# Patient Record
Sex: FEMALE | Race: WHITE | Employment: UNEMPLOYED | URBAN - NONMETROPOLITAN AREA
[De-identification: names, ages, dates, MRNs, and addresses within clinical notes are randomized per-mention and may not be internally consistent; named-entity substitution may affect disease eponyms.]

---

## 2017-07-18 ENCOUNTER — HOSPITAL ENCOUNTER (OUTPATIENT)
Dept: PREADMISSION TESTING | Age: 47
Discharge: HOME | End: 2017-07-18
Payer: MEDICARE

## 2017-12-18 ENCOUNTER — NURSE TRIAGE (OUTPATIENT)
Dept: ADMINISTRATIVE | Age: 47
End: 2017-12-18

## 2017-12-18 NOTE — TELEPHONE ENCOUNTER
Summary: Upper right abdominal pain that wraps around to her back     Upper right abdominal pain that wraps around to her back

## 2017-12-18 NOTE — TELEPHONE ENCOUNTER
Reason for Disposition   [1] MILD-MODERATE pain AND [2] constant AND [3] present > 2 hours    Answer Assessment - Initial Assessment Questions  1. LOCATION: \"Where does it hurt? \"       RUQ pain   2. RADIATION: \"Does the pain shoot anywhere else? \" (e.g., chest, back)     To back   3. ONSET: \"When did the pain begin? \" (e.g., minutes, hours or days ago)       Three days ago. 4. SUDDEN: \"Gradual or sudden onset? \"      Gradual   5. PATTERN \"Does the pain come and go, or is it constant? \"     - If constant: \"Is it getting better, staying the same, or worsening? \"       (Note: Constant means the pain never goes away completely; most serious pain is constant and it progresses)      - If intermittent: \"How long does it last?\" \"Do you have pain now? \"      (Note: Intermittent means the pain goes away completely between bouts)     intermittent   6. SEVERITY: \"How bad is the pain? \"  (e.g., Scale 1-10; mild, moderate, or severe)    - MILD (1-3): doesn't interfere with normal activities, abdomen soft and not tender to touch     - MODERATE (4-7): interferes with normal activities or awakens from sleep, tender to touch     - SEVERE (8-10): excruciating pain, doubled over, unable to do any normal activities       Pain 7 our of 10   7. RECURRENT SYMPTOM: \"Have you ever had this type of abdominal pain before? \" If so, ask: \"When was the last time? \" and \"What happened that time? \"       No   8. CAUSE: What do you think is causing the abdominal pain?      unknown   9. RELIEVING/AGGRAVATING FACTORS: \"What makes it better or worse? \" (e.g., movement, antacids, bowel movement)    Nothing   10. OTHER SYMPTOMS: \"Has there been any vomiting, diarrhea, constipation, or urine problems? \"      Nausea   11. PREGNANCY: \"Is there any chance you are pregnant? \" \"When was your last menstrual period? \"      No    Protocols used: ABDOMINAL PAIN - [facility]

## 2019-01-15 ENCOUNTER — TELEPHONE (OUTPATIENT)
Dept: CARDIOLOGY | Age: 49
End: 2019-01-15

## 2019-01-16 ENCOUNTER — OFFICE VISIT (OUTPATIENT)
Dept: CARDIOLOGY CLINIC | Age: 49
End: 2019-01-16
Payer: MEDICARE

## 2019-01-16 VITALS
HEART RATE: 66 BPM | BODY MASS INDEX: 43.15 KG/M2 | SYSTOLIC BLOOD PRESSURE: 132 MMHG | DIASTOLIC BLOOD PRESSURE: 80 MMHG | WEIGHT: 219.8 LBS | HEIGHT: 60 IN

## 2019-01-16 DIAGNOSIS — R07.9 CHEST PAIN, UNSPECIFIED TYPE: Primary | ICD-10-CM

## 2019-01-16 DIAGNOSIS — R42 DIZZINESS: ICD-10-CM

## 2019-01-16 DIAGNOSIS — I10 ESSENTIAL HYPERTENSION: ICD-10-CM

## 2019-01-16 PROCEDURE — G8417 CALC BMI ABV UP PARAM F/U: HCPCS | Performed by: NUCLEAR MEDICINE

## 2019-01-16 PROCEDURE — 93000 ELECTROCARDIOGRAM COMPLETE: CPT | Performed by: NUCLEAR MEDICINE

## 2019-01-16 PROCEDURE — G8427 DOCREV CUR MEDS BY ELIG CLIN: HCPCS | Performed by: NUCLEAR MEDICINE

## 2019-01-16 PROCEDURE — G8484 FLU IMMUNIZE NO ADMIN: HCPCS | Performed by: NUCLEAR MEDICINE

## 2019-01-16 PROCEDURE — 99204 OFFICE O/P NEW MOD 45 MIN: CPT | Performed by: NUCLEAR MEDICINE

## 2019-01-16 PROCEDURE — 4004F PT TOBACCO SCREEN RCVD TLK: CPT | Performed by: NUCLEAR MEDICINE

## 2019-01-16 RX ORDER — HYDROCHLOROTHIAZIDE 12.5 MG/1
12.5 TABLET ORAL DAILY
Status: ON HOLD | COMMUNITY
End: 2019-10-30 | Stop reason: HOSPADM

## 2019-01-16 ASSESSMENT — ENCOUNTER SYMPTOMS
RECTAL PAIN: 0
VOMITING: 0
ABDOMINAL PAIN: 0
PHOTOPHOBIA: 0
BLOOD IN STOOL: 0
NAUSEA: 0
BACK PAIN: 0
DIARRHEA: 0
SHORTNESS OF BREATH: 1
CHEST TIGHTNESS: 1
ABDOMINAL DISTENTION: 0
CONSTIPATION: 0
ANAL BLEEDING: 0

## 2019-02-04 ENCOUNTER — TELEPHONE (OUTPATIENT)
Dept: CARDIOLOGY CLINIC | Age: 49
End: 2019-02-04

## 2019-02-19 ENCOUNTER — TELEPHONE (OUTPATIENT)
Dept: CARDIOLOGY CLINIC | Age: 49
End: 2019-02-19

## 2019-02-21 ENCOUNTER — HOSPITAL ENCOUNTER (OUTPATIENT)
Age: 49
Discharge: HOME | End: 2019-02-21
Payer: MEDICARE

## 2019-02-21 LAB
AVERAGE GLUCOSE: 108 MG/DL (ref 70–126)
BASOPHILS # BLD: 0.6 %
BASOPHILS ABSOLUTE: 0 THOU/MM3 (ref 0–0.1)
CHOLESTEROL, TOTAL: 186 MG/DL (ref 100–199)
EOSINOPHIL # BLD: 2.2 %
EOSINOPHILS ABSOLUTE: 0.1 THOU/MM3 (ref 0–0.4)
ERYTHROCYTE [DISTWIDTH] IN BLOOD BY AUTOMATED COUNT: 13.2 % (ref 11.5–14.5)
ERYTHROCYTE [DISTWIDTH] IN BLOOD BY AUTOMATED COUNT: 41.1 FL (ref 35–45)
GLUCOSE BLD-MCNC: 96 MG/DL (ref 70–108)
HBA1C MFR BLD: 5.6 % (ref 4.4–6.4)
HCT VFR BLD CALC: 44.2 % (ref 37–47)
HDLC SERPL-MCNC: 54 MG/DL
HEMOGLOBIN: 14.3 GM/DL (ref 12–16)
IMMATURE GRANS (ABS): 0.06 THOU/MM3 (ref 0–0.07)
IMMATURE GRANULOCYTES: 1 %
LDL CHOLESTEROL CALCULATED: 112 MG/DL
LYMPHOCYTES # BLD: 33.1 %
LYMPHOCYTES ABSOLUTE: 2.1 THOU/MM3 (ref 1–4.8)
MCH RBC QN AUTO: 28.3 PG (ref 26–33)
MCHC RBC AUTO-ENTMCNC: 32.4 GM/DL (ref 32.2–35.5)
MCV RBC AUTO: 87.4 FL (ref 81–99)
MONOCYTES # BLD: 6.3 %
MONOCYTES ABSOLUTE: 0.4 THOU/MM3 (ref 0.4–1.3)
NUCLEATED RED BLOOD CELLS: 0 /100 WBC
PLATELET # BLD: 232 THOU/MM3 (ref 130–400)
PMV BLD AUTO: 10.4 FL (ref 9.4–12.4)
RBC # BLD: 5.06 MILL/MM3 (ref 4.2–5.4)
SEG NEUTROPHILS: 56.8 %
SEGMENTED NEUTROPHILS ABSOLUTE COUNT: 3.5 THOU/MM3 (ref 1.8–7.7)
TRIGL SERPL-MCNC: 100 MG/DL (ref 0–199)
TSH SERPL DL<=0.05 MIU/L-ACNC: 2.68 UIU/ML (ref 0.4–4.2)
WBC # BLD: 6.2 THOU/MM3 (ref 4.8–10.8)

## 2019-02-21 PROCEDURE — 82947 ASSAY GLUCOSE BLOOD QUANT: CPT

## 2019-02-21 PROCEDURE — 36415 COLL VENOUS BLD VENIPUNCTURE: CPT

## 2019-02-21 PROCEDURE — 84443 ASSAY THYROID STIM HORMONE: CPT

## 2019-02-21 PROCEDURE — 83036 HEMOGLOBIN GLYCOSYLATED A1C: CPT

## 2019-02-21 PROCEDURE — 85025 COMPLETE CBC W/AUTO DIFF WBC: CPT

## 2019-02-21 PROCEDURE — 80061 LIPID PANEL: CPT

## 2019-03-11 ENCOUNTER — TELEPHONE (OUTPATIENT)
Dept: CARDIOLOGY CLINIC | Age: 49
End: 2019-03-11

## 2019-03-11 ENCOUNTER — HOSPITAL ENCOUNTER (OUTPATIENT)
Dept: NON INVASIVE DIAGNOSTICS | Age: 49
Discharge: HOME | End: 2019-03-11
Payer: MEDICARE

## 2019-03-11 DIAGNOSIS — R07.9 CHEST PAIN, UNSPECIFIED TYPE: ICD-10-CM

## 2019-03-11 DIAGNOSIS — R42 DIZZINESS: ICD-10-CM

## 2019-03-11 DIAGNOSIS — A37.90 PERTUSSIS-LIKE SYNDROME: Primary | ICD-10-CM

## 2019-03-11 LAB
LV EF: 60 %
LVEF MODALITY: NORMAL

## 2019-03-11 PROCEDURE — 2709999900 HC NON-CHARGEABLE SUPPLY

## 2019-03-11 PROCEDURE — 93306 TTE W/DOPPLER COMPLETE: CPT

## 2019-03-11 PROCEDURE — 93660 TILT TABLE EVALUATION: CPT | Performed by: NUCLEAR MEDICINE

## 2019-03-11 PROCEDURE — 93270 REMOTE 30 DAY ECG REV/REPORT: CPT

## 2019-05-23 ENCOUNTER — OFFICE VISIT (OUTPATIENT)
Dept: ENT CLINIC | Age: 49
End: 2019-05-23
Payer: MEDICARE

## 2019-05-23 VITALS
RESPIRATION RATE: 14 BRPM | HEIGHT: 60 IN | DIASTOLIC BLOOD PRESSURE: 80 MMHG | BODY MASS INDEX: 39.95 KG/M2 | SYSTOLIC BLOOD PRESSURE: 130 MMHG | WEIGHT: 203.5 LBS | HEART RATE: 80 BPM | TEMPERATURE: 98 F

## 2019-05-23 DIAGNOSIS — R42 DIZZINESS: Primary | ICD-10-CM

## 2019-05-23 DIAGNOSIS — M26.609 TMJ (TEMPOROMANDIBULAR JOINT SYNDROME): ICD-10-CM

## 2019-05-23 PROCEDURE — 1036F TOBACCO NON-USER: CPT | Performed by: OTOLARYNGOLOGY

## 2019-05-23 PROCEDURE — G8417 CALC BMI ABV UP PARAM F/U: HCPCS | Performed by: OTOLARYNGOLOGY

## 2019-05-23 PROCEDURE — G8427 DOCREV CUR MEDS BY ELIG CLIN: HCPCS | Performed by: OTOLARYNGOLOGY

## 2019-05-23 PROCEDURE — 99204 OFFICE O/P NEW MOD 45 MIN: CPT | Performed by: OTOLARYNGOLOGY

## 2019-05-23 RX ORDER — IBUPROFEN 800 MG/1
800 TABLET ORAL EVERY 6 HOURS PRN
Status: ON HOLD | COMMUNITY
End: 2019-07-19 | Stop reason: HOSPADM

## 2019-05-23 NOTE — PROGRESS NOTES
[address] [place], NOSE [facility]  [address] [address]  Dept: [phone]  Dept Fax: [phone]  Loc: [phone]    Akilah Patel is a 51 y.o. female who was referred Shirin Powers MD for:  Chief Complaint   Patient presents with    Dizziness     New patient is here for dizziness, synocope and choking. Radha Campa HPI:     Akilah Patel is a 51 y.o. female who presents today for evaluation of dizziness which has been going on for about 1 year. She doesn't report true vertigo but does reports what sounds like presyncopal episodes where she feels like she is going to pass out. These last seconds and are worse when she gets up from lying down. Otherwise she can not think of any other associations with it. She denies ever truly losing consciousness but she had at one time fallen. No history of trauma to her head. She describes her dizziness as feeling like she is [place] over. \" She has no history or family history of migraines but on further questioning she is not clear whether she gets headaches. She doesn't report that bright lights or loud noises make her dizzy, however she does feel that bright lights makes her feel \"queasy. \"  She was worked up by her cardiologist in a tilt table test was negative. She also reports that she had a Holter monitor for arrhythmias but she self-reports that it malfunctioned and she did not wear all the time. She has no prior history of ear disease or ear surgeries. No hearing loss, tinnitus, otorrhea or otalgia. She does report left TMJ pain with chewing. She is a history of reflux with globus sensation and throat clearing. History:      Allergies   Allergen Reactions    Codeine Rash    Dilaudid [Hydromorphone Hcl] Rash     Current Outpatient Medications   Medication Sig Dispense Refill    ibuprofen (ADVIL;MOTRIN) 800 MG tablet Take 800 mg by mouth every 6 hours as needed for Pain  Cyclobenzaprine HCl (FLEXERIL PO) Take by mouth      PHENTERMINE HCL PO Take by mouth      hydrochlorothiazide (HYDRODIURIL) 12.5 MG tablet Take 12.5 mg by mouth daily       No current facility-administered medications for this visit. Past Medical History:   Diagnosis Date    Arthritis     Back disorder     spine     GERD (gastroesophageal reflux disease)       Past Surgical History:   Procedure Laterality Date    BLADDER SUSPENSION      BREAST REDUCTION SURGERY      CHOLECYSTECTOMY      COLONOSCOPY      DILATION AND CURETTAGE OF UTERUS      ENDOSCOPY, COLON, DIAGNOSTIC      HYSTERECTOMY      SPINE SURGERY      ACDF     Family History   Problem Relation Age of Onset    Ulcerative Colitis Mother     Cancer Father         skin    High Blood Pressure Father     Diabetes Father      Social History     Tobacco Use    Smoking status: Former Smoker     Packs/day: 2.00     Years: 20.00     Pack years: 40.00     Types: Cigarettes     Last attempt to quit: 2017     Years since quittin.3    Smokeless tobacco: Never Used   Substance Use Topics    Alcohol use: No       Subjective:      Review of Systems   Constitutional: Negative. HENT: Positive for trouble swallowing. Negative for ear discharge, ear pain, facial swelling, hearing loss, nosebleeds, postnasal drip, rhinorrhea, sinus pressure, sinus pain, sore throat, tinnitus and voice change. Eyes: Negative for itching and visual disturbance. Respiratory: Negative for cough, shortness of breath and stridor. Gastrointestinal: Negative for nausea and vomiting. Musculoskeletal: Negative for neck pain and neck stiffness. Skin: Negative for color change and wound. Allergic/Immunologic: Negative for environmental allergies and immunocompromised state. Neurological: Positive for dizziness and light-headedness. Negative for facial asymmetry. Psychiatric/Behavioral: Negative for behavioral problems and hallucinations. All other systems reviewed and are negative. Rest of review of systems are negative, except as noted in HPI. Objective:   /80 (Site: Left Upper Arm, Position: Sitting)   Pulse 80   Temp 98 °F (36.7 °C) (Oral)   Resp 14   Ht 5' (1.524 m)   Wt 203 lb 8 oz (92.3 kg)   BMI 39.74 kg/m²     PHYSICAL EXAM  Constitutional: He is oriented to person, place, and time. He appears well-developed and well-nourished. No distress. HENT:   Head: Normocephalic and atraumatic. Right Ear: External ear normal. Ear canal clear. Tympanic membrane intact. Middle ear aerated. Left Ear: External ear normal. Ear canal clear. Tympanic membrane intact. Middle ear aerated. Nose: External nose normal.  Nasal mucosa normal.  No lesions noted. Mouth/Throat: Fair dentition. Oral cavity mucosa normal, no masses or lesions noted. Oropharynx is clear and moist.   Eyes: Pupils are equal, round, and reactive to light. Conjunctivae and EOM are normal.   Neck: Normal range of motion. Neck supple. No JVD present. No tracheal deviation present. No thyromegaly present. No cervical lymphadenopathy noted. Well healed neck incision from ACDF surgery. Cardiovascular: Normal rate. Pulmonary/Chest: Effort normal. No stridor or stertor. No respiratory distress. Musculoskeletal: Normal range of motion. He exhibits no edema or Lymphadenopathy. Neurological: He is alert and oriented to person, place, and time. Cranial nerve II-XII grossly intact. Skin: Skin is warm. No erythema. Psychiatric: Normal mood and affect. Behavior is normal.   Vitals reviewed. No spontaneous or gaze evoked nystagmus. Normal gait. Left sided tenderness to palpation, and clicking. Data:  All of the past medical history, past surgical history, family history,social history, allergies and current medications were reviewed with the patient. Assessment & Plan   Diagnoses and all orders for this visit:     Diagnosis Orders   1.  Dizziness Audiometry with tympanometry    Videonystagmography   2. TMJ (temporomandibular joint syndrome)       52-year-old female with subjective symptoms of dizziness which is worse from standing up from lying down. She does not report true vertigo but more of episodes of presyncope, like she feels like she is going to pass out. She has already been worked up by her cardiologist and had a negative tilt table test and no signs of arrhythmia. From a systematic standpoint it would be reasonable to perform vestibular testing to see if there are any objective data showing any pathologies of her inner ear. We will also obtain an audiogram and tympanometry to complete the evaluation. I discussed that finding a diagnosis can be difficult and would not be done on our first visit. I gave her education on inner ear causes of dizziness and discussed keeping a dizzy log. If vestibular testing is negative, the next approach may be sending her to neurology to rule out a central cause. She does report that bright lights does seem to make her feel \"queasy\" and she'll pay more attention to associations with her dizziness. If we do not find a cause of her symptoms she still may benefit from physical therapy and in fact she is currently undergoing this for her chronic musculoskeletal issues. I gave her handouts on home exercises she can do to strengthen her inner ear. Follow-up in 6 weeks with our NP or PA after she obtains her hearing and balance testing. A total of 45 minutes were spent face-to-face with the patient during this encounter and over half of that time was spent on counseling and coordination of care. This includes education on causes of dizziness, and our systematic method to help find a diagnosis. We also discussed ways to cope, and the multifactorial nature of dizziness. The findings were explained and her questions were answered.         Return in about 6 weeks (around 7/4/2019) for Evaluation of dizziness following audio/tymp, and VNG. Gregory Bangura MD    **This report has been created using voice recognition software. It may contain minor errors which are inherent in voice recognition technology. **

## 2019-05-24 ASSESSMENT — ENCOUNTER SYMPTOMS
SORE THROAT: 0
COLOR CHANGE: 0
COUGH: 0
TROUBLE SWALLOWING: 1
VOICE CHANGE: 0
RHINORRHEA: 0
NAUSEA: 0
VOMITING: 0
EYE ITCHING: 0
SINUS PAIN: 0
STRIDOR: 0
SHORTNESS OF BREATH: 0
SINUS PRESSURE: 0
FACIAL SWELLING: 0

## 2019-06-05 ENCOUNTER — HOSPITAL ENCOUNTER (OUTPATIENT)
Dept: AUDIOLOGY | Age: 49
Discharge: HOME | End: 2019-06-05
Payer: MEDICARE

## 2019-06-05 PROCEDURE — 92540 BASIC VESTIBULAR EVALUATION: CPT | Performed by: AUDIOLOGIST

## 2019-06-05 PROCEDURE — 92537 CALORIC VSTBLR TEST W/REC: CPT | Performed by: AUDIOLOGIST

## 2019-06-05 PROCEDURE — 92557 COMPREHENSIVE HEARING TEST: CPT | Performed by: AUDIOLOGIST

## 2019-06-05 PROCEDURE — 92567 TYMPANOMETRY: CPT | Performed by: AUDIOLOGIST

## 2019-06-05 NOTE — PROGRESS NOTES
ACCOUNT #: [de-identified]                                    AUDIOLOGICAL EVALUATION WITH VNG      MEDICATIONS REVIEWED:  Patient has held appropriate medications for VNG testing. CHIEF COMPLAINT:  Patient reported dizziness with near syncope when laying down, getting up, and with quick movements. She has experienced symptoms for several months. She feels like she is going to faint when she has an episode. She also has left otalgia. OTOSCOPY: clear EAC's. AUDIOGRAM        Reliability: Good  Audiometer Used:  GSI-61    PURE TONES     RE    LE     [x]   [x] WNL        []   [] Mild    []   [] Moderate       []   [] Mod-Severe   []   [] Severe    []   [] Profound      SPEECH AUDIOMETRY   Right Left Sound Field Aided   PTA 7 7     SRT 0 0     SAT       MASKING       % WRS   QUIET 100 100      30 SL 30 SL     %WRS   NOISE              MCL       UCL            Live Voice  [x]     Recorded  []     List   []     WORD RECOGNITION   RE    LE  [x]   [x]  Excellent    []   []  Good  []   [] Fair  []   [] Poor  []   [] Very Poor    TYMPANOGRAMS  RE    LE  [x]   [x]  Normal compliance    []   []  Reduced mobility  []   [] Hyper mobility  [x]   [x] Normal middle ear pressure  []   [] Negative middle ear pressure  []   [] Positive middle ear pressure  []   [] Flat w/normal ECV  []   [] Flat w/large ECV  []   [] Patent PE tube  []   [] Non-Patent PE tube  []   [] Could Not Test       VNG RESULTS:    SACCADES: WNL  TRACKING: WNL  OPTOKINETICS:  WNL  GAZE: WNL  HALLPIKE MANEUVER:  BPPV BILATERALLY. RESPONSE WAS STRONGER WITH THE LEFT EAR UNDERNEATH. NYSTAGMUS WAS ROTATORY WITH AND OCCURRED AFTER 16 SEC LASTING APPROXIMATELY 10 SECONDS. RESPONSE DID FATIGUE. POSITIONAL: WNL  HEADSHAKE TEST: WNL  CALORIC TESTING: WNL  PARVEEN' SOT:WNL    IMPRESSIONS/RECOMMENDATION(S):    1. VNG REVEALED BPPV (STRONGER WITH THE LEFT EAR DOWN). ALL OTHER RESULTS WERE NEGATIVE. VESTIBULAR REHABILITATION WITH A REPOSITIONING MANEUVER IS RECOMMENDED.

## 2019-07-10 ENCOUNTER — HOSPITAL ENCOUNTER (OUTPATIENT)
Dept: CT IMAGING | Age: 49
Discharge: HOME | End: 2019-07-10
Payer: MEDICARE

## 2019-07-10 DIAGNOSIS — R19.7 DIARRHEA, UNSPECIFIED TYPE: ICD-10-CM

## 2019-07-10 DIAGNOSIS — R10.30 LOWER ABDOMINAL PAIN: ICD-10-CM

## 2019-07-10 PROCEDURE — 6360000004 HC RX CONTRAST MEDICATION: Performed by: INTERNAL MEDICINE

## 2019-07-10 PROCEDURE — 74177 CT ABD & PELVIS W/CONTRAST: CPT

## 2019-07-10 RX ADMIN — IOPAMIDOL 85 ML: 755 INJECTION, SOLUTION INTRAVENOUS at 09:07

## 2019-07-10 RX ADMIN — IOHEXOL 50 ML: 240 INJECTION, SOLUTION INTRATHECAL; INTRAVASCULAR; INTRAVENOUS; ORAL at 09:09

## 2019-07-17 ENCOUNTER — HOSPITAL ENCOUNTER (OUTPATIENT)
Age: 49
Discharge: HOME | End: 2019-07-17
Payer: MEDICARE

## 2019-07-17 LAB
ANION GAP SERPL CALCULATED.3IONS-SCNC: 11 MEQ/L (ref 8–16)
BUN BLDV-MCNC: 22 MG/DL (ref 7–22)
CALCIUM SERPL-MCNC: 9.4 MG/DL (ref 8.5–10.5)
CHLORIDE BLD-SCNC: 104 MEQ/L (ref 98–111)
CO2: 27 MEQ/L (ref 23–33)
CREAT SERPL-MCNC: 0.9 MG/DL (ref 0.4–1.2)
ERYTHROCYTE [DISTWIDTH] IN BLOOD BY AUTOMATED COUNT: 13.6 % (ref 11.5–14.5)
ERYTHROCYTE [DISTWIDTH] IN BLOOD BY AUTOMATED COUNT: 43.4 FL (ref 35–45)
GFR SERPL CREATININE-BSD FRML MDRD: 67 ML/MIN/1.73M2
GLUCOSE BLD-MCNC: 94 MG/DL (ref 70–108)
HCT VFR BLD CALC: 44.3 % (ref 37–47)
HEMOGLOBIN: 14.6 GM/DL (ref 12–16)
MCH RBC QN AUTO: 29 PG (ref 26–33)
MCHC RBC AUTO-ENTMCNC: 33 GM/DL (ref 32.2–35.5)
MCV RBC AUTO: 87.9 FL (ref 81–99)
PLATELET # BLD: 229 THOU/MM3 (ref 130–400)
PMV BLD AUTO: 10.6 FL (ref 9.4–12.4)
POTASSIUM SERPL-SCNC: 4.7 MEQ/L (ref 3.5–5.2)
RBC # BLD: 5.04 MILL/MM3 (ref 4.2–5.4)
SODIUM BLD-SCNC: 142 MEQ/L (ref 135–145)
WBC # BLD: 6 THOU/MM3 (ref 4.8–10.8)

## 2019-07-17 PROCEDURE — 80048 BASIC METABOLIC PNL TOTAL CA: CPT

## 2019-07-17 PROCEDURE — 85027 COMPLETE CBC AUTOMATED: CPT

## 2019-07-17 PROCEDURE — 36415 COLL VENOUS BLD VENIPUNCTURE: CPT

## 2019-07-18 ENCOUNTER — OFFICE VISIT (OUTPATIENT)
Dept: ENT CLINIC | Age: 49
End: 2019-07-18
Payer: MEDICARE

## 2019-07-18 VITALS
SYSTOLIC BLOOD PRESSURE: 130 MMHG | HEIGHT: 60 IN | DIASTOLIC BLOOD PRESSURE: 76 MMHG | TEMPERATURE: 98.9 F | WEIGHT: 206 LBS | RESPIRATION RATE: 14 BRPM | BODY MASS INDEX: 40.44 KG/M2 | HEART RATE: 74 BPM

## 2019-07-18 DIAGNOSIS — R42 EPISODIC LIGHTHEADEDNESS: ICD-10-CM

## 2019-07-18 DIAGNOSIS — M26.609 TMJ (TEMPOROMANDIBULAR JOINT SYNDROME): ICD-10-CM

## 2019-07-18 DIAGNOSIS — H81.12 BENIGN PAROXYSMAL POSITIONAL VERTIGO OF LEFT EAR: Primary | ICD-10-CM

## 2019-07-18 PROCEDURE — 95992 CANALITH REPOSITIONING PROC: CPT | Performed by: OTOLARYNGOLOGY

## 2019-07-18 PROCEDURE — G8417 CALC BMI ABV UP PARAM F/U: HCPCS | Performed by: OTOLARYNGOLOGY

## 2019-07-18 PROCEDURE — 99213 OFFICE O/P EST LOW 20 MIN: CPT | Performed by: OTOLARYNGOLOGY

## 2019-07-18 PROCEDURE — G8427 DOCREV CUR MEDS BY ELIG CLIN: HCPCS | Performed by: OTOLARYNGOLOGY

## 2019-07-18 PROCEDURE — 1036F TOBACCO NON-USER: CPT | Performed by: OTOLARYNGOLOGY

## 2019-07-18 ASSESSMENT — ENCOUNTER SYMPTOMS
VOICE CHANGE: 0
CHOKING: 0
SORE THROAT: 0
APNEA: 0
FACIAL SWELLING: 0
NAUSEA: 0
ABDOMINAL PAIN: 0
COLOR CHANGE: 0
STRIDOR: 0
WHEEZING: 0
SINUS PRESSURE: 0
RHINORRHEA: 0
VOMITING: 0
SHORTNESS OF BREATH: 0
DIARRHEA: 0
CHEST TIGHTNESS: 0
TROUBLE SWALLOWING: 0
COUGH: 0

## 2019-07-18 NOTE — PROGRESS NOTES
Problem Relation Age of Onset    Ulcerative Colitis Mother     Cancer Father         skin    High Blood Pressure Father     Diabetes Father      Social History     Tobacco Use    Smoking status: Former Smoker     Packs/day: 2.00     Years: 20.00     Pack years: 40.00     Types: Cigarettes     Last attempt to quit: 2017     Years since quittin.5    Smokeless tobacco: Never Used   Substance Use Topics    Alcohol use: No       Subjective:      Review of Systems   Constitutional: Negative for activity change, appetite change, chills, diaphoresis, fatigue, fever and unexpected weight change. HENT: Negative for congestion, dental problem, ear discharge, ear pain, facial swelling, hearing loss, mouth sores, nosebleeds, postnasal drip, rhinorrhea, sinus pressure, sneezing, sore throat, tinnitus, trouble swallowing and voice change. Eyes: Negative for visual disturbance. Respiratory: Negative for apnea, cough, choking, chest tightness, shortness of breath, wheezing and stridor. Cardiovascular: Negative for chest pain, palpitations and leg swelling. Gastrointestinal: Negative for abdominal pain, diarrhea, nausea and vomiting. Endocrine: Negative for cold intolerance, heat intolerance, polydipsia and polyuria. Genitourinary: Negative for dysuria, enuresis and hematuria. Musculoskeletal: Negative for arthralgias, gait problem, neck pain and neck stiffness. Skin: Negative for color change and rash. Allergic/Immunologic: Negative for environmental allergies, food allergies and immunocompromised state. Neurological: Positive for light-headedness. Negative for dizziness, syncope, facial asymmetry, speech difficulty and headaches. Hematological: Negative for adenopathy. Does not bruise/bleed easily. Psychiatric/Behavioral: Negative for confusion and sleep disturbance. The patient is not nervous/anxious.         Objective:   /76 (Site: Left Upper Arm, Position: Sitting)   Pulse 74

## 2019-07-18 NOTE — LETTER
[address] and Throat  [address] [facility]  Phone: [phone]  Fax: [address], [place]        August 18, 2019    Whit Ramirez [address] [facility]  Ludmila Degree    Patient: Richard Jarquin   MR Number: [id number]   YOB: 1970   Date of Visit: 7/18/2019     Dear Long Castillo,    I recently saw your patient, Richard Jarquin, regarding her dizziness. She has 2 types of dizziness. The VNG testing demonstrated that she did have BPPV and I performed an Epley maneuver for her left posterior semicircular canal..     Below are the relevant portions of my assessment and plan of care. Assessment & Plan   Diagnoses and all orders for this visit:     Diagnosis Orders   1. Benign paroxysmal positional vertigo of left ear     2. Episodic lightheadedness     3. TMJ (temporomandibular joint syndrome)         The findings were explained and her questions were answered. I explained this had about an 80% chance of controlling the vertigo and would not necessarily help the lightheadedness. She was told not to lie flat for approximately 48 hours    Return in about 6 weeks (around 8/29/2019) for Follow-Up BPPV after Epley. If you have questions, please do not hesitate to call me. I look forward to following Hieu Graham along with you.     Sincerely,          Raf Solis MD

## 2019-07-19 ENCOUNTER — HOSPITAL ENCOUNTER (OUTPATIENT)
Age: 49
Setting detail: OUTPATIENT SURGERY
Discharge: HOME | End: 2019-07-19
Attending: INTERNAL MEDICINE | Admitting: INTERNAL MEDICINE
Payer: MEDICARE

## 2019-07-19 ENCOUNTER — ANESTHESIA EVENT (OUTPATIENT)
Dept: ENDOSCOPY | Age: 49
End: 2019-07-19
Payer: MEDICARE

## 2019-07-19 ENCOUNTER — ANESTHESIA (OUTPATIENT)
Dept: ENDOSCOPY | Age: 49
End: 2019-07-19
Payer: MEDICARE

## 2019-07-19 VITALS
HEART RATE: 63 BPM | OXYGEN SATURATION: 98 % | HEIGHT: 60 IN | SYSTOLIC BLOOD PRESSURE: 153 MMHG | WEIGHT: 206.6 LBS | DIASTOLIC BLOOD PRESSURE: 97 MMHG | BODY MASS INDEX: 40.56 KG/M2 | TEMPERATURE: 97.8 F | RESPIRATION RATE: 16 BRPM

## 2019-07-19 VITALS
SYSTOLIC BLOOD PRESSURE: 183 MMHG | OXYGEN SATURATION: 99 % | RESPIRATION RATE: 9 BRPM | DIASTOLIC BLOOD PRESSURE: 103 MMHG

## 2019-07-19 PROCEDURE — 2580000003 HC RX 258: Performed by: INTERNAL MEDICINE

## 2019-07-19 PROCEDURE — 3700000000 HC ANESTHESIA ATTENDED CARE: Performed by: INTERNAL MEDICINE

## 2019-07-19 PROCEDURE — 3609012400 HC EGD TRANSORAL BIOPSY SINGLE/MULTIPLE: Performed by: INTERNAL MEDICINE

## 2019-07-19 PROCEDURE — 3609012700 HC EGD DILATION SAVORY: Performed by: INTERNAL MEDICINE

## 2019-07-19 PROCEDURE — 2709999900 HC NON-CHARGEABLE SUPPLY: Performed by: INTERNAL MEDICINE

## 2019-07-19 PROCEDURE — 3700000001 HC ADD 15 MINUTES (ANESTHESIA): Performed by: INTERNAL MEDICINE

## 2019-07-19 PROCEDURE — 7100000000 HC PACU RECOVERY - FIRST 15 MIN: Performed by: INTERNAL MEDICINE

## 2019-07-19 PROCEDURE — 7100000001 HC PACU RECOVERY - ADDTL 15 MIN: Performed by: INTERNAL MEDICINE

## 2019-07-19 PROCEDURE — 88305 TISSUE EXAM BY PATHOLOGIST: CPT

## 2019-07-19 PROCEDURE — 2500000003 HC RX 250 WO HCPCS: Performed by: SPECIALIST

## 2019-07-19 PROCEDURE — 6360000002 HC RX W HCPCS: Performed by: SPECIALIST

## 2019-07-19 PROCEDURE — 86677 HELICOBACTER PYLORI ANTIBODY: CPT

## 2019-07-19 RX ORDER — SODIUM CHLORIDE 450 MG/100ML
INJECTION, SOLUTION INTRAVENOUS CONTINUOUS
Status: DISCONTINUED | OUTPATIENT
Start: 2019-07-19 | End: 2019-07-19 | Stop reason: HOSPADM

## 2019-07-19 RX ORDER — GLYCOPYRROLATE 1 MG/5 ML
SYRINGE (ML) INTRAVENOUS PRN
Status: DISCONTINUED | OUTPATIENT
Start: 2019-07-19 | End: 2019-07-19 | Stop reason: SDUPTHER

## 2019-07-19 RX ORDER — LIDOCAINE HYDROCHLORIDE 10 MG/ML
INJECTION, SOLUTION INFILTRATION; PERINEURAL PRN
Status: DISCONTINUED | OUTPATIENT
Start: 2019-07-19 | End: 2019-07-19 | Stop reason: SDUPTHER

## 2019-07-19 RX ORDER — PROPOFOL 10 MG/ML
INJECTION, EMULSION INTRAVENOUS PRN
Status: DISCONTINUED | OUTPATIENT
Start: 2019-07-19 | End: 2019-07-19 | Stop reason: SDUPTHER

## 2019-07-19 RX ADMIN — SODIUM CHLORIDE: 4.5 INJECTION, SOLUTION INTRAVENOUS at 10:22

## 2019-07-19 RX ADMIN — PROPOFOL 230 MG: 10 INJECTION, EMULSION INTRAVENOUS at 10:37

## 2019-07-19 RX ADMIN — Medication 0.1 MG: at 10:37

## 2019-07-19 RX ADMIN — LIDOCAINE HYDROCHLORIDE 100 MG: 10 INJECTION, SOLUTION INFILTRATION; PERINEURAL at 10:37

## 2019-07-19 ASSESSMENT — PAIN - FUNCTIONAL ASSESSMENT: PAIN_FUNCTIONAL_ASSESSMENT: 0-10

## 2019-07-19 ASSESSMENT — PAIN DESCRIPTION - DESCRIPTORS: DESCRIPTORS: ACHING

## 2019-07-19 NOTE — OP NOTE
#5.  The patient also had grade 2 esophagitis noted as  shown in picture #2. Multiple biopsies obtained to rule out Baptiste's  epithelium. Scope was advanced down through the distal esophagus  through the stomach, into the second portion of duodenum. On withdrawal  of the scope, the duodenum looks normal as shown in picture #10. In the  antrum, patchy focal erythema consistent with antral gastritis noted as  shown in pictures #8 and #9. Biopsies obtained for CLOtest.  Lesser and  greater curvature and body of the stomach looks normal as shown in  picture #7. On retroflex, fundus looks normal as shown in picture #6. In the distal esophagus, again grade 2 esophagitis with benign distal  esophageal stricture noted. The scope was advanced into the second portion of duodenum. Guidewire  was passed through the scope. Tip of the guidewire was placed in the  antrum of the stomach. Scope was removed while advancing the guidewire. #5 markings noted at the bite block after complete removal of the scope. #54-Wallisian and #60-Wallisian American dilators were advanced over the  guidewire and the esophageal stricture was dilated serially. After the  60-Wallisian American dilator was used to dilate the stricture, the dilator  and guidewire were removed out as a unit. No immediate complications  noted. The patient tolerated the procedure well without any immediate  complications. Vitals including blood pressure, heart rate, and pulse  ox were stable during the procedure and after the procedure. IMPRESSION:  1. Esophagogastroduodenoscopy to the second portion of duodenum:   Antral gastritis. Biopsies obtained. Grade 2 esophagitis. Biopsies  obtained to rule out Baptiste's epithelium. 2.  Distal esophageal stricture, status post dilation with #54 and  #60-Wallisian American dilators. RECOMMENDATIONS:  Antireflux instructions. Soft diet today. Pantoprazole 40 mg p.o. q.a.m.   Call my office in less than two

## 2019-07-19 NOTE — PROGRESS NOTES
EGD with dilation complete. 54Fr and 60Fr American dilators used. Patient tolerated well. Photos taken. Biopsies taken. Two specimen jars labeled and sent to lab.

## 2019-07-20 LAB — UREASE-CLO-C. PYLORI: NEGATIVE

## 2019-07-22 ENCOUNTER — OFFICE VISIT (OUTPATIENT)
Dept: CARDIOLOGY CLINIC | Age: 49
End: 2019-07-22
Payer: MEDICARE

## 2019-07-22 VITALS
SYSTOLIC BLOOD PRESSURE: 110 MMHG | DIASTOLIC BLOOD PRESSURE: 80 MMHG | HEIGHT: 60 IN | BODY MASS INDEX: 41.03 KG/M2 | WEIGHT: 209 LBS | HEART RATE: 60 BPM

## 2019-07-22 DIAGNOSIS — R00.2 PALPITATION: ICD-10-CM

## 2019-07-22 DIAGNOSIS — I10 ESSENTIAL HYPERTENSION: Primary | ICD-10-CM

## 2019-07-22 PROBLEM — M54.2 NECK PAIN: Status: ACTIVE | Noted: 2018-09-12

## 2019-07-22 PROCEDURE — G8417 CALC BMI ABV UP PARAM F/U: HCPCS | Performed by: NUCLEAR MEDICINE

## 2019-07-22 PROCEDURE — 1036F TOBACCO NON-USER: CPT | Performed by: NUCLEAR MEDICINE

## 2019-07-22 PROCEDURE — G8427 DOCREV CUR MEDS BY ELIG CLIN: HCPCS | Performed by: NUCLEAR MEDICINE

## 2019-07-22 PROCEDURE — 99213 OFFICE O/P EST LOW 20 MIN: CPT | Performed by: NUCLEAR MEDICINE

## 2019-07-22 RX ORDER — PANTOPRAZOLE SODIUM 40 MG/1
40 TABLET, DELAYED RELEASE ORAL
Status: ON HOLD | COMMUNITY
Start: 2019-07-19 | End: 2019-10-30 | Stop reason: HOSPADM

## 2019-07-22 NOTE — PROGRESS NOTES
tablet 40 mg       No current facility-administered medications for this visit. Allergies   Allergen Reactions    Codeine Rash    Dilaudid [Hydromorphone Hcl] Rash     Health Maintenance   Topic Date Due    HIV screen  01/14/1986    DTaP/Tdap/Td vaccine (1 - Tdap) 01/14/1990    Cervical cancer screen  01/14/1992    Flu vaccine (1) 09/01/2019    Potassium monitoring  07/17/2020    Creatinine monitoring  07/17/2020    Lipid screen  02/21/2024    Pneumococcal 0-64 years Vaccine  Aged Out       Subjective:  Review of Systems  General:   No fever, no chills, No fatigue or weight loss  Pulmonary:    No dyspnea, no wheezing  Cardiac:    Denies recent chest pain,   GI:     No nausea or vomiting, no abdominal pain  Neuro:    some dizziness or light headedness,   Musculoskeletal:  No recent active issues  Extremities:   No edema, good peripheral pulses      Objective:  Physical Exam  /80   Pulse 60   Ht 5' (1.524 m)   Wt 209 lb (94.8 kg)   BMI 40.82 kg/m²   General:   Well developed, well nourished  Lungs:   Clear to auscultation  Heart:    Normal S1 S2, Slight murmur. no rubs, no gallops  Abdomen:   Soft, non tender, no organomegalies, positive bowel sounds  Extremities:   No edema, no cyanosis, good peripheral pulses  Neurological:   Awake, alert, oriented. No obvious focal deficits  Musculoskelatal:  No obvious deformities    Assessment:      Diagnosis Orders   1. Essential hypertension     2. Palpitation     cardiac fair for now     Plan:  No follow-ups on file. As above  Continue risk factor modification and medical management  Thank you for allowing me to participate in the care of your patient. Please don't hesitate to contact me regarding any further issues related to the patient care    Orders Placed:  No orders of the defined types were placed in this encounter. Medications Prescribed:  No orders of the defined types were placed in this encounter.          Discussed use, benefit, and side effects of prescribed medications. All patient questions answered. Pt voicedunderstanding. Instructed to continue current medications, diet and exercise. Continue risk factor modification and medical management. Patient agreed with treatment plan. Follow up as directed.     Electronically signedby Humberto Carbajal MD on 7/22/2019 at 12:52 PM

## 2019-08-30 ENCOUNTER — HOSPITAL ENCOUNTER (OUTPATIENT)
Dept: GENERAL RADIOLOGY | Age: 49
Discharge: HOME | End: 2019-08-30
Payer: MEDICARE

## 2019-08-30 DIAGNOSIS — R19.7 DIARRHEA, UNSPECIFIED TYPE: ICD-10-CM

## 2019-08-30 DIAGNOSIS — R11.0 NAUSEA: ICD-10-CM

## 2019-08-30 PROCEDURE — 2500000003 HC RX 250 WO HCPCS: Performed by: INTERNAL MEDICINE

## 2019-08-30 PROCEDURE — 74250 X-RAY XM SM INT 1CNTRST STD: CPT

## 2019-08-30 RX ADMIN — BARIUM SULFATE 600 ML: 240 SUSPENSION ORAL at 09:34

## 2019-10-23 ENCOUNTER — HOSPITAL ENCOUNTER (INPATIENT)
Age: 49
LOS: 7 days | Discharge: NURSING, REHAB OR LONG-TERM CARE | DRG: 862 | End: 2019-10-30
Attending: PHYSICAL MEDICINE & REHABILITATION | Admitting: PHYSICAL MEDICINE & REHABILITATION
Payer: MEDICARE

## 2019-10-23 DIAGNOSIS — Z98.1 HISTORY OF LUMBAR FUSION: Primary | ICD-10-CM

## 2019-10-23 LAB
CREAT SERPL-MCNC: 0.6 MG/DL (ref 0.4–1.2)
GFR SERPL CREATININE-BSD FRML MDRD: > 90 ML/MIN/1.73M2
MRSA SCREEN RT-PCR: NEGATIVE
VANCOMYCIN RESISTANT ENTEROCOCCUS: NEGATIVE

## 2019-10-23 PROCEDURE — 99223 1ST HOSP IP/OBS HIGH 75: CPT | Performed by: PHYSICAL MEDICINE & REHABILITATION

## 2019-10-23 PROCEDURE — 36415 COLL VENOUS BLD VENIPUNCTURE: CPT

## 2019-10-23 PROCEDURE — 87081 CULTURE SCREEN ONLY: CPT

## 2019-10-23 PROCEDURE — 1180000000 HC REHAB R&B

## 2019-10-23 PROCEDURE — 82565 ASSAY OF CREATININE: CPT

## 2019-10-23 PROCEDURE — 2709999900 HC NON-CHARGEABLE SUPPLY

## 2019-10-23 PROCEDURE — 6370000000 HC RX 637 (ALT 250 FOR IP): Performed by: PHYSICAL MEDICINE & REHABILITATION

## 2019-10-23 PROCEDURE — 87641 MR-STAPH DNA AMP PROBE: CPT

## 2019-10-23 PROCEDURE — 87500 VANOMYCIN DNA AMP PROBE: CPT

## 2019-10-23 PROCEDURE — 6360000002 HC RX W HCPCS: Performed by: PHYSICAL MEDICINE & REHABILITATION

## 2019-10-23 RX ORDER — SENNA PLUS 8.6 MG/1
2 TABLET ORAL NIGHTLY
Status: DISCONTINUED | OUTPATIENT
Start: 2019-10-23 | End: 2019-10-23

## 2019-10-23 RX ORDER — METHYLPREDNISOLONE 4 MG/1
8 TABLET ORAL DAILY
Status: COMPLETED | OUTPATIENT
Start: 2019-10-23 | End: 2019-10-25

## 2019-10-23 RX ORDER — BISACODYL 10 MG
10 SUPPOSITORY, RECTAL RECTAL DAILY PRN
Status: DISCONTINUED | OUTPATIENT
Start: 2019-10-23 | End: 2019-10-30 | Stop reason: HOSPADM

## 2019-10-23 RX ORDER — METHYLPREDNISOLONE 4 MG/1
4 TABLET ORAL DAILY
Status: DISCONTINUED | OUTPATIENT
Start: 2019-10-26 | End: 2019-10-30 | Stop reason: HOSPADM

## 2019-10-23 RX ORDER — HYDROCHLOROTHIAZIDE 25 MG/1
12.5 TABLET ORAL DAILY
Status: DISCONTINUED | OUTPATIENT
Start: 2019-10-23 | End: 2019-10-23

## 2019-10-23 RX ORDER — ACETAMINOPHEN 325 MG/1
650 TABLET ORAL EVERY 4 HOURS PRN
Status: DISCONTINUED | OUTPATIENT
Start: 2019-10-23 | End: 2019-10-30 | Stop reason: HOSPADM

## 2019-10-23 RX ORDER — M-VIT,TX,IRON,MINS/CALC/FOLIC 27MG-0.4MG
1 TABLET ORAL DAILY
Status: DISCONTINUED | OUTPATIENT
Start: 2019-10-23 | End: 2019-10-30 | Stop reason: HOSPADM

## 2019-10-23 RX ORDER — METHYLPREDNISOLONE 4 MG/1
4 TABLET ORAL 4 TIMES DAILY
Status: DISCONTINUED | OUTPATIENT
Start: 2019-10-23 | End: 2019-10-23

## 2019-10-23 RX ORDER — DOCUSATE SODIUM 100 MG/1
100 CAPSULE, LIQUID FILLED ORAL DAILY
Status: DISCONTINUED | OUTPATIENT
Start: 2019-10-23 | End: 2019-10-30 | Stop reason: HOSPADM

## 2019-10-23 RX ORDER — PANTOPRAZOLE SODIUM 40 MG/1
40 TABLET, DELAYED RELEASE ORAL
Status: DISCONTINUED | OUTPATIENT
Start: 2019-10-24 | End: 2019-10-30 | Stop reason: HOSPADM

## 2019-10-23 RX ORDER — ONDANSETRON 4 MG/1
4 TABLET, FILM COATED ORAL EVERY 8 HOURS PRN
Status: DISCONTINUED | OUTPATIENT
Start: 2019-10-23 | End: 2019-10-30 | Stop reason: HOSPADM

## 2019-10-23 RX ORDER — CYCLOBENZAPRINE HCL 10 MG
10 TABLET ORAL 3 TIMES DAILY PRN
Status: DISCONTINUED | OUTPATIENT
Start: 2019-10-23 | End: 2019-10-30 | Stop reason: HOSPADM

## 2019-10-23 RX ORDER — VITAMIN B COMPLEX
2000 TABLET ORAL DAILY
Status: DISCONTINUED | OUTPATIENT
Start: 2019-10-23 | End: 2019-10-30 | Stop reason: HOSPADM

## 2019-10-23 RX ORDER — OXYCODONE HYDROCHLORIDE AND ACETAMINOPHEN 5; 325 MG/1; MG/1
1 TABLET ORAL EVERY 6 HOURS PRN
Status: DISCONTINUED | OUTPATIENT
Start: 2019-10-23 | End: 2019-10-23

## 2019-10-23 RX ORDER — GABAPENTIN 300 MG/1
300 CAPSULE ORAL 3 TIMES DAILY
Status: DISCONTINUED | OUTPATIENT
Start: 2019-10-23 | End: 2019-10-23

## 2019-10-23 RX ORDER — DOCUSATE SODIUM 100 MG/1
100 CAPSULE, LIQUID FILLED ORAL 2 TIMES DAILY
Status: DISCONTINUED | OUTPATIENT
Start: 2019-10-23 | End: 2019-10-23 | Stop reason: SDUPTHER

## 2019-10-23 RX ORDER — POLYETHYLENE GLYCOL 3350 17 G/17G
17 POWDER, FOR SOLUTION ORAL DAILY
Status: DISCONTINUED | OUTPATIENT
Start: 2019-10-24 | End: 2019-10-30 | Stop reason: HOSPADM

## 2019-10-23 RX ORDER — GABAPENTIN 300 MG/1
600 CAPSULE ORAL 3 TIMES DAILY
Status: DISCONTINUED | OUTPATIENT
Start: 2019-10-23 | End: 2019-10-28

## 2019-10-23 RX ORDER — OXYCODONE HYDROCHLORIDE AND ACETAMINOPHEN 5; 325 MG/1; MG/1
1 TABLET ORAL EVERY 4 HOURS PRN
Status: DISCONTINUED | OUTPATIENT
Start: 2019-10-23 | End: 2019-10-30 | Stop reason: HOSPADM

## 2019-10-23 RX ORDER — OXYCODONE HYDROCHLORIDE AND ACETAMINOPHEN 5; 325 MG/1; MG/1
2 TABLET ORAL EVERY 4 HOURS PRN
Status: DISCONTINUED | OUTPATIENT
Start: 2019-10-23 | End: 2019-10-30 | Stop reason: HOSPADM

## 2019-10-23 RX ORDER — LIDOCAINE 4 G/G
2 PATCH TOPICAL DAILY
Status: DISCONTINUED | OUTPATIENT
Start: 2019-10-24 | End: 2019-10-30 | Stop reason: HOSPADM

## 2019-10-23 RX ORDER — FUROSEMIDE 20 MG/1
20 TABLET ORAL DAILY
Status: DISCONTINUED | OUTPATIENT
Start: 2019-10-24 | End: 2019-10-30 | Stop reason: HOSPADM

## 2019-10-23 RX ORDER — POLYETHYLENE GLYCOL 3350 17 G/17G
17 POWDER, FOR SOLUTION ORAL DAILY
Status: DISCONTINUED | OUTPATIENT
Start: 2019-10-23 | End: 2019-10-23

## 2019-10-23 RX ADMIN — CYCLOBENZAPRINE 10 MG: 10 TABLET, FILM COATED ORAL at 17:59

## 2019-10-23 RX ADMIN — METHYLPREDNISOLONE 8 MG: 4 TABLET ORAL at 22:21

## 2019-10-23 RX ADMIN — GABAPENTIN 600 MG: 300 CAPSULE ORAL at 22:21

## 2019-10-23 RX ADMIN — OXYCODONE HYDROCHLORIDE AND ACETAMINOPHEN 1 TABLET: 5; 325 TABLET ORAL at 16:42

## 2019-10-23 RX ADMIN — HYDROCHLOROTHIAZIDE 12.5 MG: 25 TABLET ORAL at 16:42

## 2019-10-23 RX ADMIN — GABAPENTIN 300 MG: 300 CAPSULE ORAL at 16:43

## 2019-10-23 RX ADMIN — OXYCODONE HYDROCHLORIDE AND ACETAMINOPHEN 2 TABLET: 5; 325 TABLET ORAL at 22:20

## 2019-10-23 RX ADMIN — NALOXEGOL OXALATE 25 MG: 25 TABLET, FILM COATED ORAL at 16:42

## 2019-10-23 RX ADMIN — CALCIUM ACETATE 1334 MG: 667 CAPSULE ORAL at 16:41

## 2019-10-23 RX ADMIN — DOCUSATE SODIUM 100 MG: 100 CAPSULE, LIQUID FILLED ORAL at 16:42

## 2019-10-23 RX ADMIN — OXYCODONE HYDROCHLORIDE AND ACETAMINOPHEN 1 TABLET: 5; 325 TABLET ORAL at 18:00

## 2019-10-23 RX ADMIN — MULTIPLE VITAMINS W/ MINERALS TAB 1 TABLET: TAB at 22:22

## 2019-10-23 RX ADMIN — VITAMIN D, TAB 1000IU (100/BT) 2000 UNITS: 25 TAB at 16:42

## 2019-10-23 ASSESSMENT — PAIN SCALES - GENERAL
PAINLEVEL_OUTOF10: 10
PAINLEVEL_OUTOF10: 3
PAINLEVEL_OUTOF10: 8

## 2019-10-23 ASSESSMENT — PAIN DESCRIPTION - PROGRESSION
CLINICAL_PROGRESSION: NOT CHANGED

## 2019-10-23 ASSESSMENT — PAIN DESCRIPTION - DESCRIPTORS: DESCRIPTORS: ACHING;CONSTANT

## 2019-10-23 ASSESSMENT — PAIN DESCRIPTION - ONSET
ONSET: ON-GOING
ONSET: ON-GOING

## 2019-10-23 ASSESSMENT — PAIN DESCRIPTION - ORIENTATION
ORIENTATION: LOWER;MID
ORIENTATION: LOWER;MID

## 2019-10-23 ASSESSMENT — PAIN DESCRIPTION - LOCATION
LOCATION: BACK
LOCATION: LEG;BACK

## 2019-10-23 ASSESSMENT — PAIN - FUNCTIONAL ASSESSMENT: PAIN_FUNCTIONAL_ASSESSMENT: ACTIVITIES ARE NOT PREVENTED

## 2019-10-23 ASSESSMENT — PAIN DESCRIPTION - PAIN TYPE
TYPE: SURGICAL PAIN
TYPE: SURGICAL PAIN

## 2019-10-23 ASSESSMENT — PAIN DESCRIPTION - FREQUENCY
FREQUENCY: CONTINUOUS
FREQUENCY: CONTINUOUS

## 2019-10-24 LAB
ANION GAP SERPL CALCULATED.3IONS-SCNC: 12 MEQ/L (ref 8–16)
BASOPHILS # BLD: 0.9 %
BASOPHILS ABSOLUTE: 0.1 THOU/MM3 (ref 0–0.1)
BUN BLDV-MCNC: 17 MG/DL (ref 7–22)
CALCIUM SERPL-MCNC: 9.7 MG/DL (ref 8.5–10.5)
CHLORIDE BLD-SCNC: 97 MEQ/L (ref 98–111)
CO2: 30 MEQ/L (ref 23–33)
CREAT SERPL-MCNC: 0.7 MG/DL (ref 0.4–1.2)
DIFFERENTIAL TYPE: ABNORMAL
EOSINOPHIL # BLD: 3.2 %
EOSINOPHILS ABSOLUTE: 0.3 THOU/MM3 (ref 0–0.4)
ERYTHROCYTE [DISTWIDTH] IN BLOOD BY AUTOMATED COUNT: 14.6 % (ref 11.5–14.5)
ERYTHROCYTE [DISTWIDTH] IN BLOOD BY AUTOMATED COUNT: 48.5 FL (ref 35–45)
GFR SERPL CREATININE-BSD FRML MDRD: 89 ML/MIN/1.73M2
GLUCOSE BLD-MCNC: 125 MG/DL (ref 70–108)
HCT VFR BLD CALC: 35.4 % (ref 37–47)
HEMOGLOBIN: 10.8 GM/DL (ref 12–16)
IMMATURE GRANS (ABS): 0.6 THOU/MM3 (ref 0–0.07)
IMMATURE GRANULOCYTES: 6.5 %
LYMPHOCYTES # BLD: 18.3 %
LYMPHOCYTES ABSOLUTE: 1.7 THOU/MM3 (ref 1–4.8)
MAGNESIUM: 2 MG/DL (ref 1.6–2.4)
MCH RBC QN AUTO: 28.2 PG (ref 26–33)
MCHC RBC AUTO-ENTMCNC: 30.5 GM/DL (ref 32.2–35.5)
MCV RBC AUTO: 92.4 FL (ref 81–99)
MONOCYTES # BLD: 5.3 %
MONOCYTES ABSOLUTE: 0.5 THOU/MM3 (ref 0.4–1.3)
NUCLEATED RED BLOOD CELLS: 0 /100 WBC
PATHOLOGIST REVIEW: ABNORMAL
PLATELET # BLD: 290 THOU/MM3 (ref 130–400)
PLATELET ESTIMATE: ADEQUATE
PMV BLD AUTO: 9.3 FL (ref 9.4–12.4)
POTASSIUM SERPL-SCNC: 4.5 MEQ/L (ref 3.5–5.2)
POTASSIUM SERPL-SCNC: 5.4 MEQ/L (ref 3.5–5.2)
RBC # BLD: 3.83 MILL/MM3 (ref 4.2–5.4)
SCAN OF BLOOD SMEAR: NORMAL
SEG NEUTROPHILS: 65.8 %
SEGMENTED NEUTROPHILS ABSOLUTE COUNT: 6.1 THOU/MM3 (ref 1.8–7.7)
SODIUM BLD-SCNC: 139 MEQ/L (ref 135–145)
WBC # BLD: 9.3 THOU/MM3 (ref 4.8–10.8)

## 2019-10-24 PROCEDURE — 6370000000 HC RX 637 (ALT 250 FOR IP): Performed by: PHYSICAL MEDICINE & REHABILITATION

## 2019-10-24 PROCEDURE — 1180000000 HC REHAB R&B

## 2019-10-24 PROCEDURE — 6360000002 HC RX W HCPCS: Performed by: PHYSICAL MEDICINE & REHABILITATION

## 2019-10-24 PROCEDURE — 97116 GAIT TRAINING THERAPY: CPT

## 2019-10-24 PROCEDURE — 97162 PT EVAL MOD COMPLEX 30 MIN: CPT

## 2019-10-24 PROCEDURE — 97166 OT EVAL MOD COMPLEX 45 MIN: CPT

## 2019-10-24 PROCEDURE — 97110 THERAPEUTIC EXERCISES: CPT

## 2019-10-24 PROCEDURE — 85025 COMPLETE CBC W/AUTO DIFF WBC: CPT

## 2019-10-24 PROCEDURE — 97535 SELF CARE MNGMENT TRAINING: CPT

## 2019-10-24 PROCEDURE — 84132 ASSAY OF SERUM POTASSIUM: CPT

## 2019-10-24 PROCEDURE — 2709999900 HC NON-CHARGEABLE SUPPLY

## 2019-10-24 PROCEDURE — 97530 THERAPEUTIC ACTIVITIES: CPT

## 2019-10-24 PROCEDURE — 99232 SBSQ HOSP IP/OBS MODERATE 35: CPT | Performed by: PHYSICAL MEDICINE & REHABILITATION

## 2019-10-24 PROCEDURE — 83735 ASSAY OF MAGNESIUM: CPT

## 2019-10-24 PROCEDURE — 80048 BASIC METABOLIC PNL TOTAL CA: CPT

## 2019-10-24 PROCEDURE — 36415 COLL VENOUS BLD VENIPUNCTURE: CPT

## 2019-10-24 RX ORDER — AMLODIPINE BESYLATE 5 MG/1
5 TABLET ORAL DAILY
Status: DISCONTINUED | OUTPATIENT
Start: 2019-10-24 | End: 2019-10-30 | Stop reason: HOSPADM

## 2019-10-24 RX ADMIN — METHYLPREDNISOLONE 8 MG: 4 TABLET ORAL at 08:56

## 2019-10-24 RX ADMIN — MULTIPLE VITAMINS W/ MINERALS TAB 1 TABLET: TAB at 08:56

## 2019-10-24 RX ADMIN — DOCUSATE SODIUM 100 MG: 100 CAPSULE, LIQUID FILLED ORAL at 08:55

## 2019-10-24 RX ADMIN — POLYETHYLENE GLYCOL 3350 17 G: 17 POWDER, FOR SOLUTION ORAL at 09:08

## 2019-10-24 RX ADMIN — GABAPENTIN 600 MG: 300 CAPSULE ORAL at 14:28

## 2019-10-24 RX ADMIN — NALOXEGOL OXALATE 25 MG: 25 TABLET, FILM COATED ORAL at 08:56

## 2019-10-24 RX ADMIN — CALCIUM ACETATE 1334 MG: 667 CAPSULE ORAL at 11:32

## 2019-10-24 RX ADMIN — OXYCODONE HYDROCHLORIDE AND ACETAMINOPHEN 2 TABLET: 5; 325 TABLET ORAL at 16:23

## 2019-10-24 RX ADMIN — CALCIUM ACETATE 1334 MG: 667 CAPSULE ORAL at 08:55

## 2019-10-24 RX ADMIN — VITAMIN D, TAB 1000IU (100/BT) 2000 UNITS: 25 TAB at 08:56

## 2019-10-24 RX ADMIN — AMLODIPINE BESYLATE 5 MG: 5 TABLET ORAL at 11:32

## 2019-10-24 RX ADMIN — GABAPENTIN 600 MG: 300 CAPSULE ORAL at 20:42

## 2019-10-24 RX ADMIN — OXYCODONE HYDROCHLORIDE AND ACETAMINOPHEN 2 TABLET: 5; 325 TABLET ORAL at 06:13

## 2019-10-24 RX ADMIN — CALCIUM ACETATE 1334 MG: 667 CAPSULE ORAL at 16:23

## 2019-10-24 RX ADMIN — OXYCODONE HYDROCHLORIDE AND ACETAMINOPHEN 2 TABLET: 5; 325 TABLET ORAL at 11:32

## 2019-10-24 RX ADMIN — OXYCODONE HYDROCHLORIDE AND ACETAMINOPHEN 2 TABLET: 5; 325 TABLET ORAL at 02:13

## 2019-10-24 RX ADMIN — PANTOPRAZOLE SODIUM 40 MG: 40 TABLET, DELAYED RELEASE ORAL at 06:12

## 2019-10-24 RX ADMIN — ENOXAPARIN SODIUM 40 MG: 40 INJECTION SUBCUTANEOUS at 08:56

## 2019-10-24 RX ADMIN — FUROSEMIDE 20 MG: 20 TABLET ORAL at 08:55

## 2019-10-24 RX ADMIN — OXYCODONE HYDROCHLORIDE AND ACETAMINOPHEN 2 TABLET: 5; 325 TABLET ORAL at 20:42

## 2019-10-24 RX ADMIN — GABAPENTIN 600 MG: 300 CAPSULE ORAL at 08:55

## 2019-10-24 ASSESSMENT — PAIN DESCRIPTION - ORIENTATION
ORIENTATION: LEFT
ORIENTATION: LEFT

## 2019-10-24 ASSESSMENT — PAIN DESCRIPTION - LOCATION
LOCATION: LEG
LOCATION: LEG

## 2019-10-24 ASSESSMENT — PAIN SCALES - GENERAL
PAINLEVEL_OUTOF10: 10
PAINLEVEL_OUTOF10: 7
PAINLEVEL_OUTOF10: 7
PAINLEVEL_OUTOF10: 8
PAINLEVEL_OUTOF10: 5
PAINLEVEL_OUTOF10: 8
PAINLEVEL_OUTOF10: 8
PAINLEVEL_OUTOF10: 7
PAINLEVEL_OUTOF10: 8

## 2019-10-24 ASSESSMENT — PAIN DESCRIPTION - PAIN TYPE
TYPE: ACUTE PAIN
TYPE: ACUTE PAIN

## 2019-10-24 ASSESSMENT — PAIN DESCRIPTION - PROGRESSION
CLINICAL_PROGRESSION: NOT CHANGED

## 2019-10-24 ASSESSMENT — PAIN DESCRIPTION - ONSET
ONSET: ON-GOING
ONSET: ON-GOING

## 2019-10-24 ASSESSMENT — PAIN DESCRIPTION - DESCRIPTORS
DESCRIPTORS: CONSTANT;SHOOTING
DESCRIPTORS: CONSTANT;SHARP;SHOOTING

## 2019-10-24 ASSESSMENT — PAIN - FUNCTIONAL ASSESSMENT
PAIN_FUNCTIONAL_ASSESSMENT: PREVENTS OR INTERFERES WITH MANY ACTIVE NOT PASSIVE ACTIVITIES
PAIN_FUNCTIONAL_ASSESSMENT: PREVENTS OR INTERFERES WITH ALL ACTIVE AND SOME PASSIVE ACTIVITIES

## 2019-10-24 ASSESSMENT — PAIN DESCRIPTION - FREQUENCY
FREQUENCY: CONTINUOUS
FREQUENCY: CONTINUOUS

## 2019-10-25 LAB — MRSA SCREEN: NORMAL

## 2019-10-25 PROCEDURE — 6360000002 HC RX W HCPCS: Performed by: PHYSICAL MEDICINE & REHABILITATION

## 2019-10-25 PROCEDURE — 6370000000 HC RX 637 (ALT 250 FOR IP): Performed by: PHYSICAL MEDICINE & REHABILITATION

## 2019-10-25 PROCEDURE — 2709999900 HC NON-CHARGEABLE SUPPLY

## 2019-10-25 PROCEDURE — 97530 THERAPEUTIC ACTIVITIES: CPT

## 2019-10-25 PROCEDURE — 97116 GAIT TRAINING THERAPY: CPT

## 2019-10-25 PROCEDURE — 97110 THERAPEUTIC EXERCISES: CPT

## 2019-10-25 PROCEDURE — 97535 SELF CARE MNGMENT TRAINING: CPT

## 2019-10-25 PROCEDURE — 94761 N-INVAS EAR/PLS OXIMETRY MLT: CPT

## 2019-10-25 PROCEDURE — 1180000000 HC REHAB R&B

## 2019-10-25 PROCEDURE — 99232 SBSQ HOSP IP/OBS MODERATE 35: CPT | Performed by: PHYSICAL MEDICINE & REHABILITATION

## 2019-10-25 RX ADMIN — METHYLPREDNISOLONE 8 MG: 4 TABLET ORAL at 08:48

## 2019-10-25 RX ADMIN — CALCIUM ACETATE 1334 MG: 667 CAPSULE ORAL at 17:35

## 2019-10-25 RX ADMIN — OXYCODONE HYDROCHLORIDE AND ACETAMINOPHEN 2 TABLET: 5; 325 TABLET ORAL at 10:37

## 2019-10-25 RX ADMIN — ENOXAPARIN SODIUM 40 MG: 40 INJECTION SUBCUTANEOUS at 09:08

## 2019-10-25 RX ADMIN — CYCLOBENZAPRINE 10 MG: 10 TABLET, FILM COATED ORAL at 08:48

## 2019-10-25 RX ADMIN — VITAMIN D, TAB 1000IU (100/BT) 2000 UNITS: 25 TAB at 08:48

## 2019-10-25 RX ADMIN — GABAPENTIN 600 MG: 300 CAPSULE ORAL at 15:11

## 2019-10-25 RX ADMIN — GABAPENTIN 600 MG: 300 CAPSULE ORAL at 19:56

## 2019-10-25 RX ADMIN — MULTIPLE VITAMINS W/ MINERALS TAB 1 TABLET: TAB at 08:48

## 2019-10-25 RX ADMIN — OXYCODONE HYDROCHLORIDE AND ACETAMINOPHEN 2 TABLET: 5; 325 TABLET ORAL at 02:12

## 2019-10-25 RX ADMIN — NALOXEGOL OXALATE 25 MG: 25 TABLET, FILM COATED ORAL at 08:48

## 2019-10-25 RX ADMIN — CYCLOBENZAPRINE 10 MG: 10 TABLET, FILM COATED ORAL at 17:35

## 2019-10-25 RX ADMIN — OXYCODONE HYDROCHLORIDE AND ACETAMINOPHEN 2 TABLET: 5; 325 TABLET ORAL at 19:56

## 2019-10-25 RX ADMIN — FUROSEMIDE 20 MG: 20 TABLET ORAL at 08:48

## 2019-10-25 RX ADMIN — POLYETHYLENE GLYCOL 3350 17 G: 17 POWDER, FOR SOLUTION ORAL at 08:50

## 2019-10-25 RX ADMIN — CALCIUM ACETATE 1334 MG: 667 CAPSULE ORAL at 15:11

## 2019-10-25 RX ADMIN — DOCUSATE SODIUM 100 MG: 100 CAPSULE, LIQUID FILLED ORAL at 08:48

## 2019-10-25 RX ADMIN — PANTOPRAZOLE SODIUM 40 MG: 40 TABLET, DELAYED RELEASE ORAL at 06:25

## 2019-10-25 RX ADMIN — AMLODIPINE BESYLATE 5 MG: 5 TABLET ORAL at 08:48

## 2019-10-25 RX ADMIN — CALCIUM ACETATE 1334 MG: 667 CAPSULE ORAL at 08:48

## 2019-10-25 RX ADMIN — OXYCODONE HYDROCHLORIDE AND ACETAMINOPHEN 2 TABLET: 5; 325 TABLET ORAL at 06:25

## 2019-10-25 RX ADMIN — GABAPENTIN 600 MG: 300 CAPSULE ORAL at 08:48

## 2019-10-25 RX ADMIN — OXYCODONE HYDROCHLORIDE AND ACETAMINOPHEN 2 TABLET: 5; 325 TABLET ORAL at 15:10

## 2019-10-25 ASSESSMENT — PAIN DESCRIPTION - PROGRESSION
CLINICAL_PROGRESSION: NOT CHANGED

## 2019-10-25 ASSESSMENT — PAIN DESCRIPTION - PAIN TYPE: TYPE: NEUROPATHIC PAIN

## 2019-10-25 ASSESSMENT — PAIN SCALES - GENERAL
PAINLEVEL_OUTOF10: 5
PAINLEVEL_OUTOF10: 7
PAINLEVEL_OUTOF10: 8
PAINLEVEL_OUTOF10: 7
PAINLEVEL_OUTOF10: 7
PAINLEVEL_OUTOF10: 6
PAINLEVEL_OUTOF10: 8

## 2019-10-25 ASSESSMENT — PAIN DESCRIPTION - ONSET: ONSET: ON-GOING

## 2019-10-25 ASSESSMENT — PAIN DESCRIPTION - ORIENTATION: ORIENTATION: LEFT

## 2019-10-25 ASSESSMENT — PAIN DESCRIPTION - DESCRIPTORS: DESCRIPTORS: TINGLING;SHOOTING;SHARP

## 2019-10-25 ASSESSMENT — PAIN DESCRIPTION - LOCATION: LOCATION: LEG

## 2019-10-25 ASSESSMENT — PAIN DESCRIPTION - FREQUENCY: FREQUENCY: CONTINUOUS

## 2019-10-25 ASSESSMENT — PAIN - FUNCTIONAL ASSESSMENT: PAIN_FUNCTIONAL_ASSESSMENT: PREVENTS OR INTERFERES SOME ACTIVE ACTIVITIES AND ADLS

## 2019-10-26 ENCOUNTER — APPOINTMENT (OUTPATIENT)
Dept: INTERVENTIONAL RADIOLOGY/VASCULAR | Age: 49
DRG: 862 | End: 2019-10-26
Attending: PHYSICAL MEDICINE & REHABILITATION
Payer: MEDICARE

## 2019-10-26 PROCEDURE — 97116 GAIT TRAINING THERAPY: CPT

## 2019-10-26 PROCEDURE — 1180000000 HC REHAB R&B

## 2019-10-26 PROCEDURE — 93971 EXTREMITY STUDY: CPT

## 2019-10-26 PROCEDURE — 97530 THERAPEUTIC ACTIVITIES: CPT

## 2019-10-26 PROCEDURE — 2709999900 HC NON-CHARGEABLE SUPPLY

## 2019-10-26 PROCEDURE — 6370000000 HC RX 637 (ALT 250 FOR IP): Performed by: PHYSICAL MEDICINE & REHABILITATION

## 2019-10-26 PROCEDURE — 97535 SELF CARE MNGMENT TRAINING: CPT

## 2019-10-26 PROCEDURE — 6360000002 HC RX W HCPCS: Performed by: PHYSICAL MEDICINE & REHABILITATION

## 2019-10-26 PROCEDURE — 97110 THERAPEUTIC EXERCISES: CPT

## 2019-10-26 RX ADMIN — OXYCODONE HYDROCHLORIDE AND ACETAMINOPHEN 2 TABLET: 5; 325 TABLET ORAL at 06:33

## 2019-10-26 RX ADMIN — PANTOPRAZOLE SODIUM 40 MG: 40 TABLET, DELAYED RELEASE ORAL at 06:33

## 2019-10-26 RX ADMIN — OXYCODONE HYDROCHLORIDE AND ACETAMINOPHEN 2 TABLET: 5; 325 TABLET ORAL at 10:24

## 2019-10-26 RX ADMIN — CYCLOBENZAPRINE 10 MG: 10 TABLET, FILM COATED ORAL at 11:26

## 2019-10-26 RX ADMIN — VITAMIN D, TAB 1000IU (100/BT) 2000 UNITS: 25 TAB at 09:38

## 2019-10-26 RX ADMIN — OXYCODONE HYDROCHLORIDE AND ACETAMINOPHEN 2 TABLET: 5; 325 TABLET ORAL at 01:09

## 2019-10-26 RX ADMIN — AMLODIPINE BESYLATE 5 MG: 5 TABLET ORAL at 09:39

## 2019-10-26 RX ADMIN — ENOXAPARIN SODIUM 40 MG: 40 INJECTION SUBCUTANEOUS at 09:43

## 2019-10-26 RX ADMIN — GABAPENTIN 600 MG: 300 CAPSULE ORAL at 08:00

## 2019-10-26 RX ADMIN — OXYCODONE HYDROCHLORIDE AND ACETAMINOPHEN 2 TABLET: 5; 325 TABLET ORAL at 21:53

## 2019-10-26 RX ADMIN — ACETAMINOPHEN 650 MG: 325 TABLET ORAL at 11:29

## 2019-10-26 RX ADMIN — CALCIUM ACETATE 1334 MG: 667 CAPSULE ORAL at 13:33

## 2019-10-26 RX ADMIN — GABAPENTIN 600 MG: 300 CAPSULE ORAL at 13:33

## 2019-10-26 RX ADMIN — DOCUSATE SODIUM 100 MG: 100 CAPSULE, LIQUID FILLED ORAL at 09:53

## 2019-10-26 RX ADMIN — MULTIPLE VITAMINS W/ MINERALS TAB 1 TABLET: TAB at 09:38

## 2019-10-26 RX ADMIN — FUROSEMIDE 20 MG: 20 TABLET ORAL at 09:38

## 2019-10-26 RX ADMIN — CYCLOBENZAPRINE 10 MG: 10 TABLET, FILM COATED ORAL at 21:10

## 2019-10-26 RX ADMIN — NALOXEGOL OXALATE 25 MG: 25 TABLET, FILM COATED ORAL at 09:37

## 2019-10-26 RX ADMIN — CYCLOBENZAPRINE 10 MG: 10 TABLET, FILM COATED ORAL at 02:28

## 2019-10-26 RX ADMIN — METHYLPREDNISOLONE 4 MG: 4 TABLET ORAL at 09:39

## 2019-10-26 RX ADMIN — OXYCODONE HYDROCHLORIDE AND ACETAMINOPHEN 2 TABLET: 5; 325 TABLET ORAL at 17:48

## 2019-10-26 RX ADMIN — CALCIUM ACETATE 1334 MG: 667 CAPSULE ORAL at 17:48

## 2019-10-26 RX ADMIN — CALCIUM ACETATE 1334 MG: 667 CAPSULE ORAL at 09:36

## 2019-10-26 RX ADMIN — GABAPENTIN 600 MG: 300 CAPSULE ORAL at 21:10

## 2019-10-26 ASSESSMENT — PAIN DESCRIPTION - PROGRESSION
CLINICAL_PROGRESSION: NOT CHANGED

## 2019-10-26 ASSESSMENT — PAIN SCALES - GENERAL
PAINLEVEL_OUTOF10: 6
PAINLEVEL_OUTOF10: 6
PAINLEVEL_OUTOF10: 8
PAINLEVEL_OUTOF10: 10
PAINLEVEL_OUTOF10: 9
PAINLEVEL_OUTOF10: 8
PAINLEVEL_OUTOF10: 7
PAINLEVEL_OUTOF10: 7

## 2019-10-26 ASSESSMENT — PAIN DESCRIPTION - FREQUENCY
FREQUENCY: INTERMITTENT

## 2019-10-26 ASSESSMENT — PAIN DESCRIPTION - DESCRIPTORS
DESCRIPTORS: ACHING
DESCRIPTORS: ACHING
DESCRIPTORS: ACHING;NUMBNESS

## 2019-10-26 ASSESSMENT — PAIN DESCRIPTION - PAIN TYPE
TYPE: NEUROPATHIC PAIN
TYPE: ACUTE PAIN;SURGICAL PAIN

## 2019-10-26 ASSESSMENT — PAIN DESCRIPTION - ORIENTATION
ORIENTATION: LEFT
ORIENTATION: RIGHT

## 2019-10-26 ASSESSMENT — PAIN DESCRIPTION - LOCATION
LOCATION: LEG;FOOT
LOCATION: BACK;HIP

## 2019-10-26 ASSESSMENT — PAIN DESCRIPTION - ONSET
ONSET: ON-GOING
ONSET: ON-GOING

## 2019-10-26 ASSESSMENT — PAIN - FUNCTIONAL ASSESSMENT: PAIN_FUNCTIONAL_ASSESSMENT: PREVENTS OR INTERFERES SOME ACTIVE ACTIVITIES AND ADLS

## 2019-10-27 LAB
ANION GAP SERPL CALCULATED.3IONS-SCNC: 10 MEQ/L (ref 8–16)
BUN BLDV-MCNC: 22 MG/DL (ref 7–22)
CALCIUM SERPL-MCNC: 9.2 MG/DL (ref 8.5–10.5)
CHLORIDE BLD-SCNC: 97 MEQ/L (ref 98–111)
CO2: 31 MEQ/L (ref 23–33)
CREAT SERPL-MCNC: 0.8 MG/DL (ref 0.4–1.2)
GFR SERPL CREATININE-BSD FRML MDRD: 76 ML/MIN/1.73M2
GLUCOSE BLD-MCNC: 100 MG/DL (ref 70–108)
POTASSIUM SERPL-SCNC: 4.7 MEQ/L (ref 3.5–5.2)
SODIUM BLD-SCNC: 138 MEQ/L (ref 135–145)

## 2019-10-27 PROCEDURE — 6370000000 HC RX 637 (ALT 250 FOR IP): Performed by: PHYSICAL MEDICINE & REHABILITATION

## 2019-10-27 PROCEDURE — 80048 BASIC METABOLIC PNL TOTAL CA: CPT

## 2019-10-27 PROCEDURE — 36415 COLL VENOUS BLD VENIPUNCTURE: CPT

## 2019-10-27 PROCEDURE — 2709999900 HC NON-CHARGEABLE SUPPLY

## 2019-10-27 PROCEDURE — 1180000000 HC REHAB R&B

## 2019-10-27 PROCEDURE — 94760 N-INVAS EAR/PLS OXIMETRY 1: CPT

## 2019-10-27 PROCEDURE — 6360000002 HC RX W HCPCS: Performed by: PHYSICAL MEDICINE & REHABILITATION

## 2019-10-27 PROCEDURE — 99232 SBSQ HOSP IP/OBS MODERATE 35: CPT | Performed by: PHYSICAL MEDICINE & REHABILITATION

## 2019-10-27 RX ADMIN — GABAPENTIN 600 MG: 300 CAPSULE ORAL at 12:44

## 2019-10-27 RX ADMIN — OXYCODONE HYDROCHLORIDE AND ACETAMINOPHEN 2 TABLET: 5; 325 TABLET ORAL at 13:42

## 2019-10-27 RX ADMIN — GABAPENTIN 600 MG: 300 CAPSULE ORAL at 09:29

## 2019-10-27 RX ADMIN — FUROSEMIDE 20 MG: 20 TABLET ORAL at 09:29

## 2019-10-27 RX ADMIN — OXYCODONE HYDROCHLORIDE AND ACETAMINOPHEN 2 TABLET: 5; 325 TABLET ORAL at 09:30

## 2019-10-27 RX ADMIN — CYCLOBENZAPRINE 10 MG: 10 TABLET, FILM COATED ORAL at 23:50

## 2019-10-27 RX ADMIN — GABAPENTIN 600 MG: 300 CAPSULE ORAL at 21:44

## 2019-10-27 RX ADMIN — NALOXEGOL OXALATE 25 MG: 25 TABLET, FILM COATED ORAL at 09:24

## 2019-10-27 RX ADMIN — AMLODIPINE BESYLATE 5 MG: 5 TABLET ORAL at 09:24

## 2019-10-27 RX ADMIN — DOCUSATE SODIUM 100 MG: 100 CAPSULE, LIQUID FILLED ORAL at 09:30

## 2019-10-27 RX ADMIN — MULTIPLE VITAMINS W/ MINERALS TAB 1 TABLET: TAB at 09:24

## 2019-10-27 RX ADMIN — METHYLPREDNISOLONE 4 MG: 4 TABLET ORAL at 09:24

## 2019-10-27 RX ADMIN — CALCIUM ACETATE 1334 MG: 667 CAPSULE ORAL at 09:24

## 2019-10-27 RX ADMIN — CALCIUM ACETATE 1334 MG: 667 CAPSULE ORAL at 17:39

## 2019-10-27 RX ADMIN — CALCIUM ACETATE 1334 MG: 667 CAPSULE ORAL at 12:44

## 2019-10-27 RX ADMIN — CYCLOBENZAPRINE 10 MG: 10 TABLET, FILM COATED ORAL at 06:02

## 2019-10-27 RX ADMIN — CYCLOBENZAPRINE 10 MG: 10 TABLET, FILM COATED ORAL at 15:39

## 2019-10-27 RX ADMIN — ENOXAPARIN SODIUM 40 MG: 40 INJECTION SUBCUTANEOUS at 09:31

## 2019-10-27 RX ADMIN — OXYCODONE HYDROCHLORIDE AND ACETAMINOPHEN 2 TABLET: 5; 325 TABLET ORAL at 17:40

## 2019-10-27 RX ADMIN — OXYCODONE HYDROCHLORIDE AND ACETAMINOPHEN 2 TABLET: 5; 325 TABLET ORAL at 03:53

## 2019-10-27 RX ADMIN — OXYCODONE HYDROCHLORIDE AND ACETAMINOPHEN 2 TABLET: 5; 325 TABLET ORAL at 21:44

## 2019-10-27 RX ADMIN — VITAMIN D, TAB 1000IU (100/BT) 2000 UNITS: 25 TAB at 09:24

## 2019-10-27 RX ADMIN — PANTOPRAZOLE SODIUM 40 MG: 40 TABLET, DELAYED RELEASE ORAL at 06:01

## 2019-10-27 ASSESSMENT — PAIN DESCRIPTION - FREQUENCY
FREQUENCY: INTERMITTENT
FREQUENCY: CONTINUOUS
FREQUENCY: INTERMITTENT

## 2019-10-27 ASSESSMENT — PAIN DESCRIPTION - PAIN TYPE
TYPE: ACUTE PAIN;SURGICAL PAIN
TYPE: ACUTE PAIN;SURGICAL PAIN
TYPE: ACUTE PAIN

## 2019-10-27 ASSESSMENT — PAIN SCALES - GENERAL
PAINLEVEL_OUTOF10: 6
PAINLEVEL_OUTOF10: 9
PAINLEVEL_OUTOF10: 6
PAINLEVEL_OUTOF10: 7
PAINLEVEL_OUTOF10: 2
PAINLEVEL_OUTOF10: 2
PAINLEVEL_OUTOF10: 5
PAINLEVEL_OUTOF10: 0
PAINLEVEL_OUTOF10: 7
PAINLEVEL_OUTOF10: 5
PAINLEVEL_OUTOF10: 2
PAINLEVEL_OUTOF10: 7

## 2019-10-27 ASSESSMENT — PAIN DESCRIPTION - DESCRIPTORS
DESCRIPTORS: ACHING;NUMBNESS
DESCRIPTORS: SHARP;SHOOTING;OTHER (COMMENT)
DESCRIPTORS: CRAMPING
DESCRIPTORS: ACHING;NUMBNESS

## 2019-10-27 ASSESSMENT — PAIN DESCRIPTION - ORIENTATION
ORIENTATION: RIGHT
ORIENTATION: LEFT;LOWER
ORIENTATION: LOWER;RIGHT
ORIENTATION: RIGHT

## 2019-10-27 ASSESSMENT — PAIN DESCRIPTION - LOCATION
LOCATION: BACK;HIP

## 2019-10-27 ASSESSMENT — PAIN DESCRIPTION - DIRECTION: RADIATING_TOWARDS: DOWN LEG

## 2019-10-27 ASSESSMENT — PAIN DESCRIPTION - PROGRESSION
CLINICAL_PROGRESSION: NOT CHANGED
CLINICAL_PROGRESSION: NOT CHANGED

## 2019-10-27 ASSESSMENT — PAIN DESCRIPTION - ONSET
ONSET: ON-GOING
ONSET: ON-GOING

## 2019-10-27 ASSESSMENT — PAIN - FUNCTIONAL ASSESSMENT: PAIN_FUNCTIONAL_ASSESSMENT: PREVENTS OR INTERFERES SOME ACTIVE ACTIVITIES AND ADLS

## 2019-10-28 ENCOUNTER — APPOINTMENT (OUTPATIENT)
Dept: CT IMAGING | Age: 49
DRG: 862 | End: 2019-10-28
Attending: PHYSICAL MEDICINE & REHABILITATION
Payer: MEDICARE

## 2019-10-28 PROCEDURE — 97110 THERAPEUTIC EXERCISES: CPT

## 2019-10-28 PROCEDURE — 97032 APPL MODALITY 1+ESTIM EA 15: CPT

## 2019-10-28 PROCEDURE — 6370000000 HC RX 637 (ALT 250 FOR IP): Performed by: PHYSICAL MEDICINE & REHABILITATION

## 2019-10-28 PROCEDURE — 97535 SELF CARE MNGMENT TRAINING: CPT

## 2019-10-28 PROCEDURE — 1180000000 HC REHAB R&B

## 2019-10-28 PROCEDURE — 97530 THERAPEUTIC ACTIVITIES: CPT

## 2019-10-28 PROCEDURE — 99232 SBSQ HOSP IP/OBS MODERATE 35: CPT | Performed by: PHYSICAL MEDICINE & REHABILITATION

## 2019-10-28 PROCEDURE — 97116 GAIT TRAINING THERAPY: CPT

## 2019-10-28 PROCEDURE — 6360000002 HC RX W HCPCS: Performed by: PHYSICAL MEDICINE & REHABILITATION

## 2019-10-28 PROCEDURE — 72131 CT LUMBAR SPINE W/O DYE: CPT

## 2019-10-28 PROCEDURE — 94760 N-INVAS EAR/PLS OXIMETRY 1: CPT

## 2019-10-28 RX ORDER — GABAPENTIN 300 MG/1
900 CAPSULE ORAL 3 TIMES DAILY
Status: DISCONTINUED | OUTPATIENT
Start: 2019-10-28 | End: 2019-10-30 | Stop reason: HOSPADM

## 2019-10-28 RX ADMIN — OXYCODONE HYDROCHLORIDE AND ACETAMINOPHEN 2 TABLET: 5; 325 TABLET ORAL at 06:35

## 2019-10-28 RX ADMIN — VITAMIN D, TAB 1000IU (100/BT) 2000 UNITS: 25 TAB at 09:05

## 2019-10-28 RX ADMIN — DOCUSATE SODIUM 100 MG: 100 CAPSULE, LIQUID FILLED ORAL at 09:05

## 2019-10-28 RX ADMIN — ENOXAPARIN SODIUM 40 MG: 40 INJECTION SUBCUTANEOUS at 09:06

## 2019-10-28 RX ADMIN — CYCLOBENZAPRINE 10 MG: 10 TABLET, FILM COATED ORAL at 09:05

## 2019-10-28 RX ADMIN — OXYCODONE HYDROCHLORIDE AND ACETAMINOPHEN 2 TABLET: 5; 325 TABLET ORAL at 21:38

## 2019-10-28 RX ADMIN — CYCLOBENZAPRINE 10 MG: 10 TABLET, FILM COATED ORAL at 17:28

## 2019-10-28 RX ADMIN — CALCIUM ACETATE 1334 MG: 667 CAPSULE ORAL at 17:27

## 2019-10-28 RX ADMIN — POLYETHYLENE GLYCOL 3350 17 G: 17 POWDER, FOR SOLUTION ORAL at 09:05

## 2019-10-28 RX ADMIN — GABAPENTIN 900 MG: 300 CAPSULE ORAL at 21:38

## 2019-10-28 RX ADMIN — NALOXEGOL OXALATE 25 MG: 25 TABLET, FILM COATED ORAL at 09:06

## 2019-10-28 RX ADMIN — MULTIPLE VITAMINS W/ MINERALS TAB 1 TABLET: TAB at 09:06

## 2019-10-28 RX ADMIN — OXYCODONE HYDROCHLORIDE AND ACETAMINOPHEN 2 TABLET: 5; 325 TABLET ORAL at 16:13

## 2019-10-28 RX ADMIN — OXYCODONE HYDROCHLORIDE AND ACETAMINOPHEN 2 TABLET: 5; 325 TABLET ORAL at 01:57

## 2019-10-28 RX ADMIN — PANTOPRAZOLE SODIUM 40 MG: 40 TABLET, DELAYED RELEASE ORAL at 06:31

## 2019-10-28 RX ADMIN — OXYCODONE HYDROCHLORIDE AND ACETAMINOPHEN 2 TABLET: 5; 325 TABLET ORAL at 10:49

## 2019-10-28 RX ADMIN — FUROSEMIDE 20 MG: 20 TABLET ORAL at 09:06

## 2019-10-28 RX ADMIN — AMLODIPINE BESYLATE 5 MG: 5 TABLET ORAL at 09:06

## 2019-10-28 RX ADMIN — GABAPENTIN 600 MG: 300 CAPSULE ORAL at 09:06

## 2019-10-28 RX ADMIN — METHYLPREDNISOLONE 4 MG: 4 TABLET ORAL at 09:06

## 2019-10-28 RX ADMIN — CALCIUM ACETATE 1334 MG: 667 CAPSULE ORAL at 09:06

## 2019-10-28 RX ADMIN — CALCIUM ACETATE 1334 MG: 667 CAPSULE ORAL at 13:15

## 2019-10-28 RX ADMIN — GABAPENTIN 600 MG: 300 CAPSULE ORAL at 13:15

## 2019-10-28 ASSESSMENT — PAIN SCALES - GENERAL
PAINLEVEL_OUTOF10: 8
PAINLEVEL_OUTOF10: 7
PAINLEVEL_OUTOF10: 8
PAINLEVEL_OUTOF10: 7
PAINLEVEL_OUTOF10: 0

## 2019-10-28 ASSESSMENT — PAIN DESCRIPTION - ORIENTATION: ORIENTATION: LEFT;RIGHT

## 2019-10-28 ASSESSMENT — PAIN DESCRIPTION - LOCATION: LOCATION: HIP

## 2019-10-28 ASSESSMENT — PAIN DESCRIPTION - DESCRIPTORS: DESCRIPTORS: SHARP;SHOOTING;OTHER (COMMENT)

## 2019-10-28 ASSESSMENT — PAIN DESCRIPTION - PAIN TYPE: TYPE: ACUTE PAIN

## 2019-10-29 ENCOUNTER — APPOINTMENT (OUTPATIENT)
Dept: MRI IMAGING | Age: 49
DRG: 862 | End: 2019-10-29
Attending: PHYSICAL MEDICINE & REHABILITATION
Payer: MEDICARE

## 2019-10-29 PROCEDURE — 1180000000 HC REHAB R&B

## 2019-10-29 PROCEDURE — 97530 THERAPEUTIC ACTIVITIES: CPT

## 2019-10-29 PROCEDURE — 97110 THERAPEUTIC EXERCISES: CPT

## 2019-10-29 PROCEDURE — 97535 SELF CARE MNGMENT TRAINING: CPT

## 2019-10-29 PROCEDURE — 99222 1ST HOSP IP/OBS MODERATE 55: CPT | Performed by: NEUROLOGICAL SURGERY

## 2019-10-29 PROCEDURE — 72158 MRI LUMBAR SPINE W/O & W/DYE: CPT

## 2019-10-29 PROCEDURE — 99233 SBSQ HOSP IP/OBS HIGH 50: CPT | Performed by: PHYSICAL MEDICINE & REHABILITATION

## 2019-10-29 PROCEDURE — 6360000004 HC RX CONTRAST MEDICATION: Performed by: PHYSICAL MEDICINE & REHABILITATION

## 2019-10-29 PROCEDURE — A9579 GAD-BASE MR CONTRAST NOS,1ML: HCPCS | Performed by: PHYSICAL MEDICINE & REHABILITATION

## 2019-10-29 PROCEDURE — 6360000002 HC RX W HCPCS: Performed by: PHYSICAL MEDICINE & REHABILITATION

## 2019-10-29 PROCEDURE — 97116 GAIT TRAINING THERAPY: CPT

## 2019-10-29 PROCEDURE — 6370000000 HC RX 637 (ALT 250 FOR IP): Performed by: PHYSICAL MEDICINE & REHABILITATION

## 2019-10-29 PROCEDURE — 94761 N-INVAS EAR/PLS OXIMETRY MLT: CPT

## 2019-10-29 RX ORDER — DIAZEPAM 5 MG/1
5 TABLET ORAL ONCE
Status: COMPLETED | OUTPATIENT
Start: 2019-10-29 | End: 2019-10-29

## 2019-10-29 RX ADMIN — GABAPENTIN 900 MG: 300 CAPSULE ORAL at 09:26

## 2019-10-29 RX ADMIN — DOCUSATE SODIUM 100 MG: 100 CAPSULE, LIQUID FILLED ORAL at 09:26

## 2019-10-29 RX ADMIN — CALCIUM ACETATE 1334 MG: 667 CAPSULE ORAL at 18:40

## 2019-10-29 RX ADMIN — OXYCODONE HYDROCHLORIDE AND ACETAMINOPHEN 2 TABLET: 5; 325 TABLET ORAL at 20:16

## 2019-10-29 RX ADMIN — OXYCODONE HYDROCHLORIDE AND ACETAMINOPHEN 2 TABLET: 5; 325 TABLET ORAL at 12:44

## 2019-10-29 RX ADMIN — CYCLOBENZAPRINE 10 MG: 10 TABLET, FILM COATED ORAL at 09:26

## 2019-10-29 RX ADMIN — DIAZEPAM 5 MG: 5 TABLET ORAL at 15:45

## 2019-10-29 RX ADMIN — PANTOPRAZOLE SODIUM 40 MG: 40 TABLET, DELAYED RELEASE ORAL at 06:23

## 2019-10-29 RX ADMIN — GADOTERIDOL 20 ML: 279.3 INJECTION, SOLUTION INTRAVENOUS at 17:36

## 2019-10-29 RX ADMIN — GABAPENTIN 900 MG: 300 CAPSULE ORAL at 14:40

## 2019-10-29 RX ADMIN — METHYLPREDNISOLONE 4 MG: 4 TABLET ORAL at 09:26

## 2019-10-29 RX ADMIN — AMLODIPINE BESYLATE 5 MG: 5 TABLET ORAL at 09:26

## 2019-10-29 RX ADMIN — CALCIUM ACETATE 1334 MG: 667 CAPSULE ORAL at 12:44

## 2019-10-29 RX ADMIN — VITAMIN D, TAB 1000IU (100/BT) 2000 UNITS: 25 TAB at 09:26

## 2019-10-29 RX ADMIN — CALCIUM ACETATE 1334 MG: 667 CAPSULE ORAL at 09:26

## 2019-10-29 RX ADMIN — OXYCODONE HYDROCHLORIDE AND ACETAMINOPHEN 2 TABLET: 5; 325 TABLET ORAL at 06:24

## 2019-10-29 RX ADMIN — ENOXAPARIN SODIUM 40 MG: 40 INJECTION SUBCUTANEOUS at 09:26

## 2019-10-29 RX ADMIN — GABAPENTIN 900 MG: 300 CAPSULE ORAL at 20:16

## 2019-10-29 RX ADMIN — FUROSEMIDE 20 MG: 20 TABLET ORAL at 09:26

## 2019-10-29 RX ADMIN — CYCLOBENZAPRINE 10 MG: 10 TABLET, FILM COATED ORAL at 22:54

## 2019-10-29 RX ADMIN — NALOXEGOL OXALATE 25 MG: 25 TABLET, FILM COATED ORAL at 09:26

## 2019-10-29 RX ADMIN — MULTIPLE VITAMINS W/ MINERALS TAB 1 TABLET: TAB at 09:26

## 2019-10-29 ASSESSMENT — PAIN SCALES - GENERAL
PAINLEVEL_OUTOF10: 7
PAINLEVEL_OUTOF10: 8

## 2019-10-29 ASSESSMENT — PAIN DESCRIPTION - ORIENTATION: ORIENTATION: LEFT

## 2019-10-29 ASSESSMENT — PAIN DESCRIPTION - LOCATION: LOCATION: HIP

## 2019-10-29 ASSESSMENT — ENCOUNTER SYMPTOMS
ABDOMINAL PAIN: 0
ABDOMINAL DISTENTION: 0
BACK PAIN: 1
CHEST TIGHTNESS: 0
SHORTNESS OF BREATH: 0
APNEA: 0

## 2019-10-29 ASSESSMENT — PAIN DESCRIPTION - ONSET: ONSET: ON-GOING

## 2019-10-29 ASSESSMENT — PAIN DESCRIPTION - FREQUENCY: FREQUENCY: CONTINUOUS

## 2019-10-29 ASSESSMENT — PAIN DESCRIPTION - PROGRESSION: CLINICAL_PROGRESSION: NOT CHANGED

## 2019-10-29 ASSESSMENT — PAIN - FUNCTIONAL ASSESSMENT: PAIN_FUNCTIONAL_ASSESSMENT: PREVENTS OR INTERFERES SOME ACTIVE ACTIVITIES AND ADLS

## 2019-10-30 ENCOUNTER — HOSPITAL ENCOUNTER (OUTPATIENT)
Dept: CT IMAGING | Age: 49
Discharge: HOME | End: 2019-10-30
Payer: MEDICARE

## 2019-10-30 VITALS
RESPIRATION RATE: 18 BRPM | BODY MASS INDEX: 40.84 KG/M2 | SYSTOLIC BLOOD PRESSURE: 151 MMHG | DIASTOLIC BLOOD PRESSURE: 92 MMHG | WEIGHT: 208 LBS | HEART RATE: 83 BPM | TEMPERATURE: 98.9 F | OXYGEN SATURATION: 99 % | HEIGHT: 60 IN

## 2019-10-30 DIAGNOSIS — Z00.6 EXAMINATION FOR NORMAL COMPARISON FOR CLINICAL RESEARCH: ICD-10-CM

## 2019-10-30 PROCEDURE — 6360000002 HC RX W HCPCS: Performed by: PHYSICAL MEDICINE & REHABILITATION

## 2019-10-30 PROCEDURE — 2709999900 HC NON-CHARGEABLE SUPPLY

## 2019-10-30 PROCEDURE — 3209999900 CT COMPARISON OF OUTSIDE FILMS

## 2019-10-30 PROCEDURE — 97535 SELF CARE MNGMENT TRAINING: CPT

## 2019-10-30 PROCEDURE — 99239 HOSP IP/OBS DSCHRG MGMT >30: CPT | Performed by: PHYSICAL MEDICINE & REHABILITATION

## 2019-10-30 PROCEDURE — 6370000000 HC RX 637 (ALT 250 FOR IP): Performed by: PHYSICAL MEDICINE & REHABILITATION

## 2019-10-30 RX ORDER — OXYCODONE HYDROCHLORIDE AND ACETAMINOPHEN 5; 325 MG/1; MG/1
1 TABLET ORAL EVERY 6 HOURS PRN
Qty: 28 TABLET | Refills: 0 | Status: SHIPPED | OUTPATIENT
Start: 2019-10-30 | End: 2019-11-06

## 2019-10-30 RX ORDER — M-VIT,TX,IRON,MINS/CALC/FOLIC 27MG-0.4MG
1 TABLET ORAL DAILY
Qty: 30 TABLET | Refills: 0 | Status: SHIPPED | OUTPATIENT
Start: 2019-10-31 | End: 2019-12-12

## 2019-10-30 RX ORDER — PANTOPRAZOLE SODIUM 40 MG/1
40 TABLET, DELAYED RELEASE ORAL
Qty: 30 TABLET | Refills: 0 | Status: ON HOLD | OUTPATIENT
Start: 2019-10-31 | End: 2020-02-07 | Stop reason: SDUPTHER

## 2019-10-30 RX ORDER — AMLODIPINE BESYLATE 5 MG/1
5 TABLET ORAL DAILY
Qty: 30 TABLET | Refills: 0 | Status: SHIPPED | OUTPATIENT
Start: 2019-10-31 | End: 2019-12-12 | Stop reason: ALTCHOICE

## 2019-10-30 RX ORDER — METHYLPREDNISOLONE 4 MG/1
4 TABLET ORAL DAILY
Qty: 15 TABLET | Refills: 0 | Status: SHIPPED | OUTPATIENT
Start: 2019-10-31 | End: 2019-11-15

## 2019-10-30 RX ORDER — FUROSEMIDE 20 MG/1
20 TABLET ORAL DAILY
Qty: 30 TABLET | Refills: 0 | Status: SHIPPED | OUTPATIENT
Start: 2019-10-31 | End: 2019-12-12 | Stop reason: ALTCHOICE

## 2019-10-30 RX ORDER — CYCLOBENZAPRINE HCL 10 MG
10 TABLET ORAL 3 TIMES DAILY PRN
Qty: 45 TABLET | Refills: 0 | Status: SHIPPED | OUTPATIENT
Start: 2019-10-30 | End: 2019-11-14

## 2019-10-30 RX ORDER — GABAPENTIN 300 MG/1
900 CAPSULE ORAL 3 TIMES DAILY
Qty: 135 CAPSULE | Refills: 0 | Status: SHIPPED | OUTPATIENT
Start: 2019-10-30 | End: 2019-12-12

## 2019-10-30 RX ADMIN — CYCLOBENZAPRINE 10 MG: 10 TABLET, FILM COATED ORAL at 08:04

## 2019-10-30 RX ADMIN — POLYETHYLENE GLYCOL 3350 17 G: 17 POWDER, FOR SOLUTION ORAL at 08:04

## 2019-10-30 RX ADMIN — CALCIUM ACETATE 1334 MG: 667 CAPSULE ORAL at 08:04

## 2019-10-30 RX ADMIN — ENOXAPARIN SODIUM 40 MG: 40 INJECTION SUBCUTANEOUS at 08:03

## 2019-10-30 RX ADMIN — GABAPENTIN 900 MG: 300 CAPSULE ORAL at 08:04

## 2019-10-30 RX ADMIN — MULTIPLE VITAMINS W/ MINERALS TAB 1 TABLET: TAB at 08:04

## 2019-10-30 RX ADMIN — AMLODIPINE BESYLATE 5 MG: 5 TABLET ORAL at 08:04

## 2019-10-30 RX ADMIN — METHYLPREDNISOLONE 4 MG: 4 TABLET ORAL at 08:04

## 2019-10-30 RX ADMIN — PANTOPRAZOLE SODIUM 40 MG: 40 TABLET, DELAYED RELEASE ORAL at 06:20

## 2019-10-30 RX ADMIN — FUROSEMIDE 20 MG: 20 TABLET ORAL at 08:04

## 2019-10-30 RX ADMIN — OXYCODONE HYDROCHLORIDE AND ACETAMINOPHEN 2 TABLET: 5; 325 TABLET ORAL at 04:35

## 2019-10-30 RX ADMIN — OXYCODONE HYDROCHLORIDE AND ACETAMINOPHEN 2 TABLET: 5; 325 TABLET ORAL at 10:34

## 2019-10-30 RX ADMIN — OXYCODONE HYDROCHLORIDE AND ACETAMINOPHEN 2 TABLET: 5; 325 TABLET ORAL at 00:18

## 2019-10-30 RX ADMIN — DOCUSATE SODIUM 100 MG: 100 CAPSULE, LIQUID FILLED ORAL at 08:04

## 2019-10-30 RX ADMIN — VITAMIN D, TAB 1000IU (100/BT) 2000 UNITS: 25 TAB at 08:04

## 2019-10-30 RX ADMIN — NALOXEGOL OXALATE 25 MG: 25 TABLET, FILM COATED ORAL at 08:04

## 2019-10-30 ASSESSMENT — PAIN SCALES - GENERAL
PAINLEVEL_OUTOF10: 9
PAINLEVEL_OUTOF10: 9
PAINLEVEL_OUTOF10: 8
PAINLEVEL_OUTOF10: 7

## 2019-11-21 RX ORDER — CALCIUM ACETATE 667 MG/1
CAPSULE ORAL
Qty: 180 CAPSULE | Refills: 0 | OUTPATIENT
Start: 2019-11-21

## 2019-11-21 RX ORDER — OMEGA-3S/DHA/EPA/FISH OIL/D3 300MG-1000
CAPSULE ORAL
Qty: 150 TABLET | Refills: 0 | OUTPATIENT
Start: 2019-11-21

## 2019-11-21 RX ORDER — AMLODIPINE BESYLATE 5 MG/1
TABLET ORAL
Qty: 30 TABLET | Refills: 0 | OUTPATIENT
Start: 2019-11-21

## 2019-11-21 RX ORDER — FUROSEMIDE 20 MG/1
TABLET ORAL
Qty: 30 TABLET | Refills: 0 | OUTPATIENT
Start: 2019-11-21

## 2019-12-05 ENCOUNTER — HOSPITAL ENCOUNTER (EMERGENCY)
Age: 49
Discharge: HOME | End: 2019-12-05
Attending: EMERGENCY MEDICINE
Payer: MEDICARE

## 2019-12-05 VITALS
BODY MASS INDEX: 39.85 KG/M2 | RESPIRATION RATE: 17 BRPM | OXYGEN SATURATION: 96 % | TEMPERATURE: 98.7 F | HEART RATE: 81 BPM | WEIGHT: 203 LBS | DIASTOLIC BLOOD PRESSURE: 92 MMHG | SYSTOLIC BLOOD PRESSURE: 143 MMHG | HEIGHT: 60 IN

## 2019-12-05 DIAGNOSIS — R19.7 DIARRHEA, UNSPECIFIED TYPE: Primary | ICD-10-CM

## 2019-12-05 LAB
ANION GAP SERPL CALCULATED.3IONS-SCNC: 13 MEQ/L (ref 8–16)
BASOPHILS # BLD: 0.6 %
BASOPHILS ABSOLUTE: 0 THOU/MM3 (ref 0–0.1)
BUN BLDV-MCNC: 20 MG/DL (ref 7–22)
CALCIUM SERPL-MCNC: 10 MG/DL (ref 8.5–10.5)
CHLORIDE BLD-SCNC: 101 MEQ/L (ref 98–111)
CO2: 28 MEQ/L (ref 23–33)
CREAT SERPL-MCNC: 0.9 MG/DL (ref 0.4–1.2)
EOSINOPHIL # BLD: 1.1 %
EOSINOPHILS ABSOLUTE: 0.1 THOU/MM3 (ref 0–0.4)
ERYTHROCYTE [DISTWIDTH] IN BLOOD BY AUTOMATED COUNT: 14.2 % (ref 11.5–14.5)
ERYTHROCYTE [DISTWIDTH] IN BLOOD BY AUTOMATED COUNT: 47.5 FL (ref 35–45)
GFR SERPL CREATININE-BSD FRML MDRD: 67 ML/MIN/1.73M2
GLUCOSE BLD-MCNC: 123 MG/DL (ref 70–108)
HCT VFR BLD CALC: 42.2 % (ref 37–47)
HEMOGLOBIN: 12.8 GM/DL (ref 12–16)
IMMATURE GRANS (ABS): 0.01 THOU/MM3 (ref 0–0.07)
IMMATURE GRANULOCYTES: 0.2 %
LYMPHOCYTES # BLD: 19 %
LYMPHOCYTES ABSOLUTE: 1.2 THOU/MM3 (ref 1–4.8)
MCH RBC QN AUTO: 27.7 PG (ref 26–33)
MCHC RBC AUTO-ENTMCNC: 30.3 GM/DL (ref 32.2–35.5)
MCV RBC AUTO: 91.3 FL (ref 81–99)
MONOCYTES # BLD: 5.9 %
MONOCYTES ABSOLUTE: 0.4 THOU/MM3 (ref 0.4–1.3)
NUCLEATED RED BLOOD CELLS: 0 /100 WBC
OSMOLALITY CALCULATION: 287.1 MOSMOL/KG (ref 275–300)
PLATELET # BLD: 357 THOU/MM3 (ref 130–400)
PMV BLD AUTO: 9.8 FL (ref 9.4–12.4)
POTASSIUM SERPL-SCNC: 3.7 MEQ/L (ref 3.5–5.2)
RBC # BLD: 4.62 MILL/MM3 (ref 4.2–5.4)
SEG NEUTROPHILS: 73.2 %
SEGMENTED NEUTROPHILS ABSOLUTE COUNT: 4.7 THOU/MM3 (ref 1.8–7.7)
SODIUM BLD-SCNC: 142 MEQ/L (ref 135–145)
WBC # BLD: 6.4 THOU/MM3 (ref 4.8–10.8)

## 2019-12-05 PROCEDURE — 36415 COLL VENOUS BLD VENIPUNCTURE: CPT

## 2019-12-05 PROCEDURE — 80048 BASIC METABOLIC PNL TOTAL CA: CPT

## 2019-12-05 PROCEDURE — 85025 COMPLETE CBC W/AUTO DIFF WBC: CPT

## 2019-12-05 PROCEDURE — 99284 EMERGENCY DEPT VISIT MOD MDM: CPT

## 2019-12-05 PROCEDURE — 6370000000 HC RX 637 (ALT 250 FOR IP)

## 2019-12-05 RX ORDER — ONDANSETRON 4 MG/1
TABLET, ORALLY DISINTEGRATING ORAL
Status: COMPLETED
Start: 2019-12-05 | End: 2019-12-05

## 2019-12-05 RX ADMIN — ONDANSETRON 4 MG: 4 TABLET, ORALLY DISINTEGRATING ORAL at 18:03

## 2019-12-05 ASSESSMENT — ENCOUNTER SYMPTOMS
ABDOMINAL PAIN: 0
SHORTNESS OF BREATH: 0
WHEEZING: 0
EYE PAIN: 0
SORE THROAT: 0
COUGH: 0
VOMITING: 0
EYE DISCHARGE: 0
NAUSEA: 0
DIARRHEA: 1
RHINORRHEA: 0
BACK PAIN: 0

## 2019-12-12 ENCOUNTER — OFFICE VISIT (OUTPATIENT)
Dept: PHYSICAL MEDICINE AND REHAB | Age: 49
End: 2019-12-12
Payer: MEDICARE

## 2019-12-12 VITALS
HEART RATE: 84 BPM | DIASTOLIC BLOOD PRESSURE: 80 MMHG | SYSTOLIC BLOOD PRESSURE: 128 MMHG | BODY MASS INDEX: 38.48 KG/M2 | WEIGHT: 196 LBS | HEIGHT: 60 IN

## 2019-12-12 DIAGNOSIS — G89.29 CHRONIC LEFT-SIDED LOW BACK PAIN WITH LEFT-SIDED SCIATICA: Primary | ICD-10-CM

## 2019-12-12 DIAGNOSIS — R26.9 NEUROLOGIC GAIT DYSFUNCTION: ICD-10-CM

## 2019-12-12 DIAGNOSIS — M54.42 CHRONIC LEFT-SIDED LOW BACK PAIN WITH LEFT-SIDED SCIATICA: Primary | ICD-10-CM

## 2019-12-12 DIAGNOSIS — R20.2 PARESTHESIA OF LEFT LOWER EXTREMITY: ICD-10-CM

## 2019-12-12 DIAGNOSIS — R20.2 PARESTHESIA OF BUTTOCK: ICD-10-CM

## 2019-12-12 PROCEDURE — 1036F TOBACCO NON-USER: CPT | Performed by: PHYSICAL MEDICINE & REHABILITATION

## 2019-12-12 PROCEDURE — G8484 FLU IMMUNIZE NO ADMIN: HCPCS | Performed by: PHYSICAL MEDICINE & REHABILITATION

## 2019-12-12 PROCEDURE — 99214 OFFICE O/P EST MOD 30 MIN: CPT | Performed by: PHYSICAL MEDICINE & REHABILITATION

## 2019-12-12 PROCEDURE — G8417 CALC BMI ABV UP PARAM F/U: HCPCS | Performed by: PHYSICAL MEDICINE & REHABILITATION

## 2019-12-12 PROCEDURE — G8427 DOCREV CUR MEDS BY ELIG CLIN: HCPCS | Performed by: PHYSICAL MEDICINE & REHABILITATION

## 2019-12-12 RX ORDER — FUROSEMIDE 20 MG/1
20 TABLET ORAL DAILY
Qty: 30 TABLET | Refills: 0 | Status: ON HOLD | OUTPATIENT
Start: 2019-12-12 | End: 2020-02-07 | Stop reason: HOSPADM

## 2019-12-12 RX ORDER — IBUPROFEN 800 MG/1
TABLET ORAL
Refills: 3 | Status: ON HOLD | COMMUNITY
Start: 2019-11-21 | End: 2020-02-07 | Stop reason: HOSPADM

## 2019-12-12 RX ORDER — DOXYCYCLINE HYCLATE 100 MG/1
CAPSULE ORAL
Status: ON HOLD | COMMUNITY
End: 2020-02-07 | Stop reason: HOSPADM

## 2019-12-12 RX ORDER — OXYCODONE HYDROCHLORIDE AND ACETAMINOPHEN 5; 325 MG/1; MG/1
TABLET ORAL
Status: ON HOLD | COMMUNITY
End: 2020-02-07 | Stop reason: HOSPADM

## 2019-12-12 RX ORDER — CYCLOBENZAPRINE HCL 10 MG
TABLET ORAL
Refills: 1 | Status: ON HOLD | COMMUNITY
Start: 2019-11-15 | End: 2020-02-07 | Stop reason: HOSPADM

## 2019-12-12 RX ORDER — MELATONIN
Refills: 1 | COMMUNITY
Start: 2019-11-22

## 2019-12-12 RX ORDER — DOCUSATE SODIUM AND SENNOSIDES 8.6; 5 MG/1; MG/1
TABLET, FILM COATED ORAL
Refills: 0 | COMMUNITY
Start: 2019-11-22 | End: 2020-12-11

## 2019-12-12 RX ORDER — AMLODIPINE BESYLATE 5 MG/1
5 TABLET ORAL DAILY
Qty: 30 TABLET | Refills: 0 | Status: ON HOLD | OUTPATIENT
Start: 2019-12-12 | End: 2020-02-07 | Stop reason: HOSPADM

## 2019-12-22 PROBLEM — R20.2 PARESTHESIA OF LEFT LOWER EXTREMITY: Status: ACTIVE | Noted: 2019-12-22

## 2019-12-22 PROBLEM — M54.42 CHRONIC LEFT-SIDED LOW BACK PAIN WITH LEFT-SIDED SCIATICA: Status: ACTIVE | Noted: 2019-12-22

## 2019-12-22 PROBLEM — R26.9 NEUROLOGIC GAIT DYSFUNCTION: Status: ACTIVE | Noted: 2019-12-22

## 2019-12-22 PROBLEM — R20.2 PARESTHESIA OF BUTTOCK: Status: ACTIVE | Noted: 2019-12-22

## 2019-12-22 PROBLEM — G89.29 CHRONIC LEFT-SIDED LOW BACK PAIN WITH LEFT-SIDED SCIATICA: Status: ACTIVE | Noted: 2019-12-22

## 2019-12-22 ASSESSMENT — ENCOUNTER SYMPTOMS
GASTROINTESTINAL NEGATIVE: 1
EYES NEGATIVE: 1
RESPIRATORY NEGATIVE: 1
BACK PAIN: 1

## 2020-01-02 ENCOUNTER — HOSPITAL ENCOUNTER (OUTPATIENT)
Dept: PHYSICAL THERAPY | Age: 50
Setting detail: THERAPIES SERIES
Discharge: HOME | End: 2020-01-02
Payer: MEDICARE

## 2020-01-02 PROCEDURE — 97110 THERAPEUTIC EXERCISES: CPT

## 2020-01-02 PROCEDURE — 97162 PT EVAL MOD COMPLEX 30 MIN: CPT

## 2020-01-02 ASSESSMENT — PAIN DESCRIPTION - ORIENTATION: ORIENTATION: LEFT;LOWER

## 2020-01-02 ASSESSMENT — PAIN DESCRIPTION - LOCATION: LOCATION: BACK

## 2020-01-02 ASSESSMENT — PAIN DESCRIPTION - PAIN TYPE: TYPE: ACUTE PAIN;SURGICAL PAIN

## 2020-01-02 ASSESSMENT — PAIN SCALES - GENERAL: PAINLEVEL_OUTOF10: 6

## 2020-01-02 NOTE — PROGRESS NOTES
Reviewed: Yes  Patient assessed for rehabilitation services?: Yes  Family / Caregiver Present: No  Comments: Follow up with family doctor today and with surgeon on 1-8-20. [facility] follow up with Dr Jon Baum in one month. Subjective: Patient trace had surgery in October 2019 and had a fusion done in low back with rods and screws.  was in horrific pain and found out that the surgeon had pinched and entrapped her L5 nerve.  had surgery again in Nov 2019 and felt better but developed MRSA and the top part of her incision was open and not healing.  was having no pain and started walking 1-2 miles a day. Reports then right before Christmas 2019 started having increased pain in her back again.  went to the ER and testing showed all her surgical parts were healing fine and that she may have just pulled a muscle.  is continuing to have pain and tightness in low back and incision is not fully healed at the top yet.  thought had C-diff but was told by a doctor that she did not when she went to the hospital. Trace is being given two sets of instructions from doctors so not sure what to follow.  2 days ago started having increased dizziness and trouble catching her breath.        Pain:  Patient Currently in Pain: Yes  Pain Assessment: 0-10  Pain Level: 6(5-6/10)  Pain Type: Acute pain, Surgical pain  Pain Location: Back  Pain Orientation: Left, Lower  Pain Radiating Towards:  left leg pain is almost gone     Social/Functional History:    Type of Home: House  Home Layout: Multi-level, Bed/Bath upstairs  Home Access: Stairs to enter without rails  Entrance Stairs - Number of Steps: one big step at [facility]  Home Equipment: [employer]             ADL Assistance: Independent  Homemaking Assistance: Independent  Homemaking Responsibilities: Yes  Ambulation Assistance: Independent  Transfer Assistance: Independent    Active : Yes  Occupation: Unemployed  Type of occupation: Patient has been through multiple surgeries, her incision is not healing and her pain continues to be a problem so unsure how will handle therapy. .  Decision making was based on patient assessment and decision making process in determining plan of care and establishing reasonable expectations for measurable functional outcomes. Evaluation Complexity: Based on the findings of patient history, examination, clinical presentation, and decision making during this evaluation, the evaluation of Luann Robledo  is of medium complexity. Goals:  Patient goals : To have better use of left leg and improve her balance. Short term goals  Time Frame for Short term goals: 4 weeks  Short term goal 1: Patient to report 25-50% decrease in low back pain to be able to tolerate laying in positions longer for better sleep at night  Short term goal 2: Patient to ambulate with less use of AD. improved balance and more upright posture and less pain for all shopping  Short term goal 3: Patient to be able to independently lift left leg without pain through all ranges for ease with getting in and out of the car and bed  Short term goal 4: Patient to demonstrate abdominal bracing with no more than 1-2 cues for increased stability in back with all lifting at home  Short term goal 5: Patient to demonstrate 25% increase in lumbar range with less pain for ease with dressing    Long term goals  Time Frame for Long term goals : 8 weeks  Long term goal 1: Patient to be independent with home and or pool program for ease and less pain with all daily activity to be able to return to a job.     [address], [address]

## 2020-01-09 ENCOUNTER — HOSPITAL ENCOUNTER (OUTPATIENT)
Dept: PHYSICAL THERAPY | Age: 50
Setting detail: THERAPIES SERIES
Discharge: HOME | End: 2020-01-09
Payer: MEDICARE

## 2020-01-09 PROCEDURE — 97140 MANUAL THERAPY 1/> REGIONS: CPT

## 2020-01-09 PROCEDURE — 97110 THERAPEUTIC EXERCISES: CPT

## 2020-01-09 PROCEDURE — G0283 ELEC STIM OTHER THAN WOUND: HCPCS

## 2020-01-09 ASSESSMENT — PAIN DESCRIPTION - LOCATION: LOCATION: BACK

## 2020-01-09 ASSESSMENT — PAIN DESCRIPTION - PAIN TYPE: TYPE: ACUTE PAIN;SURGICAL PAIN

## 2020-01-09 ASSESSMENT — PAIN SCALES - GENERAL: PAINLEVEL_OUTOF10: 10

## 2020-01-09 ASSESSMENT — PAIN DESCRIPTION - ORIENTATION: ORIENTATION: LOWER;RIGHT;LEFT

## 2020-01-09 NOTE — PROGRESS NOTES
Left      Objective    Exercises  Exercise 2: Spent beginning of session discussing what was said at doctor's appts and educating then on what will be doing with therapy  Exercise 3: E-stim to bilateral sides low back (channel 1 to one side and channel 2 to the other) wtih one HP to each side and not over incision in right sidelying. 15 minutes 100% scan at intenisty of 12  Exercise 4: Used wipes to clean up tape residue on low back so could perform myofascial work to bilateral sides low back in right sidelying. patient with tightness noted bilaterally. Exercise 5: Ended sesion with exercises for abdominal bracing, glut sets and quad sets. Activity Tolerance:  Activity Tolerance: Patient Tolerated treatment well, Patient limited by pain  Activity Tolerance: Patient reported pain decreased from 10/10 to 5-6/10 after treatment. Assessment: Body structures, Functions, Activity limitations: Decreased functional mobility , Decreased strength, Decreased endurance, Decreased ADL status, Decreased sensation, Decreased ROM, Decreased balance, Increased pain  Assessment: Patient responded well to therapy with E-stim and manual treatments. Need to continue to work on decreasing pain and tightness and progressing strengthening. Prognosis: Fair  Discharge Recommendations: Continue to assess pending progress    Patient Education:  Patient Education: Continue with HEP and see how feels after treatment today. Plan:  Times per week: 1x/week, may increase to 2x/week after has seen doctors  Plan weeks: 8 weeks  Specific instructions for Next Treatment: core and leg strengthening, posture education, manual therapy/stretches, possibly pool therapy if wound heals, modalities - be careful of wound.   Current Treatment Recommendations: Strengthening, ROM, Balance Training, Functional Mobility Training, Endurance Training, Stair training, Gait Training, Neuromuscular Re-education, Manual Therapy - Soft Tissue Mobilization, Home Exercise Program, Modalities, Aquatics, Pain Management  Plan Comment: Continue with POC    Goals:  Patient goals : To have better use of left leg and improve her balance. Short term goals  Time Frame for Short term goals: 4 weeks  Short term goal 1: Patient to report 25-50% decrease in low back pain to be able to tolerate laying in positions longer for better sleep at night  Short term goal 2: Patient to ambulate with less use of AD. improved balance and more upright posture and less pain for all shopping  Short term goal 3: Patient to be able to independently lift left leg without pain through all ranges for ease with getting in and out of the car and bed  Short term goal 4: Patient to demonstrate abdominal bracing with no more than 1-2 cues for increased stability in back with all lifting at home  Short term goal 5: Patient to demonstrate 25% increase in lumbar range with less pain for ease with dressing    Long term goals  Time Frame for Long term goals : 8 weeks  Long term goal 1: Patient to be independent with home and or pool program for ease and less pain with all daily activity to be able to return to a job.     [address], [address]

## 2020-01-13 ENCOUNTER — HOSPITAL ENCOUNTER (OUTPATIENT)
Dept: PHYSICAL THERAPY | Age: 50
Setting detail: THERAPIES SERIES
Discharge: HOME | End: 2020-01-13
Payer: MEDICARE

## 2020-01-13 PROCEDURE — 97110 THERAPEUTIC EXERCISES: CPT

## 2020-01-13 PROCEDURE — 97140 MANUAL THERAPY 1/> REGIONS: CPT

## 2020-01-13 PROCEDURE — G0283 ELEC STIM OTHER THAN WOUND: HCPCS

## 2020-01-13 ASSESSMENT — PAIN DESCRIPTION - ORIENTATION: ORIENTATION: RIGHT;LEFT;LOWER

## 2020-01-13 ASSESSMENT — PAIN DESCRIPTION - LOCATION: LOCATION: BACK;HIP

## 2020-01-13 ASSESSMENT — PAIN DESCRIPTION - PAIN TYPE: TYPE: SURGICAL PAIN;ACUTE PAIN

## 2020-01-13 ASSESSMENT — PAIN SCALES - GENERAL: PAINLEVEL_OUTOF10: 10

## 2020-01-13 NOTE — PROGRESS NOTES
weeks  Short term goal 1: Patient to report 25-50% decrease in low back pain to be able to tolerate laying in positions longer for better sleep at night  Short term goal 2: Patient to ambulate with less use of AD. improved balance and more upright posture and less pain for all shopping  Short term goal 3: Patient to be able to independently lift left leg without pain through all ranges for ease with getting in and out of the car and bed  Short term goal 4: Patient to demonstrate abdominal bracing with no more than 1-2 cues for increased stability in back with all lifting at home  Short term goal 5: Patient to demonstrate 25% increase in lumbar range with less pain for ease with dressing    Long term goals  Time Frame for Long term goals : 8 weeks  Long term goal 1: Patient to be independent with home and or pool program for ease and less pain with all daily activity to be able to return to a job.     [address], [address]

## 2020-01-16 ENCOUNTER — APPOINTMENT (OUTPATIENT)
Dept: PHYSICAL THERAPY | Age: 50
End: 2020-01-16
Payer: MEDICARE

## 2020-01-20 ENCOUNTER — TELEPHONE (OUTPATIENT)
Dept: PHYSICAL MEDICINE AND REHAB | Age: 50
End: 2020-01-20

## 2020-01-20 ENCOUNTER — HOSPITAL ENCOUNTER (OUTPATIENT)
Dept: PHYSICAL THERAPY | Age: 50
Setting detail: THERAPIES SERIES
Discharge: HOME | End: 2020-01-20
Payer: MEDICARE

## 2020-01-20 NOTE — PROGRESS NOTES
[facility]  PHYSICAL THERAPY MISSED TREATMENT NOTE  OUTPATIENT REHABILITATION    Date: 2020  Patient Name: Emiliano Romo        MRN: [id number]   : 1970  (53 y.o.)  Gender: female           PT Visit Information  Canceled Appointment: 2    REASON FOR MISSED TREATMENT:  PT spoke with patient over the phone. Patient reports has actually been able to have a bowel movement but her back pain is still horrible. States has to take a pain pill just to be able to get out of bed to go to the bathroom. Patient states she is miserable and does not know what to do. States she cannot move or do any activity due to her back pain. Patient crying on the phone and stating that she does not want to live the rest of her life in this type of pain. PT stated would call doctor's office to find out what to do next. PT cancelled therapy appt for [place] [id number]: 2020

## 2020-01-21 ENCOUNTER — TELEPHONE (OUTPATIENT)
Dept: PHYSICAL MEDICINE AND REHAB | Age: 50
End: 2020-01-21

## 2020-01-21 NOTE — TELEPHONE ENCOUNTER
[facility] physical therapy department called and stated patient having severe back pain and can not tolerate therapy. Want to know if therapy should be continued and what to advise patient.  Please advise

## 2020-01-21 NOTE — TELEPHONE ENCOUNTER
Spoke with the patient's outpatient P.T., Twan Officer, on 01/21/20. Twan Officer stated that the patient's low back pain has recently been severe and she is not able to tolerate the P.T. Recommended that her P.T be placed on hold. The patient is to be reevaluated in the outpatient rehab. clinic on 01/22/20. Will update Kassidy after the patient's follow-up evaluation.

## 2020-01-21 NOTE — DISCHARGE SUMMARY
[facility]    Patient Name: Chanell Gonzales        CSN: [id number]   YOB: 1970  Gender: female  Referring Practitioner: Dr Randy Eldridge  Diagnosis: M54.42, G89.29 (ICD-10-CM) - Chronic left-sided low back pain with left-sided sciatica    Patient is discharged from Physical Therapy services at this time. See last note for details related to results of therapy and goal achievement. Reason for discharge:  Patient has had to cancel her last 2 sessions of therapy due to cannot get out of bed because of her intense pain. Patient reports she is wetting the bed because she cannot tolerate getting up to go to the bathroom. States her pain is all on her right side now. Patient crying for the entirety of the phone call and kept stating that \"I cannot live like this\" and \"I cannot go through another night of pain. \" Patient mentioned going to the ER in [place] because cannot get in to see the doctor up there for 3 more weeks. Patient agreeable to being done with therapy right now due to cannot tolerate it. Unable to update goals at this time.   Patient discharged as of 1-      [facility] [id number]: 1/21/2020

## 2020-01-22 ENCOUNTER — TELEPHONE (OUTPATIENT)
Dept: PHYSICAL MEDICINE AND REHAB | Age: 50
End: 2020-01-22

## 2020-01-22 NOTE — TELEPHONE ENCOUNTER
Pt. Called office to cancel today's appt. States she is admitted to [facility] and is scheduled to have MRI for possible loose  hardware from prior back surgery.

## 2020-01-22 NOTE — TELEPHONE ENCOUNTER
Message noted. Will follow-up with this patient once she returns home from her treatment at the [facility].

## 2020-01-23 ENCOUNTER — APPOINTMENT (OUTPATIENT)
Dept: PHYSICAL THERAPY | Age: 50
End: 2020-01-23
Payer: MEDICARE

## 2020-01-28 ENCOUNTER — APPOINTMENT (OUTPATIENT)
Dept: PHYSICAL THERAPY | Age: 50
End: 2020-01-28
Payer: MEDICARE

## 2020-01-29 ENCOUNTER — APPOINTMENT (OUTPATIENT)
Dept: PHYSICAL THERAPY | Age: 50
End: 2020-01-29
Payer: MEDICARE

## 2020-01-30 ENCOUNTER — HOSPITAL ENCOUNTER (INPATIENT)
Age: 50
LOS: 8 days | Discharge: HOME HEALTH | DRG: 058 | End: 2020-02-07
Attending: PHYSICAL MEDICINE & REHABILITATION | Admitting: PHYSICAL MEDICINE & REHABILITATION
Payer: MEDICARE

## 2020-01-30 PROBLEM — M43.27 FUSION OF LUMBOSACRAL SPINE: Status: ACTIVE | Noted: 2020-01-30

## 2020-01-30 PROBLEM — M54.50 BACK PAIN, LUMBOSACRAL: Status: ACTIVE | Noted: 2020-01-30

## 2020-01-30 PROCEDURE — 87641 MR-STAPH DNA AMP PROBE: CPT

## 2020-01-30 PROCEDURE — 1180000000 HC REHAB R&B

## 2020-01-30 PROCEDURE — 87081 CULTURE SCREEN ONLY: CPT

## 2020-01-30 PROCEDURE — 87500 VANOMYCIN DNA AMP PROBE: CPT

## 2020-01-30 PROCEDURE — 6370000000 HC RX 637 (ALT 250 FOR IP): Performed by: PHYSICAL MEDICINE & REHABILITATION

## 2020-01-30 RX ORDER — OXYCODONE HYDROCHLORIDE AND ACETAMINOPHEN 5; 325 MG/1; MG/1
1 TABLET ORAL EVERY 4 HOURS PRN
Status: DISCONTINUED | OUTPATIENT
Start: 2020-01-30 | End: 2020-02-07 | Stop reason: HOSPADM

## 2020-01-30 RX ADMIN — OXYCODONE HYDROCHLORIDE AND ACETAMINOPHEN 1 TABLET: 5; 325 TABLET ORAL at 23:58

## 2020-01-30 ASSESSMENT — PAIN DESCRIPTION - FREQUENCY: FREQUENCY: INTERMITTENT

## 2020-01-30 ASSESSMENT — PAIN DESCRIPTION - PAIN TYPE: TYPE: ACUTE PAIN

## 2020-01-30 ASSESSMENT — PAIN SCALES - GENERAL
PAINLEVEL_OUTOF10: 10
PAINLEVEL_OUTOF10: 5

## 2020-01-30 ASSESSMENT — PAIN DESCRIPTION - LOCATION: LOCATION: BACK

## 2020-01-30 ASSESSMENT — PAIN DESCRIPTION - ORIENTATION: ORIENTATION: RIGHT;LEFT;LOWER

## 2020-01-30 ASSESSMENT — PAIN DESCRIPTION - PROGRESSION: CLINICAL_PROGRESSION: NOT CHANGED

## 2020-01-30 NOTE — PROGRESS NOTES
[facility]  Acute Inpatient Rehab Preadmission Assessment    Patient Name: Guera Easley        MRN: [id number]    : 1970  (53 y.o.)  Gender: female     Admitted from:[facility]  Initial Assessment  Pre-admission assessment completed via review of faxed medical records and review of patient information with staff. Pre-Admission assessment discussed with physiatrist and approved for admission     Date of admission to the hospital:20  Date patient eligible for admission:2020    Primary Diagnosis: s1-L1 fusion       Did patient have surgery? yes - S1-Fusion    Physicians: Tr Romo MD    Risk for clinical complications/co-morbidities:   Past Medical History:   Diagnosis Date    Arthritis     Back disorder     spine     Esophagitis     GERD (gastroesophageal reflux disease)     Hypertension     Morbid obesity with BMI of 40.0-44.9, adult [facility])        Financial Information  Primary insurance: medicaid advantage    Secondary Insurance:  . Has the patient had two or more falls in the past year or any fall with injury in the past year? no    Did the patient have major surgery during the 100 days prior to admission?   yes    Precautions:   falls and infections       Isolation Precautions: Contact       Physiatrist: Dr. Zuniga President    Patients Occupation: Unemployed, not seeking work  Reviewed Lab and Diagnostic reports from Current Admission: Yes    Patients Prior Functional  Level:      Current functional status for upper extremity ADLs:     Current functional status for lower extremity ADLs:     Current functional status for bed, chair, wheelchair transfers:     Current functional status for toilet transfers:     Current functional status for locomotion:     Current functional status for bladder management: Complete independence    Current functional status for bowel management:Complete independence    Current functional status for comprehension: Modified independence    Current functional status for expression: Modified independence    Current functional status for social interaction: Modified independence    Current functional status for problem solving: Complete independence    Current functional status for memory: Complete independence    Expected level of Improvement in Self-Care:  Complete independence    Expected level of Improvement in Sphincter Control:  Complete independence    Expected level of Improvement in Transfers: Complete independence    Expected level of Improvement in Locomotion:  Complete independence    Expected level of Improvement in Communication and Social Cognition: Complete independence    Expected length of time to achieve that level of improvement: 2 weeks    Current rehab issues: ADL dysfunction,bladder management,bowel management,carry over of therapy techniques, discharge planning, disease and co-morbidity management, gait/mobility dysfunction, medication management, nutrition and hydration management,Ongoing assessment of safety, Pain management, Patient and family education, Prevention of secondary complications, Skin Integrity  Required therapy: Physical Therapy, Occupational Therapy 3 hours per day, 5-6 days per week. Recreational Therapy 1 hour per week. Expected Discharge Destination: Home    Expected Post Discharge Treatments: Home Care    Other information relevant to the care needs:     Acute Inpatient Rehabilitation Disclosure Statement provided to patient. Patient verbalized understanding. I have reviewed and concur with the findings and results of the pre-admission screening assessment completed by the Inpatient Rehabilitation Admissions Coordinator.     Tala Ugarte MD

## 2020-01-31 LAB
ABSOLUTE RETIC #: 81 THOU/MM3 (ref 20–115)
ALBUMIN SERPL-MCNC: 3.4 G/DL (ref 3.5–5.1)
ALP BLD-CCNC: 85 U/L (ref 38–126)
ALT SERPL-CCNC: 9 U/L (ref 11–66)
ANION GAP SERPL CALCULATED.3IONS-SCNC: 11 MEQ/L (ref 8–16)
AST SERPL-CCNC: 9 U/L (ref 5–40)
BILIRUB SERPL-MCNC: 0.5 MG/DL (ref 0.3–1.2)
BUN BLDV-MCNC: 11 MG/DL (ref 7–22)
C-REACTIVE PROTEIN: 2.52 MG/DL (ref 0–1)
CALCIUM SERPL-MCNC: 9.6 MG/DL (ref 8.5–10.5)
CHLORIDE BLD-SCNC: 101 MEQ/L (ref 98–111)
CO2: 28 MEQ/L (ref 23–33)
CREAT SERPL-MCNC: 0.5 MG/DL (ref 0.4–1.2)
ERYTHROCYTE [DISTWIDTH] IN BLOOD BY AUTOMATED COUNT: 14.8 % (ref 11.5–14.5)
ERYTHROCYTE [DISTWIDTH] IN BLOOD BY AUTOMATED COUNT: 44.8 FL (ref 35–45)
FERRITIN: 129 NG/ML (ref 10–291)
GFR SERPL CREATININE-BSD FRML MDRD: > 90 ML/MIN/1.73M2
GLUCOSE BLD-MCNC: 84 MG/DL (ref 70–108)
HCT VFR BLD CALC: 32.1 % (ref 37–47)
HEMOGLOBIN: 9.6 GM/DL (ref 12–16)
IMMATURE RETIC FRACT: 27.3 % (ref 3–15.9)
IRON: 30 UG/DL (ref 50–170)
MCH RBC QN AUTO: 24.9 PG (ref 26–33)
MCHC RBC AUTO-ENTMCNC: 29.9 GM/DL (ref 32.2–35.5)
MCV RBC AUTO: 83.4 FL (ref 81–99)
MRSA SCREEN RT-PCR: NEGATIVE
PLATELET # BLD: 330 THOU/MM3 (ref 130–400)
PMV BLD AUTO: 9.1 FL (ref 9.4–12.4)
POTASSIUM SERPL-SCNC: 4.2 MEQ/L (ref 3.5–5.2)
RBC # BLD: 3.85 MILL/MM3 (ref 4.2–5.4)
RETIC HEMOGLOBIN: 30.3 PG (ref 28.2–35.7)
RETICULOCYTE ABSOLUTE COUNT: 2.1 % (ref 0.5–2)
SEDIMENTATION RATE, ERYTHROCYTE: 81 MM/HR (ref 0–20)
SODIUM BLD-SCNC: 140 MEQ/L (ref 135–145)
TOTAL PROTEIN: 6.8 G/DL (ref 6.1–8)
VANCOMYCIN RESISTANT ENTEROCOCCUS: NEGATIVE
VITAMIN D 25-HYDROXY: 26 NG/ML (ref 30–100)
WBC # BLD: 7.1 THOU/MM3 (ref 4.8–10.8)

## 2020-01-31 PROCEDURE — 6370000000 HC RX 637 (ALT 250 FOR IP): Performed by: PHYSICAL MEDICINE & REHABILITATION

## 2020-01-31 PROCEDURE — 1180000000 HC REHAB R&B

## 2020-01-31 PROCEDURE — 82306 VITAMIN D 25 HYDROXY: CPT

## 2020-01-31 PROCEDURE — 2580000003 HC RX 258: Performed by: PHYSICAL MEDICINE & REHABILITATION

## 2020-01-31 PROCEDURE — 6360000002 HC RX W HCPCS: Performed by: PHYSICAL MEDICINE & REHABILITATION

## 2020-01-31 PROCEDURE — 97161 PT EVAL LOW COMPLEX 20 MIN: CPT

## 2020-01-31 PROCEDURE — 97116 GAIT TRAINING THERAPY: CPT

## 2020-01-31 PROCEDURE — 2580000003 HC RX 258: Performed by: INTERNAL MEDICINE

## 2020-01-31 PROCEDURE — 97110 THERAPEUTIC EXERCISES: CPT

## 2020-01-31 PROCEDURE — 82728 ASSAY OF FERRITIN: CPT

## 2020-01-31 PROCEDURE — 97530 THERAPEUTIC ACTIVITIES: CPT

## 2020-01-31 PROCEDURE — 85046 RETICYTE/HGB CONCENTRATE: CPT

## 2020-01-31 PROCEDURE — 36415 COLL VENOUS BLD VENIPUNCTURE: CPT

## 2020-01-31 PROCEDURE — 97535 SELF CARE MNGMENT TRAINING: CPT

## 2020-01-31 PROCEDURE — 86140 C-REACTIVE PROTEIN: CPT

## 2020-01-31 PROCEDURE — 97166 OT EVAL MOD COMPLEX 45 MIN: CPT

## 2020-01-31 PROCEDURE — 80053 COMPREHEN METABOLIC PANEL: CPT

## 2020-01-31 PROCEDURE — 85027 COMPLETE CBC AUTOMATED: CPT

## 2020-01-31 PROCEDURE — 85651 RBC SED RATE NONAUTOMATED: CPT

## 2020-01-31 PROCEDURE — 6360000002 HC RX W HCPCS: Performed by: INTERNAL MEDICINE

## 2020-01-31 PROCEDURE — 83540 ASSAY OF IRON: CPT

## 2020-01-31 RX ORDER — METOPROLOL SUCCINATE 50 MG/1
50 TABLET, EXTENDED RELEASE ORAL DAILY
Status: DISCONTINUED | OUTPATIENT
Start: 2020-01-31 | End: 2020-02-07 | Stop reason: HOSPADM

## 2020-01-31 RX ORDER — PANTOPRAZOLE SODIUM 40 MG/1
40 TABLET, DELAYED RELEASE ORAL
Status: DISCONTINUED | OUTPATIENT
Start: 2020-01-31 | End: 2020-02-07 | Stop reason: HOSPADM

## 2020-01-31 RX ORDER — DOCUSATE SODIUM 100 MG/1
100 CAPSULE, LIQUID FILLED ORAL 2 TIMES DAILY
Status: DISCONTINUED | OUTPATIENT
Start: 2020-01-31 | End: 2020-02-07 | Stop reason: HOSPADM

## 2020-01-31 RX ORDER — SODIUM CHLORIDE 0.9 % (FLUSH) 0.9 %
10 SYRINGE (ML) INJECTION EVERY 12 HOURS
Status: DISCONTINUED | OUTPATIENT
Start: 2020-01-31 | End: 2020-02-07 | Stop reason: HOSPADM

## 2020-01-31 RX ORDER — LIDOCAINE 4 G/G
3 PATCH TOPICAL DAILY
Status: DISCONTINUED | OUTPATIENT
Start: 2020-01-31 | End: 2020-02-02

## 2020-01-31 RX ORDER — POLYETHYLENE GLYCOL 3350 17 G/17G
17 POWDER, FOR SOLUTION ORAL DAILY
Status: DISCONTINUED | OUTPATIENT
Start: 2020-01-31 | End: 2020-02-07 | Stop reason: HOSPADM

## 2020-01-31 RX ORDER — MORPHINE SULFATE 2 MG/ML
2 INJECTION, SOLUTION INTRAMUSCULAR; INTRAVENOUS
Status: DISCONTINUED | OUTPATIENT
Start: 2020-01-31 | End: 2020-02-03

## 2020-01-31 RX ORDER — SENNA PLUS 8.6 MG/1
2 TABLET ORAL 2 TIMES DAILY
Status: DISCONTINUED | OUTPATIENT
Start: 2020-01-31 | End: 2020-02-07 | Stop reason: HOSPADM

## 2020-01-31 RX ORDER — VITAMIN B COMPLEX
2000 TABLET ORAL DAILY
Status: DISCONTINUED | OUTPATIENT
Start: 2020-01-31 | End: 2020-02-01

## 2020-01-31 RX ORDER — CYCLOBENZAPRINE HCL 10 MG
10 TABLET ORAL 3 TIMES DAILY PRN
Status: DISCONTINUED | OUTPATIENT
Start: 2020-01-31 | End: 2020-02-07 | Stop reason: HOSPADM

## 2020-01-31 RX ORDER — HEPARIN SODIUM 5000 [USP'U]/ML
5000 INJECTION, SOLUTION INTRAVENOUS; SUBCUTANEOUS EVERY 12 HOURS
Status: DISCONTINUED | OUTPATIENT
Start: 2020-01-31 | End: 2020-02-07 | Stop reason: HOSPADM

## 2020-01-31 RX ADMIN — HEPARIN SODIUM 5000 UNITS: 5000 INJECTION INTRAVENOUS; SUBCUTANEOUS at 14:00

## 2020-01-31 RX ADMIN — OXYCODONE HYDROCHLORIDE AND ACETAMINOPHEN 1 TABLET: 5; 325 TABLET ORAL at 19:28

## 2020-01-31 RX ADMIN — SODIUM CHLORIDE, PRESERVATIVE FREE 10 ML: 5 INJECTION INTRAVENOUS at 00:58

## 2020-01-31 RX ADMIN — SODIUM CHLORIDE, PRESERVATIVE FREE 10 ML: 5 INJECTION INTRAVENOUS at 09:00

## 2020-01-31 RX ADMIN — OXYCODONE HYDROCHLORIDE AND ACETAMINOPHEN 1 TABLET: 5; 325 TABLET ORAL at 14:15

## 2020-01-31 RX ADMIN — VITAMIN D, TAB 1000IU (100/BT) 2000 UNITS: 25 TAB at 10:12

## 2020-01-31 RX ADMIN — VANCOMYCIN HYDROCHLORIDE 1.5 G: 5 INJECTION, POWDER, LYOPHILIZED, FOR SOLUTION INTRAVENOUS at 14:26

## 2020-01-31 RX ADMIN — DOCUSATE SODIUM 100 MG: 100 CAPSULE, LIQUID FILLED ORAL at 10:13

## 2020-01-31 RX ADMIN — HEPARIN SODIUM 5000 UNITS: 5000 INJECTION INTRAVENOUS; SUBCUTANEOUS at 01:50

## 2020-01-31 RX ADMIN — OXYCODONE HYDROCHLORIDE AND ACETAMINOPHEN 1 TABLET: 5; 325 TABLET ORAL at 10:18

## 2020-01-31 RX ADMIN — SENNOSIDES 17.2 MG: 8.6 TABLET, FILM COATED ORAL at 17:33

## 2020-01-31 RX ADMIN — MEROPENEM 1 G: 1 INJECTION, POWDER, FOR SOLUTION INTRAVENOUS at 17:33

## 2020-01-31 RX ADMIN — PANTOPRAZOLE SODIUM 40 MG: 40 TABLET, DELAYED RELEASE ORAL at 06:08

## 2020-01-31 RX ADMIN — MEROPENEM 1 G: 1 INJECTION, POWDER, FOR SOLUTION INTRAVENOUS at 00:57

## 2020-01-31 RX ADMIN — MEROPENEM 1 G: 1 INJECTION, POWDER, FOR SOLUTION INTRAVENOUS at 10:15

## 2020-01-31 RX ADMIN — OXYCODONE HYDROCHLORIDE AND ACETAMINOPHEN 1 TABLET: 5; 325 TABLET ORAL at 06:08

## 2020-01-31 RX ADMIN — DOCUSATE SODIUM 100 MG: 100 CAPSULE, LIQUID FILLED ORAL at 22:00

## 2020-01-31 RX ADMIN — SENNOSIDES 17.2 MG: 8.6 TABLET, FILM COATED ORAL at 10:12

## 2020-01-31 RX ADMIN — METOPROLOL SUCCINATE 50 MG: 50 TABLET, EXTENDED RELEASE ORAL at 10:13

## 2020-01-31 RX ADMIN — VANCOMYCIN HYDROCHLORIDE 1.5 G: 5 INJECTION, POWDER, LYOPHILIZED, FOR SOLUTION INTRAVENOUS at 02:47

## 2020-01-31 ASSESSMENT — PAIN SCALES - GENERAL
PAINLEVEL_OUTOF10: 8
PAINLEVEL_OUTOF10: 6
PAINLEVEL_OUTOF10: 0
PAINLEVEL_OUTOF10: 8
PAINLEVEL_OUTOF10: 5
PAINLEVEL_OUTOF10: 8
PAINLEVEL_OUTOF10: 0

## 2020-01-31 ASSESSMENT — PAIN DESCRIPTION - LOCATION
LOCATION: BACK;LEG
LOCATION: BACK;LEG
LOCATION: BACK
LOCATION: BACK;LEG

## 2020-01-31 ASSESSMENT — PAIN DESCRIPTION - FREQUENCY
FREQUENCY: CONTINUOUS

## 2020-01-31 ASSESSMENT — PAIN DESCRIPTION - PAIN TYPE
TYPE: CHRONIC PAIN
TYPE: SURGICAL PAIN;CHRONIC PAIN

## 2020-01-31 ASSESSMENT — PAIN DESCRIPTION - ONSET
ONSET: ON-GOING

## 2020-01-31 ASSESSMENT — PAIN DESCRIPTION - DESCRIPTORS
DESCRIPTORS: ACHING;CONSTANT;DISCOMFORT
DESCRIPTORS: ACHING;CONSTANT;SHARP
DESCRIPTORS: ACHING;CONSTANT;DISCOMFORT
DESCRIPTORS: ACHING;CONSTANT;DISCOMFORT

## 2020-01-31 ASSESSMENT — PAIN DESCRIPTION - ORIENTATION: ORIENTATION: LOWER;LEFT;RIGHT

## 2020-01-31 NOTE — PROGRESS NOTES
on the Rt side   Entrance Stairs - Rails: Right  Home Equipment: Cane, Rolling walker     Bathroom Shower/Tub: Tub/Shower unit  Bathroom Toilet: Standard  Bathroom Equipment: (shower stool. )    Receives Help From: Family        Ambulation Assistance: Independent  Transfer Assistance: Independent               OBJECTIVE:  Range of Motion:  Right Lower Extremity: WFL  Left Lower Extremity: WFL    Strength:  Right Lower Extremity: WFL,  MMT assessed in supine. Hip add/abd, ankle dorsiflexion/plantar flexion, hip flexion, and knee extension were 2/5. Left Lower Extremity: WFL, MMT assessed in supine. Hip add/abd, ankle dorsiflexion/plantar flexion, hip flexion, knee extension were all 2-/5. Balance:  Static Sitting Balance:  Modified Independent  Static Standing Balance: Contact Guard Assistance, X 1    Bed Mobility:  Rolling to Right: Mod I   Supine to Sit: Mod I  Sit to Supine: Mod I  Bed was flat w/ no rail, log roll precautions performed    Transfers:  Sit to Stand: Contact Guard Assistance, X 1  Stand to [place] [id number], X 1  Stand Pivot:Contact [employer], X 1  Car:Contact Guard Assistance, X 1, verbal cues for hand placement    Ambulation:  Contact Guard Assistance, X 1, verbal cues for heel strike improved gait pattern  Distance: 70 ft X 1, 10 ft X 1  Surface: Level Tile  Device:Rolling Walker  Gait Deviations:  Slow Es, Decreased Gait Speed, Decreased Heel Strike Bilaterally, bilateral hip drop on single leg stance, and delayed bilateral toe-off. Exercise:  Patient was guided in 1 set(s) 10 reps of exercise to both lower extremities. Ankle pumps, Glut sets, Heelslides, isometric abdominals, scapula squeezes, and quad sets. Exercises were completed for increased independence with functional mobility. Functional Outcome Measures: Not completed       ASSESSMENT:  Activity Tolerance:  Patient tolerance of  treatment: fair.        Treatment Initiated: Treatment and education for Short term goals: 1 week  Short term goal 1: Pt to perform sit <>stand transfers at [facility] to prepare for transfers. Short term goal 2: Pt to amb >=100' at [facility] w/ RW on indoor/outdoor surfaces for home mobility. Short term goal 3: Pt to peform car transfer at [facility] for community transportation. Short term goal 4: Pt will complete curb step w/ RW at [facility] X 1 to enter/leave home. Long term goals  Time Frame for Long term goals : 3 weeks  Long term goal 1: Pt to perform sit <>stand transfers at [facility] to prepare for transfers. Long term goal 2: Pt to amb >=150' at [facility] w/ RW on indoor/outdoor surfaces for home mobility. Long term goal 3: Pt to peform car transfer at [facility] for community transportation. Long term goal 4: Pt will complete 6 steps w/ railing on Rt, Mod I X 1 to go upstairs in home. Long term goal 5: Pt will complete TUG at <=20 seconds to show decreased risk of falls. Following session, patient left in safe position with all fall risk precautions in place.

## 2020-01-31 NOTE — PROGRESS NOTES
[facility]  Individualized Disclosure Statement      Patient: Emiliano Romo      [facility] [address] provides 24 hour individualized service to patients with functional limitations due to, but not limited to: stroke, brain injury, spinal cord injury, major multiple trauma, fractures, amputation, and neurological disorders. The [address] [place] [address] provides rehabilitative nursing and medical services as well as physical, occupational, speech, and recreation therapies. [address] [facility] is fully accredited by the Commission on Accreditation of Rehabilitation Facilities (CARF) as a comprehensive provider of rehabilitation services. Patients admitted to the [address] [place] [address] receive a minimum of three hours of therapy per day, at least six days per week, with a revised therapy schedule on weekends and holidays. Physical therapy, occupational therapy, and speech therapy are provided seven days per week including holidays. Other therapeutic services are available on weekends and evenings as needed or scheduled. Intensity of Treatment  Your treatment program will consist of Nursing Care and:  1.5 hours of Physical Therapy, per day  1.5    hours of Occupational Therapy, per day  1 hours of Recreational Therapy, per week    [facility] maintains contracts with most insurance plans. Depending on the type of coverage, the insurance may impose limits on the coverage for rehabilitation care. Coverage is based on the premise that you are able to fully participate in the rehabilitation program and show continued progress. Please verify your own insurance information A copy of this was given to the patient/ family on this date.   Insurance Coverage  Your insurance company has made the following determination relative to the length of your stay:   Your estimated length of stay is 14 days   Your insurance Coverage has been verified as follows:    Primary Insurance: [employer]   Deductible:0 Coverage:active  Secondary Insurance: ;secondary insurance policies often cover co-pay amounts, but to ensure payment please contact your insurance company.     Alternative Resources: Please ask the  for more information [phone]

## 2020-01-31 NOTE — PLAN OF CARE
Problem: Pain:  Goal: Pain level will decrease  Description  Pain level will decrease  Note:   Pain meds given and repositioning and ice therapy offered      Problem: SKIN INTEGRITY  Goal: LTG - Patient will be free from infection  Outcome: Ongoing  Note:   Incision lower mid back clean dry and intact LEIGHA

## 2020-01-31 NOTE — PROGRESS NOTES
Pharmacy Note  Vancomycin Consult    Becky Conroy is a 53 y.o. female started on Vancomycin for possible osteomyelitis of the back ; consult received from Dr. Daniel Pac to manage therapy. Also receiving the following antibiotics: merrem. Patient Active Problem List   Diagnosis    GERD (gastroesophageal reflux disease)    Pertussis-like syndrome    Anxiety    Clinical depression    Neck pain    History of lumbar fusion    Chronic left-sided low back pain with left-sided sciatica    Paresthesia of buttock    Paresthesia of left lower extremity    Neurologic gait dysfunction    Fusion of lumbosacral spine    Back pain, lumbosacral       Allergies:  Codeine and Dilaudid [hydromorphone hcl]     Temp max: 98.4    No results for input(s): BUN in the last 72 hours. No results for input(s): CREATININE in the last 72 hours. No results for input(s): WBC in the last 72 hours. No intake or output data in the 24 hours ending 01/31/20 0047    Culture Date      Source                       Results       Ht Readings from Last 1 Encounters:   12/12/19 5' (1.524 m)        Wt Readings from Last 1 Encounters:   12/12/19 196 lb (88.9 kg)         There is no height or weight on file to calculate BMI. CrCl cannot be calculated (Patient's most recent lab result is older than the maximum 10 days allowed. ). Goal Trough Level: 15-20 mcg/mL    Assessment/Plan:  Will continue vancomycin 1500 mg IV every 12 hours. Timing of trough level will be determined based on culture results, renal function, and clinical response. Thank you for the consult. Will continue to follow.

## 2020-01-31 NOTE — PROGRESS NOTES
RECREATIONAL THERAPY  MISSED TREATMENT    []Transitional Care Unit  [x]Inpatient Rehabilitation    Date:  1/31/2020            Patient Name: Joanne Keller           MRN: [id number]  Acct: [de-identified]          YOB: 1970 (49 y.o.)       Gender: female   Diagnosis: lumbar spinal fusion  Physician: Referring Practitioner: Dr. Noam Osei:    []Hold treatment per nursing request  []Patient refusal  []Family declined treatment  []Patient at testing and/or off the floor  [x]Patient unavailable, with PT/OT/nursing, etc.  [x]Other pt with P.T. this afternoon and unavailable -will evaluate for RT on Monday   Lashell Newsome, [address] 1/31/2020

## 2020-01-31 NOTE — PLAN OF CARE
Problem: Nutrition  Goal: Optimal nutrition therapy  Outcome: Ongoing   Nutrition Problem: Increased nutrient needs  Intervention: Food and/or Nutrient Delivery: Continue current diet, Start ONS, Vitamin Supplement(Started Ensure High Protein TID.  Recommend a Multivitamin w/minerals daily.)  Nutritional Goals: Pt will consume 75% or more of meals during LOS

## 2020-01-31 NOTE — H&P
Procedure Laterality Date    BLADDER SUSPENSION      BREAST REDUCTION SURGERY  10/2007    CERVICAL FUSION  2017    ACDF C5-C7    CHOLECYSTECTOMY      COLONOSCOPY      DILATION AND CURETTAGE OF UTERUS      ENDOSCOPY, COLON, DIAGNOSTIC      HYSTERECTOMY      LUMBAR SPINE SURGERY  10/17/2019    L5-S1    UPPER GASTROINTESTINAL ENDOSCOPY N/A 7/19/2019    EGD BIOPSY performed by Yusra Verdugo MD at [facility] ENDOSCOPY  7/19/2019    EGD DILATION SAVORY performed by Yusra Verdugo MD at [facility]     Allergies:    Codeine and Dilaudid [hydromorphone hcl]    Current Medications:    Current Facility-Administered Medications: meropenem (MERREM) 1 g in sodium chloride 0.9 % 100 mL IVPB (mini-bag), 1 g, Intravenous, Q8H  polyethylene glycol (GLYCOLAX) packet 17 g, 17 g, Oral, Daily  senna (SENOKOT) tablet 17.2 mg, 2 tablet, Oral, BID  vancomycin (VANCOCIN) 1.5 g in dextrose 5 % 500 mL IVPB, 1.5 g, Intravenous, Q12H  metoprolol succinate (TOPROL XL) extended release tablet 50 mg, 50 mg, Oral, Daily  morphine (PF) injection 2 mg, 2 mg, Intravenous, Q3H PRN  sodium chloride flush 0.9 % injection 10 mL, 10 mL, Intravenous, Q12H  pantoprazole (PROTONIX) tablet 40 mg, 40 mg, Oral, QAM AC  bisacodyl (DULCOLAX) EC tablet 10 mg, 10 mg, Oral, Daily PRN  docusate sodium (COLACE) capsule 100 mg, 100 mg, Oral, BID  heparin (porcine) injection 5,000 Units, 5,000 Units, Subcutaneous, Q12H  Vitamin D (CHOLECALCIFEROL) tablet 2,000 Units, 2,000 Units, Oral, Daily  cyclobenzaprine (FLEXERIL) tablet 10 mg, 10 mg, Oral, TID PRN  vancomycin (VANCOCIN) intermittent dosing (placeholder), , Other, RX Placeholder  oxyCODONE-acetaminophen (PERCOCET) 5-325 MG per tablet 1 tablet, 1 tablet, Oral, Q4H PRN     Social History/Functional History:   reports that she quit smoking about 3 years ago. Her smoking use included cigarettes. She has a 40.00 pack-year smoking history.  She has never used smokeless situation  Mood: within normal limits  Affect: calm  General appearance: Patient is well nourished, well developed, well groomed and in no acute distress, overweight, appearing stated age, up in [facility]    Memory:   normal,   Attention/Concentration: normal  Language:  normal    Cranial Nerves:  cranial nerves II-XII are grossly intact  ROM:  abnormal - Lumbar precautions  Motor Exam:  Motor exam is symmetrical 5 out of 5 upper extremities bilaterally    Manual Muscle Testing:    Hip Flexion  Hip Extension  Hip Abduction  Hip Adduction    Left  3-      Right 3-         Knee Flex  Knee Ext  Ankle DF  Ankle PF  Ankle Inv  Ankle Ev  EHL    Left   3- 3- 3+      Right  3- 3- 3+        Tone:  normal  Muscle bulk: within normal limits  Sensory:  Sensory intact with exception of decreased sensation over the left distal lateral thigh, left distal lateral calf and top of left foot  Coordination:   abnormal - foot taps decreased  Deep Tendon Reflexes:  Right Knee:  3+  Left Knee:  3+    Skin: warm and dry, no rash or erythema  Peripheral vascular: Pulses: Normal upper and lower extremity pulses; Edema: 1+    Diagnostics:  Recent Results (from the past 24 hour(s))   MRSA by PCR    Collection Time: 01/30/20 11:20 PM   Result Value Ref Range    MRSA SCREEN RT-PCR NEGATIVE    VRE Screen by PCR    Collection Time: 01/30/20 11:20 PM   Result Value Ref Range    Vancomycin Resistant Enterococcus NEGATIVE    CBC    Collection Time: 01/31/20  6:00 AM   Result Value Ref Range    WBC 7.1 4.8 - 10.8 thou/mm3    RBC 3.85 (L) 4.20 - 5.40 mill/mm3    Hemoglobin 9.6 (L) 12.0 - 16.0 gm/dl    Hematocrit 32.1 (L) 37.0 - 47.0 %    MCV 83.4 81.0 - 99.0 fL    MCH 24.9 (L) 26.0 - 33.0 pg    MCHC 29.9 (L) 32.2 - 35.5 gm/dl    RDW-CV 14.8 (H) 11.5 - 14.5 %    RDW-SD 44.8 35.0 - 45.0 fL    Platelets 864 281 - 879 thou/mm3    MPV 9.1 (L) 9.4 - 12.4 fL   Comprehensive Metabolic Panel    Collection Time: 01/31/20  6:00 AM   Result Value Ref Range    Glucose 84 70 - 108 mg/dL    CREATININE 0.5 0.4 - 1.2 mg/dL    BUN 11 7 - 22 mg/dL    Sodium 140 135 - 145 meq/L    Potassium 4.2 3.5 - 5.2 meq/L    Chloride 101 98 - 111 meq/L    CO2 28 23 - 33 meq/L    Calcium 9.6 8.5 - 10.5 mg/dL    AST 9 5 - 40 U/L    Alkaline Phosphatase 85 38 - 126 U/L    Total Protein 6.8 6.1 - 8.0 g/dL    Alb 3.4 (L) 3.5 - 5.1 g/dL    Total Bilirubin 0.5 0.3 - 1.2 mg/dL    ALT 9 (L) 11 - 66 U/L   Vitamin D 25 Hydroxy    Collection Time: 01/31/20  6:00 AM   Result Value Ref Range    Vit D, 25-Hydroxy 26 (L) 30 - 100 ng/ml   Sedimentation Rate    Collection Time: 01/31/20  6:00 AM   Result Value Ref Range    Sed Rate 81 (H) 0 - 20 mm/hr   C-Reactive Protein    Collection Time: 01/31/20  6:00 AM   Result Value Ref Range    CRP 2.52 (H) 0.00 - 1.00 mg/dl   Iron    Collection Time: 01/31/20  6:00 AM   Result Value Ref Range    Iron 30 (L) 50 - 170 ug/dL   Ferritin    Collection Time: 01/31/20  6:00 AM   Result Value Ref Range    Ferritin 129 10 - 291 ng/mL   Reticulocytes    Collection Time: 01/31/20  6:00 AM   Result Value Ref Range    Retic Ct Abs 2.1 (H) 0.5 - 2.0 %    Absolute Retic # 81.0 20.0 - 115.0 thou/mm3    Immature Retic Fract 27.3 (H) 3.0 - 15.9 %    Retic Hemoglobin 30.3 28.2 - 35.7 pg   Anion Gap    Collection Time: 01/31/20  6:00 AM   Result Value Ref Range    Anion Gap 11.0 8.0 - 16.0 meq/L   Glomerular Filtration Rate, Estimated    Collection Time: 01/31/20  6:00 AM   Result Value Ref Range    Est, Glom Filt Rate >90 ml/min/1.73m2      Lab Results   Component Value Date    LABA1C 5.6 02/21/2019     No results found for: EAG  Recent Labs     01/31/20  0600   WBC 7.1   HGB 9.6*   HCT 32.1*   MCV 83.4        Impression:  1. Postsurgical wound bed infection at L4-S1 with L4-L5 osteomyelitis noted on CT imaging of the lumbar spine with contrast on 1/22/2019.  2. Long-term antibiotic treatment  3.  History of prior L5-S1 lumbar fusion on 10/17/2019 at [facility] with complication of retropulsion L5-S1 compressing the left L5 nerve root. 4. History of removal of machine prosthetic allograft spacer L5-S1 on 11/19/2019 at [facility]. 5. Persistent paresthesias over the bilateral legs following surgery on 11/19/2019.  1. Patient had been using Lidoderm patches and these have been ordered. 6. Physical deconditioning/Gait instability/Decreased ability for ADLs. 1. PT/OT. 7. Nutrition:  Consultation to dietician for nutritional counseling and recommendations. 8. TotP 6.8, alb 3.4, Vitamin 25OH level of 26 on 1/31/2020.  1. Cholecalciferol 5000 IU daily. 9. Electrolytes. 1. Normal on 1/31. 10. Bladder: No issues. 11. Bowel: Senna, colace, MOM. 1. Glycolax. 12. Rehabilitation nursing will be involved for bowel, bladder, skin, and pain management. Nursing will also provide education and training to patient and family. 13. Prophylaxis:  DVT: Heparin. GI: Protonix. 14.  and case management consultations for coordination of care and discharge planning. Chronic Problems:  1. Hypertension. 1. Toprol-XL. 2. GERD. 1. Protonix. 3. History of IBS. 1. No current medications. 4. History of diverticulitis. 5. History of ACDF C5-C7 in 2017. 6. COPD. 1. No current medications. Labs reviewed on:  1. 1/31.  2. 2/1.  3. 2/2. Infectious Disease:  1. Vancomycin. 2. Meropenem. The main medical problem(s) and comorbidities being actively managed by the physicians and requiring 24 hour rehabilitation nursing care during this stay include wound care, pain control, antibiotic management, electrolyte management. The domains of functional impairment present in this patient which will require an intensive and interdisciplinary rehabilitation environment include self care, mobility, bowel/bladder management, pain management and safety. Estimated length of stay for this admission 14 days. Anticipated disposition: Home. The potential to achieve that is very good.     The post admission physician evaluation (MARLA) is consistent with the pre-admission assessment. See above findings to reflect the elements required in the MARLA. Patient's admitting condition is consistent with the findings of the preadmission assessment by the rehabilitation admissions coordinator.     Yon Yang MD

## 2020-01-31 NOTE — PROGRESS NOTES
Independent  Transfer Assistance: Independent               Cognition/Orientation:       WFL. ADL's:  Feeding: Setup  Grooming: Contact guard assistance(standing sinkside for ADLs)  UE Bathing: Setup  LE Bathing: Minimal assistance  UE Dressing: Contact guard assistance  LE Dressing: Minimal assistance(assist for socks over feet. CGA for LE dressing tasks without AE.)  Toileting: Contact guard assistance       Functional Mobility:  Bed mobility  Supine to Sit: Contact guard assistance  Sit to Supine: Contact guard assistance  Scooting: Contact guard assistance    Functional Mobility  Functional - Mobility Device: Rolling Walker  Activity: To/from bathroom  Assist Level: Contact guard assistance  Functional Mobility Comments: min cues for RW safety to keep it close to her. Pt ambulated x 10 feet x 2 trials to and from the shower room. Balance:  Balance  Sitting Balance: Modified independent   Standing Balance: Contact guard assistance  Standing Balance  Time: x 2 minute intervalx x 4 trials during ADL routine 1 UE support required    Transfers:  Sit to stand: Contact guard assistance  Stand to sit: Contact guard assistance  Transfer Comments: mod cues for safe hand placement  Toilet Transfers  Toilet - Technique: Ambulating  Equipment Used: Standard bedside commode  Toilet Transfer: Contact guard assistance  Toilet Transfers Comments: min cues for safe tech. Upper Extremity Assessment:   LUE AROM : WFL  RUE AROM : WFL    LUE Strength  LUE Strength Comment: min deconditioned throughout B UEs. MMt Formally NT due to spinal surgery. Sensation  Overall Sensation Status: WFL       Activity Tolerance: Patient limited by pain, Patient Tolerated treatment well       Assessment:  Assessment: Pt presents with a decrease in function for ADLs due to recent spinal surgery. She currently requires up to min A for LE ADLs and CGA for dynamic mobility.  She requires min cues for back safety as well as min cues for RW dressing tasks with SBA and no cues for adapted tech to increase independence in self care tasks  Short term goal 2: PT will tolerate standing and dynamic reaching tasks x 5 min with SBA and no cues for back safety to increase endurance for sinkside ADL routien  Short term goal 3: PT will ambulate to and from the BR as well as around obstacles with SBA and AE to increase independence in home mobiliyt to and from the BR  Short term goal 4: Pt will complete 2 step homemaking tasks with SBA and RW with no > min cues for proper body mechanics to increase ability to retrieve a snack  Long term goals  Time Frame for Long term goals : 2-3 weeks  Long term goal 1: Pt will complete ADL routine with MI and no cues for proper body mechanics, back safety and LHAE PRN to increase independence in self care tasks  Long term goal 2: PT will complete 2 step homemaking tasks with no cues for safe tech and proper body mechanics/ work simplification tech          Following session, patient left in safe position with all fall risk precautions in place.

## 2020-01-31 NOTE — PLAN OF CARE
rails  Entrance Stairs - Number of Steps: 6 stairs in house w/ railing on the Rt side   Entrance Stairs - Rails: Right  Bathroom Shower/Tub: Tub/Shower unit  Bathroom Toilet: Standard  Bathroom Equipment: (shower stool. )  Home Equipment: [place], Rolling walker  Receives Help From: Family  Ambulation Assistance: Independent  Transfer Assistance: Independent    Contact/Guardian Information: MotherBairon, [phone]. Daughter, Chante Munoz, [phone]. Community Resources Utilized: Patient receives medical card through [employer]. Sexuality/Intimacy: No issues or concerns noted at time of SW assessment. Complementary Health Approaches: Patient with no current interest in complementary health approaches at time of SW assessment. Anticipated Needs/Discharge Plans: Anticipate patient would benefit from continued therapy and IV antibiotics at discharge. SW met with patient to introduce self and explain role, complete SW assessment, and initiate discharge planning. Patient familiar to SW from previous inpatient rehab stay in October 2019 after initial back surgery. Since discharge, patient has had another back surgery with complications that required wound vac, home health care, etc. Patient indicated that after having second surgery in November began feeling some relief and was recovering well. Patient indicated being able to walk for almost two miles at the [facility] back in December. Patient reports that significant pain increased approximately a week and a half ago. Patient did not feel she was managed well by surgeon and went to [facility] a second opinion. Patient admitted to inpatient rehab from [facility]. Since discharge from previous stay in October, patient has continued to live with her 66year old mother. Patient indicates that mother assists with meal preparation, laundry, and housekeeping. Patient was independent with personal care.  Patient has not worked since 2014, and has been denied for disability for the second time. Patient reports ex- provides financial assistance for bills, spending money, etc. Patient was driving, managing finances, managing medications. Patient indicates that mother, daughter, and girlfriend have been and will continue to be her support system. Barriers to discharge include patient's two IV antibiotics (one Q8 and one Q12) that are needed until March 2, 2020. Anticipate patient would benefit from continued therapy and IV antibiotics at discharge. SW reviewed with patient team conference on Tuesday, 02/04/2020, to further discuss progress and discharge recommendations. SW to follow and maintain involvement in discharge planning.       Electronically signed by BARRY Ochoa on 1/31/2020 at 4:43 PM

## 2020-01-31 NOTE — CONSULTS
Body Wt: 100 lb (45.4 kg)   · BMI Classification: BMI 35.0 - 39.9 Obese Class II(38.8)    Nutrition Interventions:   Continue current diet, Start ONS, Vitamin Supplement(Started Ensure High Protein TID.  Recommend a Multivitamin w/minerals daily.)  Continued Inpatient Monitoring, Education Initiated, Coordination of Care(Encouraged lean protein sources with meals at best effort and reviewed what foods are a good source of protein.)    Nutrition Evaluation:   · Evaluation: Goals set   · Goals: Pt will consume 75% or more of meals during LOS    · Monitoring: Nutrition Progression, Meal Intake, Supplement Intake, Diet Tolerance, Skin Integrity, Wound Healing, Weight, Pertinent Labs, Monitor Bowel Function    Electronically signed by Rekha Gudino RD, LD on 1/31/20 at 1:43 PM    Contact Number: [phone]

## 2020-01-31 NOTE — PROGRESS NOTES
[address] [address]  INPATIENT PHYSICAL THERAPY  DAILY NOTE  [facility] - [id number]    Time In: 1430  Time Out: 1500  Timed Code Treatment Minutes: 30 Minutes  Minutes: 30          Date: 2020  Patient Name: Guera Easley,  Gender:  female        MRN: [id number]  : 1970  (53 y.o.)     Referring Practitioner: Dr. Zuniga President     Additional Pertinent Hx: Pt had past spinal fusion of L1-S1 in 10/19 secondary to lumbar spondylolisthesis. Pt recently addmitted to [facility] for increased low back pain and infection. History of low back pain, HTN, arthritis and Lt sciatica. Dx: Lumbar Sacral spinal fusion    Prior Level of Function:  Lives With: Family(Currently lives w/ mother)  Type of Home: House  Home Layout: Multi-level  Home Access: Stairs to enter with rails  Entrance Stairs - Number of Steps: 6 stairs in house w/ railing on the Rt side   Entrance Stairs - Rails: Right  Home Equipment: Cane, Rolling walker   Bathroom Shower/Tub: Tub/Shower unit  Bathroom Toilet: Standard  Bathroom Equipment: (shower stool. )    Receives Help From: Family  Ambulation Assistance: Independent  Transfer Assistance: Independent    Restrictions/Precautions:  Restrictions/Precautions: General Precautions, Surgical Protocols, Up as Tolerated  Required Braces or Orthoses  Spinal: Lumbar Corset  Spinal Other: LSO brace on PRN   Position Activity Restriction  Spinal Precautions: No Bending, No Lifting, No Twisting  Spinal Precautions: no lifting > 5 pounds   Other position/activity restrictions: Pt reported cardiologist stated no rolling to Lt    SUBJECTIVE: Pt was sitting in w/c with IV lines in for whole session. Pt was pleasant and cooperative. Pt used restroom at the end of the session. PAIN: No pain reported.     OBJECTIVE:  Bed Mobility:  Supine to Sit: Modified Independent  Sit to Supine: Modified Independent     Transfers:  Sit to Stand: [employer], X 1  Stand to [facility], X 1  Stand Pivot:Contact Guard Assistance, X 1     Balance:  Pt stood in bathroom and washed hands at sink at CGA X 1. No LOB noted during reaching. Ambulation:  Contact Guard Assistance, X 1  Distance: 50 ft X 1, 20 ft X 2  Surface: Level Tile  Device:Rolling Walker  Gait Deviations:  Slow Es, Decreased Gait Speed, bilateral hip drop during single leg stance, delayed toe-off bilaterally, and Decreased Heel Strike Bilaterally    Exercise:  Patient was guided in 1 set(s) 10 reps of exercise to both lower extremities. Standing heel/toe raises. Exercises were completed for increased independence with functional mobility. Functional Outcome Measures: Completed   TU seconds  >14 seconds is risk for falls    ASSESSMENT:  Assessment: Patient progressing toward established goals. Activity Tolerance:  Patient tolerance of  treatment: good. Equipment Recommendations:   Discharge Recommendations:       Plan: Times per week: 5x/week 90 minutes, 1x/week 30 minutes  Current Treatment Recommendations: Strengthening, Transfer Training, Endurance Training, ROM, Equipment Evaluation, Education, & procurement, Pain Management, Balance Training, Gait Training, Home Exercise Program, Functional Mobility Training, Stair training, Safety Education & Training    Patient Education  Patient Education: Plan of Care, [employer] Mobility, Transfers, Gait, Verbal Exercise Instruction,  - Patient Verbalized Understanding, - Patient Requires Continued Education    Goals:  Patient goals : increase strength in legs and decrease pain  Short term goals  Time Frame for Short term goals: 1 week  Short term goal 1: Pt to perform sit <>stand transfers at [facility] to prepare for transfers. Short term goal 2: Pt to amb >=100' at [facility] w/ RW on indoor/outdoor surfaces for home mobility. Short term goal 3: Pt to peform car transfer at [facility] for community transportation.   Short term goal 4: Pt will complete curb step w/ RW at [address] X 1 to enter/leave home. Long term goals  Time Frame for Long term goals : 3 weeks  Long term goal 1: Pt to perform sit <>stand transfers at [facility] to prepare for transfers. Long term goal 2: Pt to amb >=150' at [facility] w/ RW on indoor/outdoor surfaces for home mobility. Long term goal 3: Pt to peform car transfer at [facility] for community transportation. Long term goal 4: Pt will complete 6 steps w/ railing on Rt, [facility] X 1 to go upstairs in home. Long term goal 5: Pt will complete TUG at <=20 seconds to show decreased risk of falls. Following session, patient left in safe position with all fall risk precautions in place.

## 2020-01-31 NOTE — PROGRESS NOTES
Admitted to the Inpatient Rehabilitation Unit via wheelchair. Patient was then oriented to room and unit. Education provided on the rehabilitation routine: three hours of therapy five days per week and dining room for lunch. Explained patients right to have family, representative or physician notified of their admission. Patient has requested for physician to be notified. Patient has declined for family/representative to be notified. Admitting medication orders compared with acute stay medications; home medication list reviewed with patient/family. Medication issues identified None  Medication issue: N/A    Bladder and Bowel Function Assessment:  1. Prior history of bladder problems: none  2. Number of pads used per day: none  3. Frequency of night time voiding: none  4. Fluid intake volume and pattern:   5. Last BM: 1/30/2020  6. Prior history of bowel problems: Yes     Incontinence      Frequent diarrhea   X   Constipation      Hemorrhoids      Diverticulitis      Bowel Surgery       Care plan was created with patient's input and goals were agreed upon. Admission folder provided with education regarding patients diagnoses, fall prevention, skin care, and M in the box. \"Data Collection Information Summary for Patients in Inpatient Rehabilitation Facilities\" and \"Privacy Act Statement - Health Care Records\" provided. Please refer to the admission navigator for further information.

## 2020-01-31 NOTE — DISCHARGE SUMMARY
[facility]  Discharge Summary     Patient Identification:  Chanell Gonzales  : 1970  Admit date: 2020  Discharge date:  2020   Attending provider: Roya Lantigua MD        Primary care provider: Ella Michael MD     Discharge Diagnoses:   1. Postsurgical wound bed infection at L4-S1 with L4-L5 osteomyelitis noted on CT imaging of the lumbar spine with contrast on 2019.  2. Long-term antibiotic treatment  3. History of prior L5-S1 lumbar fusion on 10/17/2019 at [facility] with complication of retropulsion of machine prosthetic allograft spacer at L5-S1 compressing the left L5 nerve root. 4. History of removal of machine prosthetic allograft spacer L5-S1 on 2019 at [facility]. 5. Persistent paresthesias over the bilateral legs following surgery on 2019. 6. Physical deconditioning. 7. Gait instability. 8. Decreased ability for ADLs. 9. Hypertension. 10. GERD. 11. History of IBS. 12. History of diverticulitis. 13. History of ACDF C5-C7 in 2017. 14. COPD. 15. Dysesthesias at site of lumbar surgical scar. Discharge Functional Status:    Physical therapy:    OBJECTIVE:  Bed Mobility:  Rolling to Right: Modified Independent   Scooting: Modified Independent  Impulsively moving in bed     Transfers:  Not Tested  Pt refusing to get OOB     Ambulation:  Not Tested     Balance:  Not Tested     Exercise:  Gave HEP and discussed/demo'd with pt who did not want to try and complete any of the xercises due to nausea. Pt declined any questions or concerns about this packet. Pt expressed understanding of all exercises.     Functional Outcome Measures: Not completed     ASSESSMENT:  Assessment: Patient progressing toward established goals. Activity Tolerance:  Patient tolerance of  treatment: poor. Pt refusing to get up due to nausea. SAfety concerns noted de to poor foot clearance and pain with mobility. Pt demos malingering behaviors therefore self limiting. within her daily activities, completing a shower routine and a simple meal prep task this date with SBA and self initiating various squats / modified golfer's lift without cue'ing required. She continues to be limited by pain management at times, but overall this is cont to improve. She reports pain relief with kinesiotape applied to the incision site for scar management. Plans to  continue with education of family regarding scar management, IADL routines, and advanced IADLS to encourage increased awareness of body mechanics to utilize for prevention of additional back concerns. lShe requires skilled OT intervention to progress to a MI level with her ADLs, IADLs and to improve strength and endurance as well as overall safety to decrease fall risk and burden of care. Speech therapy:  N/A    Wt Readings from Last 3 Encounters:   02/05/20 197 lb 11.2 oz (89.7 kg)   12/12/19 196 lb (88.9 kg)   12/05/19 203 lb (92.1 kg)     Vitals:    02/05/20 0745 02/06/20 0844 02/07/20 0836 02/07/20 0859   BP: 134/89 119/77 134/85 134/85   Pulse: 79 79 72 72   Resp: 16 18 16    Temp: 97.7 °F (36.5 °C) 98.1 °F (36.7 °C) 98.4 °F (36.9 °C)    TempSrc: Oral Oral Oral    SpO2: 98% 98% 98%    Weight:       Height:            Inpatient Rehabilitation Course:   Jeovany Nixon is a 53 y.o. female admitted to inpatient rehabilitation on 1/30/2020 as a transfer from [facility]. Original admission to the [facility] was on 1/22/2020 where the patient had sought a second opinion for her persistent back pain after a previous lumbar surgery. The patient originally had an elective L5-S1 spinal fusion on 10/17/2019 at the [facility] and was discharged to the [facility] acute inpatient rehabilitation unit where she initially did well with her offered therapies.   She did have worsening pain and a CT of the lumbar spine on 10/28/2019 demonstrated that the L5 and S1 disc prostheses had displaced to the far left Medical course on the inpatient rehabilitation unit was notable for worsening pain in her left leg, continued antibiotic treatments as directed by the discharging hospital, and a mild vitamin D deficiency. During the acute inpatient rehabilitation stay and the extra therapy offered the patient did have worsening pain in her left leg that presented primarily as worsening paresthesias. Given the length of time out from the patient's last surgery and discussion with the patient she was agreeable to trial a Medrol Dosepak; this resulted in significant relief and resolution of the paresthesias over her thigh and calf leaving only the toes as having any residual pain at time of discharge. Antibiotics were continued as directed with one adjustment made by pharmacy for the vancomycin dose based on a vancomycin trough that was drawn. Patient was discharged with orders to continue both vancomycin and meropenem at the directed frequencies through at least March 2 based upon her labs to be faxed to her infectious disease specialist.  At discharge the patient provide orders for home health to draw CRP, ESR, CBC with auto differential, and a BMP and to fax these recommendations to Dr. Homero Upton. Lastly the patient did have a mild vitamin D deficiency of 23 as a vitamin D 25-hydroxy level which was addressed with cholecalciferol 5000 IU daily with a mild improvement to 29 at time of discharge. Dosing was recommended to continue at 1000 international units daily after discharge. Details are provided below. The patient progressed well with the offered therapies and was discharged to home with Home Health Physical Therapy, Occupational Therapy and Nursing. Issues addressed during rehabilitation stay and medication changes:   Narcotic usage:  Percocet Last 24 hour usage 25mg. Stable. Morphine Last 24 hour usage none. Discontinued 2/3.   Last BM:  Stool Amount: Large (02/04/20 7885)      Acute/Rehabilitation Problems:  1. Postsurgical wound bed infection at L4-S1 with L4-L5 osteomyelitis noted on CT imaging of the lumbar spine with contrast on 1/22/2019.  1. Addressed with long-term antibiotic treatment as below. 2. Wound care. 3. Pain control. 4. Oxycodone. 5. Morphine. Discontinued 2/3.  6. Flexeril. 7. Patient was started on a Medrol Dosepak on 2/5 for worsening pain in her left leg that was neuropathic in nature. Patient notes significant improvement in her pain complaint; specifically absence of pain over the left thigh and left lateral calf. The only residual pain at this time after the second dose is it in her toes. 8. Patient is endorsing dysesthesias at the site of the lumbar surgical scar for which Occupational Therapy has been addressing this with kinesiotaping which the patient endorses is helping significantly to reduce the sensitivity to having clothing over the surgical site. 2. Long-term antibiotic treatment. 1. Vancomycin every 12 hours through at least 3/2/2020.  1. Pharmacy to dose for vancomycin trough levels. 2. Meropenem every 8 hours through at least 3/2/2020.  3. Weekly CBC/differential, ESR, and CRP. 4. On 1/22 ESR was 73 and CRP was 6.2.  5. Labs on 1/31 were 81 and 2.52.  6. Labs on 2/3 were 77 and 2.93  7. [address] has requested CBC/diff, Creatinine, ESR, CRP, Vanc trough fax Dr. Hernan Mccullough [phone].  1. Information faxed on 2/4.  3. History of prior L5-S1 lumbar fusion on 10/17/2019 at [facility] with complication of retropulsion of machine prosthetic allograft spacer at L5-S1 compressing the left L5 nerve root. 4. History of removal of machine prosthetic allograft spacer L5-S1 on 11/19/2019 at [facility]. 5. Persistent paresthesias over the bilateral legs following surgery on 11/19/2019.  1. Patient had been using Lidoderm patches and these have been ordered.   Patches have not been staying in place and Voltaren gel was substituted which the patient states works very well.  6. Physical deconditioning/Gait instability/Decreased ability for ADLs. 1. PT/OT. 2. TUG 35 seconds on 2/3. Improved to 28 seconds on 2/5.  60-69 years:  8.1 seconds; 70-79 years: 9.2 seconds; 80-99 years: 11.3 seconds. 7. Nutrition:  Consultation to dietician for nutritional counseling and recommendations. 1. TotP 6.8, alb 3.4, Vitamin 25OH level of 26 on 1/31/2020.  2. Cholecalciferol 5000 IU daily. 8. Electrolytes. 1. Normal on 2/3.  9. Bladder: No issues. 10. Bowel: Senna, colace, MOM. 1. Glycolax. 11. Rehabilitation nursing will be involved for bowel, bladder, skin, and pain management.  Nursing will also provide education and training to patient and family.    12. Prophylaxis:  DVT: Heparin.  GI: Protonix. 13.  and case management consultations for coordination of care and discharge planning.     Chronic Problems:  1. Hypertension. 1. Toprol-XL. 2. GERD. 1. Protonix. 3. History of IBS. 1. No current medications. 4. History of diverticulitis. 5. History of ACDF C5-C7 in 2017. 6. COPD. 1. No current medications. The patient participated in an aggressive multidisciplinary inpatient rehabilitation program involving 3 hours per day, 5 days per week of rehabilitation. Estimated Discharge Date: 2/7   Destination: home health  Services at Discharge: [address], Occupational Therapy and Nursing 2x week  Is patient appropriate for an outpatient driving evaluation? no  Equipment at Discharge: None    Hypertension management was undertaken with medication management on any patient with systolic blood pressure greater than 997 or diastolic blood pressure greater than 90. Hyperlipidemia was considered and the patient was started or continued on a statin medication for any LDL>100. Appropriate DVT prophylaxis options were considered throughout rehabilitation stay.     DME:  None    Consults:   Internal Medicine, Physical Medicine    Significant Diagnostics:     Lab Results   Component Value Date     02/07/2020    K 4.7 02/07/2020     02/07/2020    CO2 29 02/07/2020    BUN 15 02/07/2020    CREATININE 0.5 02/07/2020    GLUCOSE 107 02/07/2020    CALCIUM 9.1 02/07/2020    PROT 6.7 02/07/2020    LABALBU 3.5 02/07/2020    BILITOT 0.3 02/07/2020    ALKPHOS 99 02/07/2020    AST 9 02/07/2020    ALT 13 02/07/2020    LABGLOM >90 02/07/2020     Recent Labs     02/07/20  0615 02/03/20  0915 02/02/20  1420   WBC 9.1 6.3 7.6   HGB 9.8* 10.2* 10.1*   HCT 33.3* 34.4* 33.3*   MCV 84.7 83.5 83.7    360 358     Results for Kecia Sellers (MRN [id number]) as of 2/9/2020 18:51   Ref.  Range 12/5/2019 16:19 1/31/2020 06:00 2/1/2020 06:30 2/2/2020 14:20 2/3/2020 09:15   WBC Latest Ref Range: 4.8 - 10.8 thou/mm3 6.4 7.1 7.0 7.6 6.3   RBC Latest Ref Range: 4.20 - 5.40 mill/mm3 4.62 3.85 (L) 3.80 (L) 3.98 (L) 4.12 (L)   Hemoglobin Quant Latest Ref Range: 12.0 - 16.0 gm/dl 12.8 9.6 (L) 9.6 (L) 10.1 (L) 10.2 (L)   Hematocrit Latest Ref Range: 37.0 - 47.0 % 42.2 32.1 (L) 31.7 (L) 33.3 (L) 34.4 (L)   MCV Latest Ref Range: 81.0 - 99.0 fL 91.3 83.4 83.4 83.7 83.5   MCH Latest Ref Range: 26.0 - 33.0 pg 27.7 24.9 (L) 25.3 (L) 25.4 (L) 24.8 (L)   MCHC Latest Ref Range: 32.2 - 35.5 gm/dl 30.3 (L) 29.9 (L) 30.3 (L) 30.3 (L) 29.7 (L)   MPV Latest Ref Range: 9.4 - 12.4 fL 9.8 9.1 (L) 9.3 (L) 9.5 9.3 (L)   RDW-CV Latest Ref Range: 11.5 - 14.5 % 14.2 14.8 (H) 15.0 (H) 15.1 (H) 15.3 (H)   RDW-SD Latest Ref Range: 35.0 - 45.0 fL 47.5 (H) 44.8 45.2 (H) 45.7 (H) 45.7 (H)   Platelet Count Latest Ref Range: 130 - 400 thou/mm3 357 330 314 358 360   Lymphocytes Absolute Latest Ref Range: 1.0 - 4.8 thou/mm3 1.2    1.7   Monocytes Absolute Latest Ref Range: 0.4 - 1.3 thou/mm3 0.4    0.4   Eosinophils Absolute Latest Ref Range: 0.0 - 0.4 thou/mm3 0.1    0.2   Basophils Absolute Latest Ref Range: 0.0 - 0.1 thou/mm3 0.0    0.1   Seg Neutrophils Latest Units: % 73.2    62.1   Segs Absolute Latest Ref Range: 1.8 - 7.7 thou/mm3 4.7    3.9   Lymphocytes Latest Units: % 19.0    26.9   Monocytes Latest Units: % 5.9    6.1   Eosinophils Latest Units: % 1.1    2.7   Basophils Latest Units: % 0.6    0.8   Immature Grans (Abs) Latest Ref Range: 0.00 - 0.07 thou/mm3 0.01    0.09 (H)   Immature Granulocytes Latest Units: % 0.2    1.4   Nucleated Red Blood Cells Latest Units: /100 wbc 0    0     Labs Renal Latest Ref Rng & Units 2/7/2020 2/3/2020 2/2/2020 2/1/2020 1/31/2020   BUN 7 - 22 mg/dL 15 12 13 15 11   Cr 0.4 - 1.2 mg/dL 0.5 0.5 0.5 0.5 0.5   K 3.5 - 5.2 meq/L 4.7 4.4 4.2 4.3 4.2   Na 135 - 145 meq/L 140 140 135 139 140     Results for Marcela Nova (MRN [id number]) as of 2/9/2020 18:51   Ref. Range 1/31/2020 06:00 2/1/2020 14:45 2/3/2020 09:15 2/7/2020 10:35   Vancomycin Tr Latest Ref Range: 5.0 - 15.0 ug/mL  23.4 (HH) 18.5 (H) 17.6 (H)   Sed Rate Latest Ref Range: 0 - 20 mm/hr 81 (H)  77 (H)    CRP Latest Ref Range: 0.00 - 1.00 mg/dl 2.52 (H)  2.93 (H)       Results for Marcela Nova (MRN [id number]) as of 2/9/2020 18:51   Ref. Range 1/31/2020 06:00 2/7/2020 06:15   Vit D, 25-Hydroxy Latest Ref Range: 30 - 100 ng/ml 26 (L) 29 (L)     Patient Instructions:    See AVS  Follow-up visits: See after visit summary from hospitalization    Discharge Medications:   Ayesha Martinidain   Home Medication Instructions IQB:[id number]    Printed on:02/09/20 4536   Medication Information                      Cholecalciferol (VITAMIN D3) 25 MCG (1000 UT) TABS  TAKE ONE TABLET BY MOUTH EVERY DAY             cyclobenzaprine (FLEXERIL) 10 MG tablet  Take 1 tablet by mouth 3 times daily as needed for Muscle spasms             dextrose 5 % SOLN 250 mL with vancomycin 1 g SOLR 1,250 mg  Infuse 1,250 mg intravenously every 12 hours for 24 days Vancomycin to be dosed and followed by Dr. Vianney Neville.              diclofenac sodium 1 % GEL  Apply 4 g topically 4 times daily as needed for Pain metoprolol succinate (TOPROL XL) 50 MG extended release tablet  Take 1 tablet by mouth daily             oxyCODONE-acetaminophen (PERCOCET) 5-325 MG per tablet  Take 1 tablet by mouth every 6 hours as needed for Pain for up to 14 days. pantoprazole (PROTONIX) 40 MG tablet  Take 1 tablet by mouth every morning (before breakfast)             STOOL SOFTENER/LAXATIVE 50-8.6 MG per tablet  TAKE ONE TABLET BY MOUTH EVERY TWELVE HOURS AS NEEDED               Controlled substances monitoring: possible medication side effects, risk of tolerance and/or dependence, and alternative treatments discussed, no signs of potential drug abuse or diversion identified and OARRS report reviewed today- activity consistent with treatment plan.      40 minutes spent preparing the patient for discharge    Livier Conde MD

## 2020-01-31 NOTE — PLAN OF CARE
nursing interventions may include bowel/bladder training, education for medical assistive devices, medication education, O2 saturation management, energy conservation, stress management techniques, fall prevention, alarms protocol, seating and positioning, skin/wound care, pressure relief instruction, dressing changes, infection protection, DVT prophylaxis, assistance with safe transfers , and/or assistance with bathroom activities and hygiene. PHYSICAL THERAPY:  Goals:        Short term goals  Time Frame for Short term goals: 1 week  Short term goal 1: Pt to perform sit <>stand transfers at [facility] to prepare for transfers. Short term goal 2: Pt to amb >=100' at [facility] w/ RW on indoor/outdoor surfaces for home mobility. Short term goal 3: Pt to peform car transfer at [facility] for community transportation. Short term goal 4: Pt will complete curb step w/ RW at [facility] to enter/leave home. Long term goals  Time Frame for Long term goals : 3 weeks  Long term goal 1: Pt to perform sit <>stand transfers at [facility] to prepare for transfers. Long term goal 2: Pt to amb >=150' at [facility] w/ RW on indoor/outdoor surfaces for home mobility. Long term goal 3: Pt to peform car transfer at [facility] for community transportation. Long term goal 4: Pt will complete 6 steps w/ railing on Rt, [facility] X 1 to go upstairs in home. Long term goal 5: Pt will complete TUG at <=20 seconds to show decreased risk of falls. PPlan of Care:  Patient to be seen by physical therapy services 90 minutes per day Monday through Friday and 30 minutes on Saturday or Sunday    Anticipated interventions may include therapeutic exercises, gait training, neuromuscular re-ed, transfer training, community reintegration, bed mobility, w/c mobility and training.     OCCUPATIONAL THERAPY:  Goals:             Short term goals  Time Frame for Short term goals: 1 week   Short term goal 1: pt will complete ADL routine including gathering items needed for dressing tasks prophylaxis, Fall precautions, Fluid/Electrolyte balance, Nutritional status and Anemia                                           Physician anticipated functional outcomes: Improved independence with functional measures   Estimated length of stay for this admission 21 days. Medical Prognosis: Good  Anticipated disposition: Home. The potential to achieve the above medical and rehabilitative goals is good. This plan of care has been developed with the assistance and input of the multidisciplinary rehabilitation team.  The plan was reviewed with the patient. The patient has had the opportunity to provide input to the therapy team.    I have reviewed this Individualized Plan of Care and agree with its contents. Above documentation has been expanded, modified, adjusted to reflect the findings of my evaluations and goals for the patient.     Physician:  Christine Nolasco MD

## 2020-01-31 NOTE — PROGRESS NOTES
Alert and oriented to person, place, and time. Calm and cheerfull interaction with staff. Sitting in wheelchair, visiting with mom. Lung sounds clear anterior, posterior, and lateral. Respiration rate easy and regular. Pedal push and pull weak bilaterally. Leg lift weak bilaterally. Wheelchair locked, call light and over bed table within reach. Denies any further needs at this time.

## 2020-01-31 NOTE — CONSULTS
Hospital Medicine Consult    Patient:  Celeste Davila  MRN: [id number]    Referring Physician: Dr Nino Kent  Reason for Consult: medical management  Primacy Care Physician: Waleska Villasenor MD    HISTORY OF PRESENT ILLNESS:   The patient is a 53 y.o. female  Admitted from [facility] to [facility] rehab bed for further management of lumbar spine infection. She had earlier lumbar spine decompression and fusion surgery in Sept 2019, had a revision done in Nov 2019. By Jan this year, she developed excruciating back pain with left leg weakness. She presented to [facility] where MRI lumbar spine showed inflammatory changes at L4/5 with epidural and paraspinal tissues involvement, thus started on antibiotics with PT/OT. She reported a h/o dysrhythmia, had stress test at [facility], unremarkable.     Past Medical History:        Diagnosis Date    Arthritis     Esophagitis     GERD (gastroesophageal reflux disease)     Hypertension     Morbid obesity with BMI of 40.0-44.9, adult ([address]     Vertigo        Past Surgical History:        Procedure Laterality Date    BLADDER SUSPENSION      BREAST REDUCTION SURGERY  10/2007    CERVICAL FUSION  2017    ACDF C5-C7    CHOLECYSTECTOMY      COLONOSCOPY      DILATION AND CURETTAGE OF UTERUS      ENDOSCOPY, COLON, DIAGNOSTIC      HYSTERECTOMY      LUMBAR SPINE SURGERY  10/17/2019    L5-S1    UPPER GASTROINTESTINAL ENDOSCOPY N/A 7/19/2019    EGD BIOPSY performed by Yolanda Galvan MD at [facility]  7/19/2019    EGD DILATION SAVORY performed by Yolanda Galvan MD at [facility]       Medications: Scheduled Meds:   meropenem  1 g Intravenous Q8H    polyethylene glycol  17 g Oral Daily    senna  2 tablet Oral BID    vancomycin  1.5 g Intravenous Q12H    metoprolol succinate  50 mg Oral Daily    sodium chloride flush  10 mL Intravenous Q12H    pantoprazole  40 mg Oral QAM AC    docusate sodium  100 mg Oral BID    heparin (porcine)  5,000 Units Subcutaneous Q12H    Vitamin D  2,000 Units Oral Daily    vancomycin (VANCOCIN) intermittent dosing (placeholder)   Other RX Placeholder     Continuous Infusions:  PRN Meds:.morphine, bisacodyl, cyclobenzaprine, oxyCODONE-acetaminophen    Allergies:  Codeine and Dilaudid [hydromorphone hcl]    Social History:   TOBACCO:   reports that she quit smoking about 3 years ago. Her smoking use included cigarettes. She has a 40.00 pack-year smoking history. She has never used smokeless tobacco.  ETOH:   reports no history of alcohol use. Family History:       Problem Relation Age of Onset    Ulcerative Colitis Mother     Cancer Father         skin    High Blood Pressure Father     Diabetes Father        Physical Exam:    Vitals: BP (!) 130/94   Pulse 78   Temp 98.1 °F (36.7 °C) (Oral)   Resp 16   Ht 5' (1.524 m)   Wt 198 lb 4.8 oz (89.9 kg)   LMP 10/27/2015 (Exact Date)   SpO2 99%   BMI 38.73 kg/m²   General appearance: alert, appears stated age and cooperative  Skin: Skin color, texture, turgor normal. No rashes or lesions  HEENT: Head: atraumatic  Neck: no adenopathy, no carotid bruit and no JVD  Lungs: clear to auscultation bilaterally  Heart: S1, S2 normal  Abdomen: soft, non-tender; bowel sounds normal; no masses,  no organomegaly  Extremities: no edema, redness or tenderness in the calves or thighs  Neurologic: Mental status: Alert, oriented, thought content appropriate, power 4/5 lower extremities tati    CBC:   Recent Labs     01/31/20  0600   WBC 7.1   HGB 9.6*        BMP:    Recent Labs     01/31/20  0600      K 4.2      CO2 28   BUN 11   CREATININE 0.5   GLUCOSE 84     Hepatic:   Recent Labs     01/31/20  0600   AST 9   ALT 9*   BILITOT 0.5   ALKPHOS 85         Assessment and Plan   1. L4/5 osteomyelitis  2. H/o L4/5 decompression and PSF  3. HTN  4. Anemia -post op  5. Non sust VT, negative stress test  6. Mild protein Eliazar malnutrition      On Merrem, vanc IV  Cont BB.   Check lytes and correct. Analgesics. Iron studies  Thank you for the consult Dr Alex Sun.       Patient Active Problem List   Diagnosis Code    GERD (gastroesophageal reflux disease) K21.9    Pertussis-like syndrome A37.90    Anxiety F41.9    Clinical depression F32.9    Neck pain M54.2    History of lumbar fusion Z98.1    Chronic left-sided low back pain with left-sided sciatica M54.42, G89.29    Paresthesia of buttock R20.2    Paresthesia of left lower extremity R20.2    Neurologic gait dysfunction R26.9    Fusion of lumbosacral spine M43.27    Back pain, lumbosacral M54.5       Jose Juarez MD  Consulting Internist.  1/31/2020

## 2020-02-01 LAB
ANION GAP SERPL CALCULATED.3IONS-SCNC: 12 MEQ/L (ref 8–16)
BUN BLDV-MCNC: 15 MG/DL (ref 7–22)
CALCIUM SERPL-MCNC: 9.6 MG/DL (ref 8.5–10.5)
CHLORIDE BLD-SCNC: 100 MEQ/L (ref 98–111)
CO2: 27 MEQ/L (ref 23–33)
CREAT SERPL-MCNC: 0.5 MG/DL (ref 0.4–1.2)
ERYTHROCYTE [DISTWIDTH] IN BLOOD BY AUTOMATED COUNT: 15 % (ref 11.5–14.5)
ERYTHROCYTE [DISTWIDTH] IN BLOOD BY AUTOMATED COUNT: 45.2 FL (ref 35–45)
FOLATE: 4.9 NG/ML (ref 4.8–24.2)
GFR SERPL CREATININE-BSD FRML MDRD: > 90 ML/MIN/1.73M2
GLUCOSE BLD-MCNC: 80 MG/DL (ref 70–108)
HCT VFR BLD CALC: 31.7 % (ref 37–47)
HEMOGLOBIN: 9.6 GM/DL (ref 12–16)
MCH RBC QN AUTO: 25.3 PG (ref 26–33)
MCHC RBC AUTO-ENTMCNC: 30.3 GM/DL (ref 32.2–35.5)
MCV RBC AUTO: 83.4 FL (ref 81–99)
MRSA SCREEN: NORMAL
PLATELET # BLD: 314 THOU/MM3 (ref 130–400)
PMV BLD AUTO: 9.3 FL (ref 9.4–12.4)
POTASSIUM REFLEX MAGNESIUM: 4.3 MEQ/L (ref 3.5–5.2)
RBC # BLD: 3.8 MILL/MM3 (ref 4.2–5.4)
SODIUM BLD-SCNC: 139 MEQ/L (ref 135–145)
TSH SERPL DL<=0.05 MIU/L-ACNC: 2.08 UIU/ML (ref 0.4–4.2)
VANCOMYCIN TROUGH: 23.4 UG/ML (ref 5–15)
VITAMIN B-12: 278 PG/ML (ref 211–911)
WBC # BLD: 7 THOU/MM3 (ref 4.8–10.8)

## 2020-02-01 PROCEDURE — 82746 ASSAY OF FOLIC ACID SERUM: CPT

## 2020-02-01 PROCEDURE — 36415 COLL VENOUS BLD VENIPUNCTURE: CPT

## 2020-02-01 PROCEDURE — 85027 COMPLETE CBC AUTOMATED: CPT

## 2020-02-01 PROCEDURE — 2580000003 HC RX 258: Performed by: PHYSICAL MEDICINE & REHABILITATION

## 2020-02-01 PROCEDURE — 6360000002 HC RX W HCPCS: Performed by: PHYSICAL MEDICINE & REHABILITATION

## 2020-02-01 PROCEDURE — 97535 SELF CARE MNGMENT TRAINING: CPT

## 2020-02-01 PROCEDURE — 1180000000 HC REHAB R&B

## 2020-02-01 PROCEDURE — 2580000003 HC RX 258: Performed by: PHARMACIST

## 2020-02-01 PROCEDURE — 6370000000 HC RX 637 (ALT 250 FOR IP): Performed by: PHYSICAL MEDICINE & REHABILITATION

## 2020-02-01 PROCEDURE — 97530 THERAPEUTIC ACTIVITIES: CPT

## 2020-02-01 PROCEDURE — 80048 BASIC METABOLIC PNL TOTAL CA: CPT

## 2020-02-01 PROCEDURE — 80202 ASSAY OF VANCOMYCIN: CPT

## 2020-02-01 PROCEDURE — 82607 VITAMIN B-12: CPT

## 2020-02-01 PROCEDURE — 97110 THERAPEUTIC EXERCISES: CPT

## 2020-02-01 PROCEDURE — 84443 ASSAY THYROID STIM HORMONE: CPT

## 2020-02-01 PROCEDURE — 6360000002 HC RX W HCPCS: Performed by: PHARMACIST

## 2020-02-01 PROCEDURE — 97116 GAIT TRAINING THERAPY: CPT

## 2020-02-01 RX ORDER — VITAMIN B COMPLEX
5000 TABLET ORAL DAILY
Status: DISCONTINUED | OUTPATIENT
Start: 2020-02-02 | End: 2020-02-07 | Stop reason: HOSPADM

## 2020-02-01 RX ADMIN — VANCOMYCIN HYDROCHLORIDE 1250 MG: 5 INJECTION, POWDER, LYOPHILIZED, FOR SOLUTION INTRAVENOUS at 20:00

## 2020-02-01 RX ADMIN — OXYCODONE HYDROCHLORIDE AND ACETAMINOPHEN 1 TABLET: 5; 325 TABLET ORAL at 04:20

## 2020-02-01 RX ADMIN — OXYCODONE HYDROCHLORIDE AND ACETAMINOPHEN 1 TABLET: 5; 325 TABLET ORAL at 08:33

## 2020-02-01 RX ADMIN — DOCUSATE SODIUM 100 MG: 100 CAPSULE, LIQUID FILLED ORAL at 09:55

## 2020-02-01 RX ADMIN — SENNOSIDES 17.2 MG: 8.6 TABLET, FILM COATED ORAL at 18:32

## 2020-02-01 RX ADMIN — MEROPENEM 1 G: 1 INJECTION, POWDER, FOR SOLUTION INTRAVENOUS at 09:55

## 2020-02-01 RX ADMIN — PANTOPRAZOLE SODIUM 40 MG: 40 TABLET, DELAYED RELEASE ORAL at 06:48

## 2020-02-01 RX ADMIN — OXYCODONE HYDROCHLORIDE AND ACETAMINOPHEN 1 TABLET: 5; 325 TABLET ORAL at 21:01

## 2020-02-01 RX ADMIN — METOPROLOL SUCCINATE 50 MG: 50 TABLET, EXTENDED RELEASE ORAL at 09:56

## 2020-02-01 RX ADMIN — SENNOSIDES 17.2 MG: 8.6 TABLET, FILM COATED ORAL at 09:55

## 2020-02-01 RX ADMIN — VANCOMYCIN HYDROCHLORIDE 1.5 G: 5 INJECTION, POWDER, LYOPHILIZED, FOR SOLUTION INTRAVENOUS at 02:34

## 2020-02-01 RX ADMIN — OXYCODONE HYDROCHLORIDE AND ACETAMINOPHEN 1 TABLET: 5; 325 TABLET ORAL at 16:57

## 2020-02-01 RX ADMIN — VITAMIN D, TAB 1000IU (100/BT) 2000 UNITS: 25 TAB at 09:55

## 2020-02-01 RX ADMIN — HEPARIN SODIUM 5000 UNITS: 5000 INJECTION INTRAVENOUS; SUBCUTANEOUS at 02:07

## 2020-02-01 RX ADMIN — SODIUM CHLORIDE, PRESERVATIVE FREE 10 ML: 5 INJECTION INTRAVENOUS at 17:00

## 2020-02-01 RX ADMIN — OXYCODONE HYDROCHLORIDE AND ACETAMINOPHEN 1 TABLET: 5; 325 TABLET ORAL at 00:13

## 2020-02-01 RX ADMIN — MEROPENEM 1 G: 1 INJECTION, POWDER, FOR SOLUTION INTRAVENOUS at 16:15

## 2020-02-01 RX ADMIN — OXYCODONE HYDROCHLORIDE AND ACETAMINOPHEN 1 TABLET: 5; 325 TABLET ORAL at 12:42

## 2020-02-01 RX ADMIN — MEROPENEM 1 G: 1 INJECTION, POWDER, FOR SOLUTION INTRAVENOUS at 00:10

## 2020-02-01 RX ADMIN — SODIUM CHLORIDE, PRESERVATIVE FREE 10 ML: 5 INJECTION INTRAVENOUS at 02:33

## 2020-02-01 RX ADMIN — DOCUSATE SODIUM 100 MG: 100 CAPSULE, LIQUID FILLED ORAL at 21:02

## 2020-02-01 RX ADMIN — HEPARIN SODIUM 5000 UNITS: 5000 INJECTION INTRAVENOUS; SUBCUTANEOUS at 12:42

## 2020-02-01 ASSESSMENT — PAIN SCALES - GENERAL
PAINLEVEL_OUTOF10: 8
PAINLEVEL_OUTOF10: 6
PAINLEVEL_OUTOF10: 5
PAINLEVEL_OUTOF10: 7
PAINLEVEL_OUTOF10: 8
PAINLEVEL_OUTOF10: 7
PAINLEVEL_OUTOF10: 7
PAINLEVEL_OUTOF10: 5
PAINLEVEL_OUTOF10: 5

## 2020-02-01 ASSESSMENT — PAIN DESCRIPTION - LOCATION: LOCATION: BACK

## 2020-02-01 ASSESSMENT — PAIN DESCRIPTION - DESCRIPTORS: DESCRIPTORS: ACHING

## 2020-02-01 ASSESSMENT — PAIN DESCRIPTION - PAIN TYPE: TYPE: ACUTE PAIN

## 2020-02-01 ASSESSMENT — PAIN DESCRIPTION - ORIENTATION: ORIENTATION: LOWER

## 2020-02-01 ASSESSMENT — PAIN DESCRIPTION - PROGRESSION: CLINICAL_PROGRESSION: GRADUALLY IMPROVING

## 2020-02-01 ASSESSMENT — PAIN DESCRIPTION - ONSET: ONSET: ON-GOING

## 2020-02-01 ASSESSMENT — PAIN DESCRIPTION - FREQUENCY: FREQUENCY: CONTINUOUS

## 2020-02-01 ASSESSMENT — PAIN - FUNCTIONAL ASSESSMENT: PAIN_FUNCTIONAL_ASSESSMENT: PREVENTS OR INTERFERES SOME ACTIVE ACTIVITIES AND ADLS

## 2020-02-01 NOTE — PROGRESS NOTES
throughout session and taking rest breaks as needed      ADDITIONAL ACTIVITIES:  .Pt completed BUE strengthening exercises x10 reps x1set this date with a minimal resisitive band to her tolerance and not overdo due to back precautions  in all joints and all planes in order to increase strength and improve activity tolerance required for functional toilet & shower transfers and ADL routine. Pt tolerated in sitting , requiring short  rest breaks throughout task. .Pt completed dynamic standing task this date standing x5 min while requiring CGA for balance - task was graded to involve  2 hand release from RW to fold towel . Completed in order to challenge dynamic standing balance and standing tolerance for the completion of self-care ADLs & homemaking activities. ASSESSMENT:     Activity Tolerance:  Patient tolerance of  treatment: good. Discharge Recommendations: Continue to assess pending progress  Equipment Recommendations: Other: Pt has a shower stool.    Plan: Times per week: 5xs week x90 min, 1 x week x 30 min  Current Treatment Recommendations: Safety Education & Training, Balance Training, Patient/Caregiver Education & Training, Self-Care / ADL, Functional Mobility Training, Equipment Evaluation, Education, & procurement, Home Management Training, Endurance Training, Pain Management    Patient Education  Patient Education: ADL's, IADL's, Energy Conservation, Home Exercise Program and Importance of Increasing Activity    Goals  Short term goals  Time Frame for Short term goals: 1 week   Short term goal 1: pt will complete ADL routine including gathering items needed for dressing tasks with SBA and no cues for adapted tech to increase independence in self care tasks  Short term goal 2: PT will tolerate standing and dynamic reaching tasks x 5 min with SBA and no cues for back safety to increase endurance for sinkside ADL routien  Short term goal 3: PT will ambulate to and from the BR as well as around

## 2020-02-01 NOTE — PROGRESS NOTES
[facility]  INPATIENT PHYSICAL THERAPY  DAILY NOTE  [facility] - [id number]    Time In: 0830  Time Out: 0930  Timed Code Treatment Minutes: 60 Minutes  Minutes: 60          Date: 2020  Patient Name: Miladys Olivia,  Gender:  female        MRN: [id number]  : 1970  (53 y.o.)     Referring Practitioner: Dr. Thiago Pena     Additional Pertinent Hx: Pt had past spinal fusion of L1-S1 in 10/19 secondary to lumbar spondylolisthesis. Pt recently addmitted to [facility] for increased low back pain and infection. History of low back pain, HTN, arthritis and Lt sciatica. Prior Level of Function:  Lives With: Family(Currently lives w/ mother)  Type of Home: House  Home Layout: Multi-level  Home Access: Stairs to enter with rails  Entrance Stairs - Number of Steps: 6 stairs in house w/ railing on the Rt side   Entrance Stairs - Rails: Right  Home Equipment: Cane, Rolling walker   Bathroom Shower/Tub: Tub/Shower unit  Bathroom Toilet: Standard  Bathroom Equipment: (shower stool. )    Receives Help From: Family  Ambulation Assistance: Independent  Transfer Assistance: Independent    Restrictions/Precautions:  Restrictions/Precautions: General Precautions, Surgical Protocols, Up as Tolerated  Required Braces or Orthoses  Spinal: Lumbar Corset  Spinal Other: LSO brace on PRN   Position Activity Restriction  Spinal Precautions: No Bending, No Lifting, No Twisting  Spinal Precautions: no lifting > 5 pounds   Other position/activity restrictions: Pt reported cardiologist stated no rolling to Lt    SUBJECTIVE: Pt side lying in bed, finishing breakfast, friend present. Pt agreeable to session however requesting pain medication and to use the restroom before therapy.     PAIN: 7/10: LB    OBJECTIVE:  Bed Mobility:  Supine to Sit: Modified Independent    Transfers:  Sit to Stand: Stand By Assistance, with increased time for completion  Stand to Sit:Stand By Assistance, with increased time for completion    Ambulation:  Stand By Assistance, [employer], WC follow  Distance: 50ftx 10ft x3, 20ft  Surface: Level Tile  Device:Rolling Walker  Gait Deviations:  Slow Es, Decreased Step Length Bilaterally, Decreased Gait Speed, Decreased Heel Strike Bilaterally, Narrow Base of Support and Pt demo'd decreased DF during swing thru phase 50% of gait    Balance:  Pt standing in bathroom for cloth management and hand hygiene, SBA  Stairs:  Contact Guard Assistance, with cues for safety  Number of Steps: 1  Height: 6\" step with Rolling Walker  Exercise:  Patient was guided in 1 set(s) 5-10 reps of exercise to both lower extremities. Long arc quads, Hip abduction/adduction, Seated hamstring curls, Seated heel/toe raises, Seated isometric hip adduction and AAROM required for Armaan LAQs. Exercises were completed for increased independence with functional mobility. Functional Outcome Measures: Not completed       ASSESSMENT:  Assessment: Patient progressing toward established goals. Activity Tolerance:  Patient tolerance of  treatment: fair. Pt tolerated session fair, cont with high pain levels and limited strength/endurance.      Equipment Recommendations:Equipment Needed: No(Pt has RW)  Discharge Recommendations:  Continue to assess pending progress, Patient would benefit from continued therapy after discharge    Plan: Times per week: 5x/week 90 minutes, 1x/week 30 minutes  Current Treatment Recommendations: Strengthening, Transfer Training, Endurance Training, ROM, Equipment Evaluation, Education, & procurement, Pain Management, Balance Training, Gait Training, Home Exercise Program, Functional Mobility Training, Stair training, Safety Education & Training    Patient Education  Patient Education: Plan of Care, Precautions/Restrictions, Equipment Education, Transfers, Gait, Stairs, Verbal Exercise Instruction    Goals:  Patient goals : increase strength in legs and decrease pain  Short term goals  Time Frame for Short term goals: 1 week  Short term goal 1: Pt to perform sit <>stand transfers at [facility] to prepare for transfers. Short term goal 2: Pt to amb >=100' at [address] w/ RW on indoor/outdoor surfaces for home mobility. Short term goal 3: Pt to peform car transfer at [facility] for community transportation. Short term goal 4: Pt will complete curb step w/ RW at C[facility] X 1 to enter/leave home. Long term goals  Time Frame for Long term goals : 3 weeks  Long term goal 1: Pt to perform sit <>stand transfers at [facility] to prepare for transfers. Long term goal 2: Pt to amb >=150' at [facility] w/ RW on indoor/outdoor surfaces for home mobility. Long term goal 3: Pt to peform car transfer at [facility] for community transportation. Long term goal 4: Pt will complete 6 steps w/ railing on Rt, [facility] [place] X 1 to go upstairs in home. Long term goal 5: Pt will complete TUG at <=20 seconds to show decreased risk of falls. Following session, patient left in safe position with all fall risk precautions in place.

## 2020-02-01 NOTE — FLOWSHEET NOTE
Patient was in bed; her girlfriend was in the room with her. This is patient's 2nd back surgery since October 2019. She is really frustrated with how slow the healing has been and admits to \"crying a lot\". She feels the crying is cathartic, though. Adding to her frustration is that her son is returning from being stationed in [place] for 9 months and she won't be able to be there when he gets home to [facility]. She will have to wait until he can come home to [place] later in February. She states she has a strong priscilla and draws on it during tough times. 01/31/20 2110   Encounter Summary   Services provided to: Patient;Significant other   Referral/Consult From: [address] [place] [address] Parent;Significant other;Family members   Continue Visiting Yes  (1/31)   Complexity of Encounter Moderate   Length of Encounter 30 minutes   Spiritual Assessment Completed Yes   Spiritual/Mormon   Type Spiritual struggle   Assessment Approachable;Helplessness; Concerns with suffering   Intervention Active listening;Explored feelings, thoughts, concerns;Explored coping resources;Nurtured hope;Discussed illness/injury and it's impact; Discussed belief system/Mormon practices/priscilla   Outcome Comfort;Expressed gratitude;Engaged in conversation;Expressed feelings/needs/concerns;Encouraged; Hopeful;Receptive

## 2020-02-01 NOTE — PROGRESS NOTES
[address] [address]  INPATIENT PHYSICAL THERAPY  DAILY NOTE  [facility] - [id number]    Time In: 5349  Time Out: 1403  Timed Code Treatment Minutes: 30 Minutes  Minutes: 30          Date: 2020  Patient Name: Linda Young,  Gender:  female        MRN: [id number]  : 1970  (53 y.o.)     Referring Practitioner: Dr. Kelly Lau     Additional Pertinent Hx: Pt had past spinal fusion of L1-S1 in 10/19 secondary to lumbar spondylolisthesis. Pt recently addmitted to [facility] for increased low back pain and infection. History of low back pain, HTN, arthritis and Lt sciatica. Prior Level of Function:  Lives With: Family(Currently lives w/ mother)  Type of Home: House  Home Layout: Multi-level  Home Access: Stairs to enter with rails  Entrance Stairs - Number of Steps: 6 stairs in house w/ railing on the Rt side   Entrance Stairs - Rails: Right  Home Equipment: Cane, Rolling walker   Bathroom Shower/Tub: Tub/Shower unit  Bathroom Toilet: Standard  Bathroom Equipment: (shower stool. )    Receives Help From: Family  Ambulation Assistance: Independent  Transfer Assistance: Independent    Restrictions/Precautions:  Restrictions/Precautions: General Precautions, Surgical Protocols, Up as Tolerated  Required Braces or Orthoses  Spinal: Lumbar Corset  Spinal Other: LSO brace on PRN   Position Activity Restriction  Spinal Precautions: No Bending, No Lifting, No Twisting  Spinal Precautions: no lifting > 5 pounds   Other position/activity restrictions: Pt reported cardiologist stated no rolling to Lt    SUBJECTIVE: Pt resting in bed upon therapy arrival, had just finished lunch and agreeable to therapy. Reports increased pain however just took a pain pill.     PAIN: 9/10: back    OBJECTIVE:  Bed Mobility:  Supine to Sit: Modified Independent    Transfers:  Sit to Stand: Stand By Assistance  Stand to Sit:Stand By Assistance    Ambulation:  Stand By Assistance, Vinnie Shaikh Assistance  Distance: 80ft, short distances in gym. Surface: Level Tile  Device:Rolling Walker  Gait Deviations:  Slow Es, Decreased Step Length Bilaterally, Decreased Heel Strike Bilaterally and pt inconsistent with toe clearance. Balance:  Dynamic Standing Balance: Stand By Assistance, Contact Guard Assistance, Pt standing at AD, no UE support, completing ring toss activity for 5 min. Pt completing standing activity with SONI UE on AD, attempting tapping on half Tribal or bean bag SLS, Pt unable to complete activity completely. Exercise:  Patient was guided in 1 set(s) 5 reps of exercise to both lower extremities. seated HS curls with orange tband, seated foot taps with beanbag x5ea. Exercises were completed for increased independence with functional mobility. Functional Outcome Measures: Not completed       ASSESSMENT:  Assessment: Patient progressing toward established goals. Activity Tolerance:  Patient tolerance of  treatment: good. Equipment Recommendations:Equipment Needed: No(Pt has RW)  Discharge Recommendations:  Continue to assess pending progress, Patient would benefit from continued therapy after discharge    Plan: Times per week: 5x/week 90 minutes, 1x/week 30 minutes  Current Treatment Recommendations: Strengthening, Transfer Training, Endurance Training, ROM, Equipment Evaluation, Education, & procurement, Pain Management, Balance Training, Gait Training, Home Exercise Program, Functional Mobility Training, Stair training, Safety Education & Training    Patient Education  Patient Education: Plan of Care, Precautions/Restrictions, Gait, RPE Scale    Goals:  Patient goals : increase strength in legs and decrease pain  Short term goals  Time Frame for Short term goals: 1 week  Short term goal 1: Pt to perform sit <>stand transfers at [facility] to prepare for transfers. Short term goal 2: Pt to amb >=100' at [facility] w/ RW on indoor/outdoor surfaces for home mobility.   Short term goal 3: Pt

## 2020-02-01 NOTE — PROGRESS NOTES
Pharmacy Vancomycin Consult     Vancomycin Day: 10  Current Dosinmg q12h    Temp max:  afebrile    Recent Labs     20  0600 20  0630   BUN 11 15       Recent Labs     20  0600 20  0630   CREATININE 0.5 0.5       Recent Labs     20  0600 20  0630   WBC 7.1 7.0         Intake/Output Summary (Last 24 hours) at 2020 1605  Last data filed at 2020 1457  Gross per 24 hour   Intake 140 ml   Output --   Net 140 ml       Cultures   Patient transferred from [facility] for L4-L5 osteomyelitis, MRSE in blood (contaminant per care everywhere)  November intra-op cultures showed MRSA, Enterobacter, Pseudomonas   Planning on 6 weeks of therapy (stop date for 3/2/20 entered)- may extend to 8 weeks following ID appointment 3/2/20 in [facility]  Dr. Vianney Neville [facility] ID physician) wanted vancomycin trough weekly per their discharge instructions     Ht Readings from Last 1 Encounters:   20 5' (1.524 m)        Wt Readings from Last 1 Encounters:   20 199 lb (90.3 kg)         Body mass index is 38.86 kg/m². Estimated Creatinine Clearance: 136 mL/min (based on SCr of 0.5 mg/dL). Trough: 23.4    Assessment/Plan:  Hold next vanc dose for 6hrs  Resume vanc at 1250mg q12h  Recheck trough after 4th or 5th dose to ensure it is less than [address] BCPS, BCCCP 2020 4:08 PM

## 2020-02-01 NOTE — PROGRESS NOTES
[facility] [address]  Occupational Therapy  Daily Note  Time:   Time In: 1401  Time Out: 1431  Timed Code Treatment Minutes: 30 Minutes  Minutes: 30          Date: 2020  Patient Name: Haseeb Alvarenga,   Gender: female      Room: [facility]/073-A  MRN: [id number]  : 1970  (53 y.o.)  Referring Practitioner: Dr. Abhinav Arndt  Diagnosis: lumbar spinal fusion  Additional Pertinent Hx: PT admitted to the rehab unit from [facility] after having been found to have an infection in lumbar area. Pt had a prior fusion surgery and spacer placed 10-19. Restrictions/Precautions:  Restrictions/Precautions: General Precautions, Surgical Protocols, Up as Tolerated  Required Braces or Orthoses  Spinal: Lumbar Corset  Spinal Other: LSO brace on PRN   Position Activity Restriction  Spinal Precautions: No Bending, No Lifting, No Twisting  Spinal Precautions: no lifting > 5 pounds   Other position/activity restrictions: Pt reported cardiologist stated no rolling to Lt    SUBJECTIVE: pt was in gym finishing with PT when arrived. Pt agreeable to OT     PAIN: pt stated less pain this pm at 4/10 in back area     COGNITION: Decreased Problem Solving and Decreased Safety Awareness    ADL:   Grooming: [employer]. standing at sink to brush teeth and wash hands . Tolieting: CGA to wipe self sitting on STS   Toieting transfers:   CGA  To and from STS     BALANCE:  Standing Balance: [employer]. standing at sink and at counter in room to put items away in drawers       TRANSFERS:  Sit to Stand:  [employer]. from arm chair and STS and w/c   Stand to Sit: Contact Guard Assistance. to w/c, STS and EOB     FUNCTIONAL MOBILITY:  Assistive Device: Rolling Walker  Assist Level:  Contact Guard Assistance and with verbal cues .    Distance: approx 20 feet from therapy gym, and around in room and to and from BR   Pt had no LOB, unsteady at times 2-3 weeks  Long term goal 1: Pt will complete ADL routine with MI and no cues for proper body mechanics, back safety and LHAE PRN to increase independence in self care tasks  Long term goal 2: PT will complete 2 step homemaking tasks with no cues for safe tech and proper body mechanics/ work simplification tech     Following session, patient left in safe position with all fall risk precautions in place.

## 2020-02-01 NOTE — PLAN OF CARE
Problem: Pain:  Goal: Pain level will decrease  Description  Pain level will decrease  Outcome: Ongoing  Note:   Pain ranges between 6-8 most of shift. Patient states its better at rest.      Problem: SKIN INTEGRITY  Goal: LTG - Patient will be free from infection  Outcome: Ongoing  Note:   ATB cont for infections. Afebrile. No c/o voiced. Problem: Falls - Risk of:  Goal: Will remain free from falls  Description  Will remain free from falls  Outcome: Ongoing  Note:   Up with assist of one with walker and gait belt. Tolerates well. Gait steady. Problem: IP BOWEL ELIMINATION  Goal: LTG - patient will utilize adaptive techniques/equipment to complete bowel elimination  Outcome: Ongoing  Note:   Continent of bowel and bladder.  Uses call light appropriately

## 2020-02-02 LAB
ANION GAP SERPL CALCULATED.3IONS-SCNC: 13 MEQ/L (ref 8–16)
BUN BLDV-MCNC: 13 MG/DL (ref 7–22)
CALCIUM SERPL-MCNC: 9.7 MG/DL (ref 8.5–10.5)
CHLORIDE BLD-SCNC: 96 MEQ/L (ref 98–111)
CO2: 26 MEQ/L (ref 23–33)
CREAT SERPL-MCNC: 0.5 MG/DL (ref 0.4–1.2)
ERYTHROCYTE [DISTWIDTH] IN BLOOD BY AUTOMATED COUNT: 15.1 % (ref 11.5–14.5)
ERYTHROCYTE [DISTWIDTH] IN BLOOD BY AUTOMATED COUNT: 45.7 FL (ref 35–45)
GFR SERPL CREATININE-BSD FRML MDRD: > 90 ML/MIN/1.73M2
GLUCOSE BLD-MCNC: 125 MG/DL (ref 70–108)
HCT VFR BLD CALC: 33.3 % (ref 37–47)
HEMOGLOBIN: 10.1 GM/DL (ref 12–16)
MCH RBC QN AUTO: 25.4 PG (ref 26–33)
MCHC RBC AUTO-ENTMCNC: 30.3 GM/DL (ref 32.2–35.5)
MCV RBC AUTO: 83.7 FL (ref 81–99)
PLATELET # BLD: 358 THOU/MM3 (ref 130–400)
PMV BLD AUTO: 9.5 FL (ref 9.4–12.4)
POTASSIUM SERPL-SCNC: 4.2 MEQ/L (ref 3.5–5.2)
RBC # BLD: 3.98 MILL/MM3 (ref 4.2–5.4)
SODIUM BLD-SCNC: 135 MEQ/L (ref 135–145)
WBC # BLD: 7.6 THOU/MM3 (ref 4.8–10.8)

## 2020-02-02 PROCEDURE — 80048 BASIC METABOLIC PNL TOTAL CA: CPT

## 2020-02-02 PROCEDURE — 6370000000 HC RX 637 (ALT 250 FOR IP): Performed by: PHYSICAL MEDICINE & REHABILITATION

## 2020-02-02 PROCEDURE — 2580000003 HC RX 258: Performed by: PHYSICAL MEDICINE & REHABILITATION

## 2020-02-02 PROCEDURE — 6360000002 HC RX W HCPCS: Performed by: PHYSICAL MEDICINE & REHABILITATION

## 2020-02-02 PROCEDURE — 85027 COMPLETE CBC AUTOMATED: CPT

## 2020-02-02 PROCEDURE — 6360000002 HC RX W HCPCS: Performed by: PHARMACIST

## 2020-02-02 PROCEDURE — 1180000000 HC REHAB R&B

## 2020-02-02 PROCEDURE — 2580000003 HC RX 258: Performed by: PHARMACIST

## 2020-02-02 PROCEDURE — 36415 COLL VENOUS BLD VENIPUNCTURE: CPT

## 2020-02-02 RX ADMIN — OXYCODONE HYDROCHLORIDE AND ACETAMINOPHEN 1 TABLET: 5; 325 TABLET ORAL at 22:19

## 2020-02-02 RX ADMIN — OXYCODONE HYDROCHLORIDE AND ACETAMINOPHEN 1 TABLET: 5; 325 TABLET ORAL at 13:34

## 2020-02-02 RX ADMIN — MEROPENEM 1 G: 1 INJECTION, POWDER, FOR SOLUTION INTRAVENOUS at 16:31

## 2020-02-02 RX ADMIN — OXYCODONE HYDROCHLORIDE AND ACETAMINOPHEN 1 TABLET: 5; 325 TABLET ORAL at 18:18

## 2020-02-02 RX ADMIN — PANTOPRAZOLE SODIUM 40 MG: 40 TABLET, DELAYED RELEASE ORAL at 05:17

## 2020-02-02 RX ADMIN — OXYCODONE HYDROCHLORIDE AND ACETAMINOPHEN 1 TABLET: 5; 325 TABLET ORAL at 09:17

## 2020-02-02 RX ADMIN — OXYCODONE HYDROCHLORIDE AND ACETAMINOPHEN 1 TABLET: 5; 325 TABLET ORAL at 01:01

## 2020-02-02 RX ADMIN — HEPARIN SODIUM 5000 UNITS: 5000 INJECTION INTRAVENOUS; SUBCUTANEOUS at 13:32

## 2020-02-02 RX ADMIN — MEROPENEM 1 G: 1 INJECTION, POWDER, FOR SOLUTION INTRAVENOUS at 08:28

## 2020-02-02 RX ADMIN — VITAMIN D, TAB 1000IU (100/BT) 5000 UNITS: 25 TAB at 08:26

## 2020-02-02 RX ADMIN — VANCOMYCIN HYDROCHLORIDE 1250 MG: 5 INJECTION, POWDER, LYOPHILIZED, FOR SOLUTION INTRAVENOUS at 09:17

## 2020-02-02 RX ADMIN — METOPROLOL SUCCINATE 50 MG: 50 TABLET, EXTENDED RELEASE ORAL at 08:26

## 2020-02-02 RX ADMIN — SENNOSIDES 17.2 MG: 8.6 TABLET, FILM COATED ORAL at 08:26

## 2020-02-02 RX ADMIN — SODIUM CHLORIDE, PRESERVATIVE FREE 10 ML: 5 INJECTION INTRAVENOUS at 01:02

## 2020-02-02 RX ADMIN — VANCOMYCIN HYDROCHLORIDE 1250 MG: 5 INJECTION, POWDER, LYOPHILIZED, FOR SOLUTION INTRAVENOUS at 20:30

## 2020-02-02 RX ADMIN — HEPARIN SODIUM 5000 UNITS: 5000 INJECTION INTRAVENOUS; SUBCUTANEOUS at 00:11

## 2020-02-02 RX ADMIN — DOCUSATE SODIUM 100 MG: 100 CAPSULE, LIQUID FILLED ORAL at 08:26

## 2020-02-02 RX ADMIN — DICLOFENAC 4 G: 10 GEL TOPICAL at 16:32

## 2020-02-02 RX ADMIN — MEROPENEM 1 G: 1 INJECTION, POWDER, FOR SOLUTION INTRAVENOUS at 00:12

## 2020-02-02 RX ADMIN — SENNOSIDES 17.2 MG: 8.6 TABLET, FILM COATED ORAL at 18:18

## 2020-02-02 RX ADMIN — OXYCODONE HYDROCHLORIDE AND ACETAMINOPHEN 1 TABLET: 5; 325 TABLET ORAL at 05:17

## 2020-02-02 RX ADMIN — SODIUM CHLORIDE, PRESERVATIVE FREE 10 ML: 5 INJECTION INTRAVENOUS at 16:31

## 2020-02-02 RX ADMIN — DOCUSATE SODIUM 100 MG: 100 CAPSULE, LIQUID FILLED ORAL at 22:19

## 2020-02-02 RX ADMIN — DICLOFENAC 4 G: 10 GEL TOPICAL at 08:28

## 2020-02-02 ASSESSMENT — PAIN SCALES - GENERAL
PAINLEVEL_OUTOF10: 7
PAINLEVEL_OUTOF10: 6
PAINLEVEL_OUTOF10: 0
PAINLEVEL_OUTOF10: 8
PAINLEVEL_OUTOF10: 3
PAINLEVEL_OUTOF10: 9
PAINLEVEL_OUTOF10: 7
PAINLEVEL_OUTOF10: 5
PAINLEVEL_OUTOF10: 3
PAINLEVEL_OUTOF10: 6
PAINLEVEL_OUTOF10: 7
PAINLEVEL_OUTOF10: 8

## 2020-02-02 ASSESSMENT — PAIN DESCRIPTION - ONSET: ONSET: ON-GOING

## 2020-02-02 ASSESSMENT — ENCOUNTER SYMPTOMS
EYE PAIN: 0
CONSTIPATION: 0
SHORTNESS OF BREATH: 0
ALLERGIC/IMMUNOLOGIC NEGATIVE: 1
TROUBLE SWALLOWING: 0
WHEEZING: 0
DIARRHEA: 0
BACK PAIN: 1
NAUSEA: 0

## 2020-02-02 ASSESSMENT — PAIN DESCRIPTION - ORIENTATION
ORIENTATION: LOWER
ORIENTATION: LOWER;LEFT
ORIENTATION: LOWER

## 2020-02-02 ASSESSMENT — PAIN DESCRIPTION - LOCATION
LOCATION: BACK
LOCATION: BACK;LEG
LOCATION: BACK

## 2020-02-02 ASSESSMENT — PAIN DESCRIPTION - FREQUENCY
FREQUENCY: CONTINUOUS

## 2020-02-02 ASSESSMENT — PAIN DESCRIPTION - PAIN TYPE
TYPE: ACUTE PAIN
TYPE: SURGICAL PAIN
TYPE: SURGICAL PAIN

## 2020-02-02 ASSESSMENT — PAIN DESCRIPTION - DESCRIPTORS
DESCRIPTORS: ACHING;SHOOTING
DESCRIPTORS: ACHING;SHOOTING;SHARP
DESCRIPTORS: ACHING

## 2020-02-02 ASSESSMENT — PAIN DESCRIPTION - DIRECTION: RADIATING_TOWARDS: DOWN LEFT LEG

## 2020-02-02 ASSESSMENT — PAIN - FUNCTIONAL ASSESSMENT: PAIN_FUNCTIONAL_ASSESSMENT: PREVENTS OR INTERFERES SOME ACTIVE ACTIVITIES AND ADLS

## 2020-02-02 ASSESSMENT — PAIN DESCRIPTION - PROGRESSION
CLINICAL_PROGRESSION: NOT CHANGED
CLINICAL_PROGRESSION: NOT CHANGED

## 2020-02-02 NOTE — PROGRESS NOTES
Physical Medicine & Rehabilitation  Progress Note    Chief Complaint:  L4/5 osteomyelitis/deconditioning/gait instability    Subjective: Patient endorses that therapy today was very fatiguing. The pain in her left leg she states is increased over yesterday with the additional activity of therapy that she is now doing. Her infection markers were discussed with her and also potential options for treating her increased leg pain. Otherwise the patient had no specific concerns or questions today. Rehabilitation:   Physical Therapy:  OBJECTIVE:  Bed Mobility:  Not Tested     Transfers:  Sit to Stand: Stand By Assistance  Stand to Sit:Stand By Assistance  Car:Stand By Assistance, cues for hand placement, with verbal cues     Ambulation:  Stand By Assistance, [employer], with verbal cues , with increased time for completion  Distance: 125'x1, 75'1  Surface: Level Tile  Device:Rolling Walker  Gait Deviations:  Slow Es, Decreased Step Length Bilaterally, Decreased Weight Shift Bilaterally, Decreased Gait Speed and Decreased Heel Strike Bilaterally, and Decreased foot clearance     Stairs:  Platform:  6\" platform X 1 using Rolling Walker and Contact Guard Assistance, with verbal cues , with increased time for completion. Pt demonstrated poor eccentric control on descent due to LE weakness. Platform: 8\" platform X 1 using RW and contact guard assist, with verbal cues and increased time for completion.     Balance:  Pt performed reaching activity throughout the gym with long handled reacher and RW ~5'. Activity completed to improve body mechanics and simulate a functional household activity. Pt. Also completed ring toss activity ~5' standing with no UE support to improve standing tolerance.         Neuromuscular Education:  Pt performed stepping activity over fly swatters 10'x2 to increase dorsiflexion, knee flexion, and hip flexion to further improve gait.  Pt demonstrated difficulty to activate tibialis anterior and hamstrings to clear the obstacles.      Exercise:  Not Tested     Functional Outcome Measures: Completed  Timed Up and Go: 35     Normative Reference Values  60-69 years:  8.1 seconds  70-79 years: 9.2 seconds  80-99 years: 11.3 seconds     < 10 seconds is normal, a score of > 14 seconds indicates high fall risk    ASSESSMENT:  Assessment: Patient progressing toward established goals, meeting 3/4 short term goals. Pt is showing varying strengths in BLEs, ricki with df, though able to clear adequately with swing phase of gait and for steps. Pt has decreased overall endurance, strength resulting in functional mobility and gait deficits. Pt will benefit from continued skilled PT to further advance in these areas. Activity Tolerance:  Patient tolerance of  treatment: good. Equipment Recommendations:Equipment Needed: No(Pt has RW)  Discharge Recommendations:  Continue to assess pending progress, Patient would benefit from continued therapy after discharge    Occupational Therapy:  COGNITION: WNL     ADL:   Grooming: Stand By Assistance. wash hands  Toileting: Supervision. Toilet Transfer: Stand By Assistance. .     BALANCE:  Sitting Balance:  Independent. Standing Balance: Stand By Assistance.       BED MOBILITY:  Not Tested     TRANSFERS:  Sit to Stand:  Stand By Assistance. w/c, EOB  Stand to Sit: Stand By Assistance.       FUNCTIONAL MOBILITY:  Assistive Device: Rolling Walker  Assist Level:  Stand By Assistance. Distance: To and from bathroom and To and from therapy apartment      ADDITIONAL ACTIVITIES:  -Pt completed IADL activity of cooking a sandwich within therapy apartment requiring SBA for standing with RW x10 min. No vc's required for back precautions, pt self initiated squatting technique when retrieving item from lower shelf. Pt tolerated activity well.      ASSESSMENT:  Activity Tolerance:  Patient tolerance of  treatment: good.        Discharge Recommendations: Continue to assess pending progress  Equipment Recommendations: Other: Pt has a shower stool. Plan: Times per week: 5xs week x90 min, 1 x week x 30 min  Current Treatment Recommendations: Safety Education & Training, Balance Training, Patient/Caregiver Education & Training, Self-Care / ADL, Functional Mobility Training, Equipment Evaluation, Education, & procurement, Home Management Training, Endurance Training, Pain Management    Review of Systems:  Review of Systems   Constitutional: Positive for activity change and fatigue. Negative for appetite change and fever. HENT: Negative for hearing loss, nosebleeds, sore throat and trouble swallowing. Eyes: Negative for pain. Respiratory: Negative for shortness of breath and wheezing. Cardiovascular: Positive for leg swelling. Gastrointestinal: Negative for blood in stool, constipation, diarrhea and nausea. Endocrine: Negative for cold intolerance. Genitourinary: Negative for dysuria, flank pain, frequency, hematuria and urgency. Musculoskeletal: Positive for back pain and gait problem. Negative for joint swelling. Skin: Negative for pallor. Allergic/Immunologic: Negative. Neurological: Positive for weakness and numbness. Negative for tremors, seizures and headaches. Hematological: Negative. Psychiatric/Behavioral: Negative for decreased concentration and dysphoric mood. The patient is not nervous/anxious. All other systems reviewed and are negative. Objective:  BP (!) 151/89   Pulse 77   Temp 98.1 °F (36.7 °C) (Oral)   Resp 16   Ht 5' (1.524 m)   Wt 196 lb (88.9 kg)   LMP 10/27/2015 (Exact Date)   SpO2 98%   BMI 38.28 kg/m²   CURRENT VITALS:  height is 5' (1.524 m) and weight is 196 lb (88.9 kg). Her oral temperature is 98.1 °F (36.7 °C). Her blood pressure is 151/89 (abnormal) and her pulse is 77. Her respiration is 16 and oxygen saturation is 98%. Body mass index is 38.28 kg/m².   Temperature Range (24h):Temp: 98.1 °F

## 2020-02-03 ENCOUNTER — APPOINTMENT (OUTPATIENT)
Dept: GENERAL RADIOLOGY | Age: 50
DRG: 058 | End: 2020-02-03
Attending: PHYSICAL MEDICINE & REHABILITATION
Payer: MEDICARE

## 2020-02-03 LAB
ALBUMIN SERPL-MCNC: 3.7 G/DL (ref 3.5–5.1)
ALP BLD-CCNC: 90 U/L (ref 38–126)
ALT SERPL-CCNC: 11 U/L (ref 11–66)
ANION GAP SERPL CALCULATED.3IONS-SCNC: 14 MEQ/L (ref 8–16)
AST SERPL-CCNC: 12 U/L (ref 5–40)
BASOPHILS # BLD: 0.8 %
BASOPHILS ABSOLUTE: 0.1 THOU/MM3 (ref 0–0.1)
BILIRUB SERPL-MCNC: 0.6 MG/DL (ref 0.3–1.2)
BUN BLDV-MCNC: 12 MG/DL (ref 7–22)
C-REACTIVE PROTEIN: 2.93 MG/DL (ref 0–1)
CALCIUM SERPL-MCNC: 9.9 MG/DL (ref 8.5–10.5)
CHLORIDE BLD-SCNC: 99 MEQ/L (ref 98–111)
CO2: 27 MEQ/L (ref 23–33)
CREAT SERPL-MCNC: 0.5 MG/DL (ref 0.4–1.2)
EOSINOPHIL # BLD: 2.7 %
EOSINOPHILS ABSOLUTE: 0.2 THOU/MM3 (ref 0–0.4)
ERYTHROCYTE [DISTWIDTH] IN BLOOD BY AUTOMATED COUNT: 15.3 % (ref 11.5–14.5)
ERYTHROCYTE [DISTWIDTH] IN BLOOD BY AUTOMATED COUNT: 45.7 FL (ref 35–45)
GFR SERPL CREATININE-BSD FRML MDRD: > 90 ML/MIN/1.73M2
GLUCOSE BLD-MCNC: 95 MG/DL (ref 70–108)
HCT VFR BLD CALC: 34.4 % (ref 37–47)
HEMOGLOBIN: 10.2 GM/DL (ref 12–16)
IMMATURE GRANS (ABS): 0.09 THOU/MM3 (ref 0–0.07)
IMMATURE GRANULOCYTES: 1.4 %
LYMPHOCYTES # BLD: 26.9 %
LYMPHOCYTES ABSOLUTE: 1.7 THOU/MM3 (ref 1–4.8)
MCH RBC QN AUTO: 24.8 PG (ref 26–33)
MCHC RBC AUTO-ENTMCNC: 29.7 GM/DL (ref 32.2–35.5)
MCV RBC AUTO: 83.5 FL (ref 81–99)
MONOCYTES # BLD: 6.1 %
MONOCYTES ABSOLUTE: 0.4 THOU/MM3 (ref 0.4–1.3)
NUCLEATED RED BLOOD CELLS: 0 /100 WBC
PLATELET # BLD: 360 THOU/MM3 (ref 130–400)
PMV BLD AUTO: 9.3 FL (ref 9.4–12.4)
POTASSIUM SERPL-SCNC: 4.4 MEQ/L (ref 3.5–5.2)
RBC # BLD: 4.12 MILL/MM3 (ref 4.2–5.4)
SEDIMENTATION RATE, ERYTHROCYTE: 77 MM/HR (ref 0–20)
SEG NEUTROPHILS: 62.1 %
SEGMENTED NEUTROPHILS ABSOLUTE COUNT: 3.9 THOU/MM3 (ref 1.8–7.7)
SODIUM BLD-SCNC: 140 MEQ/L (ref 135–145)
TOTAL PROTEIN: 7.4 G/DL (ref 6.1–8)
VANCOMYCIN TROUGH: 18.5 UG/ML (ref 5–15)
WBC # BLD: 6.3 THOU/MM3 (ref 4.8–10.8)

## 2020-02-03 PROCEDURE — 97110 THERAPEUTIC EXERCISES: CPT

## 2020-02-03 PROCEDURE — 6360000002 HC RX W HCPCS: Performed by: PHYSICAL MEDICINE & REHABILITATION

## 2020-02-03 PROCEDURE — 97112 NEUROMUSCULAR REEDUCATION: CPT

## 2020-02-03 PROCEDURE — 6370000000 HC RX 637 (ALT 250 FOR IP): Performed by: PHYSICAL MEDICINE & REHABILITATION

## 2020-02-03 PROCEDURE — 36415 COLL VENOUS BLD VENIPUNCTURE: CPT

## 2020-02-03 PROCEDURE — 80053 COMPREHEN METABOLIC PANEL: CPT

## 2020-02-03 PROCEDURE — 97530 THERAPEUTIC ACTIVITIES: CPT

## 2020-02-03 PROCEDURE — 2580000003 HC RX 258: Performed by: PHYSICAL MEDICINE & REHABILITATION

## 2020-02-03 PROCEDURE — 97535 SELF CARE MNGMENT TRAINING: CPT

## 2020-02-03 PROCEDURE — 85025 COMPLETE CBC W/AUTO DIFF WBC: CPT

## 2020-02-03 PROCEDURE — 1180000000 HC REHAB R&B

## 2020-02-03 PROCEDURE — 85651 RBC SED RATE NONAUTOMATED: CPT

## 2020-02-03 PROCEDURE — 97116 GAIT TRAINING THERAPY: CPT

## 2020-02-03 PROCEDURE — 86140 C-REACTIVE PROTEIN: CPT

## 2020-02-03 PROCEDURE — 71045 X-RAY EXAM CHEST 1 VIEW: CPT

## 2020-02-03 PROCEDURE — 80202 ASSAY OF VANCOMYCIN: CPT

## 2020-02-03 RX ORDER — METHYLPREDNISOLONE 4 MG/1
8 TABLET ORAL NIGHTLY
Status: CANCELLED | OUTPATIENT
Start: 2020-02-04 | End: 2020-02-05

## 2020-02-03 RX ORDER — METHYLPREDNISOLONE 4 MG/1
4 TABLET ORAL NIGHTLY
Status: CANCELLED | OUTPATIENT
Start: 2020-02-05 | End: 2020-02-08

## 2020-02-03 RX ORDER — METHYLPREDNISOLONE 4 MG/1
24 TABLET ORAL ONCE
Status: CANCELLED | OUTPATIENT
Start: 2020-02-03 | End: 2020-02-03

## 2020-02-03 RX ORDER — METHYLPREDNISOLONE 4 MG/1
4 TABLET ORAL
Status: CANCELLED | OUTPATIENT
Start: 2020-02-04 | End: 2020-02-06

## 2020-02-03 RX ORDER — METHYLPREDNISOLONE 4 MG/1
4 TABLET ORAL
Status: CANCELLED | OUTPATIENT
Start: 2020-02-04 | End: 2020-02-07

## 2020-02-03 RX ORDER — METHYLPREDNISOLONE 4 MG/1
4 TABLET ORAL
Status: CANCELLED | OUTPATIENT
Start: 2020-02-04 | End: 2020-02-09

## 2020-02-03 RX ADMIN — PANTOPRAZOLE SODIUM 40 MG: 40 TABLET, DELAYED RELEASE ORAL at 06:56

## 2020-02-03 RX ADMIN — SODIUM CHLORIDE, PRESERVATIVE FREE 10 ML: 5 INJECTION INTRAVENOUS at 13:00

## 2020-02-03 RX ADMIN — OXYCODONE HYDROCHLORIDE AND ACETAMINOPHEN 1 TABLET: 5; 325 TABLET ORAL at 06:56

## 2020-02-03 RX ADMIN — DOCUSATE SODIUM 100 MG: 100 CAPSULE, LIQUID FILLED ORAL at 21:36

## 2020-02-03 RX ADMIN — DOCUSATE SODIUM 100 MG: 100 CAPSULE, LIQUID FILLED ORAL at 08:49

## 2020-02-03 RX ADMIN — OXYCODONE HYDROCHLORIDE AND ACETAMINOPHEN 1 TABLET: 5; 325 TABLET ORAL at 12:34

## 2020-02-03 RX ADMIN — HEPARIN SODIUM 5000 UNITS: 5000 INJECTION INTRAVENOUS; SUBCUTANEOUS at 11:53

## 2020-02-03 RX ADMIN — OXYCODONE HYDROCHLORIDE AND ACETAMINOPHEN 1 TABLET: 5; 325 TABLET ORAL at 21:36

## 2020-02-03 RX ADMIN — METOPROLOL SUCCINATE 50 MG: 50 TABLET, EXTENDED RELEASE ORAL at 08:50

## 2020-02-03 RX ADMIN — MEROPENEM 1 G: 1 INJECTION, POWDER, FOR SOLUTION INTRAVENOUS at 08:39

## 2020-02-03 RX ADMIN — SENNOSIDES 17.2 MG: 8.6 TABLET, FILM COATED ORAL at 17:32

## 2020-02-03 RX ADMIN — DICLOFENAC 4 G: 10 GEL TOPICAL at 21:42

## 2020-02-03 RX ADMIN — VANCOMYCIN HYDROCHLORIDE 1250 MG: 5 INJECTION, POWDER, LYOPHILIZED, FOR SOLUTION INTRAVENOUS at 12:54

## 2020-02-03 RX ADMIN — HEPARIN SODIUM 5000 UNITS: 5000 INJECTION INTRAVENOUS; SUBCUTANEOUS at 01:02

## 2020-02-03 RX ADMIN — SODIUM CHLORIDE, PRESERVATIVE FREE 10 ML: 5 INJECTION INTRAVENOUS at 01:10

## 2020-02-03 RX ADMIN — DICLOFENAC 4 G: 10 GEL TOPICAL at 13:43

## 2020-02-03 RX ADMIN — MEROPENEM 1 G: 1 INJECTION, POWDER, FOR SOLUTION INTRAVENOUS at 01:08

## 2020-02-03 RX ADMIN — MEROPENEM 1 G: 1 INJECTION, POWDER, FOR SOLUTION INTRAVENOUS at 17:31

## 2020-02-03 RX ADMIN — VANCOMYCIN HYDROCHLORIDE 1250 MG: 5 INJECTION, POWDER, LYOPHILIZED, FOR SOLUTION INTRAVENOUS at 21:48

## 2020-02-03 RX ADMIN — VITAMIN D, TAB 1000IU (100/BT) 5000 UNITS: 25 TAB at 08:50

## 2020-02-03 RX ADMIN — OXYCODONE HYDROCHLORIDE AND ACETAMINOPHEN 1 TABLET: 5; 325 TABLET ORAL at 02:23

## 2020-02-03 RX ADMIN — SENNOSIDES 17.2 MG: 8.6 TABLET, FILM COATED ORAL at 08:50

## 2020-02-03 ASSESSMENT — PAIN DESCRIPTION - FREQUENCY: FREQUENCY: CONTINUOUS

## 2020-02-03 ASSESSMENT — PAIN SCALES - GENERAL
PAINLEVEL_OUTOF10: 4
PAINLEVEL_OUTOF10: 4
PAINLEVEL_OUTOF10: 5
PAINLEVEL_OUTOF10: 7
PAINLEVEL_OUTOF10: 8
PAINLEVEL_OUTOF10: 0
PAINLEVEL_OUTOF10: 8
PAINLEVEL_OUTOF10: 7
PAINLEVEL_OUTOF10: 0

## 2020-02-03 ASSESSMENT — PAIN - FUNCTIONAL ASSESSMENT: PAIN_FUNCTIONAL_ASSESSMENT: PREVENTS OR INTERFERES SOME ACTIVE ACTIVITIES AND ADLS

## 2020-02-03 ASSESSMENT — PAIN DESCRIPTION - ONSET: ONSET: ON-GOING

## 2020-02-03 ASSESSMENT — PAIN DESCRIPTION - DESCRIPTORS: DESCRIPTORS: ACHING;SHOOTING

## 2020-02-03 NOTE — PLAN OF CARE
Problem: Pain:  Description  Pain management should include both nonpharmacologic and pharmacologic interventions. Goal: Pain level will decrease  Description  Pain level will decrease  Outcome: Ongoing  Goal: Control of acute pain  Description  Control of acute pain  Outcome: Ongoing  Goal: Control of chronic pain  Description  Control of chronic pain  Outcome: Ongoing    Patient continues to participate in pain management to decrease pain as evidenced by patient use of breathing and meditation for pain management     Problem: SKIN INTEGRITY  Goal: LTG - Patient will be free from infection  Outcome: Ongoing     Patient continues to participate in skin integrity promotion during this admission as patient offloading weight while in bed and in chair. Patient and family will alert staff to any concerns he has related to break in intact skin. Problem: Falls - Risk of:  Goal: Will remain free from falls  Description  Will remain free from falls  Outcome: Ongoing     Patient continues to work with therapy and nursing to remain free from falls during this admission as evidenced by no falls during this admission patient and staff will utilize safety devices and techniques for transfer     Problem: IP BOWEL ELIMINATION  Goal: LTG - patient will utilize adaptive techniques/equipment to complete bowel elimination  Outcome: Ongoing  Goal: STG - patient will be accident free  Outcome: Ongoing  Goal: STG - Patient will verbalize signs and symptoms of constipation and how to prevent/alleviate  Outcome: Ongoing     Patient continues to work on bowel regulation as evidenced by patient taking prescribed bowel medications     Problem: DISCHARGE BARRIERS  Goal: Patient's continuum of care needs are met  Outcome: Ongoing     Patient will participate in discharge planning during this admission as evidenced by patient will continue to work with nursing and therapy for home going education.

## 2020-02-03 NOTE — PLAN OF CARE
Problem: Pain:  Goal: Pain level will decrease  Description  Pain level will decrease  Outcome: Ongoing  Note:   Pain ranges from 3-8 most of shift. Patient able to change positions in bed on own for comfort. Problem: SKIN INTEGRITY  Goal: LTG - Patient will be free from infection  Outcome: Ongoing  Note:   IV ATB cont. No c/o voiced. Afebrile. Problem: Falls - Risk of:  Goal: Will remain free from falls  Description  Will remain free from falls  Outcome: Ongoing  Note:   Up with stand by assist of one with walker and gait belt. No c/o voiced. Gait steady and slow. Problem: IP BOWEL ELIMINATION  Goal: LTG - patient will utilize adaptive techniques/equipment to complete bowel elimination  Outcome: Ongoing  Note:   Cont. BM today large soft.

## 2020-02-03 NOTE — PROGRESS NOTES
[address] NOTE    Conference Date: 2020  Admit Date:  2020  9:30 PM  Patient Name: Rashad Acosta    MRN: [id number]    : 1970  (53 y.o.)  Rehabilitation Admitting Diagnosis:  Fusion of lumbosacral spine [M43.27]  Back pain, lumbosacral [M54.5]  Referring Practitioner: Dr. Booker Leyva issues/needs regarding patient and family discharge status: SW met with patient to introduce self and explain role, complete SW assessment, and initiate discharge planning. Patient familiar to SW from previous inpatient rehab stay in 2019 after initial back surgery. Since discharge, patient has had another back surgery with complications that required wound vac, home health care, etc. Patient indicated that after having second surgery in November began feeling some relief and was recovering well. Patient indicated being able to walk for almost two miles at the [facility] back in December. Patient reports that significant pain increased approximately a week and a half ago. Patient did not feel she was managed well by surgeon and went to [facility] a second opinion. Patient admitted to inpatient rehab from [facility]. Since discharge from previous stay in October, patient has continued to live with her 66year old mother. Patient indicates that mother assists with meal preparation, laundry, and housekeeping. Patient was independent with personal care. Patient has not worked since , and has been denied for disability for the second time. Patient reports ex- provides financial assistance for bills, spending money, etc. Patient was driving, managing finances, managing medications. Patient indicates that mother, daughter, and girlfriend have been and will continue to be her support system. Barriers to discharge include patient's two IV antibiotics (one Q8 and one Q12) that are needed until 2020.  Anticipate months in [place] and will be coming home the end of this month-pleasant and social-slightly tearful at times-also trying to stay positive      NUTRITION  Weight: 197 lb 11.2 oz (89.7 kg) / Body mass index is 38.61 kg/m². Current diet: DIET GENERAL;  Dietary Nutrition Supplements: Low Calorie High Protein Supplement  Please see nutrition note for details. NURSING  Continent of Bowel and Bladder: Yes  Pain is Managed:  Yes  Signs and Symptoms of Infection:  No  Signs and Symptoms of Skin Breakdown:  No  Injury and Fall Free during Inpatient Rehabilitation Admission: Yes    Consultations/Labs/X-rays: Physical Medicine    Family Education: Need to make contact with family to initiate education  Fall Risk:  Falling star program initiated  Is the patient appropriate for a stay in the functional apartment? no    Discharge Plan   Estimated Discharge Date: 2/7   Destination: home health  Services at Discharge: [address] [place], [place] [address], Occupational Therapy and Nursing 2x week  Is patient appropriate for an outpatient driving evaluation? no  Equipment at Discharge: None  Factors facilitating achievement of predicted outcomes: Caregiver support, Motivated, Cooperative, Pleasant, Good insight into deficits, Has needed Durable Medical Equipment at home and Knowledge about rehab  Barriers to the achievement of predicted outcomes: Pain, Lower extremity weakness and Long standing deficits    Team Members Present at Conference:  :Kristen Sorto [place], [place]  Occupational Therapist: Galileo Meraz OTR/L [id number]. Physical Therapist: Kem Joyce PT [id number]  Speech Therapist: N/A. Nurse: Yogi Pandey, RN   Psychologist: Radha Reyes, PhD.    I approve the established interdisciplinary plan of care as documented within the medical record of [address] [facility]

## 2020-02-03 NOTE — PROGRESS NOTES
By Assistance    Ambulation:  Stand By Assistance, [address], with cues for safety, with verbal cues , with increased time for completion Cues for activation of tibialis anterior and hamstrings for improved gait  Distance: 125'x2  Surface: Level Tile  Device:Rolling Walker  Gait Deviations:  Slow Es, Decreased Step Length Bilaterally, Decreased Gait Speed, Decreased Heel Strike Bilaterally, Narrow Base of Support and Mild Path Deviations      Exercise:  Patient was guided in 1 set(s) 10 reps of exercise to both lower extremities. Ankle pumps, Glut sets, Heelslides, Adductor squeezes, Abdominal squeezes, and Hooklying hip abduction/adduction. Exercises were completed for increased independence with functional mobility. Minimal Assistance required to complete full ROM    Functional Outcome Measures: Not completed       ASSESSMENT:  Assessment: Patient progressing toward established goals. Pt drowsy and required cues throughout for hamstring activation and tibialis anterior activation. Activity Tolerance:  Patient tolerance of  treatment: good.       Equipment Recommendations:Equipment Needed: No(Pt has RW)  Discharge Recommendations:  Continue to assess pending progress, Patient would benefit from continued therapy after discharge    Plan: Times per week: 5x/week 90 minutes, 1x/week 30 minutes  Current Treatment Recommendations: Strengthening, Transfer Training, Endurance Training, ROM, Equipment Evaluation, Education, & procurement, Pain Management, Balance Training, Gait Training, Home Exercise Program, Functional Mobility Training, Stair training, Safety Education & Training    Patient Education  Patient Education: Plan of Care, [employer] Mobility, Transfers, Gait, Verbal Exercise Instruction    Goals:  Patient goals : increase strength in legs and decrease pain  Short term goals  Time Frame for Short term goals: 1 week  Short term goal 1: Pt to perform sit <>stand transfers at [facility] to prepare for transfers. Short term goal 2: Pt to amb >=100' at [facility] w/ RW on indoor/outdoor surfaces for home mobility. Short term goal 3: Pt to peform car transfer at [facility] for community transportation. Short term goal 4: Pt will complete curb step w/ RW at [facility] X 1 to enter/leave home. Long term goals  Time Frame for Long term goals : 3 weeks  Long term goal 1: Pt to perform sit <>stand transfers at Mod I to prepare for transfers. Long term goal 2: Pt to amb >=150' at [facility] w/ RW on indoor/outdoor surfaces for home mobility. Long term goal 3: Pt to peform car transfer at [facility] for community transportation. Long term goal 4: Pt will complete 6 steps w/ railing on Rt, Mod I X 1 to go upstairs in home. Long term goal 5: Pt will complete TUG at <=20 seconds to show decreased risk of falls. Following session, patient left in safe position with all fall risk precautions in place.     Ju Armijo  PTA

## 2020-02-03 NOTE — PROGRESS NOTES
[address]  Occupational Therapy  Progress Note  Time:   Time In: 1330  Time Out: 1400  Timed Code Treatment Minutes: 30 Minutes  Minutes: 30    Date: 2/3/2020  Patient Name: Guera Easley,   Gender: female      Room: [facility] [place]73/073-A  MRN: [id number]  : 1970  (53 y.o.)  Referring Practitioner: Dr. Clive Fisher  Diagnosis: lumbar spinal fusion  Additional Pertinent Hx: PT admitted to the rehab unit from [facility] after having been found to have an infection in lumbar area. Pt had a prior fusion surgery and spacer placed 10-19. Restrictions/Precautions:  Restrictions/Precautions: General Precautions, Surgical Protocols, Up as Tolerated  Required Braces or Orthoses  Spinal: Lumbar Corset  Spinal Other: LSO brace on PRN   Position Activity Restriction  Spinal Precautions: No Bending, No Lifting, No Twisting  Spinal Precautions: no lifting > 5 pounds   Other position/activity restrictions: Pt reported cardiologist stated no rolling to Lt    SUBJECTIVE: Pt in bathroom upon arrival, agreeable to OT. Pt wearing IV line during tx session. PAIN: 4/10: Pt noted that she did receive pain medicine and was feeling better. COGNITION: WNL    ADL:   Grooming: Stand By Assistance. wash hands  Toileting: Supervision. Toilet Transfer: Stand By Assistance. Sarah Jiang BALANCE:  Sitting Balance:  Independent. Standing Balance: Stand By Assistance. BED MOBILITY:  Not Tested    TRANSFERS:  Sit to Stand:  Stand By Assistance. w/c, EOB  Stand to Sit: Stand By Assistance. FUNCTIONAL MOBILITY:  Assistive Device: Rolling Walker  Assist Level:  Stand By Assistance. Distance: To and from bathroom and To and from therapy apartment     ADDITIONAL ACTIVITIES:  -Pt completed IADL activity of cooking a sandwich within therapy apartment requiring SBA for standing with RW x10 min.  No vc's required for back precautions, pt self initiated squatting technique when retrieving item from lower shelf. Pt tolerated activity well. ASSESSMENT:   Pt is making steady progress on IP Rehab. She has progressed to a SBA level with her functional transfers and basic mobility. She has a good understanding of spinal precautions within her daily activities, completing a shower routine and a simple meal prep task this date with SBA and self initiating various squats / modified golfer's lift without cue'ing required. She continues to be limited by pain management at times, but overall this is cont to improve. She requires skilled OT intervention to progress to a MI level with her ADLs, IADLs and to improve strength and endurance as well as overall safety to decrease fall risk and burden of care. Activity Tolerance:  Patient tolerance of  treatment: good. Discharge Recommendations: Continue to assess pending progress  Equipment Recommendations: Other: Pt has a shower stool.    Plan: Times per week: 5xs week x90 min, 1 x week x 30 min  Current Treatment Recommendations: Safety Education & Training, Balance Training, Patient/Caregiver Education & Training, Self-Care / ADL, Functional Mobility Training, Equipment Evaluation, Education, & procurement, Home Management Training, Endurance Training, Pain Management    Patient Education  Patient Education: ADL's, IADL's and Assistive Device Safety    Goals  Short term goals  Time Frame for Short term goals: 1 week   Short term goal 1: pt will complete ADL routine including gathering items needed for dressing tasks with SBA and no cues for adapted tech to increase independence in self care tasks GOAL MET, REVISE   Short term goal 2: PT will tolerate standing and dynamic reaching tasks x 5 min with SBA and no cues for back safety to increase endurance for sinkside ADL routien GOAL MET, REVISE   Short term goal 3: PT will ambulate to and from the BR as well as around obstacles with SBA and AE to increase independence in home mobiliyt to and from the BR GOAL MET, REVISE   Short term goal 4: Pt will complete 2 step homemaking tasks with SBA and RW with no > min cues for proper body mechanics to increase ability to retrieve a snack GOAL MET, REVISE   Long term goals  Time Frame for Long term goals : 2-3 weeks  Long term goal 1: Pt will complete ADL routine with MI and no cues for proper body mechanics, back safety and LHAE PRN to increase independence in self care tasks GOAL NOT MET, CONT   Long term goal 2: PT will complete 2 step homemaking tasks with no cues for safe tech and proper body mechanics/ work simplification tech  GOAL NOT MET, CONT     Revised Short-Term Goals  Short term goals  Time Frame for Short term goals: 1 week   Short term goal 1: pt will complete ADL routine including gathering items needed for dressing tasks with S and no cues for adapted tech to increase independence in self care tasks  Short term goal 2: PT will tolerate standing and dynamic reaching tasks x 5 min with S and no cues for back safety to increase endurance for sinkside ADL routien  Short term goal 3: PT will ambulate to and from the BR as well as around obstacles with S and AE to increase independence in home mobiliyt to and from the BR  Short term goal 4: Pt will complete 2 step homemaking tasks with S and RW with no > min cues for proper body mechanics to increase ability to retrieve a snack  Long term goals  Time Frame for Long term goals : 2 weeks  Long term goal 1: Pt will complete ADL routine with MI and no cues for proper body mechanics, back safety and LHAE PRN to increase independence in self care tasks  Long term goal 2: PT will complete 2 step homemaking tasks with no cues for safe tech and proper body mechanics/ work simplification tech       Following session, patient left in safe position with all fall risk precautions in place. Treatment and note completed by JORDIN Rodriguez/LEIGHA.  Assessment and goal revision completed by Vanesa [address] [place] [address]

## 2020-02-03 NOTE — PROGRESS NOTES
Pharmacy Vancomycin Consult     Vancomycin Day:   Current Dosin mg Q12H     Temp max:  Afebrile    Recent Labs     20  1420 20  0915   BUN 13 12       Recent Labs     20  1420 20  0915   CREATININE 0.5 0.5       Recent Labs     20  1420 20  0915   WBC 7.6 6.3         Intake/Output Summary (Last 24 hours) at 2/3/2020 1122  Last data filed at 2/3/2020 0140  Gross per 24 hour   Intake 942.18 ml   Output --   Net 942.18 ml       Ht Readings from Last 1 Encounters:   20 5' (1.524 m)        Wt Readings from Last 1 Encounters:   20 196 lb (88.9 kg)         Body mass index is 38.28 kg/m². Estimated Creatinine Clearance: 135 mL/min (based on SCr of 0.5 mg/dL). Trough: 18.5    Assessment/Plan:  Trough is in appropriate range. Continue current dosing.      Kilo Haile, PharmD  2/3/2020  11:30 AM

## 2020-02-03 NOTE — PROGRESS NOTES
[facility]  INPATIENT PHYSICAL THERAPY  PROGRESS NOTE  [address] [facility],4Th Floor - 7E-73/073-A     Time IN:  0930  Time Out:  1030  Treatment time:  60 min          Date: 2020  Patient Name: Guera Easley,  Gender:  female        MRN: [id number]  : 1970  (53 y.o.)     Referring Practitioner: Dr. Zuniga President  Diagnosis: Fusion of Lumbar sacral spine  Additional Pertinent Hx: Pt had past spinal fusion of L1-S1 in 10/19 secondary to lumbar spondylolisthesis. Pt recently addmitted to [facility] for increased low back pain and infection. History of low back pain, HTN, arthritis and Lt sciatica. Prior Level of Function:  Lives With: Family(Currently lives w/ mother)  Type of Home: House  Home Layout: Multi-level  Home Access: Stairs to enter with rails  Entrance Stairs - Number of Steps: 6 stairs in house w/ railing on the Rt side   Entrance Stairs - Rails: Right  Home Equipment: Cane, Rolling walker   Bathroom Shower/Tub: Tub/Shower unit  Bathroom Toilet: Standard  Bathroom Equipment: (shower stool. )    Receives Help From: Family  Ambulation Assistance: Independent  Transfer Assistance: Independent    Restrictions/Precautions:  Restrictions/Precautions: General Precautions, Surgical Protocols, Up as Tolerated  Required Braces or Orthoses  Spinal: Lumbar Corset  Spinal Other: LSO brace on PRN   Position Activity Restriction  Spinal Precautions: No Bending, No Lifting, No Twisting  Spinal Precautions: no lifting > 5 pounds   Other position/activity restrictions: Pt reported cardiologist stated no rolling to Lt    SUBJECTIVE: Pt in [facility] upon arrival. Pt states only had a bite of breakfast this am. Pt agreeable to therapy. Pt wore brace second half of session due to pain in R lower back.     PAIN: Not Rated, low back and B LE (described as achy)    OBJECTIVE:  Bed Mobility:  Not Tested    Transfers:  Sit to Stand: Stand By Assistance  Stand to Sit:Stand By Assistance  Car:Stand By Assistance, cues for hand placement, with verbal cues    Ambulation:  Stand By Assistance, [employer], with verbal cues , with increased time for completion  Distance: 125'x1, 75'1  Surface: Level Tile  Device:Rolling Walker  Gait Deviations:  Slow Es, Decreased Step Length Bilaterally, Decreased Weight Shift Bilaterally, Decreased Gait Speed and Decreased Heel Strike Bilaterally, and Decreased foot clearance    Stairs:  Platform:  6\" platform X 1 using Rolling Walker and [employer], with verbal cues , with increased time for completion. Pt demonstrated poor eccentric control on descent due to LE weakness. Platform: 8\" platform X 1 using RW and contact guard assist, with verbal cues and increased time for completion. Balance:  Pt performed reaching activity throughout the gym with long handled reacher and RW ~5'. Activity completed to improve body mechanics and simulate a functional household activity. Pt. Also completed ring toss activity ~5' standing with no UE support to improve standing tolerance. Neuromuscular Education:  Pt performed stepping activity over fly swatters 10'x2 to increase dorsiflexion, knee flexion, and hip flexion to further improve gait. Pt demonstrated difficulty to activate tibialis anterior and hamstrings to clear the obstacles. Exercise:  Not Tested    Functional Outcome Measures: Completed       Normative Reference Values  60-69 years:  8.1 seconds  70-79 years: 9.2 seconds  80-99 years: 11.3 seconds    < 10 seconds is normal, a score of > 14 seconds indicates high fall risk        ASSESSMENT:  Assessment: Patient progressing toward established goals, meeting 3/4 short term goals. Pt is showing varying strengths in BLEs, ricki with df, though able to clear adequately with swing phase of gait and for steps. Pt has decreased overall endurance, strength resulting in functional mobility and gait deficits.   Pt will risk precautions in place. Ju Armijo PTA  Treatment session and note completed by Janak Chun PTA student.   Assessment and goal revision of Progress Note completed by Fady Walters PT.

## 2020-02-03 NOTE — PROGRESS NOTES
[address]  Occupational Therapy  Daily Note  Time:  Time In: 1100  Time Out: 1200  Timed Code Treatment Minutes: 60 Minutes  Minutes: 60    Date: 2/3/2020  Patient Name: Malcolm Tate,   Gender: female      Room: -73/073-A  MRN: [id number]  : 1970  (53 y.o.)  Referring Practitioner: Dr. Abby Ham  Diagnosis: lumbar spinal fusion  Additional Pertinent Hx: PT admitted to the rehab unit from [facility] after having been found to have an infection in lumbar area. Pt had a prior fusion surgery and spacer placed 10-19. Restrictions/Precautions:  Restrictions/Precautions: General Precautions, Surgical Protocols, Up as Tolerated  Required Braces or Orthoses  Spinal: Lumbar Corset  Spinal Other: LSO brace on PRN   Position Activity Restriction  Spinal Precautions: No Bending, No Lifting, No Twisting  Spinal Precautions: no lifting > 5 pounds   Other position/activity restrictions: Pt reported cardiologist stated no rolling to Lt    SUBJECTIVE: Pt lying supine in bed upon arrival, agreeable to OT. Pt asked for a shower and explained needing more than two showers a week, set up plane for showers Monday, Wednesday, and Friday per pt's request. PICC line was completely covered, but noted that it was slightly damp after shower. Notified nurse and IV nurse about line, IV nurse stated that it was not wet and did not need changed. End of session, chest x-ray arrived to check PICC line. PAIN: 7/10: Pain in back. COGNITION: WNL    ADL:   Grooming: Stand By Assistance. x5 min at sink, hair and oral care  Bathing: Stand By Assistance. Upper Extremity Dressing: Supervision. Lower Extremity Dressing: Stand By Assistance. Toileting: Supervision. Toilet Transfer: Stand By Assistance. Tub Transfer: Stand By Assistance. No vc's required for back precautions. BALANCE:  Sitting Balance:  Independent.    Standing Balance: Stand By

## 2020-02-04 PROCEDURE — 6360000002 HC RX W HCPCS: Performed by: PHYSICAL MEDICINE & REHABILITATION

## 2020-02-04 PROCEDURE — 6370000000 HC RX 637 (ALT 250 FOR IP): Performed by: PHYSICAL MEDICINE & REHABILITATION

## 2020-02-04 PROCEDURE — 1180000000 HC REHAB R&B

## 2020-02-04 PROCEDURE — 97530 THERAPEUTIC ACTIVITIES: CPT

## 2020-02-04 PROCEDURE — 2580000003 HC RX 258: Performed by: PHYSICAL MEDICINE & REHABILITATION

## 2020-02-04 PROCEDURE — 97116 GAIT TRAINING THERAPY: CPT

## 2020-02-04 PROCEDURE — 97110 THERAPEUTIC EXERCISES: CPT

## 2020-02-04 PROCEDURE — 97535 SELF CARE MNGMENT TRAINING: CPT

## 2020-02-04 RX ORDER — METHYLPREDNISOLONE 4 MG/1
8 TABLET ORAL NIGHTLY
Status: COMPLETED | OUTPATIENT
Start: 2020-02-05 | End: 2020-02-05

## 2020-02-04 RX ORDER — METHYLPREDNISOLONE 4 MG/1
24 TABLET ORAL ONCE
Status: COMPLETED | OUTPATIENT
Start: 2020-02-04 | End: 2020-02-04

## 2020-02-04 RX ORDER — METHYLPREDNISOLONE 4 MG/1
4 TABLET ORAL
Status: DISCONTINUED | OUTPATIENT
Start: 2020-02-05 | End: 2020-02-07 | Stop reason: HOSPADM

## 2020-02-04 RX ORDER — METHYLPREDNISOLONE 4 MG/1
4 TABLET ORAL
Status: COMPLETED | OUTPATIENT
Start: 2020-02-05 | End: 2020-02-07

## 2020-02-04 RX ORDER — METHYLPREDNISOLONE 4 MG/1
4 TABLET ORAL NIGHTLY
Status: DISCONTINUED | OUTPATIENT
Start: 2020-02-06 | End: 2020-02-07 | Stop reason: HOSPADM

## 2020-02-04 RX ORDER — METHYLPREDNISOLONE 4 MG/1
4 TABLET ORAL
Status: COMPLETED | OUTPATIENT
Start: 2020-02-05 | End: 2020-02-06

## 2020-02-04 RX ADMIN — VANCOMYCIN HYDROCHLORIDE 1250 MG: 5 INJECTION, POWDER, LYOPHILIZED, FOR SOLUTION INTRAVENOUS at 20:49

## 2020-02-04 RX ADMIN — SENNOSIDES 17.2 MG: 8.6 TABLET, FILM COATED ORAL at 19:06

## 2020-02-04 RX ADMIN — HEPARIN SODIUM 5000 UNITS: 5000 INJECTION INTRAVENOUS; SUBCUTANEOUS at 13:31

## 2020-02-04 RX ADMIN — VITAMIN D, TAB 1000IU (100/BT) 5000 UNITS: 25 TAB at 08:50

## 2020-02-04 RX ADMIN — VANCOMYCIN HYDROCHLORIDE 1250 MG: 5 INJECTION, POWDER, LYOPHILIZED, FOR SOLUTION INTRAVENOUS at 09:45

## 2020-02-04 RX ADMIN — MEROPENEM 1 G: 1 INJECTION, POWDER, FOR SOLUTION INTRAVENOUS at 01:26

## 2020-02-04 RX ADMIN — DOCUSATE SODIUM 100 MG: 100 CAPSULE, LIQUID FILLED ORAL at 08:51

## 2020-02-04 RX ADMIN — SODIUM CHLORIDE, PRESERVATIVE FREE 10 ML: 5 INJECTION INTRAVENOUS at 02:10

## 2020-02-04 RX ADMIN — PANTOPRAZOLE SODIUM 40 MG: 40 TABLET, DELAYED RELEASE ORAL at 06:41

## 2020-02-04 RX ADMIN — OXYCODONE HYDROCHLORIDE AND ACETAMINOPHEN 1 TABLET: 5; 325 TABLET ORAL at 02:09

## 2020-02-04 RX ADMIN — HEPARIN SODIUM 5000 UNITS: 5000 INJECTION INTRAVENOUS; SUBCUTANEOUS at 01:26

## 2020-02-04 RX ADMIN — METOPROLOL SUCCINATE 50 MG: 50 TABLET, EXTENDED RELEASE ORAL at 08:50

## 2020-02-04 RX ADMIN — MEROPENEM 1 G: 1 INJECTION, POWDER, FOR SOLUTION INTRAVENOUS at 16:23

## 2020-02-04 RX ADMIN — DOCUSATE SODIUM 100 MG: 100 CAPSULE, LIQUID FILLED ORAL at 20:48

## 2020-02-04 RX ADMIN — METHYLPREDNISOLONE 24 MG: 4 TABLET ORAL at 08:49

## 2020-02-04 RX ADMIN — OXYCODONE HYDROCHLORIDE AND ACETAMINOPHEN 1 TABLET: 5; 325 TABLET ORAL at 06:41

## 2020-02-04 RX ADMIN — SENNOSIDES 17.2 MG: 8.6 TABLET, FILM COATED ORAL at 08:50

## 2020-02-04 RX ADMIN — OXYCODONE HYDROCHLORIDE AND ACETAMINOPHEN 1 TABLET: 5; 325 TABLET ORAL at 11:05

## 2020-02-04 RX ADMIN — SODIUM CHLORIDE, PRESERVATIVE FREE 10 ML: 5 INJECTION INTRAVENOUS at 13:35

## 2020-02-04 RX ADMIN — OXYCODONE HYDROCHLORIDE AND ACETAMINOPHEN 1 TABLET: 5; 325 TABLET ORAL at 16:23

## 2020-02-04 RX ADMIN — OXYCODONE HYDROCHLORIDE AND ACETAMINOPHEN 1 TABLET: 5; 325 TABLET ORAL at 20:48

## 2020-02-04 RX ADMIN — MEROPENEM 1 G: 1 INJECTION, POWDER, FOR SOLUTION INTRAVENOUS at 08:51

## 2020-02-04 ASSESSMENT — PAIN DESCRIPTION - DESCRIPTORS
DESCRIPTORS: ACHING
DESCRIPTORS: SHARP;SHOOTING

## 2020-02-04 ASSESSMENT — PAIN SCALES - GENERAL
PAINLEVEL_OUTOF10: 4
PAINLEVEL_OUTOF10: 8
PAINLEVEL_OUTOF10: 8
PAINLEVEL_OUTOF10: 4
PAINLEVEL_OUTOF10: 7
PAINLEVEL_OUTOF10: 6
PAINLEVEL_OUTOF10: 7
PAINLEVEL_OUTOF10: 0
PAINLEVEL_OUTOF10: 4
PAINLEVEL_OUTOF10: 4
PAINLEVEL_OUTOF10: 7

## 2020-02-04 ASSESSMENT — PAIN DESCRIPTION - PAIN TYPE
TYPE: SURGICAL PAIN
TYPE: CHRONIC PAIN

## 2020-02-04 ASSESSMENT — PAIN DESCRIPTION - FREQUENCY
FREQUENCY: CONTINUOUS
FREQUENCY: CONTINUOUS

## 2020-02-04 ASSESSMENT — PAIN DESCRIPTION - LOCATION
LOCATION: BACK
LOCATION: BACK

## 2020-02-04 ASSESSMENT — ENCOUNTER SYMPTOMS
WHEEZING: 0
BACK PAIN: 1
CONSTIPATION: 0
BLOOD IN STOOL: 0
DIARRHEA: 0
SHORTNESS OF BREATH: 0
NAUSEA: 0
ALLERGIC/IMMUNOLOGIC NEGATIVE: 1
EYE PAIN: 0
SORE THROAT: 0
TROUBLE SWALLOWING: 0

## 2020-02-04 ASSESSMENT — PAIN DESCRIPTION - ORIENTATION
ORIENTATION: LOWER
ORIENTATION: LOWER

## 2020-02-04 ASSESSMENT — PAIN DESCRIPTION - PROGRESSION
CLINICAL_PROGRESSION: NOT CHANGED
CLINICAL_PROGRESSION: NOT CHANGED

## 2020-02-04 ASSESSMENT — PAIN DESCRIPTION - ONSET: ONSET: ON-GOING

## 2020-02-04 ASSESSMENT — PAIN - FUNCTIONAL ASSESSMENT: PAIN_FUNCTIONAL_ASSESSMENT: PREVENTS OR INTERFERES SOME ACTIVE ACTIVITIES AND ADLS

## 2020-02-04 ASSESSMENT — PAIN DESCRIPTION - DIRECTION: RADIATING_TOWARDS: DOWN LEFT LEG

## 2020-02-04 NOTE — PROGRESS NOTES
Patient/family education done with patient significant other as well as patient on how to flush and administer IV antibiotics through the PICC. Patient significant other able to do a flush with this nurse present and walking through steps appropriately. Will need further education to ensure safe administration of iv therapy at home.

## 2020-02-04 NOTE — PROGRESS NOTES
Date    TSH 2.080 02/01/2020     FOLATE:    Lab Results   Component Value Date    FOLATE 4.9 02/01/2020     IRON:    Lab Results   Component Value Date    IRON 30 01/31/2020     FERRITIN:    Lab Results   Component Value Date    FERRITIN 129 01/31/2020       Assessment and Plan:   1. L4/5 osteomyelitis  2. H/o L4/5 decompression and PSF  3. HTN  4. Anemia -post op  5. Non sust VT, negative stress test  6. Mild protein Eliazar malnutrition       cont IV Merrem, vanc IV  Cont BB. Analgesics. Bowel regimen.   Sc heparin    Mary Lou Hagen MD

## 2020-02-04 NOTE — PROGRESS NOTES
ambulate to and from the BR as well as around obstacles with S and AE to increase independence in home mobiliyt to and from the BR  Short term goal 4: Pt will complete 2 step homemaking tasks with S and RW with no > min cues for proper body mechanics to increase ability to retrieve a snack  Long term goals  Time Frame for Long term goals : 2 weeks  Long term goal 1: Pt will complete ADL routine with MI and no cues for proper body mechanics, back safety and LHAE PRN to increase independence in self care tasks  Long term goal 2: PT will complete 2 step homemaking tasks with no cues for safe tech and proper body mechanics/ work simplification tech     Following session, patient left in safe position with all fall risk precautions in place.     Darlene MARRUFO/CARLYN [id number]

## 2020-02-04 NOTE — PROGRESS NOTES
Physical Medicine & Rehabilitation  Progress Note    Chief Complaint:  L4/5 osteomyelitis/deconditioning/gait instability    Subjective:  Care conference held today with proposed discharge date on 2/7 to home with Home Health Physical Therapy, Occupational Therapy and Nursing. Family not present. Patient is agreeable to this plan and timing. She states that the Medrol Dosepak that was started today has not yet shown a benefit; but she only took her first pills a few hours prior to rounding today. The pain in her left leg is noted to be stable in comparison to yesterday. Patient has no other specific concerns. Rehabilitation:   Physical Therapy:  OBJECTIVE:  Bed Mobility:  Rolling to Right: Modified Independent   Supine to Sit: Modified Independent  Sit to Supine: Modified Independent      Transfers:  Sit to Stand: Stand By Assistance  Stand to Sit:Stand By Assistance  Stand Pivot:Stand By Assistance      Ambulation:  Stand By Assistance, with increased time for completion, verbal cues for increased heel strike and hamstring activiation  Distance: 150' X 2  Surface: Level Tile  Device:Rolling Walker  Gait Deviations:  Slow Es, Decreased Step Length Bilaterally, Decreased Gait Speed and Decreased Heel Strike Bilaterally     Balance:  Pt completed dynamic standing w/ reaching to wash hands after using bathroom. No LOB and stayed within spinal precautions. Pt attempted to perform foot taps onto a 6\" step w/ BUE support on railings, but was unsuccessful of clearing step height. Pt then attempted to complete foot taps onto half circles on the floor that are 2-3\" in height. Pt only able to tap with each foot twice and had to stop due to fatigue. Pt demonstrated decreased strength in ankle dorsiflexion, hip flexion, and knee flexion. Exercise:  Patient was guided in 1 set(s) 10 reps of exercise to both lower extremities.   Ankle pumps, Glut sets, Quad sets, Heelslides, assisted Short arc quads, assisted Hip abduction/adduction, isometric hip adduction, scapula squeezes, and Seated heel/toe raises. Exercises were completed for increased independence with functional mobility.      Functional Outcome Measures: Not completed     Occupational Therapy:  COGNITION: WNL     ADL:   Grooming: Stand By Assistance. sinkside to wash hands  Toileting: Stand By Assistance. Toilet Transfer: Stand By Assistance. .     BALANCE:  Sitting Balance:  Independent. Standing Balance: Stand By Assistance.       BED MOBILITY:  Not Tested     TRANSFERS:  Sit to Stand:  Stand By Assistance. EOB, standard chair, toilet  Stand to Sit: Stand By Assistance.       FUNCTIONAL MOBILITY:  Assistive Device: Rolling Walker  Assist Level:  Stand By Assistance. Distance: To and from bathroom and To and from therapy apartment      ADDITIONAL ACTIVITIES:  -Pt completed IADL activity of baking cookies and placing dishes away requiring SBA for balance x10 min x3 events with rest breaks for back pain. Pt required no vc's for back precautions, pt attempted to lift two pans at the same time but self corrected and placed one down due to it being too heavy.   -K tape applied with 25% tension to lumbar over scar for desenstization for ease of managing pants.     ASSESSMENT:  Assessment: Pt is making steady progress on IP Rehab. She has progressed to a SBA level with her functional transfers and basic mobility. She has a good understanding of spinal precautions within her daily activities, completing a shower routine and a simple meal prep task this date with SBA and self initiating various squats / modified golfer's lift without cue'ing required. She continues to be limited by pain management at times, but overall this is cont to improve. She requires skilled OT intervention to progress to a MI level with her ADLs, IADLs and to improve strength and endurance as well as overall safety to decrease fall risk and burden of care.    Activity Tolerance:  Patient tolerance of  treatment: good. Discharge Recommendations: Home with Home health OT, Home with assist PRN  Equipment Recommendations: Other: Pt has a shower stool. Review of Systems:  Review of Systems   Constitutional: Positive for activity change. Negative for appetite change, fatigue and fever. HENT: Negative for hearing loss, nosebleeds, sore throat and trouble swallowing. Eyes: Negative for pain. Respiratory: Negative for shortness of breath and wheezing. Cardiovascular: Positive for leg swelling. Gastrointestinal: Negative for blood in stool, constipation, diarrhea and nausea. Endocrine: Negative for cold intolerance. Genitourinary: Negative for dysuria, flank pain, frequency, hematuria and urgency. Musculoskeletal: Positive for back pain and gait problem. Negative for joint swelling. Skin: Negative for pallor. Allergic/Immunologic: Negative. Neurological: Positive for weakness and numbness. Negative for tremors, seizures and headaches. Hematological: Negative. Psychiatric/Behavioral: Negative for decreased concentration and dysphoric mood. The patient is not nervous/anxious. All other systems reviewed and are negative. Objective:  BP (!) 151/85   Pulse 85   Temp 97.7 °F (36.5 °C) (Oral)   Resp 16   Ht 5' (1.524 m)   Wt 196 lb 1.6 oz (89 kg)   LMP 10/27/2015 (Exact Date)   SpO2 96%   BMI 38.30 kg/m²   CURRENT VITALS:  height is 5' (1.524 m) and weight is 196 lb 1.6 oz (89 kg). Her oral temperature is 97.7 °F (36.5 °C). Her blood pressure is 151/85 (abnormal) and her pulse is 85. Her respiration is 16 and oxygen saturation is 96%. Body mass index is 38.3 kg/m².   Temperature Range (24h):Temp: 97.7 °F (36.5 °C) Temp  Av.7 °F (36.5 °C)  Min: 97.7 °F (36.5 °C)  Max: 97.7 °F (36.5 °C)  BP Range (39R): Systolic (79LLG), SAD:925 , Min:119 , OEN:150     Diastolic (55QXU), LILLIANA:88, Min:85, Max:85    Pulse Range (24h): Pulse  Av.5  Min: CRP.  4. On 1/22 ESR was 73 and CRP was 6.2.  5. Labs on 1/31 were 81 and 2.52.  6. Labs on 2/3 were 77 and 2.93  7. [facility] has requested CBC/diff, Creatinine, ESR, CRP, Vanc trough fax Dr. Mayes Bring [phone].  1. Information faxed on 2/4.  3. History of prior L5-S1 lumbar fusion on 10/17/2019 at [facility] with complication of retropulsion of machine prosthetic allograft spacer at L5-S1 compressing the left L5 nerve root. 4. History of removal of machine prosthetic allograft spacer L5-S1 on 11/19/2019 at [facility]. 5. Persistent paresthesias over the bilateral legs following surgery on 11/19/2019.  1. Patient had been using Lidoderm patches and these have been ordered. Patches have not been staying in place and Voltaren gel was substituted which the patient states works very well. 6. Physical deconditioning/Gait instability/Decreased ability for ADLs. 1. PT/OT. 2. TUG 35 seconds on 2/3.  60-69 years:  8.1 seconds; 70-79 years: 9.2 seconds; 80-99 years: 11.3 seconds. 7. Nutrition:  Consultation to dietician for nutritional counseling and recommendations. 1. TotP 6.8, alb 3.4, Vitamin 25OH level of 26 on 1/31/2020.  2. Cholecalciferol 5000 IU daily. 8. Electrolytes. 1. Normal on 2/3.  9. Bladder: No issues. 10. Bowel: Senna, colace, MOM. 1. Glycolax. 11. Rehabilitation nursing will be involved for bowel, bladder, skin, and pain management. Nursing will also provide education and training to patient and family. 12. Prophylaxis:  DVT: Heparin. GI: Protonix. 13.  and case management consultations for coordination of care and discharge planning.     Chronic Problems:  1. Hypertension. 1. Toprol-XL. 2. GERD. 1. Protonix. 3. History of IBS. 1. No current medications. 4. History of diverticulitis. 5. History of ACDF C5-C7 in 2017. 6. COPD. 1. No current medications.     Labs reviewed on:  1. 1/31.  2. 2/1.  3. 2/2.     Infectious Disease:  1. Vancomycin. 2. Meropenem.     Missed Therapy Time:  None     DME:    Discharge Plan:  Estimated Discharge Date: 2/7   Destination: home health  Services at Discharge: [address] [place] [address], Occupational Therapy and Nursing 2x week  Is patient appropriate for an outpatient driving evaluation? no  Equipment at Discharge: None    Greater than 50% of 20 minutes was spent with the patient reviewing the recommendations from today's care conference.     Romaine Dave MD

## 2020-02-04 NOTE — PROGRESS NOTES
[facility]  INPATIENT PHYSICAL THERAPY  DAILY NOTE  [address] [facility] - [id number]    Time In: 0730  Time Out: 08  Timed Code Treatment Minutes: 60 Minutes  Minutes: 60          Date: 2020  Patient Name: Tiffanie Arellano,  Gender:  female        MRN: [id number]  : 1970  (53 y.o.)     Referring Practitioner: Dr. Vilma Delgadillo  Diagnosis: Fusion of Lumbar sacral spine  Additional Pertinent Hx: Pt had past spinal fusion of L1-S1 in 10/19 secondary to lumbar spondylolisthesis. Pt recently addmitted to [facility] for increased low back pain and infection. History of low back pain, HTN, arthritis and Lt sciatica. Prior Level of Function:  Lives With: Family(Currently lives w/ mother)  Type of Home: House  Home Layout: Multi-level  Home Access: Stairs to enter with rails  Entrance Stairs - Number of Steps: 6 stairs in house w/ railing on the Rt side   Entrance Stairs - Rails: Right  Home Equipment: Cane, Rolling walker   Bathroom Shower/Tub: Tub/Shower unit  Bathroom Toilet: Standard  Bathroom Equipment: (shower stool. )    Receives Help From: Family  Ambulation Assistance: Independent  Transfer Assistance: Independent    Restrictions/Precautions:  Restrictions/Precautions: General Precautions, Surgical Protocols, Up as Tolerated  Required Braces or Orthoses  Spinal: Lumbar Corset  Spinal Other: LSO brace on PRN   Position Activity Restriction  Spinal Precautions: No Bending, No Lifting, No Twisting  Spinal Precautions: no lifting > 5 pounds   Other position/activity restrictions: Pt reported cardiologist stated no rolling to Lt    SUBJECTIVE: Pt was asleep in bed upon arrival.  Pt was pleasant and cooperative during session.     PAIN: not rated pain, lower back    OBJECTIVE:  Bed Mobility:  Rolling to Right: Modified Independent   Supine to Sit: Modified Independent  Sit to Supine: Modified Independent     Transfers:  Sit to Stand: Stand By Assistance  Stand to & procurement, Pain Management, Balance Training, Gait Training, Home Exercise Program, Functional Mobility Training, Stair training, Safety Education & Training    Patient Education  Patient Education: Plan of Care, Home Exercise Program, [employer] Mobility, Transfers, Gait, Verbal Exercise Instruction,  - Patient Verbalized Understanding, - Patient Requires Continued Education    Goals:  Patient goals : increase strength in legs and decrease pain  Short term goals  Time Frame for Short term goals: 1 week  Short term goal 1: Pt to perform sit <>stand transfers at [facility] to prepare for transfers. Short term goal 2: Pt to amb >=100' at [facility] w/ RW on indoor/outdoor surfaces for home mobility. Short term goal 3: Pt to peform car transfer at [facility] for community transportation. Short term goal 4: Pt will complete curb step w/ RW at [facility] X 1 to enter/leave home. Long term goals  Time Frame for Long term goals : 3 weeks  Long term goal 1: Pt to perform sit <>stand transfers at [facility] to prepare for transfers. Long term goal 2: Pt to amb >=150' at [facility] w/ RW on indoor/outdoor surfaces for home mobility. Long term goal 3: Pt to peform car transfer at [facility] for community transportation. Long term goal 4: Pt will complete 6 steps w/ railing on Rt, [facility] I X 1 to go upstairs in home. Long term goal 5: Pt will complete TUG at <=20 seconds to show decreased risk of falls. Following session, patient left in safe position with all fall risk precautions in place.

## 2020-02-04 NOTE — PROGRESS NOTES
[facility]  INPATIENT PHYSICAL THERAPY  DAILY NOTE  [address] [facility] - [id number]    Time In: 1330  Time Out: 1400  Timed Code Treatment Minutes: 30 Minutes  Minutes: 30          Date: 2020  Patient Name: Janes Mitchell,  Gender:  female        MRN: [id number]  : 1970  (53 y.o.)     Referring Practitioner: Dr. Carmelita Fung  Diagnosis: Fusion of Lumbar sacral spine  Additional Pertinent Hx: Pt had past spinal fusion of L1-S1 in 10/19 secondary to lumbar spondylolisthesis. Pt recently addmitted to [facility] for increased low back pain and infection. History of low back pain, HTN, arthritis and Lt sciatica. Prior Level of Function:  Lives With: Family(Currently lives w/ mother)  Type of Home: House  Home Layout: Multi-level  Home Access: Stairs to enter with rails  Entrance Stairs - Number of Steps: 6 stairs in house w/ railing on the Rt side   Entrance Stairs - Rails: Right  Home Equipment: Cane, Rolling walker   Bathroom Shower/Tub: Tub/Shower unit  Bathroom Toilet: Standard  Bathroom Equipment: (shower stool. )    Receives Help From: Family  Ambulation Assistance: Independent  Transfer Assistance: Independent    Restrictions/Precautions:  Restrictions/Precautions: General Precautions, Surgical Protocols, Up as Tolerated  Required Braces or Orthoses  Spinal: Lumbar Corset  Spinal Other: LSO brace on PRN   Position Activity Restriction  Spinal Precautions: No Bending, No Lifting, No Twisting  Spinal Precautions: no lifting > 5 pounds   Other position/activity restrictions: Pt reported cardiologist stated no rolling to Lt    SUBJECTIVE: Pt was lying in bed, finishing w/ nursing upon arrival.  Pt was pleasant and cooperative during session. PAIN: Pt reported pain in lower back, but did not rate.     OBJECTIVE:  Bed Mobility:2  Rolling to Right: Modified Independent   Supine to Sit: Modified Independent  Sit to Supine: Modified Independent Transfers:  Sit to Stand: Stand By Assistance, X 1  Stand to Sit:Stand By Assistance, X 1  Stand Pivot:Stand By Assistance, X 1    Ambulation:  Stand By Assistance, X 1  Distance: 150' X 1  Surface: Level Tile  Device:Rolling Walker  Gait Deviations:  Slow Es, Decreased Step Length Bilaterally, Decreased Weight Shift Bilaterally, Decreased Gait Speed, decreased foot clearance, and Decreased Heel Strike Bilaterally    Stairs:  Contact Guard Assistance, X 1, pt utilized a theraband around each thigh to self-assist w/ ascending stairs. Pt used theraband to assist lead leg up onto step w/ ascend while holding on rail w/ unilateral UE support. Increased time was needed to clear foot, nonreciprocal pattern. Hip hiking of lead leg was used instead of therabands to clear foot for descending. Number of Steps: 4  Height: 6\" step with Bilateral Handrails    Exercise:  Patient was guided in 1 set(s) 10 reps of exercise to both lower extremities. Assisted Long arc quads, assisted Seated hip flexion and Seated heel/toe raises. Pt performed wall squats at [facility]-min A while holding onto RW 1 set 6 reps. Exercises were completed for increased independence with functional mobility. Functional Outcome Measures: Not completed       ASSESSMENT:  Assessment: Patient progressing toward established goals. Activity Tolerance:  Patient tolerance of  treatment: good.       Equipment Recommendations:Equipment Needed: No(Pt has RW)  Discharge Recommendations:  Continue to assess pending progress, Patient would benefit from continued therapy after discharge    Plan: Times per week: 5x/week 90 minutes, 1x/week 30 minutes  Current Treatment Recommendations: Strengthening, Transfer Training, Endurance Training, ROM, Equipment Evaluation, Education, & procurement, Pain Management, Balance Training, Gait Training, Home Exercise Program, Functional Mobility Training, Stair training, Safety Education & Training    Patient

## 2020-02-05 PROBLEM — M54.2 NECK PAIN: Status: RESOLVED | Noted: 2018-09-12 | Resolved: 2020-02-05

## 2020-02-05 PROCEDURE — 6360000002 HC RX W HCPCS: Performed by: PHYSICAL MEDICINE & REHABILITATION

## 2020-02-05 PROCEDURE — 97116 GAIT TRAINING THERAPY: CPT

## 2020-02-05 PROCEDURE — 6370000000 HC RX 637 (ALT 250 FOR IP): Performed by: PHYSICAL MEDICINE & REHABILITATION

## 2020-02-05 PROCEDURE — 1180000000 HC REHAB R&B

## 2020-02-05 PROCEDURE — 2580000003 HC RX 258: Performed by: PHYSICAL MEDICINE & REHABILITATION

## 2020-02-05 PROCEDURE — 97535 SELF CARE MNGMENT TRAINING: CPT

## 2020-02-05 PROCEDURE — 97530 THERAPEUTIC ACTIVITIES: CPT

## 2020-02-05 PROCEDURE — 97110 THERAPEUTIC EXERCISES: CPT

## 2020-02-05 PROCEDURE — 97112 NEUROMUSCULAR REEDUCATION: CPT

## 2020-02-05 RX ADMIN — DICLOFENAC 4 G: 10 GEL TOPICAL at 13:53

## 2020-02-05 RX ADMIN — SENNOSIDES 17.2 MG: 8.6 TABLET, FILM COATED ORAL at 18:08

## 2020-02-05 RX ADMIN — DOCUSATE SODIUM 100 MG: 100 CAPSULE, LIQUID FILLED ORAL at 08:08

## 2020-02-05 RX ADMIN — MEROPENEM 1 G: 1 INJECTION, POWDER, FOR SOLUTION INTRAVENOUS at 08:06

## 2020-02-05 RX ADMIN — METHYLPREDNISOLONE 8 MG: 4 TABLET ORAL at 19:57

## 2020-02-05 RX ADMIN — PANTOPRAZOLE SODIUM 40 MG: 40 TABLET, DELAYED RELEASE ORAL at 06:51

## 2020-02-05 RX ADMIN — VANCOMYCIN HYDROCHLORIDE 1250 MG: 5 INJECTION, POWDER, LYOPHILIZED, FOR SOLUTION INTRAVENOUS at 09:46

## 2020-02-05 RX ADMIN — HEPARIN SODIUM 5000 UNITS: 5000 INJECTION INTRAVENOUS; SUBCUTANEOUS at 01:02

## 2020-02-05 RX ADMIN — SODIUM CHLORIDE, PRESERVATIVE FREE 10 ML: 5 INJECTION INTRAVENOUS at 14:01

## 2020-02-05 RX ADMIN — METHYLPREDNISOLONE 4 MG: 4 TABLET ORAL at 06:51

## 2020-02-05 RX ADMIN — OXYCODONE HYDROCHLORIDE AND ACETAMINOPHEN 1 TABLET: 5; 325 TABLET ORAL at 15:21

## 2020-02-05 RX ADMIN — METOPROLOL SUCCINATE 50 MG: 50 TABLET, EXTENDED RELEASE ORAL at 08:05

## 2020-02-05 RX ADMIN — SENNOSIDES 17.2 MG: 8.6 TABLET, FILM COATED ORAL at 08:05

## 2020-02-05 RX ADMIN — METHYLPREDNISOLONE 4 MG: 4 TABLET ORAL at 18:08

## 2020-02-05 RX ADMIN — METHYLPREDNISOLONE 4 MG: 4 TABLET ORAL at 13:54

## 2020-02-05 RX ADMIN — MEROPENEM 1 G: 1 INJECTION, POWDER, FOR SOLUTION INTRAVENOUS at 16:00

## 2020-02-05 RX ADMIN — DOCUSATE SODIUM 100 MG: 100 CAPSULE, LIQUID FILLED ORAL at 19:52

## 2020-02-05 RX ADMIN — SODIUM CHLORIDE, PRESERVATIVE FREE 10 ML: 5 INJECTION INTRAVENOUS at 01:04

## 2020-02-05 RX ADMIN — OXYCODONE HYDROCHLORIDE AND ACETAMINOPHEN 1 TABLET: 5; 325 TABLET ORAL at 06:53

## 2020-02-05 RX ADMIN — OXYCODONE HYDROCHLORIDE AND ACETAMINOPHEN 1 TABLET: 5; 325 TABLET ORAL at 19:52

## 2020-02-05 RX ADMIN — VANCOMYCIN HYDROCHLORIDE 1250 MG: 5 INJECTION, POWDER, LYOPHILIZED, FOR SOLUTION INTRAVENOUS at 20:15

## 2020-02-05 RX ADMIN — OXYCODONE HYDROCHLORIDE AND ACETAMINOPHEN 1 TABLET: 5; 325 TABLET ORAL at 11:01

## 2020-02-05 RX ADMIN — OXYCODONE HYDROCHLORIDE AND ACETAMINOPHEN 1 TABLET: 5; 325 TABLET ORAL at 01:03

## 2020-02-05 RX ADMIN — HEPARIN SODIUM 5000 UNITS: 5000 INJECTION INTRAVENOUS; SUBCUTANEOUS at 13:52

## 2020-02-05 RX ADMIN — VITAMIN D, TAB 1000IU (100/BT) 5000 UNITS: 25 TAB at 08:06

## 2020-02-05 RX ADMIN — MEROPENEM 1 G: 1 INJECTION, POWDER, FOR SOLUTION INTRAVENOUS at 01:03

## 2020-02-05 ASSESSMENT — ENCOUNTER SYMPTOMS
TROUBLE SWALLOWING: 0
CONSTIPATION: 0
DIARRHEA: 0
BLOOD IN STOOL: 0
BACK PAIN: 1
SHORTNESS OF BREATH: 0
ALLERGIC/IMMUNOLOGIC NEGATIVE: 1
SORE THROAT: 0
EYE PAIN: 0
NAUSEA: 0
WHEEZING: 0

## 2020-02-05 ASSESSMENT — PAIN DESCRIPTION - DESCRIPTORS: DESCRIPTORS: ACHING

## 2020-02-05 ASSESSMENT — PAIN DESCRIPTION - PROGRESSION: CLINICAL_PROGRESSION: GRADUALLY IMPROVING

## 2020-02-05 ASSESSMENT — PAIN SCALES - GENERAL
PAINLEVEL_OUTOF10: 0
PAINLEVEL_OUTOF10: 7
PAINLEVEL_OUTOF10: 5
PAINLEVEL_OUTOF10: 7
PAINLEVEL_OUTOF10: 2
PAINLEVEL_OUTOF10: 6
PAINLEVEL_OUTOF10: 6

## 2020-02-05 ASSESSMENT — PAIN DESCRIPTION - LOCATION: LOCATION: BACK

## 2020-02-05 ASSESSMENT — PAIN DESCRIPTION - FREQUENCY: FREQUENCY: CONTINUOUS

## 2020-02-05 ASSESSMENT — PAIN - FUNCTIONAL ASSESSMENT: PAIN_FUNCTIONAL_ASSESSMENT: PREVENTS OR INTERFERES SOME ACTIVE ACTIVITIES AND ADLS

## 2020-02-05 ASSESSMENT — PAIN DESCRIPTION - PAIN TYPE: TYPE: CHRONIC PAIN

## 2020-02-05 ASSESSMENT — PAIN DESCRIPTION - ORIENTATION: ORIENTATION: LOWER

## 2020-02-05 ASSESSMENT — PAIN DESCRIPTION - ONSET: ONSET: ON-GOING

## 2020-02-05 NOTE — PROGRESS NOTES
Tested    Transfers:  Sit to Stand: Stand By Assistance  Stand to Sit:Stand By Assistance, cues for hand placement  Car:Stand By Assistance, with increased time for completion    Ambulation:  Stand By Assistance, with increased time for completion  Distance: 75'x1, 50'x1, 90'x1. 20'x1 on ramp  Surface: Level Tile and Ramp  Device:Rolling Walker  Gait Deviations:  Slow Es, Decreased Step Length Bilaterally, Decreased Gait Speed, Decreased Foot Clearance, Decreased Heel Strike Bilaterally and Unsteady Gait  Cues throughout for hamstring and tibialis anterior activation. Balance:  Dynamic Standing Balance: Stand By Assistance  Pt standing at sink ~1' performing oral hygiene. Stairs:  Stairs:  6\" steps. X 8 using One Handrail and Contact Guard Assistance, Minimal Assistance, with increased time for completion. Pt utilized a theraband around each thigh to self-assist w/ ascending stairs. Pt used theraband to assist lead leg up onto step w/ ascend while holding on rail w/ unilateral UE support. Increased time was needed to clear foot, nonreciprocal pattern. Hip hiking of lead leg was used instead of therabands to clear foot for descending. Neuromuscular Education:   Pt performed stepping activity over fly swatters 10'x2. Contact Guard Assist with verbal cues for hamstring and tibialis anterior activation. Pt came in contact with 6 out of 8 objects. Pt performed foot taps on numbered pods on ground. Pt completed 12x total with UE support on walker. Pt required cues for  hamstring and tibialis anterior activation as well as weight-shifting. Contact Guard Assist.   Activities completed to improve muscle activation to further improve gait. Exercise:  Patient was guided in 1 set(s) 10 reps of exercise to both lower extremities. Glut sets, Long arc quads, Seated hip flexion, Seated hamstring curls with orange theraband, Seated heel/toe raises and Seated isometric hip adduction.   Exercises were completed for increased independence with functional mobility. Functional Outcome Measures: Completed  Timed Up and Go: 28    Normative Reference Values  60-69 years:  8.1 seconds  70-79 years: 9.2 seconds  80-99 years: 11.3 seconds    < 10 seconds is normal, a score of > 14 seconds indicates high fall risk      ASSESSMENT:  Assessment: Patient progressing toward established goals. Pt performed well however shows decrease muscle activation throughout gait patterns. Pt performed improved TUG scores and is improving with gait by increasing step length. Pt gait presents with foot drag throughout. Would benefit from additional skilled PT services to increase strength, endurance, balance and safety for improved functional mobility. Activity Tolerance:  Patient tolerance of  treatment: good. Equipment Recommendations:Equipment Needed: No(Pt has RW)  Discharge Recommendations:  Continue to assess pending progress, Patient would benefit from continued therapy after discharge    Plan: Times per week: 5x/week 90 minutes, 1x/week 30 minutes  Current Treatment Recommendations: Strengthening, Transfer Training, Endurance Training, ROM, Equipment Evaluation, Education, & procurement, Pain Management, Balance Training, Gait Training, Home Exercise Program, Functional Mobility Training, Stair training, Safety Education & Training    Patient Education  Patient Education: Plan of Care, Transfers, Gait, Stairs, Verbal Exercise Instruction    Goals:  Patient goals : increase strength in legs and decrease pain  Short term goals  Time Frame for Short term goals: 1 week  Short term goal 1: Pt to perform sit <>stand transfers at [facility] to prepare for transfers. Short term goal 2: Pt to amb >=100' at [facility] w/ RW on indoor/outdoor surfaces for home mobility. Short term goal 3: Pt to peform car transfer at [facility] for community transportation. Short term goal 4: Pt will complete curb step w/ RW at [facility] X 1 to enter/leave home.   Long term goals  Time Frame for Long term goals : 3 weeks  Long term goal 1: Pt to perform sit <>stand transfers at [facility] to prepare for transfers. Long term goal 2: Pt to amb >=150' at [facility] w/ RW on indoor/outdoor surfaces for home mobility. Long term goal 3: Pt to peform car transfer at [facility] for community transportation. Long term goal 4: Pt will complete 6 steps w/ railing on Rt, Mod I X 1 to go upstairs in home. Long term goal 5: Pt will complete TUG at <=20 seconds to show decreased risk of falls. Following session, patient left in safe position with all fall risk precautions in place. Titi Bailon Cap PTA [id number]

## 2020-02-05 NOTE — PLAN OF CARE
Nutrition Problem: Increased nutrient needs  Intervention: Food and/or Nutrient Delivery: Continue current diet, Continue current ONS  Nutritional Goals: Pt will consume 75% or more of meals during LOS   Problem: Nutrition  Goal: Optimal nutrition therapy  Outcome: Ongoing

## 2020-02-05 NOTE — PROGRESS NOTES
Physical Medicine & Rehabilitation  Progress Note    Chief Complaint:  L4/5 osteomyelitis/deconditioning/gait instability    Subjective: Patient states that the pain over her left thigh and left ankle has completely resolved on day 2 of the Medrol Dosepak with her only residual pain being just at the toes. Occupational Therapy is successfully addressed the dysesthesias over her surgical scar site with kinesiotaping. Additionally she has been approved for home IV antibiotics and it was discussed with her that these have been ordered. Patient is looking forward to discharging to home on Friday. She would like to continue with a prescription of Voltaren gel at discharge. She has no other specific concerns or questions at this time. Rehabilitation:   Physical Therapy:  OBJECTIVE:     Transfers:  Sit to Stand: Stand By Assistance  Stand to Sit:Stand By Assistance     Ambulation:  Stand By Assistance  Distance: [id number] and short distances in gym  Surface: Level Tile  Device:Rolling Walker  Gait Deviations: Forward Flexed Posture, Slow Es, Decreased Gait Speed and poor foot clearance bilaterally, pt with trunk sway with c/o pain, heavy reliance on RW  Had pt amb forward and retro 1 bout each on 10' carpet ramp as well. Pt steady, c/o pain in back with retro walking. Extreme foot drag noted.     Balance:  Dynamic Standing Balance: Stand By Assistance, Contact Guard Assistance  Had pt weave through cones with RW to challenge balance with direction changes, steady no LOB, poor foot clearance throughout. Had pt attempt to tap cone with R and LE- pt states \"oh I can't do that\" pt did attempt- large compensatory movements from UEs and trunk noted, poor foot clearance ~1-2\" off floor then had pt attempt to step over top of fly swatter on floor, pt kicked this item with tati LEs mult times, poor fooot clearance when moving forward and retro, pt cont with large compensatory movements.     Exercise:   Had tp roll a tennis ball forward and back underneath each foot to encourage hip flexor, dorsiflexor, and hs activation and control to keep ball from rolling out or shooting out from under foot. Pt using UEs to assist tati LEs on top of the ball. Pt did lose ball 2-3x on each foot. 4 reps forward and back on  tati LEs completed.     Functional Outcome Measures: Not completed  Timed Up and Go: 28     ASSESSMENT:  Assessment: Patient progressing toward established goals. Activity Tolerance:  Patient tolerance of  treatment: fair. Pt tolerated session fairl;y well. Pt c/o pain and fatigue at end of session. Pt denies any needs from this PTA. Pt demos decreased hip, ankle strength. Pt will benefit from cont PT at this time to imrpove muscle function and stability on feet and to decrease the risk for falls. Equipment Recommendations:Equipment Needed: No(Pt has RW)  Discharge Recommendations:  Continue to assess pending progress, Patient would benefit from continued therapy after discharge     Occupational Therapy:  COGNITION: WFL     ADL:   Feeding: Independent. Grooming: Supervision. standing sinkside for grooming tasks. Bathing: Stand By Assistance. SBA standing to wash bottom. Most of ADLs completed seated. UE sponge bath due to PICC line, LE  bathing in the shower. Upper Extremity Dressing: Supervision. Lower Extremity Dressing: Stand By Assistance. for LE dressing. Toileting: Stand By Assistance. Toilet Transfer: Stand By Assistance. Shower Transfer: Stand By Assistance. to a walk in shower. .     BALANCE:  Sitting Balance:  Independent. Standing Balance: Supervision.       BED MOBILITY:  Rolling to Left: Modified Independent     Rolling to Right: Modified Independent       TRANSFERS:  Sit to Stand:  Supervision. Stand to Sit: Supervision.       FUNCTIONAL MOBILITY:  Assistive Device: Rolling Walker  Assist Level:  Stand By Assistance. Distance:  To and from bathroom and To and from shower room  To and from the therapy gym for discussion of scar mgt tech.       ADDITIONAL ACTIVITIES:  Pt able to retrieve items from a lower shelf and place items in drawer with SBA. 1 cue for safe placement of clothing items on RW.      Discussed scar  for back incision. Pt reports that kinesotape helped for pain relief and sensitivity for clothing rubbing on the scar. Discussed how to apply kinesotape. Continue to educate family on proper tech. Also discussed options for scar mgt including vitamin E oil and silicone gel over the counter. Educated pt on lifetime body mechanics to prevent additional potential injuries. Review of Systems:  Review of Systems   Constitutional: Positive for activity change. Negative for appetite change, fatigue and fever. HENT: Negative for hearing loss, nosebleeds, sore throat and trouble swallowing. Eyes: Negative for pain. Respiratory: Negative for shortness of breath and wheezing. Cardiovascular: Positive for leg swelling. Gastrointestinal: Negative for blood in stool, constipation, diarrhea and nausea. Endocrine: Negative for cold intolerance. Genitourinary: Negative for dysuria, flank pain, frequency, hematuria and urgency. Musculoskeletal: Positive for back pain and gait problem. Negative for joint swelling. Skin: Negative for pallor. Allergic/Immunologic: Negative. Neurological: Positive for weakness and numbness. Negative for tremors, seizures and headaches. Hematological: Negative. Psychiatric/Behavioral: Negative for decreased concentration and dysphoric mood. The patient is not nervous/anxious. All other systems reviewed and are negative. Objective:  /89   Pulse 79   Temp 97.7 °F (36.5 °C) (Oral)   Resp 16   Ht 5' (1.524 m)   Wt 197 lb 11.2 oz (89.7 kg)   LMP 10/27/2015 (Exact Date)   SpO2 98%   BMI 38.61 kg/m²   CURRENT VITALS:  height is 5' (1.524 m) and weight is 197 lb 11.2 oz (89.7 kg).  Her oral with long-term antibiotic treatment as below. 2. Wound care. 3. Pain control. 4. Oxycodone. 5. Morphine. Discontinued 2/3.  6. Flexeril. 7. Patient was started on a Medrol Dosepak on 2/5 for worsening pain in her left leg that was neuropathic in nature. Patient notes significant improvement in her pain complaint; specifically absence of pain over the left thigh and left lateral calf. The only residual pain at this time after the second dose is it in her toes. 8. Patient is endorsing dysesthesias at the site of the lumbar surgical scar for which Occupational Therapy has been addressing this with kinesiotaping which the patient endorses is helping significantly to reduce the sensitivity to having clothing over the surgical site. 2. Long-term antibiotic treatment. 1. Vancomycin every 12 hours through at least 3/2/2020.  1. Pharmacy to dose for vancomycin trough levels. 2. Meropenem every 8 hours through at least 3/2/2020.  3. Weekly CBC/differential, ESR, and CRP. 4. On 1/22 ESR was 73 and CRP was 6.2.  5. Labs on 1/31 were 81 and 2.52.  6. Labs on 2/3 were 77 and 2.93  7. [facility] clinic has requested CBC/diff, Creatinine, ESR, CRP, Vanc trough fax Dr. Cara Cueva [phone].  1. Information faxed on 2/4.  3. History of prior L5-S1 lumbar fusion on 10/17/2019 at [facility] with complication of retropulsion of machine prosthetic allograft spacer at L5-S1 compressing the left L5 nerve root. 4. History of removal of machine prosthetic allograft spacer L5-S1 on 11/19/2019 at [facility]. 5. Persistent paresthesias over the bilateral legs following surgery on 11/19/2019.  1. Patient had been using Lidoderm patches and these have been ordered. Patches have not been staying in place and Voltaren gel was substituted which the patient states works very well. 6. Physical deconditioning/Gait instability/Decreased ability for ADLs. 1. PT/OT. 2. TUG 35 seconds on 2/3.   Improved to 28 seconds on 2/5.  60-69 years:  8.1 seconds; 70-79 years: 9.2 seconds; 80-99 years: 11.3 seconds. 7. Nutrition:  Consultation to dietician for nutritional counseling and recommendations. 1. TotP 6.8, alb 3.4, Vitamin 25OH level of 26 on 1/31/2020.  2. Cholecalciferol 5000 IU daily. 8. Electrolytes. 1. Normal on 2/3.  9. Bladder: No issues. 10. Bowel: Senna, colace, MOM. 1. Glycolax. 11. Rehabilitation nursing will be involved for bowel, bladder, skin, and pain management. Nursing will also provide education and training to patient and family. 12. Prophylaxis:  DVT: Heparin. GI: Protonix. 13.  and case management consultations for coordination of care and discharge planning.     Chronic Problems:  1. Hypertension. 1. Toprol-XL. 2. GERD. 1. Protonix. 3. History of IBS. 1. No current medications. 4. History of diverticulitis. 5. History of ACDF C5-C7 in 2017. 6. COPD. 1. No current medications.     Labs reviewed on:  1. 1/31.  2. 2/1.  3. 2/2.     Infectious Disease:  1. Vancomycin. 2. Meropenem. Missed Therapy Time:  None     DME:    Discharge Plan:  Estimated Discharge Date: 2/7   Destination: home health  Services at Discharge: [address] [place] [address], Occupational Therapy and Nursing 2x week  Is patient appropriate for an outpatient driving evaluation? no  Equipment at Discharge: None    Greater than 50% of 20 minutes was spent with the patient discussing her current dose pack as well as Voltaren gel. The patient has been approved for home IV antibiotics and these have been ordered today.     Jennifer Graham MD

## 2020-02-05 NOTE — PROGRESS NOTES
[address] [address]  Inpatient Rehabilitation  Occupational Therapy  Progress Note  Time:  Time In: 3751  Time Out: 0254  Timed Code Treatment Minutes: 90 Minutes  Minutes: 90     Date: 2020  Patient Name: Betty Reyes,   Gender: female      Room: -73/073-A  MRN: [id number]  : 1970  (53 y.o.)  Referring Practitioner: Dr. Lupe Dubon  Diagnosis: lumbar spinal fusion  Additional Pertinent Hx: PT admitted to the rehab unit from [facility] after having been found to have an infection in lumbar area. Pt had a prior fusion surgery and spacer placed 10-19. Restrictions/Precautions:  Restrictions/Precautions: General Precautions, Surgical Protocols, Up as Tolerated  Required Braces or Orthoses  Spinal: Lumbar Corset  Spinal Other: LSO brace on PRN   Position Activity Restriction  Spinal Precautions: No Bending, No Lifting, No Twisting  Spinal Precautions: no lifting > 5 pounds   Other position/activity restrictions: Pt reported cardiologist stated no rolling to Lt    SUBJECTIVE: cooperative. Reviewed progress and updated for continued education for homegoing. PAIN: 5/10: in back,     COGNITION: WFL    ADL:   Feeding: Independent. Grooming: Supervision. standing sinkside for grooming tasks. Bathing: Stand By Assistance. SBA standing to wash bottom. Most of ADLs completed seated. UE sponge bath due to PICC line, LE  bathing in the shower. Upper Extremity Dressing: Supervision. Lower Extremity Dressing: Stand By Assistance. for LE dressing. Toileting: Stand By Assistance. Toilet Transfer: Stand By Assistance. Shower Transfer: Stand By Assistance. to a walk in shower. Anu De La Cruz BALANCE:  Sitting Balance:  Independent. Standing Balance: Supervision. BED MOBILITY:  Rolling to Left: Modified Independent     Rolling to Right: Modified Independent      TRANSFERS:  Sit to Stand:  Supervision. Stand to Sit: Supervision.       FUNCTIONAL MOBILITY:  Assistive stool.   Plan: Times per week: 5xs week x90 min, 1 x week x 30 min  Current Treatment Recommendations: Safety Education & Training, Balance Training, Patient/Caregiver Education & Training, Self-Care / ADL, Functional Mobility Training, Equipment Evaluation, Education, & procurement, Home Management Training, Endurance Training, Pain Management    Patient Education  Patient Education: ADL's and IADL's, scar management. Goals  Short term goals  Time Frame for Short term goals: 1 week   Short term goal 1: pt will complete ADL routine including gathering items needed for dressing tasks with S and no cues for adapted tech to increase independence in self care tasks MET REVISE  Short term goal 2: PT will tolerate standing and dynamic reaching tasks x 5 min with S and no cues for back safety to increase endurance for sinkside ADL routien MET REVISE  Short term goal 3: PT will ambulate to and from the BR as well as around obstacles with S and AE to increase independence in home mobiliyt to and from the BR MET REVISE  Short term goal 4: Pt will complete 2 step homemaking tasks with S and RW with no > min cues for proper body mechanics to increase ability to retrieve a snack MET REVISE  Long term goals  Time Frame for Long term goals : 2 weeks  Long term goal 1: Pt will complete ADL routine with MI and no cues for proper body mechanics, back safety and LHAE PRN to increase independence in self care tasks NOT MET CONTINUE  Long term goal 2: PT will complete 2 step homemaking tasks with no cues for safe tech and proper body mechanics/ work simplification tech NOT MET CONTINUE    Following session, patient left in safe position with all fall risk precautions in place.

## 2020-02-05 NOTE — DISCHARGE INSTR - COC
Isolation        Patient Infection Status     Infection Onset Added Last Indicated Last Indicated By Review Planned Expiration Resolved Resolved By    None active    Resolved    C-diff Rule Out  12/05/19 12/05/19 GI Bacterial Pathogens By PCR (Ordered)   01/31/20 Shaw Montesinos, RN          Nurse Assessment:  Last Vital Signs: /89   Pulse 79   Temp 97.7 °F (36.5 °C) (Oral)   Resp 16   Ht 5' (1.524 m)   Wt 197 lb 11.2 oz (89.7 kg)   LMP 10/27/2015 (Exact Date)   SpO2 98%   BMI 38.61 kg/m²     Last documented pain score (0-10 scale): Pain Level: 5  Last Weight:   Wt Readings from Last 1 Encounters:   02/05/20 197 lb 11.2 oz (89.7 kg)     Mental Status:  oriented and alert    IV Access:  - PICC - site  L Upper Arm, insertion date: 01/29/2020    Nursing Mobility/ADLs:  Walking   Independent  Transfer  Independent  Bathing  Assisted  Dressing  Independent  Toileting  Independent  Feeding  [address] Delivery   whole    Wound Care Documentation and Therapy:        Elimination:  Continence:   · Bowel: Yes  · Bladder: Yes  Urinary Catheter: None   Colostomy/Ileostomy/Ileal Conduit: No       Date of Last BM:02/06/2020    Intake/Output Summary (Last 24 hours) at 2/5/2020 1208  Last data filed at 2/5/2020 0857  Gross per 24 hour   Intake 1160 ml   Output --   Net 1160 ml     I/O last 3 completed shifts: In: 1110 [P.O.:700; I.V.:410]  Out: -     Safety Concerns: At Risk for Falls    Impairments/Disabilities:      None    Nutrition Therapy:  Current Nutrition Therapy:   - Oral Diet:  General    Routes of Feeding: Oral  Liquids: Thin Liquids  Daily Fluid Restriction: no  Last Modified Barium Swallow with Video (Video Swallowing Test): not done    Treatments at the Time of Hospital Discharge:   Respiratory Treatments: none  Oxygen Therapy:  is not on home oxygen therapy.   Ventilator:    - No ventilator support    Rehab Therapies: Physical Therapy and Occupational

## 2020-02-05 NOTE — PROGRESS NOTES
[facility]  Recreational Therapy  Daily Note  [address]    Time Spent with Patient: 10 minutes    Date:  2/5/2020       Patient Name: Pamela Norton      MRN: [id number]      YOB: 1970 (53 y.o.)       Gender: female  Diagnosis: lumbar spinal fusion  Referring Practitioner: Dr. Te Thornton    RESTRICTIONS/PRECAUTIONS:  Restrictions/Precautions: General Precautions, Surgical Protocols, Up as Tolerated  Vision: Within Functional Limits  Hearing: Within functional limits    PAIN: 0-no c/o pain     SUBJECTIVE:  You are always trying to give me something to do     OBJECTIVE:  Pt agreed to cut out hearts for megan méndez next week in her free time-affect bright-good sense of humor-         Patient Education  New Education Provided: Importance of Leisure,     Electronically signed by: CHECO Nuñez  Date: 2/5/2020

## 2020-02-05 NOTE — PROGRESS NOTES
Change:  ,  -3.9% weight loss in 6 months per EMR; weight loss not considered clinically significant  · Ideal Body Wt: 100 lb (45.4 kg),  · BMI Classification: BMI 35.0 - 39.9 Obese Class II    Nutrition Interventions:   Continue current diet, Continue current ONS  Continued Inpatient Monitoring, Coordination of Care, Education Initiated(encouraged intake at best effort and use of Ensure high protein and daily intake of yogurt for probiotic benefit; notified patient  of patient's concerns with meal tray accuracy)    Nutrition Evaluation:   · Evaluation: Progressing toward goals   · Goals: Pt will consume 75% or more of meals during LOS    · Monitoring: Nutrition Progression, Meal Intake, Supplement Intake, Diet Tolerance, Skin Integrity, Wound Healing, Weight, Pertinent Labs, Monitor Bowel Function      Electronically signed by Shaunna Rice RD, LD on 2/5/20 at 9:58 AM    Contact Number: [phone]

## 2020-02-05 NOTE — PROGRESS NOTES
[address] [address]  Recreational Therapy  Daily Note  [address]    Time Spent with Patient: 0 minutes    Date:  2/5/2020       Patient Name: Emiliano Romo      MRN: [id number]      YOB: 1970 (53 y.o.)       Gender: female  Diagnosis: lumbar spinal fusion  Referring Practitioner: Dr. Antoine Petersen    Patient enjoyed spending time with our pet therapy dogs.  Pt enjoyed petting our pet therapy dog May this afternoon while working with P.T. in gym-affect bright and social     Electronically signed by: Luis Leija, CTRS  Date: 2/5/2020

## 2020-02-05 NOTE — PLAN OF CARE
Problem: DISCHARGE BARRIERS  Goal: Patient's continuum of care needs are met  Note:   SW met with patient to further discuss discharge plans. Patient plans to discharge to ex-'s, [facility], home as her mother is currently sick. Address is [address] [place] [address], [address] [place] [address], [facility]. Phone number is [phone]. Information provided to home health care and home infusion. Once weekly (Monday) CBC/diff, Creatinine, ESR, Vanc trough faxed to Dr. Britney Amaya at [phone]. Dr. Daron Dennis to dose Vanc. Office number: [phone]. Dr. Britney Amaya to follow for IV antibiotics/labs and wound management. Dr. Nimisha Jara to follow for other nursing needs and therapy. SW to follow and maintain involvement in discharge planning.

## 2020-02-06 PROCEDURE — 97110 THERAPEUTIC EXERCISES: CPT

## 2020-02-06 PROCEDURE — 97116 GAIT TRAINING THERAPY: CPT

## 2020-02-06 PROCEDURE — 6360000002 HC RX W HCPCS: Performed by: PHYSICAL MEDICINE & REHABILITATION

## 2020-02-06 PROCEDURE — 97530 THERAPEUTIC ACTIVITIES: CPT

## 2020-02-06 PROCEDURE — 6370000000 HC RX 637 (ALT 250 FOR IP): Performed by: PHYSICAL MEDICINE & REHABILITATION

## 2020-02-06 PROCEDURE — 1180000000 HC REHAB R&B

## 2020-02-06 PROCEDURE — 97535 SELF CARE MNGMENT TRAINING: CPT

## 2020-02-06 PROCEDURE — 2580000003 HC RX 258: Performed by: PHYSICAL MEDICINE & REHABILITATION

## 2020-02-06 RX ORDER — ONDANSETRON 2 MG/ML
4 INJECTION INTRAMUSCULAR; INTRAVENOUS EVERY 6 HOURS PRN
Status: DISCONTINUED | OUTPATIENT
Start: 2020-02-06 | End: 2020-02-07 | Stop reason: HOSPADM

## 2020-02-06 RX ADMIN — METOPROLOL SUCCINATE 50 MG: 50 TABLET, EXTENDED RELEASE ORAL at 08:44

## 2020-02-06 RX ADMIN — METHYLPREDNISOLONE 4 MG: 4 TABLET ORAL at 17:54

## 2020-02-06 RX ADMIN — HEPARIN SODIUM 5000 UNITS: 5000 INJECTION INTRAVENOUS; SUBCUTANEOUS at 12:59

## 2020-02-06 RX ADMIN — OXYCODONE HYDROCHLORIDE AND ACETAMINOPHEN 1 TABLET: 5; 325 TABLET ORAL at 17:55

## 2020-02-06 RX ADMIN — VANCOMYCIN HYDROCHLORIDE 1250 MG: 5 INJECTION, POWDER, LYOPHILIZED, FOR SOLUTION INTRAVENOUS at 21:24

## 2020-02-06 RX ADMIN — VITAMIN D, TAB 1000IU (100/BT) 5000 UNITS: 25 TAB at 08:44

## 2020-02-06 RX ADMIN — METHYLPREDNISOLONE 4 MG: 4 TABLET ORAL at 12:59

## 2020-02-06 RX ADMIN — SENNOSIDES 17.2 MG: 8.6 TABLET, FILM COATED ORAL at 17:54

## 2020-02-06 RX ADMIN — OXYCODONE HYDROCHLORIDE AND ACETAMINOPHEN 1 TABLET: 5; 325 TABLET ORAL at 21:29

## 2020-02-06 RX ADMIN — MEROPENEM 1 G: 1 INJECTION, POWDER, FOR SOLUTION INTRAVENOUS at 00:24

## 2020-02-06 RX ADMIN — METHYLPREDNISOLONE 4 MG: 4 TABLET ORAL at 21:29

## 2020-02-06 RX ADMIN — DOCUSATE SODIUM 100 MG: 100 CAPSULE, LIQUID FILLED ORAL at 21:35

## 2020-02-06 RX ADMIN — DOCUSATE SODIUM 100 MG: 100 CAPSULE, LIQUID FILLED ORAL at 08:44

## 2020-02-06 RX ADMIN — SODIUM CHLORIDE, PRESERVATIVE FREE 10 ML: 5 INJECTION INTRAVENOUS at 13:05

## 2020-02-06 RX ADMIN — SODIUM CHLORIDE, PRESERVATIVE FREE 10 ML: 5 INJECTION INTRAVENOUS at 03:11

## 2020-02-06 RX ADMIN — SENNOSIDES 17.2 MG: 8.6 TABLET, FILM COATED ORAL at 08:44

## 2020-02-06 RX ADMIN — METHYLPREDNISOLONE 4 MG: 4 TABLET ORAL at 05:23

## 2020-02-06 RX ADMIN — OXYCODONE HYDROCHLORIDE AND ACETAMINOPHEN 1 TABLET: 5; 325 TABLET ORAL at 05:23

## 2020-02-06 RX ADMIN — VANCOMYCIN HYDROCHLORIDE 1250 MG: 5 INJECTION, POWDER, LYOPHILIZED, FOR SOLUTION INTRAVENOUS at 09:00

## 2020-02-06 RX ADMIN — OXYCODONE HYDROCHLORIDE AND ACETAMINOPHEN 1 TABLET: 5; 325 TABLET ORAL at 00:24

## 2020-02-06 RX ADMIN — MEROPENEM 1 G: 1 INJECTION, POWDER, FOR SOLUTION INTRAVENOUS at 17:51

## 2020-02-06 RX ADMIN — ONDANSETRON 4 MG: 2 INJECTION INTRAMUSCULAR; INTRAVENOUS at 15:07

## 2020-02-06 RX ADMIN — MEROPENEM 1 G: 1 INJECTION, POWDER, FOR SOLUTION INTRAVENOUS at 08:50

## 2020-02-06 RX ADMIN — HEPARIN SODIUM 5000 UNITS: 5000 INJECTION INTRAVENOUS; SUBCUTANEOUS at 00:18

## 2020-02-06 RX ADMIN — OXYCODONE HYDROCHLORIDE AND ACETAMINOPHEN 1 TABLET: 5; 325 TABLET ORAL at 10:27

## 2020-02-06 RX ADMIN — PANTOPRAZOLE SODIUM 40 MG: 40 TABLET, DELAYED RELEASE ORAL at 05:23

## 2020-02-06 RX ADMIN — DICLOFENAC 4 G: 10 GEL TOPICAL at 10:29

## 2020-02-06 ASSESSMENT — PAIN SCALES - GENERAL
PAINLEVEL_OUTOF10: 6
PAINLEVEL_OUTOF10: 0
PAINLEVEL_OUTOF10: 6
PAINLEVEL_OUTOF10: 5
PAINLEVEL_OUTOF10: 8
PAINLEVEL_OUTOF10: 0
PAINLEVEL_OUTOF10: 4

## 2020-02-06 ASSESSMENT — PAIN DESCRIPTION - PROGRESSION
CLINICAL_PROGRESSION: GRADUALLY IMPROVING
CLINICAL_PROGRESSION: GRADUALLY IMPROVING

## 2020-02-06 ASSESSMENT — ENCOUNTER SYMPTOMS
EYE PAIN: 0
BLOOD IN STOOL: 0
ALLERGIC/IMMUNOLOGIC NEGATIVE: 1
SHORTNESS OF BREATH: 0
TROUBLE SWALLOWING: 0
CONSTIPATION: 0
SORE THROAT: 0
DIARRHEA: 0
NAUSEA: 1
WHEEZING: 0
BACK PAIN: 1

## 2020-02-06 NOTE — PROGRESS NOTES
Physical Medicine & Rehabilitation  Progress Note    Chief Complaint:  L4/5 osteomyelitis/deconditioning/gait instability    Subjective: The relief that she has gotten from the C/ [facility] has remained consistent with only her residual discomfort being in the toes of the left foot at this time. Later in the day she did have nausea with a small emesis. Patient's preferred pharmacy was documented in preparation for her discharge tomorrow. Otherwise patient has no specific concerns or questions at this time. Rehabilitation:   Physical Therapy:  OBJECTIVE:     Transfers:  Sit to Stand: Modified Independent  Stand to Sit:Modified Independent     Ambulation:  Modified Independent  Distance: 130 ft. X2, multiple shorter distances around therapy gym   Surface: Level Tile and ramp  Device:Rolling Walker  Gait Deviations:  Decreased Step Length Bilaterally, Decreased Gait Speed, Decreased Heel Strike Bilaterally, Narrow Base of Support, Mild Path Deviations, decreased foot clearance bilaterally but dragging feet during first bout, slight toe drag noted on 2nd bout after therapist noted to patient of 1st bouts foot clearance looking better, increased UE reliance through walker.      -Using RW, patient attempting to step over fly swatters placed on ground, increased difficulty with clearing foot over object and appeared to exaggerate throughout activity. Patient reports feeling like she is \"over-thinking\" activity.   -Inside Parallel bars, patient stepped through gait ladder to promote/force bilateral foot clearance, increased time to clear foot and toes over 2\" bars on ladder but was able to \"extra\" effort noted, verbal cues for correct muscle facilitation to clear foot and ankle. -Using RW, patient tapped numbered pods on floor to again work on promoting improved foot clearance, still demonstrating increased difficulty with picking up toes but was able to eventually tap the top of pod with extra effort and time. -Patient walked forward up/down 10 ft chapel ramp using RW to work on facilitating bilateral foot clearance, still demonstrating toe drag at times but did show min improvements with verbal cues for foot clearance.      Exercise:  Patient was guided in 1 set(s) 10 reps of exercise to both lower extremities. Mini squats and patient initially completed wall squats but demonstrating increased muscle compensations to complete activity, had patient then place both hands on walker very lightly and complete smaller range squat with light support on walker to better faciliate LE muscles/prevent muscle compensation and also less reliance on Armaan UEs. Exercises were completed for increased independence with functional mobility.     Functional Outcome Measures: Not completed     ASSESSMENT:  Assessment: Patient progressing toward established goals. Activity Tolerance:  Patient tolerance of  treatment: fair. Limited due to anxiety/pain     Equipment Recommendations:Equipment Needed: No(Pt has RW)  Discharge Recommendations: Home with assist PRN, Home with Home Health  Plan: Times per week: 5x/week 90 minutes, 1x/week 30 minutes  Current Treatment Recommendations: Strengthening, Transfer Training, Endurance Training, ROM, Equipment Evaluation, Education, & procurement, Pain Management, Balance Training, Gait Training, Home Exercise Program, Functional Mobility Training, Stair training, Safety Education & Training     Occupational Therapy:  SUBJECTIVE: Pt finishing in bathroom with nurse upon arrival, agreeable to OT. Pt's PICC line being used throughout tx session, shower was changed to sponge bath.     PAIN: 3/10     COGNITION: WNL     ADL:   Grooming: Modified Independent.  hair and oral care, hair washed in rec room sink due to PICC line being used. Toileting: Independent   Bathing: Modified Independent.   Pt seated EOB, performed sponge bath  Upper Extremity Dressing: Independent  Lower Extremity Dressing: Modified Independent  No vc's required for back precautions      BALANCE:  Sitting Balance:  Independent. Standing Balance: Modified Independent .       BED MOBILITY:  Not Tested     TRANSFERS:  Sit to Stand:  Modified Independent. Stand to Sit: Modified Independent . EOB, w/c     FUNCTIONAL MOBILITY:  Assistive Device: Rolling Walker  Assist Level:  Modified Independent. Distance: To and from bathroom and To and from rec room      ADDITIONAL ACTIVITIES:  -Pt completed B UE exercises in all joints and all planes with min resistance band x10 reps x2 sets. Activity completed to increase strength and endurance of B UE for independence with functional transfers.   -K tape applied to scar with 25% tension for desensitization for ease of donning pants.      ASSESSMENT:  Activity Tolerance:  Patient tolerance of  treatment: good. Discharge Recommendations: Home with Home health OT, Home with assist PRN  Equipment Recommendations: Other: Pt has a shower stool. Review of Systems:  Review of Systems   Constitutional: Positive for activity change. Negative for appetite change, fatigue and fever. HENT: Negative for hearing loss, nosebleeds, sore throat and trouble swallowing. Eyes: Negative for pain. Respiratory: Negative for shortness of breath and wheezing. Cardiovascular: Positive for leg swelling. Gastrointestinal: Positive for nausea and vomiting. Negative for blood in stool, constipation and diarrhea. Endocrine: Negative for cold intolerance. Genitourinary: Negative for dysuria, flank pain, frequency, hematuria and urgency. Musculoskeletal: Positive for back pain and gait problem. Negative for joint swelling. Skin: Negative for pallor. Allergic/Immunologic: Negative. Neurological: Positive for weakness and numbness. Negative for tremors, seizures and headaches. Hematological: Negative. Psychiatric/Behavioral: Negative for decreased concentration and dysphoric mood.  The patient is not 2/1/2020 1/31/2020 12/5/2019   BUN 7 - 22 mg/dL 12 13 15 11 20   Cr 0.4 - 1.2 mg/dL 0.5 0.5 0.5 0.5 0.9   K 3.5 - 5.2 meq/L 4.4 4.2 4.3 4.2 3.7   Na 135 - 145 meq/L 140 135 139 140 142      Recent Labs     02/03/20  0915 02/02/20  1420 02/01/20  0630   WBC 6.3 7.6 7.0   HGB 10.2* 10.1* 9.6*   HCT 34.4* 33.3* 31.7*   MCV 83.5 83.7 83.4    358 314      Results for Yeni Angelo (MRN [id number]) as of 2/4/2020 00:15   Ref. Range 1/31/2020 06:00 2/3/2020 09:15   Sed Rate Latest Ref Range: 0 - 20 mm/hr 81 (H) 77 (H)   CRP Latest Ref Range: 0.00 - 1.00 mg/dl 2.52 (H) 2.93 (H)     Results for Yeni Angelo (MRN [id number]) as of 2/4/2020 00:15   Ref. Range 2/1/2020 14:45 2/3/2020 09:15   Vancomycin Tr Latest Ref Range: 5.0 - 15.0 ug/mL 23.4 (HH) 18.5 (H)     Impression:  1. Postsurgical wound bed infection at L4-S1 with L4-L5 osteomyelitis noted on CT imaging of the lumbar spine with contrast on 1/22/2019.  2. Long-term antibiotic treatment  3. History of prior L5-S1 lumbar fusion on 10/17/2019 at [facility] with complication of retropulsion of machine prosthetic allograft spacer at L5-S1 compressing the left L5 nerve root. 4. History of removal of machine prosthetic allograft spacer L5-S1 on 11/19/2019 at [facility]. 5. Persistent paresthesias over the bilateral legs following surgery on 11/19/2019. 6. Physical deconditioning. 7. Gait instability. 8. Decreased ability for ADLs. 9. Hypertension. 10. GERD. 11. History of IBS. 12. History of diverticulitis. 13. History of ACDF C5-C7 in 2017. 14. COPD. 15. Dysesthesias at site of lumbar surgical scar. Plan:     Medical management: Per primary team and Dr. Francia ceballos.     Consultants:  Internal Medicine, Physical Medicine     Narcotic usage:  Percocet Last 24 hour usage 25mg. Stable. Morphine Last 24 hour usage none. Discontinued 2/3.   Last BM:  Stool Amount: Large (02/04/20 1335)    FUNCTIONAL OUTCOMES TOOLS:    HAWK -      Tinetti -      TUG - Timed Up and Go: 28    Acute/Rehabilitation Problems:  1. Postsurgical wound bed infection at L4-S1 with L4-L5 osteomyelitis noted on CT imaging of the lumbar spine with contrast on 1/22/2019.  1. Addressed with long-term antibiotic treatment as below. 2. Wound care. 3. Pain control. 4. Oxycodone. 5. Morphine. Discontinued 2/3.  6. Flexeril. 7. Patient was started on a Medrol Dosepak on 2/5 for worsening pain in her left leg that was neuropathic in nature. Patient notes significant improvement in her pain complaint; specifically absence of pain over the left thigh and left lateral calf. The only residual pain at this time after the second dose is it in her toes. 8. Patient is endorsing dysesthesias at the site of the lumbar surgical scar for which Occupational Therapy has been addressing this with kinesiotaping which the patient endorses is helping significantly to reduce the sensitivity to having clothing over the surgical site. 2. Long-term antibiotic treatment. 1. Vancomycin every 12 hours through at least 3/2/2020.  1. Pharmacy to dose for vancomycin trough levels. 2. Meropenem every 8 hours through at least 3/2/2020.  3. Weekly CBC/differential, ESR, and CRP. 4. On 1/22 ESR was 73 and CRP was 6.2.  5. Labs on 1/31 were 81 and 2.52.  6. Labs on 2/3 were 77 and 2.93  7. [facility] has requested CBC/diff, Creatinine, ESR, CRP, Vanc trough fax Dr. Homero Upton [phone].  1. Information faxed on 2/4.  3. History of prior L5-S1 lumbar fusion on 10/17/2019 at [facility] with complication of retropulsion of machine prosthetic allograft spacer at L5-S1 compressing the left L5 nerve root. 4. History of removal of machine prosthetic allograft spacer L5-S1 on 11/19/2019 at [facility]. 5. Persistent paresthesias over the bilateral legs following surgery on 11/19/2019.  1. Patient had been using Lidoderm patches and these have been ordered.   Patches have not been staying in place and Voltaren gel was substituted which the patient states works very well. 6. Physical deconditioning/Gait instability/Decreased ability for ADLs. 1. PT/OT. 2. TUG 35 seconds on 2/3. Improved to 28 seconds on 2/5.  60-69 years:  8.1 seconds; 70-79 years: 9.2 seconds; 80-99 years: 11.3 seconds. 7. Nutrition:  Consultation to dietician for nutritional counseling and recommendations. 1. TotP 6.8, alb 3.4, Vitamin 25OH level of 26 on 1/31/2020.  2. Cholecalciferol 5000 IU daily. 8. Electrolytes. 1. Normal on 2/3.  9. Bladder: No issues. 10. Bowel: Senna, colace, MOM. 1. Glycolax. 11. Rehabilitation nursing will be involved for bowel, bladder, skin, and pain management. Nursing will also provide education and training to patient and family. 12. Prophylaxis:  DVT: Heparin. GI: Protonix. 13.  and case management consultations for coordination of care and discharge planning.     Chronic Problems:  1. Hypertension. 1. Toprol-XL. 2. GERD. 1. Protonix. 3. History of IBS. 1. No current medications. 4. History of diverticulitis. 5. History of ACDF C5-C7 in 2017. 6. COPD. 1. No current medications.     Labs reviewed on:  1. 1/31.  2. 2/1.  3. 2/2.     Infectious Disease:  1. Vancomycin. 2. Meropenem. Missed Therapy Time:  None     DME:    Discharge Plan:  Estimated Discharge Date: 2/7   Destination: home health  Services at Discharge: [address] [place] [address], Occupational Therapy and Nursing 2x week  Is patient appropriate for an outpatient driving evaluation? no  Equipment at Discharge: None    Greater than 50% of 20 minutes was spent with the patient discussing discharge medications and labs.     Vick Allen MD

## 2020-02-06 NOTE — PROGRESS NOTES
INTERNAL MEDICINE Progress Note  2/6/2020 6:11 PM  Subjective:   Admit Date: 1/30/2020  PCP: Tatum Keys MD  Interval History:   mild back pain    Objective:   Vitals: /77   Pulse 79   Temp 98.1 °F (36.7 °C) (Oral)   Resp 18   Ht 5' (1.524 m)   Wt 197 lb 11.2 oz (89.7 kg)   LMP 10/27/2015 (Exact Date)   SpO2 98%   BMI 38.61 kg/m²   General appearance: alert and cooperative with exam  HEENT:  atraumatic  Neck:  no JVD  Lungs: clear to auscultation bilaterally  Heart: S1, S2 normal  Abdomen: soft, non-tender; bowel sounds normal; no masses,  no organomegaly  Extremities: no edema,   Neurologic:  Alert, oriented, thought content appropriate      Medications:   Scheduled Meds:   methylPREDNISolone  4 mg Oral QAM AC    methylPREDNISolone  4 mg Oral Lunch    methylPREDNISolone  4 mg Oral Nightly    vancomycin  1,250 mg Intravenous Q12H    Vitamin D  5,000 Units Oral Daily    meropenem  1 g Intravenous Q8H    polyethylene glycol  17 g Oral Daily    senna  2 tablet Oral BID    metoprolol succinate  50 mg Oral Daily    sodium chloride flush  10 mL Intravenous Q12H    pantoprazole  40 mg Oral QAM AC    docusate sodium  100 mg Oral BID    heparin (porcine)  5,000 Units Subcutaneous Q12H    vancomycin (VANCOCIN) intermittent dosing (placeholder)   Other RX Placeholder     Continuous Infusions:    Lab Results:   CBC:   No results for input(s): WBC, HGB, PLT in the last 72 hours. BMP:    No results for input(s): NA, K, CL, CO2, BUN, CREATININE, GLUCOSE in the last 72 hours. Hepatic:   No results for input(s): AST, ALT, ALB, BILITOT, ALKPHOS in the last 72 hours.   Magnesium:    Lab Results   Component Value Date    MG 2.0 10/24/2019     HgBA1c:    Lab Results   Component Value Date    LABA1C 5.6 02/21/2019     TSH:    Lab Results   Component Value Date    TSH 2.080 02/01/2020     FOLATE:    Lab Results   Component Value Date    FOLATE 4.9 02/01/2020     IRON:    Lab Results   Component Value Date IRON 30 01/31/2020     FERRITIN:    Lab Results   Component Value Date    FERRITIN 129 01/31/2020       Assessment and Plan:   1. L4/5 osteomyelitis  2. H/o L4/5 decompression and PSF  3. HTN  4. Anemia -post op  5. Non sust VT, negative stress test  6. Mild protein Eliazar malnutrition       cont IV Merrem, vanc IV  Cont BB. Analgesics.     Aureliano Edwards MD

## 2020-02-06 NOTE — PROGRESS NOTES
Distance: To and from bathroom and To and from rec room     ADDITIONAL ACTIVITIES:  -Pt completed B UE exercises in all joints and all planes with min resistance band x10 reps x2 sets. Activity completed to increase strength and endurance of B UE for independence with functional transfers.   -K tape applied to scar with 25% tension for desensitization for ease of donning pants. ASSESSMENT:  Activity Tolerance:  Patient tolerance of  treatment: good. Discharge Recommendations: Home with Home health OT, Home with assist PRN  Equipment Recommendations: Other: Pt has a shower stool.    Plan: Times per week: 5xs week x90 min, 1 x week x 30 min  Current Treatment Recommendations: Safety Education & Training, Balance Training, Patient/Caregiver Education & Training, Self-Care / ADL, Functional Mobility Training, Equipment Evaluation, Education, & procurement, Home Management Training, Endurance Training, Pain Management    Patient Education  Patient Education: ADL's, IADL's, Home Exercise Program and Assistive Device Safety    Goals  Short term goals  Time Frame for Short term goals: 1 week   Short term goal 1: pt will complete ADL routine including gathering items needed for dressing tasks with MI and no cues for adapted tech to increase independence in self care tasks  Short term goal 2: PT will tolerate standing and dynamic reaching tasks x 10 min with MI  and no cues for back safety to increase endurance for sinkside ADL routien  Short term goal 3: PT will ambulate to and from the BR as well as around obstacles with MI and AE to increase independence in home mobiliyt to and from the BR  Short term goal 4: Pt will complete 2 step homemaking tasks with MI  and RW with no  cues for proper body mechanics to increase ability to retrieve a snack  Long term goals  Time Frame for Long term goals : 1 week   Long term goal 1: Pt will complete ADL routine with MI and no cues for proper body mechanics, back safety and LHAE PRN to increase independence in self care tasks  Long term goal 2: PT will complete 2 step homemaking tasks with no cues for safe tech and proper body mechanics/ work simplification tech     Following session, patient left in safe position with all fall risk precautions in place.     Kiarra MARRUFO/CARLYN [id number]

## 2020-02-06 NOTE — PROGRESS NOTES
OBJECTIVE:  Bed Mobility:  Rolling to Right: Modified Independent   Supine to Sit: Modified Independent  Sit to Supine: Modified Independent   Scooting: Modified Independent    Transfers:  Sit to Stand: Modified Independent  Stand to Sit:Modified Independent  Car:Modified Independent, extra time to complete and to get into car and bring both legs in. Bed to Chair: Modified Independent, using RW    Ambulation:  Modified Independent  Distance: 150 ft. X1; 300 ft. X1; 80 ft. x2  Surface: Level Tile, Carpet and Ramp  Device:Rolling Walker  Gait Deviations: Forward Flexed Posture, Slow Es, Decreased Step Length Bilaterally, Decreased Gait Speed, Decreased Heel Strike Bilaterally and decreased foot clearance bilaterally but did show improvement. Stairs:  Stairs:  6\" steps. X 12 using Bilateral Handrails and Stand By Assistance, used orange t-band loops around distal thighs to assist with advancing each LE up step, patient using loop to advance lead leg but then could then lift 2nd leg up without loop, would alternate each LE every step and continued this same technique. Platform:  6\" platform X 1 using Ivette Gene and Stand By Assistance. Balance:  Dynamic Standing Balance: Modified Independent  -In bathroom completed douglas-care, clothing management, and washing hands with intermittent 1 UE support. -Used RW and adaptive reacher to  3 objects from floor, good technique and maintained spinal precautions well. Functional Outcome Measures: Not completed       ASSESSMENT:  Assessment: Patient progressing toward established goals. Activity Tolerance:  Patient tolerance of  treatment: good.       Equipment Recommendations:Equipment Needed: No(Pt has RW)  Discharge Recommendations: Home with Home Health PT     Plan: Times per week: 5x/week 90 minutes, 1x/week 30 minutes  Current Treatment Recommendations: Strengthening, Transfer Training, Endurance Training, ROM, Equipment Evaluation, Education, & procurement, Pain Management, Balance Training, Gait Training, Home Exercise Program, Functional Mobility Training, Stair training, Safety Education & Training    Patient Education  Patient Education: Plan of Care, Precautions/Restrictions, Bed Mobility, Transfers, Reviewed Prior Education, Gait, Stairs, Car Transfers, safety, posture,  - Patient Verbalized Understanding    Goals:  Patient goals : increase strength in legs and decrease pain  Short term goals  Time Frame for Short term goals: 1 week  Short term goal 1: Pt to perform sit <>stand transfers at [facility] to prepare for transfers. Short term goal 2: Pt to amb >=100' at [facility] w/ RW on indoor/outdoor surfaces for home mobility. Short term goal 3: Pt to peform car transfer at [facility] for community transportation. Short term goal 4: Pt will complete curb step w/ RW at [facility] X 1 to enter/leave home. Long term goals  Time Frame for Long term goals : 3 weeks  Long term goal 1: Pt to perform sit <>stand transfers at [facility] to prepare for transfers. Long term goal 2: Pt to amb >=150' at [facility] w/ RW on indoor/outdoor surfaces for home mobility. Long term goal 3: Pt to peform car transfer at [facility] for community transportation. Long term goal 4: Pt will complete 6 steps w/ railing on Rt, [facility] X 1 to go upstairs in home. Long term goal 5: Pt will complete TUG at <=20 seconds to show decreased risk of falls. Following session, patient left in safe position with all fall risk precautions in place.

## 2020-02-06 NOTE — PROGRESS NOTES
[facility]  Recreational Therapy  Daily Note  [address]    Time Spent with Patient: 15 minutes    Date:  2/6/2020       Patient Name: Sangita Linn      MRN: [id number]      YOB: 1970 (53 y.o.)       Gender: female  Diagnosis: lumbar spinal fusion  Referring Practitioner: Dr. Gold Kim    RESTRICTIONS/PRECAUTIONS:  Restrictions/Precautions: General Precautions, Surgical Protocols, Up as Tolerated  Vision: Within Functional Limits  Hearing: Within functional limits    PAIN: 0-no c/o pain     SUBJECTIVE:  Get these hearts out of here    OBJECTIVE:  Pt has good sense of humor-asked RT to get the hearts out of her room because she finished cutting them out last night-said they were a pain to cut out but it gave her something to do         Patient Education  New Education Provided: Importance of Leisure,     Electronically signed by: MARCELLA RolonS  Date: 2/6/2020

## 2020-02-06 NOTE — PLAN OF CARE
Problem: DISCHARGE BARRIERS  Goal: Patient's continuum of care needs are met  Note:   Prescriptions for Vancomycin and Meropenem faxed to [facility]. Plan remains for discharge on Friday, 02/07/2020. SW to follow and maintain involvement in discharge planning.

## 2020-02-07 VITALS
HEART RATE: 72 BPM | WEIGHT: 197.7 LBS | SYSTOLIC BLOOD PRESSURE: 134 MMHG | TEMPERATURE: 98.4 F | HEIGHT: 60 IN | DIASTOLIC BLOOD PRESSURE: 85 MMHG | BODY MASS INDEX: 38.82 KG/M2 | OXYGEN SATURATION: 98 % | RESPIRATION RATE: 16 BRPM

## 2020-02-07 LAB
ALBUMIN SERPL-MCNC: 3.5 G/DL (ref 3.5–5.1)
ALP BLD-CCNC: 99 U/L (ref 38–126)
ALT SERPL-CCNC: 13 U/L (ref 11–66)
ANION GAP SERPL CALCULATED.3IONS-SCNC: 11 MEQ/L (ref 8–16)
AST SERPL-CCNC: 9 U/L (ref 5–40)
BILIRUB SERPL-MCNC: 0.3 MG/DL (ref 0.3–1.2)
BUN BLDV-MCNC: 15 MG/DL (ref 7–22)
CALCIUM SERPL-MCNC: 9.1 MG/DL (ref 8.5–10.5)
CHLORIDE BLD-SCNC: 100 MEQ/L (ref 98–111)
CO2: 29 MEQ/L (ref 23–33)
CREAT SERPL-MCNC: 0.5 MG/DL (ref 0.4–1.2)
ERYTHROCYTE [DISTWIDTH] IN BLOOD BY AUTOMATED COUNT: 15.8 % (ref 11.5–14.5)
ERYTHROCYTE [DISTWIDTH] IN BLOOD BY AUTOMATED COUNT: 48 FL (ref 35–45)
GFR SERPL CREATININE-BSD FRML MDRD: > 90 ML/MIN/1.73M2
GLUCOSE BLD-MCNC: 107 MG/DL (ref 70–108)
HCT VFR BLD CALC: 33.3 % (ref 37–47)
HEMOGLOBIN: 9.8 GM/DL (ref 12–16)
MCH RBC QN AUTO: 24.9 PG (ref 26–33)
MCHC RBC AUTO-ENTMCNC: 29.4 GM/DL (ref 32.2–35.5)
MCV RBC AUTO: 84.7 FL (ref 81–99)
PLATELET # BLD: 361 THOU/MM3 (ref 130–400)
PMV BLD AUTO: 9.4 FL (ref 9.4–12.4)
POTASSIUM SERPL-SCNC: 4.7 MEQ/L (ref 3.5–5.2)
RBC # BLD: 3.93 MILL/MM3 (ref 4.2–5.4)
SODIUM BLD-SCNC: 140 MEQ/L (ref 135–145)
TOTAL PROTEIN: 6.7 G/DL (ref 6.1–8)
VANCOMYCIN TROUGH: 17.6 UG/ML (ref 5–15)
VITAMIN D 25-HYDROXY: 29 NG/ML (ref 30–100)
WBC # BLD: 9.1 THOU/MM3 (ref 4.8–10.8)

## 2020-02-07 PROCEDURE — 80053 COMPREHEN METABOLIC PANEL: CPT

## 2020-02-07 PROCEDURE — 97110 THERAPEUTIC EXERCISES: CPT

## 2020-02-07 PROCEDURE — 85027 COMPLETE CBC AUTOMATED: CPT

## 2020-02-07 PROCEDURE — 82306 VITAMIN D 25 HYDROXY: CPT

## 2020-02-07 PROCEDURE — 97530 THERAPEUTIC ACTIVITIES: CPT

## 2020-02-07 PROCEDURE — 2580000003 HC RX 258: Performed by: PHYSICAL MEDICINE & REHABILITATION

## 2020-02-07 PROCEDURE — 36415 COLL VENOUS BLD VENIPUNCTURE: CPT

## 2020-02-07 PROCEDURE — 6370000000 HC RX 637 (ALT 250 FOR IP): Performed by: PHYSICAL MEDICINE & REHABILITATION

## 2020-02-07 PROCEDURE — 6360000002 HC RX W HCPCS: Performed by: PHYSICAL MEDICINE & REHABILITATION

## 2020-02-07 PROCEDURE — 80202 ASSAY OF VANCOMYCIN: CPT

## 2020-02-07 RX ORDER — OXYCODONE HYDROCHLORIDE AND ACETAMINOPHEN 5; 325 MG/1; MG/1
1 TABLET ORAL EVERY 6 HOURS PRN
Qty: 56 TABLET | Refills: 0 | Status: SHIPPED | OUTPATIENT
Start: 2020-02-07 | End: 2020-02-21

## 2020-02-07 RX ORDER — METOPROLOL SUCCINATE 50 MG/1
50 TABLET, EXTENDED RELEASE ORAL DAILY
Qty: 30 TABLET | Refills: 3 | Status: SHIPPED | OUTPATIENT
Start: 2020-02-08

## 2020-02-07 RX ORDER — PANTOPRAZOLE SODIUM 40 MG/1
40 TABLET, DELAYED RELEASE ORAL
Qty: 30 TABLET | Refills: 0 | Status: SHIPPED | OUTPATIENT
Start: 2020-02-07

## 2020-02-07 RX ORDER — CYCLOBENZAPRINE HCL 10 MG
10 TABLET ORAL 3 TIMES DAILY PRN
Qty: 30 TABLET | Refills: 0 | Status: SHIPPED | OUTPATIENT
Start: 2020-02-07 | End: 2020-02-17

## 2020-02-07 RX ADMIN — PANTOPRAZOLE SODIUM 40 MG: 40 TABLET, DELAYED RELEASE ORAL at 09:08

## 2020-02-07 RX ADMIN — HEPARIN SODIUM 5000 UNITS: 5000 INJECTION INTRAVENOUS; SUBCUTANEOUS at 00:05

## 2020-02-07 RX ADMIN — METHYLPREDNISOLONE 4 MG: 4 TABLET ORAL at 12:42

## 2020-02-07 RX ADMIN — DOCUSATE SODIUM 100 MG: 100 CAPSULE, LIQUID FILLED ORAL at 09:02

## 2020-02-07 RX ADMIN — HEPARIN SODIUM 5000 UNITS: 5000 INJECTION INTRAVENOUS; SUBCUTANEOUS at 12:42

## 2020-02-07 RX ADMIN — ONDANSETRON 4 MG: 2 INJECTION INTRAMUSCULAR; INTRAVENOUS at 08:02

## 2020-02-07 RX ADMIN — VANCOMYCIN HYDROCHLORIDE 1250 MG: 5 INJECTION, POWDER, LYOPHILIZED, FOR SOLUTION INTRAVENOUS at 11:08

## 2020-02-07 RX ADMIN — MEROPENEM 1 G: 1 INJECTION, POWDER, FOR SOLUTION INTRAVENOUS at 00:20

## 2020-02-07 RX ADMIN — SENNOSIDES 17.2 MG: 8.6 TABLET, FILM COATED ORAL at 09:01

## 2020-02-07 RX ADMIN — VITAMIN D, TAB 1000IU (100/BT) 5000 UNITS: 25 TAB at 09:01

## 2020-02-07 RX ADMIN — OXYCODONE HYDROCHLORIDE AND ACETAMINOPHEN 1 TABLET: 5; 325 TABLET ORAL at 08:59

## 2020-02-07 RX ADMIN — METOPROLOL SUCCINATE 50 MG: 50 TABLET, EXTENDED RELEASE ORAL at 09:01

## 2020-02-07 RX ADMIN — METHYLPREDNISOLONE 4 MG: 4 TABLET ORAL at 09:02

## 2020-02-07 RX ADMIN — MEROPENEM 1 G: 1 INJECTION, POWDER, FOR SOLUTION INTRAVENOUS at 08:59

## 2020-02-07 RX ADMIN — SODIUM CHLORIDE, PRESERVATIVE FREE 10 ML: 5 INJECTION INTRAVENOUS at 00:21

## 2020-02-07 ASSESSMENT — PAIN SCALES - GENERAL
PAINLEVEL_OUTOF10: 6
PAINLEVEL_OUTOF10: 0

## 2020-02-07 ASSESSMENT — ENCOUNTER SYMPTOMS: VOMITING: 1

## 2020-02-07 NOTE — PLAN OF CARE
Care plan reviewed with patient. Patient verbalize understanding of the plan of care and contribute to goal setting. Problem: Pain:  Goal: Pain level will decrease  Description  Pain level will decrease  Outcome: Completed  Note:   Patient being discharged able to dictact plan of care for maintaining pain   Goal: Control of acute pain  Description  Control of acute pain  Outcome: Completed  Goal: Control of chronic pain  Description  Control of chronic pain  Outcome: Completed     Problem: SKIN INTEGRITY  Goal: LTG - Patient will be free from infection  Outcome: Completed  Note:   No sign or symptom of infection noted      Problem: Falls - Risk of:  Goal: Will remain free from falls  Description  Will remain free from falls  Outcome: Completed  Note:   Patient being discharged. No fall noted.  Balance stable   Goal: Absence of physical injury  Description  Absence of physical injury  Outcome: Completed     Problem: IP BOWEL ELIMINATION  Goal: LTG - patient will utilize adaptive techniques/equipment to complete bowel elimination  Outcome: Completed  Goal: STG - patient will be accident free  Outcome: Completed  Goal: STG - Patient will verbalize signs and symptoms of constipation and how to prevent/alleviate  Outcome: Completed

## 2020-02-07 NOTE — PROGRESS NOTES
Pharmacy Vancomycin Consult     Vancomycin Day: 16/42  Current Dosing: Vancomycin 1250 mg IVPB Q12H    Temp max:  98.4    Recent Labs     02/07/20  0615   BUN 15       Recent Labs     02/07/20  0615   CREATININE 0.5       Recent Labs     02/07/20  0615   WBC 9.1         Intake/Output Summary (Last 24 hours) at 2/7/2020 1258  Last data filed at 2/6/2020 1937  Gross per 24 hour   Intake 595 ml   Output --   Net 595 ml       Ht Readings from Last 1 Encounters:   01/31/20 5' (1.524 m)        Wt Readings from Last 1 Encounters:   02/05/20 197 lb 11.2 oz (89.7 kg)         Body mass index is 38.61 kg/m². Estimated Creatinine Clearance: 136 mL/min (based on SCr of 0.5 mg/dL). Trough: 17.6 mcg/mL (drawn ~ 13 hours after last dose)    Assessment/Plan:  Scr stable. Trough today of 17.6 mcg/mL was at goal of 15-20 mcg/mL for osteomyelitis. Anticipate trough to be slightly higher since drawn ~1 hour late, but still at goal. Continue Vancomycin 1250 mg IVPB Q12H at this time.     Frederic Riggs, PharmD, BCPS  2/7/2020  1:03 PM

## 2020-02-07 NOTE — PROGRESS NOTES
[address] [address]  Recreational Therapy  Discharge Note  Inpatient Rehabilitation Unit           Date:  2/7/2020       Patient Name: Lianet Encinas      MRN: [id number]       YOB: 1970 (53 y.o.)       Gender: female  Diagnosis: lumbar spinal fusion  Referring Practitioner: Dr. Dl Martinez    Patient discharged from Recreational Therapy at this time. See recreational therapy notes for details.     Electronically signed by: CHECO Lawrence  Date: 2/7/2020

## 2020-02-07 NOTE — PROGRESS NOTES
[address] [address]  Inpatient Rehabilitation  Occupational Therapy  Discharge Note  Time:  Time In: 1691  Time Out: 4707  Timed Code Treatment Minutes: 30 Minutes  Minutes: 30          Date: 2020  Patient Name: Guera Easley,   Gender: female      Room: [facility]73/073-A  MRN: [id number]  : 1970  (53 y.o.)  Referring Practitioner: Dr. Clive Fisher  Diagnosis: lumbar spinal fusion  Additional Pertinent Hx: PT admitted to the rehab unit from [facility] after having been found to have an infection in lumbar area. Pt had a prior fusion surgery and spacer placed 10-19. Restrictions/Precautions:  Restrictions/Precautions: General Precautions, Surgical Protocols, Up as Tolerated  Required Braces or Orthoses  Spinal: Lumbar Corset  Spinal Other: LSO brace on PRN   Position Activity Restriction  Spinal Precautions: No Bending, No Lifting, No Twisting  Spinal Precautions: no lifting > 5 pounds   Other position/activity restrictions: Pt reported cardiologist stated no rolling to Lt    SUBJECTIVE: cooperative, completed in room activities due to awaiting nursing for IV intervention. PAIN: 3/10: in back. Better, not as nauseated. COGNITION: WNL    ADL:   Grooming: Independent. Toileting: Modified Independent. Toilet Transfer: Modified Independent. Sarah Jiang BALANCE:  Sitting Balance:  Independent. Standing Balance: Modified Independent. BED MOBILITY:  Not Tested    TRANSFERS:  Sit to Stand:  Modified Independent. 1 cue for safe hand placement. Stand to Sit: Modified Independent. FUNCTIONAL MOBILITY:  Assistive Device: Rolling Walker  Assist Level:  Modified Independent. Distance: To and from bathroom and within room to gather items for discharge. ADDITIONAL ACTIVITIES:  Pt ambulated throughout the room to gather items for discharge.  PT sat to reach towards the floor to gather dirty laundry, MI for balance, standing to pack items x 10 min. reviewed back safety and proper body mechanics. Reviewed acosta for covering PICC line. ASSESSMENT:  Activity Tolerance:  Patient tolerance of  treatment: good. Assessment: Assessment: Pt is making steady progress on IP Rehab. She has met all STGS and LTGs. She displays excellent review of back safety howerver occasional cues for safe hand placement of the RW for sit to stand transfers. She has progressed to a  MI  level with her functional transfers and basic mobility. She has a good understanding of spinal precautions within her daily activities, completing a shower routine and a simple meal prep task this date with MI  and self initiating various squats / modified golfer's lift without cue'ing required. She continues to be limited by pain management at times, but overall this is cont to improve. She reports pain relief with kinesiotape applied to the incision site for scar management. Family education has been completed  regarding scar management, IADL routines, and advanced IADLS to encourage increased awareness of body mechanics to utilize for prevention of additional back concerns. She requires skilled OT intervention within the home setting to progress to a MI level with her ADLs, IADLs and to improve strength and endurance as well as overall safety to decrease fall risk and burden of care. Discharge Recommendations: Home with Home health OT, Home with assist PRN  Equipment Recommendations: Other: Pt has a shower stool. Plan: home with assist PRN. [place] OT for home safety. [facility] for UEs.      Patient Education  Patient Education: ADL's    Goals  Short term goals  Time Frame for Short term goals: 1 week   Short term goal 1: pt will complete ADL routine including gathering items needed for dressing tasks with MI and no cues for adapted tech to increase independence in self care tasks MET   Short term goal 2: PT will tolerate standing and dynamic reaching tasks x 10 min with MI  and no cues for back safety to increase endurance for sinkside ADL routien MET   Short term goal 3: PT will ambulate to and from the BR as well as around obstacles with MI and AE to increase independence in home mobiliyt to and from the BR MET  Short term goal 4: Pt will complete 2 step homemaking tasks with MI  and RW with no  cues for proper body mechanics to increase ability to retrieve a snack MET   Long term goals  Time Frame for Long term goals : 1 week   Long term goal 1: Pt will complete ADL routine with MI and no cues for proper body mechanics, back safety and LHAE PRN to increase independence in self care tasks MET   Long term goal 2: PT will complete 2 step homemaking tasks with no cues for safe tech and proper body mechanics/ work simplification tech MET    Following session, patient left in safe position with all fall risk precautions in place.

## 2020-02-07 NOTE — PROGRESS NOTES
[address] [address]  INPATIENT PHYSICAL THERAPY  DISCHARGE NOTE  [facility] - 7E-73/073-A    Time In: 0800  Time Out: 0819  Timed Code Treatment Minutes: 19 Minutes  Minutes: 19     Minute Variance  Variance: -11  Reason: Refusal(pt nauseous, refusing to get OOB)    Date: 2020  Patient Name: Corky Doan,  Gender:  female        MRN: [id number]  : 1970  (53 y.o.)     Referring Practitioner: Dr. Allen Reyna  Diagnosis: Fusion of Lumbar sacral spine  Additional Pertinent Hx: Pt had past spinal fusion of L1-S1 in 10/19 secondary to lumbar spondylolisthesis. Pt recently addmitted to [facility] for increased low back pain and infection. History of low back pain, HTN, arthritis and Lt sciatica. Prior Level of Function:  Lives With: Family(Currently lives w/ mother)  Type of Home: House  Home Layout: Multi-level  Home Access: Stairs to enter with rails  Entrance Stairs - Number of Steps: 6 stairs in house w/ railing on the Rt side   Entrance Stairs - Rails: Right  Home Equipment: Cane, Rolling walker   Bathroom Shower/Tub: Tub/Shower unit  Bathroom Toilet: Standard  Bathroom Equipment: (shower stool. )    Receives Help From: Family  Ambulation Assistance: Independent  Transfer Assistance: Independent    Restrictions/Precautions:  Restrictions/Precautions: General Precautions, Surgical Protocols, Up as Tolerated  Required Braces or Orthoses  Spinal: Lumbar Corset  Spinal Other: LSO brace on PRN   Position Activity Restriction  Spinal Precautions: No Bending, No Lifting, No Twisting  Spinal Precautions: no lifting > 5 pounds   Other position/activity restrictions: Pt reported cardiologist stated no rolling to Lt    SUBJECTIVE: pt in bed upon arrival with RN present and refuses to get OOB due to nausea. Pt reporting she had been up vomiting all night.  Pt expressing that she has been \"doing this for 4 months\" when PTA was discussing precautions and technique with Goals:  Patient goals : increase strength in legs and decrease pain  Short term goals  Time Frame for Short term goals: 1 week  Short term goal 1: Pt to perform sit <>stand transfers at [facility] to prepare for transfers. Short term goal 2: Pt to amb >=100' at [facility] w/ RW on indoor/outdoor surfaces for home mobility. Short term goal 3: Pt to peform car transfer at [facility] for community transportation. Short term goal 4: Pt will complete curb step w/ RW at [facility] X 1 to enter/leave home. Long term goals  Time Frame for Long term goals : 3 weeks  Long term goal 1: Pt to perform sit <>stand transfers at Mod I to prepare for transfers. Long term goal 2: Pt to amb >=150' at [facility] w/ RW on indoor/outdoor surfaces for home mobility. Long term goal 3: Pt to peform car transfer at [facility] for community transportation. Long term goal 4: Pt will complete 6 steps w/ railing on Rt, Mod I X 1 to go upstairs in home. Long term goal 5: Pt will complete TUG at <=20 seconds to show decreased risk of falls. Following session, patient left in safe position with all fall risk precautions in place.

## 2020-02-11 ENCOUNTER — HOSPITAL ENCOUNTER (OUTPATIENT)
Age: 50
Setting detail: SPECIMEN
Discharge: HOME | End: 2020-02-11
Payer: MEDICARE

## 2020-02-11 LAB
BASOPHILS # BLD: 0.6 %
BASOPHILS ABSOLUTE: 0 THOU/MM3 (ref 0–0.1)
C-REACTIVE PROTEIN: 3.02 MG/DL (ref 0–1)
CREAT SERPL-MCNC: 0.6 MG/DL (ref 0.4–1.2)
EOSINOPHIL # BLD: 2.5 %
EOSINOPHILS ABSOLUTE: 0.2 THOU/MM3 (ref 0–0.4)
ERYTHROCYTE [DISTWIDTH] IN BLOOD BY AUTOMATED COUNT: 16.3 % (ref 11.5–14.5)
ERYTHROCYTE [DISTWIDTH] IN BLOOD BY AUTOMATED COUNT: 50.5 FL (ref 35–45)
GFR SERPL CREATININE-BSD FRML MDRD: > 90 ML/MIN/1.73M2
HCT VFR BLD CALC: 33.8 % (ref 37–47)
HEMOGLOBIN: 10.2 GM/DL (ref 12–16)
IMMATURE GRANS (ABS): 0.07 THOU/MM3 (ref 0–0.07)
IMMATURE GRANULOCYTES: 1 %
LYMPHOCYTES # BLD: 20.9 %
LYMPHOCYTES ABSOLUTE: 1.4 THOU/MM3 (ref 1–4.8)
MCH RBC QN AUTO: 25.6 PG (ref 26–33)
MCHC RBC AUTO-ENTMCNC: 30.2 GM/DL (ref 32.2–35.5)
MCV RBC AUTO: 84.9 FL (ref 81–99)
MONOCYTES # BLD: 4.4 %
MONOCYTES ABSOLUTE: 0.3 THOU/MM3 (ref 0.4–1.3)
NUCLEATED RED BLOOD CELLS: 0 /100 WBC
PLATELET # BLD: 344 THOU/MM3 (ref 130–400)
PMV BLD AUTO: 9.9 FL (ref 9.4–12.4)
RBC # BLD: 3.98 MILL/MM3 (ref 4.2–5.4)
SEDIMENTATION RATE, ERYTHROCYTE: 50 MM/HR (ref 0–20)
SEG NEUTROPHILS: 70.6 %
SEGMENTED NEUTROPHILS ABSOLUTE COUNT: 4.8 THOU/MM3 (ref 1.8–7.7)
VANCOMYCIN TROUGH: 14.8 UG/ML (ref 5–15)
WBC # BLD: 6.8 THOU/MM3 (ref 4.8–10.8)

## 2020-02-11 PROCEDURE — 80202 ASSAY OF VANCOMYCIN: CPT

## 2020-02-11 PROCEDURE — 85025 COMPLETE CBC W/AUTO DIFF WBC: CPT

## 2020-02-11 PROCEDURE — 82565 ASSAY OF CREATININE: CPT

## 2020-02-11 PROCEDURE — 86140 C-REACTIVE PROTEIN: CPT

## 2020-02-11 PROCEDURE — 85651 RBC SED RATE NONAUTOMATED: CPT

## 2020-02-17 ENCOUNTER — HOSPITAL ENCOUNTER (OUTPATIENT)
Age: 50
Setting detail: SPECIMEN
Discharge: HOME | End: 2020-02-17
Payer: MEDICARE

## 2020-02-17 LAB
BASOPHILS # BLD: 0.5 %
BASOPHILS ABSOLUTE: 0 THOU/MM3 (ref 0–0.1)
C-REACTIVE PROTEIN: 2.39 MG/DL (ref 0–1)
CREAT SERPL-MCNC: 0.5 MG/DL (ref 0.4–1.2)
EOSINOPHIL # BLD: 3.2 %
EOSINOPHILS ABSOLUTE: 0.2 THOU/MM3 (ref 0–0.4)
ERYTHROCYTE [DISTWIDTH] IN BLOOD BY AUTOMATED COUNT: 16.3 % (ref 11.5–14.5)
ERYTHROCYTE [DISTWIDTH] IN BLOOD BY AUTOMATED COUNT: 50.7 FL (ref 35–45)
GFR SERPL CREATININE-BSD FRML MDRD: > 90 ML/MIN/1.73M2
HCT VFR BLD CALC: 33.1 % (ref 37–47)
HEMOGLOBIN: 9.9 GM/DL (ref 12–16)
IMMATURE GRANS (ABS): 0.07 THOU/MM3 (ref 0–0.07)
IMMATURE GRANULOCYTES: 1.2 %
LYMPHOCYTES # BLD: 20.1 %
LYMPHOCYTES ABSOLUTE: 1.2 THOU/MM3 (ref 1–4.8)
MCH RBC QN AUTO: 25.6 PG (ref 26–33)
MCHC RBC AUTO-ENTMCNC: 29.9 GM/DL (ref 32.2–35.5)
MCV RBC AUTO: 85.5 FL (ref 81–99)
MONOCYTES # BLD: 7.6 %
MONOCYTES ABSOLUTE: 0.5 THOU/MM3 (ref 0.4–1.3)
NUCLEATED RED BLOOD CELLS: 0 /100 WBC
PLATELET # BLD: 337 THOU/MM3 (ref 130–400)
PMV BLD AUTO: 9.9 FL (ref 9.4–12.4)
RBC # BLD: 3.87 MILL/MM3 (ref 4.2–5.4)
SEDIMENTATION RATE, ERYTHROCYTE: 93 MM/HR (ref 0–20)
SEG NEUTROPHILS: 67.4 %
SEGMENTED NEUTROPHILS ABSOLUTE COUNT: 4 THOU/MM3 (ref 1.8–7.7)
VANCOMYCIN TROUGH: 13 UG/ML (ref 5–15)
WBC # BLD: 6 THOU/MM3 (ref 4.8–10.8)

## 2020-02-17 PROCEDURE — 80202 ASSAY OF VANCOMYCIN: CPT

## 2020-02-17 PROCEDURE — 85025 COMPLETE CBC W/AUTO DIFF WBC: CPT

## 2020-02-17 PROCEDURE — 86140 C-REACTIVE PROTEIN: CPT

## 2020-02-17 PROCEDURE — 85651 RBC SED RATE NONAUTOMATED: CPT

## 2020-02-17 PROCEDURE — 82565 ASSAY OF CREATININE: CPT

## 2020-02-24 ENCOUNTER — HOSPITAL ENCOUNTER (OUTPATIENT)
Age: 50
Setting detail: SPECIMEN
Discharge: HOME | End: 2020-02-24
Payer: MEDICARE

## 2020-02-24 LAB
BASOPHILS # BLD: 0.9 %
BASOPHILS ABSOLUTE: 0 THOU/MM3 (ref 0–0.1)
C-REACTIVE PROTEIN: 1.81 MG/DL (ref 0–1)
CREAT SERPL-MCNC: 0.5 MG/DL (ref 0.4–1.2)
EOSINOPHIL # BLD: 4.3 %
EOSINOPHILS ABSOLUTE: 0.2 THOU/MM3 (ref 0–0.4)
ERYTHROCYTE [DISTWIDTH] IN BLOOD BY AUTOMATED COUNT: 15.9 % (ref 11.5–14.5)
ERYTHROCYTE [DISTWIDTH] IN BLOOD BY AUTOMATED COUNT: 48.4 FL (ref 35–45)
GFR SERPL CREATININE-BSD FRML MDRD: > 90 ML/MIN/1.73M2
HCT VFR BLD CALC: 33.8 % (ref 37–47)
HEMOGLOBIN: 10 GM/DL (ref 12–16)
IMMATURE GRANS (ABS): 0.03 THOU/MM3 (ref 0–0.07)
IMMATURE GRANULOCYTES: 0.6 %
LYMPHOCYTES # BLD: 24.4 %
LYMPHOCYTES ABSOLUTE: 1.1 THOU/MM3 (ref 1–4.8)
MCH RBC QN AUTO: 24.6 PG (ref 26–33)
MCHC RBC AUTO-ENTMCNC: 29.6 GM/DL (ref 32.2–35.5)
MCV RBC AUTO: 83.3 FL (ref 81–99)
MONOCYTES # BLD: 8 %
MONOCYTES ABSOLUTE: 0.4 THOU/MM3 (ref 0.4–1.3)
NUCLEATED RED BLOOD CELLS: 0 /100 WBC
PLATELET # BLD: 290 THOU/MM3 (ref 130–400)
PMV BLD AUTO: 9.2 FL (ref 9.4–12.4)
RBC # BLD: 4.06 MILL/MM3 (ref 4.2–5.4)
SEDIMENTATION RATE, ERYTHROCYTE: 59 MM/HR (ref 0–20)
SEG NEUTROPHILS: 61.8 %
SEGMENTED NEUTROPHILS ABSOLUTE COUNT: 2.8 THOU/MM3 (ref 1.8–7.7)
VANCOMYCIN TROUGH: 13 UG/ML (ref 5–15)
WBC # BLD: 4.6 THOU/MM3 (ref 4.8–10.8)

## 2020-02-24 PROCEDURE — 80202 ASSAY OF VANCOMYCIN: CPT

## 2020-02-24 PROCEDURE — 82565 ASSAY OF CREATININE: CPT

## 2020-02-24 PROCEDURE — 85651 RBC SED RATE NONAUTOMATED: CPT

## 2020-02-24 PROCEDURE — 85025 COMPLETE CBC W/AUTO DIFF WBC: CPT

## 2020-02-24 PROCEDURE — 86140 C-REACTIVE PROTEIN: CPT

## 2020-03-03 ENCOUNTER — HOSPITAL ENCOUNTER (OUTPATIENT)
Age: 50
Setting detail: SPECIMEN
Discharge: HOME | End: 2020-03-03
Payer: MEDICARE

## 2020-03-03 LAB
BASOPHILS # BLD: 0.7 %
BASOPHILS ABSOLUTE: 0 THOU/MM3 (ref 0–0.1)
C-REACTIVE PROTEIN: 0.67 MG/DL (ref 0–1)
CREAT SERPL-MCNC: 0.5 MG/DL (ref 0.4–1.2)
EOSINOPHIL # BLD: 3.3 %
EOSINOPHILS ABSOLUTE: 0.1 THOU/MM3 (ref 0–0.4)
ERYTHROCYTE [DISTWIDTH] IN BLOOD BY AUTOMATED COUNT: 15.9 % (ref 11.5–14.5)
ERYTHROCYTE [DISTWIDTH] IN BLOOD BY AUTOMATED COUNT: 47.3 FL (ref 35–45)
GFR SERPL CREATININE-BSD FRML MDRD: > 90 ML/MIN/1.73M2
HCT VFR BLD CALC: 35.6 % (ref 37–47)
HEMOGLOBIN: 10.7 GM/DL (ref 12–16)
IMMATURE GRANS (ABS): 0.03 THOU/MM3 (ref 0–0.07)
IMMATURE GRANULOCYTES: 0.7 %
LYMPHOCYTES # BLD: 22.6 %
LYMPHOCYTES ABSOLUTE: 0.9 THOU/MM3 (ref 1–4.8)
MCH RBC QN AUTO: 24.5 PG (ref 26–33)
MCHC RBC AUTO-ENTMCNC: 30.1 GM/DL (ref 32.2–35.5)
MCV RBC AUTO: 81.7 FL (ref 81–99)
MONOCYTES # BLD: 5.2 %
MONOCYTES ABSOLUTE: 0.2 THOU/MM3 (ref 0.4–1.3)
NUCLEATED RED BLOOD CELLS: 0 /100 WBC
PLATELET # BLD: 333 THOU/MM3 (ref 130–400)
PMV BLD AUTO: 10.5 FL (ref 9.4–12.4)
RBC # BLD: 4.36 MILL/MM3 (ref 4.2–5.4)
SEDIMENTATION RATE, ERYTHROCYTE: 27 MM/HR (ref 0–20)
SEG NEUTROPHILS: 67.5 %
SEGMENTED NEUTROPHILS ABSOLUTE COUNT: 2.8 THOU/MM3 (ref 1.8–7.7)
VANCOMYCIN TROUGH: 14.9 UG/ML (ref 5–15)
WBC # BLD: 4.2 THOU/MM3 (ref 4.8–10.8)

## 2020-03-03 PROCEDURE — 85025 COMPLETE CBC W/AUTO DIFF WBC: CPT

## 2020-03-03 PROCEDURE — 86140 C-REACTIVE PROTEIN: CPT

## 2020-03-03 PROCEDURE — 82565 ASSAY OF CREATININE: CPT

## 2020-03-03 PROCEDURE — 80202 ASSAY OF VANCOMYCIN: CPT

## 2020-03-03 PROCEDURE — 85651 RBC SED RATE NONAUTOMATED: CPT

## 2020-03-09 ENCOUNTER — HOSPITAL ENCOUNTER (OUTPATIENT)
Age: 50
Setting detail: SPECIMEN
Discharge: HOME | End: 2020-03-09
Payer: MEDICARE

## 2020-03-09 LAB
BASOPHILS # BLD: 0.8 %
BASOPHILS ABSOLUTE: 0 THOU/MM3 (ref 0–0.1)
CREAT SERPL-MCNC: 0.5 MG/DL (ref 0.4–1.2)
EOSINOPHIL # BLD: 3.5 %
EOSINOPHILS ABSOLUTE: 0.2 THOU/MM3 (ref 0–0.4)
ERYTHROCYTE [DISTWIDTH] IN BLOOD BY AUTOMATED COUNT: 16.1 % (ref 11.5–14.5)
ERYTHROCYTE [DISTWIDTH] IN BLOOD BY AUTOMATED COUNT: 48 FL (ref 35–45)
GFR SERPL CREATININE-BSD FRML MDRD: > 90 ML/MIN/1.73M2
HCT VFR BLD CALC: 36.6 % (ref 37–47)
HEMOGLOBIN: 10.9 GM/DL (ref 12–16)
IMMATURE GRANS (ABS): 0.02 THOU/MM3 (ref 0–0.07)
IMMATURE GRANULOCYTES: 0.4 %
LYMPHOCYTES # BLD: 21.4 %
LYMPHOCYTES ABSOLUTE: 1.1 THOU/MM3 (ref 1–4.8)
MCH RBC QN AUTO: 24.4 PG (ref 26–33)
MCHC RBC AUTO-ENTMCNC: 29.8 GM/DL (ref 32.2–35.5)
MCV RBC AUTO: 81.9 FL (ref 81–99)
MONOCYTES # BLD: 6.3 %
MONOCYTES ABSOLUTE: 0.3 THOU/MM3 (ref 0.4–1.3)
NUCLEATED RED BLOOD CELLS: 0 /100 WBC
PLATELET # BLD: 288 THOU/MM3 (ref 130–400)
PMV BLD AUTO: 10.2 FL (ref 9.4–12.4)
RBC # BLD: 4.47 MILL/MM3 (ref 4.2–5.4)
SEG NEUTROPHILS: 67.6 %
SEGMENTED NEUTROPHILS ABSOLUTE COUNT: 3.4 THOU/MM3 (ref 1.8–7.7)
VANCOMYCIN TROUGH: 14.2 UG/ML (ref 5–15)
WBC # BLD: 5.1 THOU/MM3 (ref 4.8–10.8)

## 2020-03-09 PROCEDURE — 80202 ASSAY OF VANCOMYCIN: CPT

## 2020-03-09 PROCEDURE — 85025 COMPLETE CBC W/AUTO DIFF WBC: CPT

## 2020-03-09 PROCEDURE — 82565 ASSAY OF CREATININE: CPT

## 2020-03-17 ENCOUNTER — HOSPITAL ENCOUNTER (OUTPATIENT)
Age: 50
Setting detail: SPECIMEN
Discharge: HOME | End: 2020-03-17
Payer: MEDICARE

## 2020-03-17 LAB
BASOPHILS # BLD: 0.8 %
BASOPHILS ABSOLUTE: 0 THOU/MM3 (ref 0–0.1)
C-REACTIVE PROTEIN: 0.81 MG/DL (ref 0–1)
CREAT SERPL-MCNC: 0.5 MG/DL (ref 0.4–1.2)
EOSINOPHIL # BLD: 3.4 %
EOSINOPHILS ABSOLUTE: 0.2 THOU/MM3 (ref 0–0.4)
ERYTHROCYTE [DISTWIDTH] IN BLOOD BY AUTOMATED COUNT: 16.5 % (ref 11.5–14.5)
ERYTHROCYTE [DISTWIDTH] IN BLOOD BY AUTOMATED COUNT: 49.6 FL (ref 35–45)
GFR SERPL CREATININE-BSD FRML MDRD: > 90 ML/MIN/1.73M2
HCT VFR BLD CALC: 39.7 % (ref 37–47)
HEMOGLOBIN: 11.7 GM/DL (ref 12–16)
IMMATURE GRANS (ABS): 0.02 THOU/MM3 (ref 0–0.07)
IMMATURE GRANULOCYTES: 0.4 %
LYMPHOCYTES # BLD: 25.7 %
LYMPHOCYTES ABSOLUTE: 1.4 THOU/MM3 (ref 1–4.8)
MCH RBC QN AUTO: 24.5 PG (ref 26–33)
MCHC RBC AUTO-ENTMCNC: 29.5 GM/DL (ref 32.2–35.5)
MCV RBC AUTO: 83.2 FL (ref 81–99)
MONOCYTES # BLD: 7.4 %
MONOCYTES ABSOLUTE: 0.4 THOU/MM3 (ref 0.4–1.3)
NUCLEATED RED BLOOD CELLS: 0 /100 WBC
PLATELET # BLD: 329 THOU/MM3 (ref 130–400)
PMV BLD AUTO: 10.6 FL (ref 9.4–12.4)
RBC # BLD: 4.77 MILL/MM3 (ref 4.2–5.4)
SEDIMENTATION RATE, ERYTHROCYTE: 28 MM/HR (ref 0–20)
SEG NEUTROPHILS: 62.3 %
SEGMENTED NEUTROPHILS ABSOLUTE COUNT: 3.3 THOU/MM3 (ref 1.8–7.7)
VANCOMYCIN TROUGH: 12.7 UG/ML (ref 5–15)
WBC # BLD: 5.3 THOU/MM3 (ref 4.8–10.8)

## 2020-03-17 PROCEDURE — 80202 ASSAY OF VANCOMYCIN: CPT

## 2020-03-17 PROCEDURE — 85651 RBC SED RATE NONAUTOMATED: CPT

## 2020-03-17 PROCEDURE — 85025 COMPLETE CBC W/AUTO DIFF WBC: CPT

## 2020-03-17 PROCEDURE — 82565 ASSAY OF CREATININE: CPT

## 2020-03-17 PROCEDURE — 86140 C-REACTIVE PROTEIN: CPT

## 2020-04-01 ENCOUNTER — HOSPITAL ENCOUNTER (INPATIENT)
Age: 50
LOS: 6 days | Discharge: HOME HEALTH | DRG: 304 | End: 2020-04-07
Attending: INTERNAL MEDICINE | Admitting: INTERNAL MEDICINE
Payer: MEDICARE

## 2020-04-01 ENCOUNTER — APPOINTMENT (OUTPATIENT)
Dept: MRI IMAGING | Age: 50
DRG: 304 | End: 2020-04-01
Payer: MEDICARE

## 2020-04-01 ENCOUNTER — APPOINTMENT (OUTPATIENT)
Dept: CT IMAGING | Age: 50
DRG: 304 | End: 2020-04-01
Payer: MEDICARE

## 2020-04-01 PROBLEM — M46.46 LUMBAR DISCITIS: Status: ACTIVE | Noted: 2020-04-01

## 2020-04-01 LAB
ALBUMIN SERPL-MCNC: 3.9 G/DL (ref 3.5–5.1)
ALP BLD-CCNC: 126 U/L (ref 38–126)
ALT SERPL-CCNC: 29 U/L (ref 11–66)
ANION GAP SERPL CALCULATED.3IONS-SCNC: 15 MEQ/L (ref 8–16)
AST SERPL-CCNC: 18 U/L (ref 5–40)
BASOPHILS # BLD: 0.6 %
BASOPHILS ABSOLUTE: 0.1 THOU/MM3 (ref 0–0.1)
BILIRUB SERPL-MCNC: 0.8 MG/DL (ref 0.3–1.2)
BUN BLDV-MCNC: 15 MG/DL (ref 7–22)
C-REACTIVE PROTEIN: 1.62 MG/DL (ref 0–1)
CALCIUM SERPL-MCNC: 9.4 MG/DL (ref 8.5–10.5)
CHLORIDE BLD-SCNC: 103 MEQ/L (ref 98–111)
CO2: 24 MEQ/L (ref 23–33)
CREAT SERPL-MCNC: 0.7 MG/DL (ref 0.4–1.2)
EOSINOPHIL # BLD: 1.9 %
EOSINOPHILS ABSOLUTE: 0.2 THOU/MM3 (ref 0–0.4)
ERYTHROCYTE [DISTWIDTH] IN BLOOD BY AUTOMATED COUNT: 16.4 % (ref 11.5–14.5)
ERYTHROCYTE [DISTWIDTH] IN BLOOD BY AUTOMATED COUNT: 47.8 FL (ref 35–45)
GFR SERPL CREATININE-BSD FRML MDRD: 89 ML/MIN/1.73M2
GLUCOSE BLD-MCNC: 108 MG/DL (ref 70–108)
HCT VFR BLD CALC: 36.7 % (ref 37–47)
HEMOGLOBIN: 11.2 GM/DL (ref 12–16)
IMMATURE GRANS (ABS): 0.15 THOU/MM3 (ref 0–0.07)
IMMATURE GRANULOCYTES: 1.7 %
LACTIC ACID, SEPSIS: 0.7 MMOL/L (ref 0.5–1.9)
LACTIC ACID, SEPSIS: 0.8 MMOL/L (ref 0.5–1.9)
LYMPHOCYTES # BLD: 21.7 %
LYMPHOCYTES ABSOLUTE: 1.9 THOU/MM3 (ref 1–4.8)
MCH RBC QN AUTO: 24.6 PG (ref 26–33)
MCHC RBC AUTO-ENTMCNC: 30.5 GM/DL (ref 32.2–35.5)
MCV RBC AUTO: 80.5 FL (ref 81–99)
MONOCYTES # BLD: 7.5 %
MONOCYTES ABSOLUTE: 0.7 THOU/MM3 (ref 0.4–1.3)
NUCLEATED RED BLOOD CELLS: 0 /100 WBC
OSMOLALITY CALCULATION: 284.5 MOSMOL/KG (ref 275–300)
PLATELET # BLD: 337 THOU/MM3 (ref 130–400)
PMV BLD AUTO: 9.4 FL (ref 9.4–12.4)
POTASSIUM REFLEX MAGNESIUM: 4.3 MEQ/L (ref 3.5–5.2)
RBC # BLD: 4.56 MILL/MM3 (ref 4.2–5.4)
SEDIMENTATION RATE, ERYTHROCYTE: 30 MM/HR (ref 0–20)
SEG NEUTROPHILS: 66.6 %
SEGMENTED NEUTROPHILS ABSOLUTE COUNT: 5.9 THOU/MM3 (ref 1.8–7.7)
SODIUM BLD-SCNC: 142 MEQ/L (ref 135–145)
TOTAL PROTEIN: 7 G/DL (ref 6.1–8)
WBC # BLD: 8.9 THOU/MM3 (ref 4.8–10.8)

## 2020-04-01 PROCEDURE — 6360000002 HC RX W HCPCS: Performed by: INTERNAL MEDICINE

## 2020-04-01 PROCEDURE — 86140 C-REACTIVE PROTEIN: CPT

## 2020-04-01 PROCEDURE — 6370000000 HC RX 637 (ALT 250 FOR IP): Performed by: EMERGENCY MEDICINE

## 2020-04-01 PROCEDURE — 36415 COLL VENOUS BLD VENIPUNCTURE: CPT

## 2020-04-01 PROCEDURE — 6360000004 HC RX CONTRAST MEDICATION: Performed by: EMERGENCY MEDICINE

## 2020-04-01 PROCEDURE — 96374 THER/PROPH/DIAG INJ IV PUSH: CPT

## 2020-04-01 PROCEDURE — 99285 EMERGENCY DEPT VISIT HI MDM: CPT

## 2020-04-01 PROCEDURE — 83605 ASSAY OF LACTIC ACID: CPT

## 2020-04-01 PROCEDURE — 6360000002 HC RX W HCPCS: Performed by: EMERGENCY MEDICINE

## 2020-04-01 PROCEDURE — 72131 CT LUMBAR SPINE W/O DYE: CPT

## 2020-04-01 PROCEDURE — 1200000000 HC SEMI PRIVATE

## 2020-04-01 PROCEDURE — 85025 COMPLETE CBC W/AUTO DIFF WBC: CPT

## 2020-04-01 PROCEDURE — 80053 COMPREHEN METABOLIC PANEL: CPT

## 2020-04-01 PROCEDURE — 2580000003 HC RX 258: Performed by: INTERNAL MEDICINE

## 2020-04-01 PROCEDURE — 2709999900 HC NON-CHARGEABLE SUPPLY

## 2020-04-01 PROCEDURE — A9579 GAD-BASE MR CONTRAST NOS,1ML: HCPCS | Performed by: EMERGENCY MEDICINE

## 2020-04-01 PROCEDURE — 85651 RBC SED RATE NONAUTOMATED: CPT

## 2020-04-01 PROCEDURE — 72158 MRI LUMBAR SPINE W/O & W/DYE: CPT

## 2020-04-01 PROCEDURE — 2580000003 HC RX 258: Performed by: EMERGENCY MEDICINE

## 2020-04-01 PROCEDURE — 87040 BLOOD CULTURE FOR BACTERIA: CPT

## 2020-04-01 PROCEDURE — 6370000000 HC RX 637 (ALT 250 FOR IP): Performed by: INTERNAL MEDICINE

## 2020-04-01 RX ORDER — MORPHINE SULFATE 2 MG/ML
2 INJECTION, SOLUTION INTRAMUSCULAR; INTRAVENOUS EVERY 4 HOURS PRN
Status: DISCONTINUED | OUTPATIENT
Start: 2020-04-01 | End: 2020-04-04

## 2020-04-01 RX ORDER — FLUOXETINE HYDROCHLORIDE 20 MG/1
20 CAPSULE ORAL DAILY
COMMUNITY
End: 2021-04-08

## 2020-04-01 RX ORDER — HYDROCODONE BITARTRATE AND ACETAMINOPHEN 5; 325 MG/1; MG/1
1 TABLET ORAL EVERY 4 HOURS PRN
Status: DISCONTINUED | OUTPATIENT
Start: 2020-04-01 | End: 2020-04-03

## 2020-04-01 RX ORDER — SODIUM CHLORIDE 0.9 % (FLUSH) 0.9 %
10 SYRINGE (ML) INJECTION PRN
Status: DISCONTINUED | OUTPATIENT
Start: 2020-04-01 | End: 2020-04-04

## 2020-04-01 RX ORDER — 0.9 % SODIUM CHLORIDE 0.9 %
500 INTRAVENOUS SOLUTION INTRAVENOUS ONCE
Status: COMPLETED | OUTPATIENT
Start: 2020-04-01 | End: 2020-04-01

## 2020-04-01 RX ORDER — SODIUM CHLORIDE 0.9 % (FLUSH) 0.9 %
10 SYRINGE (ML) INJECTION EVERY 12 HOURS SCHEDULED
Status: DISCONTINUED | OUTPATIENT
Start: 2020-04-01 | End: 2020-04-04

## 2020-04-01 RX ORDER — ACETAMINOPHEN 650 MG/1
650 SUPPOSITORY RECTAL EVERY 6 HOURS PRN
Status: DISCONTINUED | OUTPATIENT
Start: 2020-04-01 | End: 2020-04-07 | Stop reason: HOSPADM

## 2020-04-01 RX ORDER — HYDROCODONE BITARTRATE AND ACETAMINOPHEN 5; 325 MG/1; MG/1
2 TABLET ORAL EVERY 4 HOURS PRN
Status: DISCONTINUED | OUTPATIENT
Start: 2020-04-01 | End: 2020-04-03

## 2020-04-01 RX ORDER — ONDANSETRON 2 MG/ML
4 INJECTION INTRAMUSCULAR; INTRAVENOUS EVERY 6 HOURS PRN
Status: DISCONTINUED | OUTPATIENT
Start: 2020-04-01 | End: 2020-04-04

## 2020-04-01 RX ORDER — LORAZEPAM 1 MG/1
1 TABLET ORAL ONCE
Status: COMPLETED | OUTPATIENT
Start: 2020-04-01 | End: 2020-04-01

## 2020-04-01 RX ORDER — ACETAMINOPHEN 325 MG/1
650 TABLET ORAL EVERY 6 HOURS PRN
Status: DISCONTINUED | OUTPATIENT
Start: 2020-04-01 | End: 2020-04-07 | Stop reason: HOSPADM

## 2020-04-01 RX ORDER — SODIUM CHLORIDE 9 MG/ML
INJECTION, SOLUTION INTRAVENOUS CONTINUOUS
Status: DISCONTINUED | OUTPATIENT
Start: 2020-04-01 | End: 2020-04-04

## 2020-04-01 RX ORDER — FLUOXETINE HYDROCHLORIDE 20 MG/1
20 CAPSULE ORAL DAILY
Status: DISCONTINUED | OUTPATIENT
Start: 2020-04-01 | End: 2020-04-07 | Stop reason: HOSPADM

## 2020-04-01 RX ORDER — VITAMIN B COMPLEX
1000 TABLET ORAL DAILY
Status: DISCONTINUED | OUTPATIENT
Start: 2020-04-01 | End: 2020-04-07 | Stop reason: HOSPADM

## 2020-04-01 RX ORDER — METOPROLOL SUCCINATE 50 MG/1
50 TABLET, EXTENDED RELEASE ORAL DAILY
Status: DISCONTINUED | OUTPATIENT
Start: 2020-04-01 | End: 2020-04-07 | Stop reason: HOSPADM

## 2020-04-01 RX ORDER — AMOXICILLIN 250 MG
2 CAPSULE ORAL 2 TIMES DAILY
Status: DISCONTINUED | OUTPATIENT
Start: 2020-04-01 | End: 2020-04-07 | Stop reason: HOSPADM

## 2020-04-01 RX ORDER — OXYCODONE HYDROCHLORIDE AND ACETAMINOPHEN 5; 325 MG/1; MG/1
1 TABLET ORAL EVERY 4 HOURS PRN
Status: ON HOLD | COMMUNITY
End: 2020-04-07 | Stop reason: HOSPADM

## 2020-04-01 RX ORDER — PANTOPRAZOLE SODIUM 40 MG/1
40 TABLET, DELAYED RELEASE ORAL
Status: DISCONTINUED | OUTPATIENT
Start: 2020-04-02 | End: 2020-04-07 | Stop reason: HOSPADM

## 2020-04-01 RX ADMIN — HYDROCODONE BITARTRATE AND ACETAMINOPHEN 1 TABLET: 5; 325 TABLET ORAL at 16:31

## 2020-04-01 RX ADMIN — VITAMIN D, TAB 1000IU (100/BT) 1000 UNITS: 25 TAB at 17:39

## 2020-04-01 RX ADMIN — VANCOMYCIN HYDROCHLORIDE 1500 MG: 1 INJECTION, POWDER, LYOPHILIZED, FOR SOLUTION INTRAVENOUS at 17:31

## 2020-04-01 RX ADMIN — METOPROLOL SUCCINATE 50 MG: 50 TABLET, EXTENDED RELEASE ORAL at 17:39

## 2020-04-01 RX ADMIN — MEROPENEM 1 G: 1 INJECTION, POWDER, FOR SOLUTION INTRAVENOUS at 15:28

## 2020-04-01 RX ADMIN — GADOTERIDOL 20 ML: 279.3 INJECTION, SOLUTION INTRAVENOUS at 13:16

## 2020-04-01 RX ADMIN — SODIUM CHLORIDE: 9 INJECTION, SOLUTION INTRAVENOUS at 16:26

## 2020-04-01 RX ADMIN — LORAZEPAM 1 MG: 1 TABLET ORAL at 11:02

## 2020-04-01 RX ADMIN — SODIUM CHLORIDE 500 ML: 9 INJECTION, SOLUTION INTRAVENOUS at 21:05

## 2020-04-01 RX ADMIN — FLUOXETINE HYDROCHLORIDE 20 MG: 20 CAPSULE ORAL at 17:39

## 2020-04-01 ASSESSMENT — ENCOUNTER SYMPTOMS
ABDOMINAL PAIN: 0
BACK PAIN: 1

## 2020-04-01 ASSESSMENT — PAIN SCALES - GENERAL
PAINLEVEL_OUTOF10: 3
PAINLEVEL_OUTOF10: 2

## 2020-04-01 ASSESSMENT — PAIN DESCRIPTION - LOCATION: LOCATION: BACK

## 2020-04-01 ASSESSMENT — PAIN DESCRIPTION - PAIN TYPE: TYPE: CHRONIC PAIN

## 2020-04-01 NOTE — H&P
positive for  arthralgias and bone pain  negative for  joint swelling and stiff joints  NEUROLOGICAL:  negative for headaches, dizziness, seizures, coordination problems, gait problems, dysphagia and syncope  BEHAVIOR/PSYCH:  negative for increased agitation and anxiety    Physical Exam:    Vitals: /85   Pulse 71   Temp 98.3 °F (36.8 °C) (Oral)   Resp 16   Wt 200 lb (90.7 kg)   LMP 10/27/2015 (Exact Date)   SpO2 100%   BMI 39.06 kg/m²   CONSTITUTIONAL:  awake, alert, cooperative, no apparent distress and moderately obese  EYES:  extra-ocular muscles intact  ENT:  normocepalic, without obvious abnormality  BACK:  spinous processes are tender on palpation lower L spine and paraspinous muscles are non-tender on palpation  LUNGS:  no increased work of breathing and clear to auscultation  CARDIOVASCULAR:  normal S1 and S2 and no edema  ABDOMEN:  normal bowel sounds, soft, non-distended, non-tender and no hepatosplenomegally  MUSCULOSKELETAL:  there is no redness, warmth, or swelling of the joints  NEUROLOGIC:  Mental Status Exam:  Level of Alertness:   awake  Orientation:   person, place, time  Memory:   normal  Cranial Nerves:  cranial nerves II-XII are grossly intact  Motor Exam:  Motor exam is symmetrical 5 out of 5 all extremities bilaterally  SKIN:  normal skin color, texture, turgor    CBC:   Recent Labs     04/01/20  0841   WBC 8.9   HGB 11.2*        BMP:    Recent Labs     04/01/20  0841      K 4.3      CO2 24   BUN 15   CREATININE 0.7   GLUCOSE 108     Hepatic:   Recent Labs     04/01/20  0841   AST 18   ALT 29   BILITOT 0.8   ALKPHOS 126     CT LUMBAR SPINE WO CONTRAST 04/01/20 1604  1. Discitis/osteomyelitis at the L4-5 level. There is bone destruction of the endplates. There is also some destruction of the bone surrounding the base of the left pedicle screw in L5. The distal aspect of the screw extends into the L4-5 disc space.   2. Worsening of the anterolisthesis of L5 on

## 2020-04-01 NOTE — ED PROVIDER NOTES
INITIAL VITALS:  weight is 200 lb (90.7 kg). Her oral temperature is 98.3 °F (36.8 °C). Her blood pressure is 139/85 and her pulse is 71. Her respiration is 16 and oxygen saturation is 100%. Physical Exam  HENT:      Head: Normocephalic. Nose: Nose normal.      Mouth/Throat:      Mouth: Mucous membranes are moist.   Eyes:      Pupils: Pupils are equal, round, and reactive to light. Cardiovascular:      Rate and Rhythm: Normal rate and regular rhythm. Pulmonary:      Effort: Pulmonary effort is normal.      Breath sounds: Normal breath sounds. Abdominal:      General: Abdomen is flat. Bowel sounds are normal. There is no distension. Tenderness: There is no abdominal tenderness. Musculoskeletal:        Back:    Skin:     General: Skin is warm. Neurological:      General: No focal deficit present. Mental Status: She is alert. Psychiatric:         Mood and Affect: Mood normal.         Behavior: Behavior normal.          DIFFERENTIAL DIAGNOSIS:   Post-op pain, post-op infection, chronic pain    DIAGNOSTIC RESULTS     EKG: All EKG's are interpreted by the Emergency Department Physician who either signs or Co-signs this chart in the absence of a cardiologist.    None    RADIOLOGY: non-plainfilm images(s) such as CT, Ultrasound and MRI are read by the radiologist.    MRI Yudithle Federico 157   Final Result       1. Discitis/osteomyelitis at the L4-5 level. This is the level above the fusion. There is diffusely enhancing epidural tissue surrounding the thecal sac at the L4-5 through L5 levels. This is most pronounced in the ventral epidural space posterior to the    L5 vertebral body. This is most likely a phlegmon. No discrete abscess is noted. There is also enhancement in the prevertebral soft tissues. 2. Enhancement of the tissue surrounding the pedicle screws. A CT of the lumbar spine is recommended. This can assess for loosening of the hardware or destruction of the bone.    3. hardware. MDM:  The patient needs admitted to the hospital.  I contacted  for admission. He graciously agreed to admit. Dr. Geo Gupta will be consulted. Dr. Geo Gupta also recommends infectious disease consult. I advised  of this. Blood cultures were ordered. Initial doses of IV antibiotics were administered in the emergency department. Patient had multi-organisms grow and previous blood cultures at [facility]. Patient will be treated with same initial dose of antibiotics, vancomycin and meropenem. CT of the lumbar spine was added to the work-up. Results are not available at time of admission. CRITICAL CARE:   None    CONSULTS:  Dr Tavo Ledbetter [facility]  Dr Jazmin Dickerson,  admission    PROCEDURES:  None    FINAL IMPRESSION      1. Discitis of lumbar region    2. Osteomyelitis of lumbar vertebra [facility])          DISPOSITION/PLAN   Admit    PATIENT REFERRED TO:  Emerita David MD  [place] ,C  [phone]            DISCHARGE MEDICATIONS:  Current Discharge Medication List          (Please note that portions of this note were completed with a voice recognition program.  Efforts were made to edit the dictations but occasionally words are mis-transcribed.)    The patient was given an opportunity to see the Emergency Attending. The patient voiced understanding that I was a Mid-LevelProvider and was in agreement with being seen independently by myself.           CALIXTO James - CNP  04/01/20 9881

## 2020-04-01 NOTE — ED NOTES
ED to inpatient nurses report    Chief Complaint   Patient presents with    Back Pain     c/o infection in old urgical site      Present to ED from home  LOC: alert and orientated to name, place, date  Vital signs   Vitals:    04/01/20 0809 04/01/20 1044 04/01/20 1352 04/01/20 1508   BP: (!) 156/96 (!) 163/90 (!) 142/98 (!) 130/90   Pulse: 83 77 70 69   Resp: 16 16 16 16   Temp: 97.8 °F (36.6 °C)      TempSrc: Oral      SpO2: 98% 99% 99% 100%   Weight: 200 lb (90.7 kg)         Oxygen Baseline RA    Current needs required RA  Bipap/Cpap No  LDAs:   Peripheral IV 04/01/20 Left Antecubital (Active)   Site Assessment Clean;Dry; Intact 4/1/2020 10:57 AM   Line Status Flushed 4/1/2020 10:57 AM   Dressing Status Clean;Dry 4/1/2020 10:57 AM     Mobility: Independent  Pending ED orders: none  Present condition: Pt restful and calm on cart. Denies current needs or concerns at this time. VSS.      Electronically signed by George Gabriel RN on 4/1/2020 at 3:35 PM       Kb Abrams RN  04/01/20 0920

## 2020-04-01 NOTE — ED NOTES
Pt medicated per orders for MRI. Pt continues in no acute distress, VSS.       Bonnie Solano RN  04/01/20 7317

## 2020-04-01 NOTE — CONSULTS
 UPPER GASTROINTESTINAL ENDOSCOPY  7/19/2019    EGD DILATION SAVORY performed by Carolina Moreno MD at [facility]         MEDICATIONS:       Scheduled Meds:   meropenem  1 g Intravenous Q8H    Vitamin D  1,000 Units Oral Daily    FLUoxetine  20 mg Oral Daily    metoprolol succinate  50 mg Oral Daily    [START ON 4/2/2020] pantoprazole  40 mg Oral QAM AC    senna-docusate  2 tablet Oral BID    sodium chloride flush  10 mL Intravenous 2 times per day    vancomycin (VANCOCIN) intermittent dosing (placeholder)   Other RX Placeholder    vancomycin  1,500 mg Intravenous Once     Continuous Infusions:   sodium chloride 125 mL/hr at 04/01/20 1626     PRN Meds:sodium chloride flush, acetaminophen **OR** acetaminophen, HYDROcodone 5 mg - acetaminophen **OR** HYDROcodone 5 mg - acetaminophen, morphine, ondansetron  Allergies:   ALLERGIES:    Codeine and Dilaudid [hydromorphone hcl]        SOCIAL HISTORY:     TOBACCO:   reports that she quit smoking about 3 years ago. Her smoking use included cigarettes. She has a 40.00 pack-year smoking history. She has never used smokeless tobacco.     ETOH:   reports no history of alcohol use. Patient currently lives with family        FAMILY HISTORY:         Problem Relation Age of Onset    Ulcerative Colitis Mother     Cancer Father         skin    High Blood Pressure Father     Diabetes Father        REVIEW OF SYSTEMS:     Constitutional: no fever, no night sweats, no fatigue. Head: no head ache , no head injury, no migranes. Eye: no blurring of vision, no double vision.   Ears: no hearing difficulty, no tinnitus  Mouth/throat: no ulceration, dental caries , dysphagia  Lungs: no cough no chest pain  CVS: no palpitation, no chest pain, no shortness of breath  GI: no abdominal pain, no nausea , no vomiting, no constipation  GOGO: no dysuria, frequency and urgency, no hematuria, no kidney stones  Musculoskeletal:+ joint pain, swelling , stiffness,  Endocrine: no Recommendation:   Discites/OM of lumbar spine: she was recently treated with prolonged iv meropenem and vancomycin from [facility]. She recently finished treatment. Will stop antibiotic. Will ask Ortho to see patient . I am concerned of infection of the hardware. Due to bone destruction and loosening around the screw, it may need to be explored and removed if feasible, will obtain tissue culture prior to empiric antibiotic  Lower back pain due to above  Hx of MRSA   And pseudomonas infection       Thank you Debbie Contreras MD for allowing me to participate in this patient's care.     Caden Tran MD,FACP 4/1/2020 6:14 PM

## 2020-04-02 ENCOUNTER — ANESTHESIA EVENT (OUTPATIENT)
Dept: OPERATING ROOM | Age: 50
DRG: 304 | End: 2020-04-02
Payer: MEDICARE

## 2020-04-02 ENCOUNTER — APPOINTMENT (OUTPATIENT)
Dept: GENERAL RADIOLOGY | Age: 50
DRG: 304 | End: 2020-04-02
Payer: MEDICARE

## 2020-04-02 LAB
ANION GAP SERPL CALCULATED.3IONS-SCNC: 10 MEQ/L (ref 8–16)
BUN BLDV-MCNC: 13 MG/DL (ref 7–22)
CALCIUM SERPL-MCNC: 9.3 MG/DL (ref 8.5–10.5)
CHLORIDE BLD-SCNC: 104 MEQ/L (ref 98–111)
CO2: 25 MEQ/L (ref 23–33)
CREAT SERPL-MCNC: 0.7 MG/DL (ref 0.4–1.2)
EKG ATRIAL RATE: 55 BPM
EKG P AXIS: 38 DEGREES
EKG P-R INTERVAL: 198 MS
EKG Q-T INTERVAL: 436 MS
EKG QRS DURATION: 92 MS
EKG QTC CALCULATION (BAZETT): 417 MS
EKG R AXIS: -16 DEGREES
EKG T AXIS: 6 DEGREES
EKG VENTRICULAR RATE: 55 BPM
ERYTHROCYTE [DISTWIDTH] IN BLOOD BY AUTOMATED COUNT: 16.5 % (ref 11.5–14.5)
ERYTHROCYTE [DISTWIDTH] IN BLOOD BY AUTOMATED COUNT: 49.3 FL (ref 35–45)
GFR SERPL CREATININE-BSD FRML MDRD: 89 ML/MIN/1.73M2
GLUCOSE BLD-MCNC: 115 MG/DL (ref 70–108)
HCT VFR BLD CALC: 34.6 % (ref 37–47)
HEMOGLOBIN: 10.4 GM/DL (ref 12–16)
INR BLD: 1.06 (ref 0.85–1.13)
MCH RBC QN AUTO: 24.7 PG (ref 26–33)
MCHC RBC AUTO-ENTMCNC: 30.1 GM/DL (ref 32.2–35.5)
MCV RBC AUTO: 82.2 FL (ref 81–99)
PLATELET # BLD: 292 THOU/MM3 (ref 130–400)
PMV BLD AUTO: 9.6 FL (ref 9.4–12.4)
POTASSIUM REFLEX MAGNESIUM: 4.3 MEQ/L (ref 3.5–5.2)
RBC # BLD: 4.21 MILL/MM3 (ref 4.2–5.4)
SODIUM BLD-SCNC: 139 MEQ/L (ref 135–145)
WBC # BLD: 6.8 THOU/MM3 (ref 4.8–10.8)

## 2020-04-02 PROCEDURE — 85027 COMPLETE CBC AUTOMATED: CPT

## 2020-04-02 PROCEDURE — 71045 X-RAY EXAM CHEST 1 VIEW: CPT

## 2020-04-02 PROCEDURE — 1200000000 HC SEMI PRIVATE

## 2020-04-02 PROCEDURE — 93005 ELECTROCARDIOGRAM TRACING: CPT | Performed by: INTERNAL MEDICINE

## 2020-04-02 PROCEDURE — 94760 N-INVAS EAR/PLS OXIMETRY 1: CPT

## 2020-04-02 PROCEDURE — 85610 PROTHROMBIN TIME: CPT

## 2020-04-02 PROCEDURE — 80048 BASIC METABOLIC PNL TOTAL CA: CPT

## 2020-04-02 PROCEDURE — 2580000003 HC RX 258: Performed by: INTERNAL MEDICINE

## 2020-04-02 PROCEDURE — 93010 ELECTROCARDIOGRAM REPORT: CPT | Performed by: NUCLEAR MEDICINE

## 2020-04-02 PROCEDURE — 36415 COLL VENOUS BLD VENIPUNCTURE: CPT

## 2020-04-02 PROCEDURE — 6370000000 HC RX 637 (ALT 250 FOR IP): Performed by: INTERNAL MEDICINE

## 2020-04-02 PROCEDURE — 2709999900 HC NON-CHARGEABLE SUPPLY

## 2020-04-02 RX ADMIN — SODIUM CHLORIDE: 9 INJECTION, SOLUTION INTRAVENOUS at 03:38

## 2020-04-02 RX ADMIN — SODIUM CHLORIDE, PRESERVATIVE FREE 10 ML: 5 INJECTION INTRAVENOUS at 20:46

## 2020-04-02 RX ADMIN — FLUOXETINE HYDROCHLORIDE 20 MG: 20 CAPSULE ORAL at 09:56

## 2020-04-02 RX ADMIN — SODIUM CHLORIDE: 9 INJECTION, SOLUTION INTRAVENOUS at 20:46

## 2020-04-02 RX ADMIN — HYDROCODONE BITARTRATE AND ACETAMINOPHEN 1 TABLET: 5; 325 TABLET ORAL at 13:01

## 2020-04-02 RX ADMIN — PANTOPRAZOLE SODIUM 40 MG: 40 TABLET, DELAYED RELEASE ORAL at 05:45

## 2020-04-02 RX ADMIN — SODIUM CHLORIDE: 9 INJECTION, SOLUTION INTRAVENOUS at 12:17

## 2020-04-02 RX ADMIN — VITAMIN D, TAB 1000IU (100/BT) 1000 UNITS: 25 TAB at 09:56

## 2020-04-02 ASSESSMENT — PAIN SCALES - GENERAL: PAINLEVEL_OUTOF10: 2

## 2020-04-02 NOTE — PLAN OF CARE
Problem: Falls - Risk of:  Goal: Will remain free from falls  Description: Will remain free from falls  4/2/2020 1425 by Ty Mccarty RN  Outcome: Ongoing  Note: Bed in lowest position. Call light within reach. Educated patient to use call light for assistance. Bed alarm in place. Problem: Discharge Planning:  Goal: Discharged to appropriate level of care  Description: Discharged to appropriate level of care  4/2/2020 1425 by Ty Mccarty RN  Outcome: Ongoing     Problem: Skin Integrity:  Goal: Absence of new skin breakdown  Description: Absence of new skin breakdown  4/2/2020 1425 by Ty Mccarty RN  Outcome: Ongoing  Note: Skin assessment completed. Patient turned every 2 hours and as needed. No skin breakdown this shift. Problem: Pain:  Goal: Control of acute pain  Description: Control of acute pain  4/2/2020 1425 by Ty Mccarty RN  Outcome: Ongoing  Note: Minimal pain this shift. Will continue to monitor. Care plan reviewed with patient. Patient verbalizes understanding of the plan of care and contribute to goal setting.

## 2020-04-02 NOTE — PROGRESS NOTES
INTERNAL MEDICINE Progress Note  4/2/2020 4:20 PM  Subjective:   Admit Date: 4/1/2020  PCP: Danyell Stephens MD  Interval History:   Back pain+    Objective:   Vitals: /64   Pulse 70   Temp 97.8 °F (36.6 °C) (Oral)   Resp 16   Ht 5' (1.524 m)   Wt 207 lb 3.2 oz (94 kg)   LMP 10/27/2015 (Exact Date)   SpO2 97%   BMI 40.47 kg/m²   General appearance: alert and cooperative with exam  HEENT: Head: atraumatic  Neck: no adenopathy, no carotid bruit and no JVD  Lungs: clear to auscultation bilaterally  Heart: S1, S2 normal  Abdomen: soft, non-tender; bowel sounds normal; no masses,  no organomegaly  Extremities: no edema, redness or tenderness in the calves or thighs  Neurologic: Mental status: Alert, oriented, thought content appropriate      Medications:   Scheduled Meds:   Vitamin D  1,000 Units Oral Daily    FLUoxetine  20 mg Oral Daily    metoprolol succinate  50 mg Oral Daily    pantoprazole  40 mg Oral QAM AC    senna-docusate  2 tablet Oral BID    sodium chloride flush  10 mL Intravenous 2 times per day     Continuous Infusions:   sodium chloride 125 mL/hr at 04/02/20 1217       Lab Results:   CBC:   Recent Labs     04/01/20  0841 04/02/20  0545   WBC 8.9 6.8   HGB 11.2* 10.4*    292     BMP:    Recent Labs     04/01/20  0841 04/02/20  0545    139   K 4.3 4.3    104   CO2 24 25   BUN 15 13   CREATININE 0.7 0.7   GLUCOSE 108 115*     Hepatic:   Recent Labs     04/01/20  0841   AST 18   ALT 29   BILITOT 0.8   ALKPHOS 126     Troponin: No results for input(s): TROPONINI in the last 72 hours. BNP: No results for input(s): BNP in the last 72 hours. Lipids: No results for input(s): CHOL, HDL in the last 72 hours.     Invalid input(s): LDLCALCU,  TRIG,  LDL  INR:   Recent Labs     04/02/20  0545   INR 1.06     Ionized Calcium:  No results found for: IONCA  Magnesium:    Lab Results   Component Value Date    MG 2.0 10/24/2019     Uric Acid:  No components found for: URIC  HgBA1c: Lab Results   Component Value Date    LABA1C 5.6 02/21/2019     TSH:    Lab Results   Component Value Date    TSH 2.080 02/01/2020     FOLATE:    Lab Results   Component Value Date    FOLATE 4.9 02/01/2020     IRON:    Lab Results   Component Value Date    IRON 30 01/31/2020     FERRITIN:    Lab Results   Component Value Date    FERRITIN 129 01/31/2020         Assessment and Plan:   1. Discitis/osteomyelitis at the L4-5 level. 2. HTN     Analgesics. SCD.   Will be going to surgery / Lo Wagner MD

## 2020-04-02 NOTE — PROGRESS NOTES
Progress note: Infectious diseases    Patient - Lizeth Diane,  Age - 53 y.o.    - 1970      Room Number - 5K-10/010-A   MRN -  [id number]   Acct # - [de-identified]  Date of Admission -  2020  8:05 AM    SUBJECTIVE:   She has no new complaints  Ortho planning surgery  OBJECTIVE   VITALS    height is 5' (1.524 m) and weight is 207 lb 3.2 oz (94 kg). Her oral temperature is 97.8 °F (36.6 °C). Her blood pressure is 105/64 and her pulse is 70. Her respiration is 16 and oxygen saturation is 98%.        Wt Readings from Last 3 Encounters:   20 207 lb 3.2 oz (94 kg)   20 197 lb 11.2 oz (89.7 kg)   19 196 lb (88.9 kg)       I/O (24 Hours)    Intake/Output Summary (Last 24 hours) at 2020 1219  Last data filed at 2020 0330  Gross per 24 hour   Intake 2063.36 ml   Output 1425 ml   Net 638.36 ml       General Appearance  Awake, alert, oriented,  not  In acute distress  HEENT - normocephalic, atraumatic, pink conjunctiva,  anicteric sclera  Neck - Supple, no mass  Lungs -  Bilateral good air entry, no rhonchi, no wheeze  Cardiovascular - Heart sounds are normal.       Abdomen - soft, not distended, nontender,   Neurologic -oriented  Skin - No bruising or bleeding  Extremities - No edema, no cyanosis, clubbing     MEDICATIONS:      Vitamin D  1,000 Units Oral Daily    FLUoxetine  20 mg Oral Daily    metoprolol succinate  50 mg Oral Daily    pantoprazole  40 mg Oral QAM AC    senna-docusate  2 tablet Oral BID    sodium chloride flush  10 mL Intravenous 2 times per day      sodium chloride 125 mL/hr at 20 1217     sodium chloride flush, acetaminophen **OR** acetaminophen, HYDROcodone 5 mg - acetaminophen **OR** HYDROcodone 5 mg - acetaminophen, morphine, ondansetron      LABS:     CBC:   Recent Labs     20  0841 20  0545   WBC 8.9 6.8   HGB 11.2* 10.4*    292     BMP:

## 2020-04-02 NOTE — CONSULTS
Inpatient Consultation    Jayesh De La Cruz (1970)  4/2/2020    Reason for Consult:  L4-5 discitis/osteomyelitis  Requesting Physician: Dr. Monty Carney: Lumbar pain    History Obtained From: Patient and EMR    HISTORY OF PRESENT ILLNESS:                The patient is a 53 y.o. female who presents with above chief complaint. The patient had a L5-S1 decompression and fusion in October of 2019 with Dr. Dottie Kim in [place]. She had a revision surgery shortly after for removal of malpositioned disc prosthesis. She developed a postoperative wound infection with MRSA and pseudomonas and was treated by ID at [facility]note 3] with IV vancomycin and meropenem. She recently completed antibiotic treatment and had her PICC line removed. She recently developed an increase in her low back pain. She had ESR and CRP done outpatient which were found to be elevated and she was directed by her ID doctor to come to the ED. She presented yesterday and MRI completed showed L4-5 discitis/osteomyelitis with possible epidural abscess. CT scan showed bone destruction with screw loosening. ESR 30 and CRP 16.2 WBC 6.8. Patient afebrile. She denies bowel or bladder incontinence, no saddle paresthesias. She does have chronic numbness/tingling in toes on left foot from first surgery. No other leg pain.  (+) night sweats, negative fever and chills    Past Medical History:        Diagnosis Date    Arthritis     Esophagitis     GERD (gastroesophageal reflux disease)     Hypertension     Morbid obesity with BMI of 40.0-44.9, adult ([address]     Vertigo      Past Surgical History:        Procedure Laterality Date    BACK SURGERY      BLADDER SUSPENSION      BREAST REDUCTION SURGERY  10/2007    CERVICAL FUSION  2017    ACDF C5-C7    CHOLECYSTECTOMY      COLONOSCOPY      DILATION AND CURETTAGE OF UTERUS      ENDOSCOPY, COLON, DIAGNOSTIC      HYSTERECTOMY      LUMBAR SPINE SURGERY  10/17/2019    L5-S1    UPPER GASTROINTESTINAL ENDOSCOPY N/A 7/19/2019    EGD BIOPSY performed by Lilian Krishnan MD at [facility]  7/19/2019    EGD DILATION SAVORY performed by Lilian Krishnan MD at [facility]     Current Medications:   Current Facility-Administered Medications: Vitamin D (CHOLECALCIFEROL) tablet 1,000 Units, 1,000 Units, Oral, Daily  FLUoxetine (PROZAC) capsule 20 mg, 20 mg, Oral, Daily  metoprolol succinate (TOPROL XL) extended release tablet 50 mg, 50 mg, Oral, Daily  pantoprazole (PROTONIX) tablet 40 mg, 40 mg, Oral, QAM AC  senna-docusate (PERICOLACE) 8.6-50 MG per tablet 2 tablet, 2 tablet, Oral, BID  sodium chloride flush 0.9 % injection 10 mL, 10 mL, Intravenous, 2 times per day  sodium chloride flush 0.9 % injection 10 mL, 10 mL, Intravenous, PRN  acetaminophen (TYLENOL) tablet 650 mg, 650 mg, Oral, Q6H PRN **OR** acetaminophen (TYLENOL) suppository 650 mg, 650 mg, Rectal, Q6H PRN  0.9 % sodium chloride infusion, , Intravenous, Continuous  HYDROcodone-acetaminophen (NORCO) 5-325 MG per tablet 1 tablet, 1 tablet, Oral, Q4H PRN **OR** HYDROcodone-acetaminophen (NORCO) 5-325 MG per tablet 2 tablet, 2 tablet, Oral, Q4H PRN  morphine (PF) injection 2 mg, 2 mg, Intravenous, Q4H PRN  ondansetron (ZOFRAN) injection 4 mg, 4 mg, Intravenous, Q6H PRN  Allergies:  Codeine and Dilaudid [hydromorphone hcl]    Social History:   TOBACCO:   reports that she quit smoking about 3 years ago. Her smoking use included cigarettes. She has a 40.00 pack-year smoking history. She has never used smokeless tobacco.  ETOH:   reports no history of alcohol use. DRUGS:   reports no history of drug use.   Family History:       Problem Relation Age of Onset    Ulcerative Colitis Mother     Cancer Father         skin    High Blood Pressure Father     Diabetes Father        REVIEW OF SYSTEMS:    CONSTITUTIONAL:  negative for fevers, chills (+) night sweats  RESPIRATORY:  negative for cough and shortness of

## 2020-04-03 ENCOUNTER — ANESTHESIA (OUTPATIENT)
Dept: OPERATING ROOM | Age: 50
DRG: 304 | End: 2020-04-03
Payer: MEDICARE

## 2020-04-03 ENCOUNTER — APPOINTMENT (OUTPATIENT)
Dept: GENERAL RADIOLOGY | Age: 50
DRG: 304 | End: 2020-04-03
Payer: MEDICARE

## 2020-04-03 VITALS
SYSTOLIC BLOOD PRESSURE: 99 MMHG | RESPIRATION RATE: 13 BRPM | DIASTOLIC BLOOD PRESSURE: 55 MMHG | TEMPERATURE: 97 F | OXYGEN SATURATION: 100 %

## 2020-04-03 LAB
ABO: NORMAL
ANION GAP SERPL CALCULATED.3IONS-SCNC: 12 MEQ/L (ref 8–16)
ANTIBODY SCREEN: NORMAL
BUN BLDV-MCNC: 14 MG/DL (ref 7–22)
CALCIUM SERPL-MCNC: 9.2 MG/DL (ref 8.5–10.5)
CHLORIDE BLD-SCNC: 105 MEQ/L (ref 98–111)
CO2: 26 MEQ/L (ref 23–33)
CREAT SERPL-MCNC: 0.6 MG/DL (ref 0.4–1.2)
GFR SERPL CREATININE-BSD FRML MDRD: > 90 ML/MIN/1.73M2
GLUCOSE BLD-MCNC: 116 MG/DL (ref 70–108)
HCT VFR BLD CALC: 24.6 % (ref 37–47)
HEMOGLOBIN: 7.2 GM/DL (ref 12–16)
POTASSIUM REFLEX MAGNESIUM: 4.2 MEQ/L (ref 3.5–5.2)
RH FACTOR: NORMAL
SODIUM BLD-SCNC: 143 MEQ/L (ref 135–145)

## 2020-04-03 PROCEDURE — 86900 BLOOD TYPING SEROLOGIC ABO: CPT

## 2020-04-03 PROCEDURE — 0QB00ZX EXCISION OF LUMBAR VERTEBRA, OPEN APPROACH, DIAGNOSTIC: ICD-10-PCS | Performed by: ORTHOPAEDIC SURGERY

## 2020-04-03 PROCEDURE — 3700000001 HC ADD 15 MINUTES (ANESTHESIA): Performed by: ORTHOPAEDIC SURGERY

## 2020-04-03 PROCEDURE — 6360000002 HC RX W HCPCS: Performed by: NURSE ANESTHETIST, CERTIFIED REGISTERED

## 2020-04-03 PROCEDURE — 85018 HEMOGLOBIN: CPT

## 2020-04-03 PROCEDURE — 2140000000 HC CCU INTERMEDIATE R&B

## 2020-04-03 PROCEDURE — 2720000010 HC SURG SUPPLY STERILE: Performed by: ORTHOPAEDIC SURGERY

## 2020-04-03 PROCEDURE — 7100000000 HC PACU RECOVERY - FIRST 15 MIN: Performed by: ORTHOPAEDIC SURGERY

## 2020-04-03 PROCEDURE — P9016 RBC LEUKOCYTES REDUCED: HCPCS

## 2020-04-03 PROCEDURE — 0SG1071 FUSION OF 2 OR MORE LUMBAR VERTEBRAL JOINTS WITH AUTOLOGOUS TISSUE SUBSTITUTE, POSTERIOR APPROACH, POSTERIOR COLUMN, OPEN APPROACH: ICD-10-PCS | Performed by: ORTHOPAEDIC SURGERY

## 2020-04-03 PROCEDURE — 3700000000 HC ANESTHESIA ATTENDED CARE: Performed by: ORTHOPAEDIC SURGERY

## 2020-04-03 PROCEDURE — 0QP004Z REMOVAL OF INTERNAL FIXATION DEVICE FROM LUMBAR VERTEBRA, OPEN APPROACH: ICD-10-PCS | Performed by: ORTHOPAEDIC SURGERY

## 2020-04-03 PROCEDURE — 6370000000 HC RX 637 (ALT 250 FOR IP): Performed by: INTERNAL MEDICINE

## 2020-04-03 PROCEDURE — 01NR0ZZ RELEASE SACRAL NERVE, OPEN APPROACH: ICD-10-PCS | Performed by: ORTHOPAEDIC SURGERY

## 2020-04-03 PROCEDURE — 0SG00AJ FUSION OF LUMBAR VERTEBRAL JOINT WITH INTERBODY FUSION DEVICE, POSTERIOR APPROACH, ANTERIOR COLUMN, OPEN APPROACH: ICD-10-PCS | Performed by: ORTHOPAEDIC SURGERY

## 2020-04-03 PROCEDURE — 2780000010 HC IMPLANT OTHER: Performed by: ORTHOPAEDIC SURGERY

## 2020-04-03 PROCEDURE — 3600000005 HC SURGERY LEVEL 5 BASE: Performed by: ORTHOPAEDIC SURGERY

## 2020-04-03 PROCEDURE — 2500000003 HC RX 250 WO HCPCS: Performed by: NURSE ANESTHETIST, CERTIFIED REGISTERED

## 2020-04-03 PROCEDURE — 0SG0071 FUSION OF LUMBAR VERTEBRAL JOINT WITH AUTOLOGOUS TISSUE SUBSTITUTE, POSTERIOR APPROACH, POSTERIOR COLUMN, OPEN APPROACH: ICD-10-PCS | Performed by: ORTHOPAEDIC SURGERY

## 2020-04-03 PROCEDURE — P9045 ALBUMIN (HUMAN), 5%, 250 ML: HCPCS | Performed by: NURSE ANESTHETIST, CERTIFIED REGISTERED

## 2020-04-03 PROCEDURE — 2580000003 HC RX 258: Performed by: PHARMACIST

## 2020-04-03 PROCEDURE — 87186 SC STD MICRODIL/AGAR DIL: CPT

## 2020-04-03 PROCEDURE — 86923 COMPATIBILITY TEST ELECTRIC: CPT

## 2020-04-03 PROCEDURE — 88304 TISSUE EXAM BY PATHOLOGIST: CPT

## 2020-04-03 PROCEDURE — 2580000003 HC RX 258: Performed by: INTERNAL MEDICINE

## 2020-04-03 PROCEDURE — 2580000003 HC RX 258: Performed by: PHYSICIAN ASSISTANT

## 2020-04-03 PROCEDURE — 36415 COLL VENOUS BLD VENIPUNCTURE: CPT

## 2020-04-03 PROCEDURE — 87075 CULTR BACTERIA EXCEPT BLOOD: CPT

## 2020-04-03 PROCEDURE — 7100000001 HC PACU RECOVERY - ADDTL 15 MIN: Performed by: ORTHOPAEDIC SURGERY

## 2020-04-03 PROCEDURE — 6360000002 HC RX W HCPCS: Performed by: ANESTHESIOLOGY

## 2020-04-03 PROCEDURE — 87070 CULTURE OTHR SPECIMN AEROBIC: CPT

## 2020-04-03 PROCEDURE — 6360000002 HC RX W HCPCS

## 2020-04-03 PROCEDURE — 3E0U0GB INTRODUCTION OF RECOMBINANT BONE MORPHOGENETIC PROTEIN INTO JOINTS, OPEN APPROACH: ICD-10-PCS | Performed by: ORTHOPAEDIC SURGERY

## 2020-04-03 PROCEDURE — 87077 CULTURE AEROBIC IDENTIFY: CPT

## 2020-04-03 PROCEDURE — 86901 BLOOD TYPING SEROLOGIC RH(D): CPT

## 2020-04-03 PROCEDURE — 2580000003 HC RX 258: Performed by: NURSE ANESTHETIST, CERTIFIED REGISTERED

## 2020-04-03 PROCEDURE — 3600000015 HC SURGERY LEVEL 5 ADDTL 15MIN: Performed by: ORTHOPAEDIC SURGERY

## 2020-04-03 PROCEDURE — 85014 HEMATOCRIT: CPT

## 2020-04-03 PROCEDURE — 6360000002 HC RX W HCPCS: Performed by: INTERNAL MEDICINE

## 2020-04-03 PROCEDURE — 88311 DECALCIFY TISSUE: CPT

## 2020-04-03 PROCEDURE — 6370000000 HC RX 637 (ALT 250 FOR IP): Performed by: PHYSICIAN ASSISTANT

## 2020-04-03 PROCEDURE — 80048 BASIC METABOLIC PNL TOTAL CA: CPT

## 2020-04-03 PROCEDURE — 6360000002 HC RX W HCPCS: Performed by: PHARMACIST

## 2020-04-03 PROCEDURE — 86850 RBC ANTIBODY SCREEN: CPT

## 2020-04-03 PROCEDURE — 01NB0ZZ RELEASE LUMBAR NERVE, OPEN APPROACH: ICD-10-PCS | Performed by: ORTHOPAEDIC SURGERY

## 2020-04-03 PROCEDURE — 0SG30AJ FUSION OF LUMBOSACRAL JOINT WITH INTERBODY FUSION DEVICE, POSTERIOR APPROACH, ANTERIOR COLUMN, OPEN APPROACH: ICD-10-PCS | Performed by: ORTHOPAEDIC SURGERY

## 2020-04-03 PROCEDURE — 87205 SMEAR GRAM STAIN: CPT

## 2020-04-03 PROCEDURE — 72020 X-RAY EXAM OF SPINE 1 VIEW: CPT

## 2020-04-03 PROCEDURE — 2709999900 HC NON-CHARGEABLE SUPPLY: Performed by: ORTHOPAEDIC SURGERY

## 2020-04-03 PROCEDURE — C1713 ANCHOR/SCREW BN/BN,TIS/BN: HCPCS | Performed by: ORTHOPAEDIC SURGERY

## 2020-04-03 DEVICE — IMPLANTABLE DEVICE: Type: IMPLANTABLE DEVICE | Site: SPINE LUMBAR | Status: FUNCTIONAL

## 2020-04-03 DEVICE — SCREW SPNL L45MM DIA75MM PEDCL POLYAX 2 LD THRD TOP LD FOR: Type: IMPLANTABLE DEVICE | Site: SPINE LUMBAR | Status: FUNCTIONAL

## 2020-04-03 DEVICE — SCREW SPNL L40MM DIA7.5MM PEDCL POLYAX 2 LD THRD TOP LD FOR: Type: IMPLANTABLE DEVICE | Site: SPINE LUMBAR | Status: FUNCTIONAL

## 2020-04-03 DEVICE — SET SCR SPNL POLYAX ATR EVEREST: Type: IMPLANTABLE DEVICE | Site: SPINE LUMBAR | Status: FUNCTIONAL

## 2020-04-03 DEVICE — BONE GRAFT KIT 7510800 INFUSE LARGE II
Type: IMPLANTABLE DEVICE | Site: SPINE LUMBAR | Status: FUNCTIONAL
Brand: INFUSE® BONE GRAFT

## 2020-04-03 DEVICE — CROSSLINK 7752537 LARGE ROD
Type: IMPLANTABLE DEVICE | Site: SPINE LUMBAR | Status: FUNCTIONAL
Brand: VERTEX® RECONSTRUCTION SYSTEM

## 2020-04-03 RX ORDER — SODIUM CHLORIDE 9 MG/ML
INJECTION, SOLUTION INTRAVENOUS CONTINUOUS
Status: DISCONTINUED | OUTPATIENT
Start: 2020-04-03 | End: 2020-04-04

## 2020-04-03 RX ORDER — SODIUM CHLORIDE, SODIUM LACTATE, POTASSIUM CHLORIDE, CALCIUM CHLORIDE 600; 310; 30; 20 MG/100ML; MG/100ML; MG/100ML; MG/100ML
INJECTION, SOLUTION INTRAVENOUS CONTINUOUS PRN
Status: DISCONTINUED | OUTPATIENT
Start: 2020-04-03 | End: 2020-04-03 | Stop reason: SDUPTHER

## 2020-04-03 RX ORDER — SODIUM CHLORIDE 9 MG/ML
INJECTION, SOLUTION INTRAVENOUS CONTINUOUS PRN
Status: DISCONTINUED | OUTPATIENT
Start: 2020-04-03 | End: 2020-04-03 | Stop reason: SDUPTHER

## 2020-04-03 RX ORDER — EPHEDRINE SULFATE/0.9% NACL/PF 50 MG/5 ML
SYRINGE (ML) INTRAVENOUS PRN
Status: DISCONTINUED | OUTPATIENT
Start: 2020-04-03 | End: 2020-04-03 | Stop reason: SDUPTHER

## 2020-04-03 RX ORDER — ONDANSETRON 2 MG/ML
INJECTION INTRAMUSCULAR; INTRAVENOUS PRN
Status: DISCONTINUED | OUTPATIENT
Start: 2020-04-03 | End: 2020-04-03 | Stop reason: SDUPTHER

## 2020-04-03 RX ORDER — SODIUM CHLORIDE 0.9 % (FLUSH) 0.9 %
10 SYRINGE (ML) INJECTION PRN
Status: DISCONTINUED | OUTPATIENT
Start: 2020-04-03 | End: 2020-04-03 | Stop reason: HOSPADM

## 2020-04-03 RX ORDER — PROPOFOL 10 MG/ML
INJECTION, EMULSION INTRAVENOUS PRN
Status: DISCONTINUED | OUTPATIENT
Start: 2020-04-03 | End: 2020-04-03 | Stop reason: SDUPTHER

## 2020-04-03 RX ORDER — FENTANYL CITRATE 50 UG/ML
INJECTION, SOLUTION INTRAMUSCULAR; INTRAVENOUS
Status: COMPLETED
Start: 2020-04-03 | End: 2020-04-03

## 2020-04-03 RX ORDER — DEXAMETHASONE SODIUM PHOSPHATE 4 MG/ML
INJECTION, SOLUTION INTRA-ARTICULAR; INTRALESIONAL; INTRAMUSCULAR; INTRAVENOUS; SOFT TISSUE PRN
Status: DISCONTINUED | OUTPATIENT
Start: 2020-04-03 | End: 2020-04-03 | Stop reason: SDUPTHER

## 2020-04-03 RX ORDER — ROCURONIUM BROMIDE 10 MG/ML
INJECTION, SOLUTION INTRAVENOUS PRN
Status: DISCONTINUED | OUTPATIENT
Start: 2020-04-03 | End: 2020-04-03 | Stop reason: SDUPTHER

## 2020-04-03 RX ORDER — OXYCODONE HYDROCHLORIDE AND ACETAMINOPHEN 5; 325 MG/1; MG/1
1 TABLET ORAL EVERY 4 HOURS PRN
Status: DISCONTINUED | OUTPATIENT
Start: 2020-04-03 | End: 2020-04-04

## 2020-04-03 RX ORDER — ALBUMIN, HUMAN INJ 5% 5 %
SOLUTION INTRAVENOUS PRN
Status: DISCONTINUED | OUTPATIENT
Start: 2020-04-03 | End: 2020-04-03 | Stop reason: SDUPTHER

## 2020-04-03 RX ORDER — OXYCODONE HYDROCHLORIDE AND ACETAMINOPHEN 5; 325 MG/1; MG/1
2 TABLET ORAL EVERY 4 HOURS PRN
Status: DISCONTINUED | OUTPATIENT
Start: 2020-04-03 | End: 2020-04-04

## 2020-04-03 RX ORDER — MORPHINE SULFATE 2 MG/ML
INJECTION, SOLUTION INTRAMUSCULAR; INTRAVENOUS
Status: COMPLETED
Start: 2020-04-03 | End: 2020-04-03

## 2020-04-03 RX ORDER — SODIUM CHLORIDE 0.9 % (FLUSH) 0.9 %
10 SYRINGE (ML) INJECTION EVERY 12 HOURS SCHEDULED
Status: DISCONTINUED | OUTPATIENT
Start: 2020-04-03 | End: 2020-04-03 | Stop reason: HOSPADM

## 2020-04-03 RX ORDER — NEOSTIGMINE METHYLSULFATE 5 MG/5 ML
SYRINGE (ML) INTRAVENOUS PRN
Status: DISCONTINUED | OUTPATIENT
Start: 2020-04-03 | End: 2020-04-03 | Stop reason: SDUPTHER

## 2020-04-03 RX ORDER — FENTANYL CITRATE 50 UG/ML
INJECTION, SOLUTION INTRAMUSCULAR; INTRAVENOUS PRN
Status: DISCONTINUED | OUTPATIENT
Start: 2020-04-03 | End: 2020-04-03 | Stop reason: SDUPTHER

## 2020-04-03 RX ORDER — MIDAZOLAM HYDROCHLORIDE 1 MG/ML
INJECTION INTRAMUSCULAR; INTRAVENOUS PRN
Status: DISCONTINUED | OUTPATIENT
Start: 2020-04-03 | End: 2020-04-03 | Stop reason: SDUPTHER

## 2020-04-03 RX ORDER — MORPHINE SULFATE 2 MG/ML
2 INJECTION, SOLUTION INTRAMUSCULAR; INTRAVENOUS EVERY 5 MIN PRN
Status: DISCONTINUED | OUTPATIENT
Start: 2020-04-03 | End: 2020-04-03 | Stop reason: HOSPADM

## 2020-04-03 RX ORDER — GLYCOPYRROLATE 1 MG/5 ML
SYRINGE (ML) INTRAVENOUS PRN
Status: DISCONTINUED | OUTPATIENT
Start: 2020-04-03 | End: 2020-04-03 | Stop reason: SDUPTHER

## 2020-04-03 RX ORDER — CYCLOBENZAPRINE HCL 10 MG
10 TABLET ORAL 3 TIMES DAILY PRN
Status: DISCONTINUED | OUTPATIENT
Start: 2020-04-03 | End: 2020-04-04

## 2020-04-03 RX ORDER — SUCCINYLCHOLINE/SOD CL,ISO/PF 200MG/10ML
SYRINGE (ML) INTRAVENOUS PRN
Status: DISCONTINUED | OUTPATIENT
Start: 2020-04-03 | End: 2020-04-03 | Stop reason: SDUPTHER

## 2020-04-03 RX ORDER — FENTANYL CITRATE 50 UG/ML
50 INJECTION, SOLUTION INTRAMUSCULAR; INTRAVENOUS EVERY 5 MIN PRN
Status: DISCONTINUED | OUTPATIENT
Start: 2020-04-03 | End: 2020-04-03 | Stop reason: HOSPADM

## 2020-04-03 RX ORDER — LIDOCAINE HYDROCHLORIDE 20 MG/ML
INJECTION, SOLUTION INTRAVENOUS PRN
Status: DISCONTINUED | OUTPATIENT
Start: 2020-04-03 | End: 2020-04-03 | Stop reason: SDUPTHER

## 2020-04-03 RX ADMIN — PHENYLEPHRINE HYDROCHLORIDE 100 MCG: 10 INJECTION INTRAVENOUS at 15:11

## 2020-04-03 RX ADMIN — PHENYLEPHRINE HYDROCHLORIDE 100 MCG: 10 INJECTION INTRAVENOUS at 14:40

## 2020-04-03 RX ADMIN — FENTANYL CITRATE 100 MCG: 50 INJECTION, SOLUTION INTRAMUSCULAR; INTRAVENOUS at 14:52

## 2020-04-03 RX ADMIN — FENTANYL CITRATE 50 MCG: 50 INJECTION, SOLUTION INTRAMUSCULAR; INTRAVENOUS at 17:45

## 2020-04-03 RX ADMIN — PHENYLEPHRINE HYDROCHLORIDE 100 MCG: 10 INJECTION INTRAVENOUS at 16:20

## 2020-04-03 RX ADMIN — Medication 10 MG: at 16:31

## 2020-04-03 RX ADMIN — Medication 10 MG: at 17:03

## 2020-04-03 RX ADMIN — SODIUM CHLORIDE, POTASSIUM CHLORIDE, SODIUM LACTATE AND CALCIUM CHLORIDE: 600; 310; 30; 20 INJECTION, SOLUTION INTRAVENOUS at 16:47

## 2020-04-03 RX ADMIN — Medication 4 MG: at 17:15

## 2020-04-03 RX ADMIN — Medication 0.2 MG: at 14:08

## 2020-04-03 RX ADMIN — ONDANSETRON HYDROCHLORIDE 4 MG: 4 INJECTION, SOLUTION INTRAMUSCULAR; INTRAVENOUS at 16:48

## 2020-04-03 RX ADMIN — PHENYLEPHRINE HYDROCHLORIDE 100 MCG: 10 INJECTION INTRAVENOUS at 15:20

## 2020-04-03 RX ADMIN — ALBUMIN (HUMAN) 250 ML: 12.5 SOLUTION INTRAVENOUS at 15:42

## 2020-04-03 RX ADMIN — PHENYLEPHRINE HYDROCHLORIDE 100 MCG: 10 INJECTION INTRAVENOUS at 15:02

## 2020-04-03 RX ADMIN — PHENYLEPHRINE HYDROCHLORIDE 100 MCG: 10 INJECTION INTRAVENOUS at 14:30

## 2020-04-03 RX ADMIN — Medication 10 MG: at 16:35

## 2020-04-03 RX ADMIN — CYCLOBENZAPRINE 10 MG: 10 TABLET, FILM COATED ORAL at 17:55

## 2020-04-03 RX ADMIN — Medication 0.2 MG: at 15:02

## 2020-04-03 RX ADMIN — PROPOFOL 150 MG: 10 INJECTION, EMULSION INTRAVENOUS at 13:15

## 2020-04-03 RX ADMIN — MEROPENEM 1 G: 1 INJECTION, POWDER, FOR SOLUTION INTRAVENOUS at 21:19

## 2020-04-03 RX ADMIN — SODIUM CHLORIDE: 9 INJECTION, SOLUTION INTRAVENOUS at 14:58

## 2020-04-03 RX ADMIN — MORPHINE SULFATE 2 MG: 2 INJECTION, SOLUTION INTRAMUSCULAR; INTRAVENOUS at 17:55

## 2020-04-03 RX ADMIN — ROCURONIUM BROMIDE 50 MG: 10 INJECTION INTRAVENOUS at 13:35

## 2020-04-03 RX ADMIN — CEFAZOLIN 2 G: 10 INJECTION, POWDER, FOR SOLUTION INTRAVENOUS at 13:58

## 2020-04-03 RX ADMIN — VANCOMYCIN HYDROCHLORIDE 1500 MG: 1 INJECTION, POWDER, LYOPHILIZED, FOR SOLUTION INTRAVENOUS at 21:12

## 2020-04-03 RX ADMIN — MORPHINE SULFATE 2 MG: 2 INJECTION, SOLUTION INTRAMUSCULAR; INTRAVENOUS at 18:01

## 2020-04-03 RX ADMIN — PHENYLEPHRINE HYDROCHLORIDE 100 MCG: 10 INJECTION INTRAVENOUS at 15:46

## 2020-04-03 RX ADMIN — OXYCODONE HYDROCHLORIDE AND ACETAMINOPHEN 2 TABLET: 5; 325 TABLET ORAL at 22:19

## 2020-04-03 RX ADMIN — SODIUM CHLORIDE: 9 INJECTION, SOLUTION INTRAVENOUS at 12:45

## 2020-04-03 RX ADMIN — PROPOFOL 50 MG: 10 INJECTION, EMULSION INTRAVENOUS at 17:08

## 2020-04-03 RX ADMIN — SODIUM CHLORIDE: 9 INJECTION, SOLUTION INTRAVENOUS at 13:30

## 2020-04-03 RX ADMIN — MIDAZOLAM HYDROCHLORIDE 2 MG: 1 INJECTION, SOLUTION INTRAMUSCULAR; INTRAVENOUS at 13:10

## 2020-04-03 RX ADMIN — PHENYLEPHRINE HYDROCHLORIDE 200 MCG: 10 INJECTION INTRAVENOUS at 13:58

## 2020-04-03 RX ADMIN — FENTANYL CITRATE 50 MCG: 50 INJECTION, SOLUTION INTRAMUSCULAR; INTRAVENOUS at 17:50

## 2020-04-03 RX ADMIN — PANTOPRAZOLE SODIUM 40 MG: 40 TABLET, DELAYED RELEASE ORAL at 06:52

## 2020-04-03 RX ADMIN — MORPHINE SULFATE 2 MG: 2 INJECTION, SOLUTION INTRAMUSCULAR; INTRAVENOUS at 17:50

## 2020-04-03 RX ADMIN — LIDOCAINE HYDROCHLORIDE 100 MG: 20 INJECTION, SOLUTION INTRAVENOUS at 13:15

## 2020-04-03 RX ADMIN — FENTANYL CITRATE 100 MCG: 50 INJECTION, SOLUTION INTRAMUSCULAR; INTRAVENOUS at 13:15

## 2020-04-03 RX ADMIN — DEXAMETHASONE SODIUM PHOSPHATE 8 MG: 4 INJECTION, SOLUTION INTRAMUSCULAR; INTRAVENOUS at 14:21

## 2020-04-03 RX ADMIN — Medication 160 MG: at 13:15

## 2020-04-03 RX ADMIN — FENTANYL CITRATE 100 MCG: 50 INJECTION, SOLUTION INTRAMUSCULAR; INTRAVENOUS at 17:08

## 2020-04-03 RX ADMIN — MORPHINE SULFATE 2 MG: 2 INJECTION, SOLUTION INTRAMUSCULAR; INTRAVENOUS at 19:43

## 2020-04-03 RX ADMIN — Medication 10 MG: at 16:39

## 2020-04-03 RX ADMIN — SODIUM CHLORIDE, PRESERVATIVE FREE 10 ML: 5 INJECTION INTRAVENOUS at 22:14

## 2020-04-03 RX ADMIN — ALBUMIN (HUMAN) 250 ML: 12.5 SOLUTION INTRAVENOUS at 15:54

## 2020-04-03 RX ADMIN — Medication 0.6 MG: at 17:15

## 2020-04-03 RX ADMIN — PHENYLEPHRINE HYDROCHLORIDE 100 MCG: 10 INJECTION INTRAVENOUS at 16:08

## 2020-04-03 RX ADMIN — FENTANYL CITRATE 50 MCG: 50 INJECTION, SOLUTION INTRAMUSCULAR; INTRAVENOUS at 18:10

## 2020-04-03 RX ADMIN — Medication 10 MG: at 16:57

## 2020-04-03 RX ADMIN — SODIUM CHLORIDE: 9 INJECTION, SOLUTION INTRAVENOUS at 22:49

## 2020-04-03 ASSESSMENT — PULMONARY FUNCTION TESTS
PIF_VALUE: 24
PIF_VALUE: 25
PIF_VALUE: 2
PIF_VALUE: 28
PIF_VALUE: 29
PIF_VALUE: 24
PIF_VALUE: 27
PIF_VALUE: 25
PIF_VALUE: 31
PIF_VALUE: 29
PIF_VALUE: 24
PIF_VALUE: 12
PIF_VALUE: 27
PIF_VALUE: 28
PIF_VALUE: 29
PIF_VALUE: 28
PIF_VALUE: 29
PIF_VALUE: 30
PIF_VALUE: 27
PIF_VALUE: 28
PIF_VALUE: 29
PIF_VALUE: 26
PIF_VALUE: 0
PIF_VALUE: 27
PIF_VALUE: 29
PIF_VALUE: 28
PIF_VALUE: 25
PIF_VALUE: 28
PIF_VALUE: 28
PIF_VALUE: 27
PIF_VALUE: 2
PIF_VALUE: 25
PIF_VALUE: 27
PIF_VALUE: 29
PIF_VALUE: 36
PIF_VALUE: 27
PIF_VALUE: 27
PIF_VALUE: 25
PIF_VALUE: 26
PIF_VALUE: 25
PIF_VALUE: 24
PIF_VALUE: 30
PIF_VALUE: 33
PIF_VALUE: 4
PIF_VALUE: 24
PIF_VALUE: 30
PIF_VALUE: 28
PIF_VALUE: 28
PIF_VALUE: 30
PIF_VALUE: 33
PIF_VALUE: 24
PIF_VALUE: 28
PIF_VALUE: 31
PIF_VALUE: 29
PIF_VALUE: 29
PIF_VALUE: 24
PIF_VALUE: 27
PIF_VALUE: 2
PIF_VALUE: 29
PIF_VALUE: 35
PIF_VALUE: 27
PIF_VALUE: 28
PIF_VALUE: 25
PIF_VALUE: 27
PIF_VALUE: 32
PIF_VALUE: 25
PIF_VALUE: 33
PIF_VALUE: 34
PIF_VALUE: 26
PIF_VALUE: 25
PIF_VALUE: 26
PIF_VALUE: 26
PIF_VALUE: 27
PIF_VALUE: 29
PIF_VALUE: 24
PIF_VALUE: 29
PIF_VALUE: 24
PIF_VALUE: 25
PIF_VALUE: 25
PIF_VALUE: 30
PIF_VALUE: 24
PIF_VALUE: 2
PIF_VALUE: 28
PIF_VALUE: 28
PIF_VALUE: 26
PIF_VALUE: 25
PIF_VALUE: 24
PIF_VALUE: 29
PIF_VALUE: 28
PIF_VALUE: 27
PIF_VALUE: 16
PIF_VALUE: 25
PIF_VALUE: 26
PIF_VALUE: 26
PIF_VALUE: 24
PIF_VALUE: 31
PIF_VALUE: 31
PIF_VALUE: 26
PIF_VALUE: 26
PIF_VALUE: 28
PIF_VALUE: 31
PIF_VALUE: 26
PIF_VALUE: 4
PIF_VALUE: 29
PIF_VALUE: 25
PIF_VALUE: 26
PIF_VALUE: 25
PIF_VALUE: 31
PIF_VALUE: 28
PIF_VALUE: 29
PIF_VALUE: 32
PIF_VALUE: 26
PIF_VALUE: 26
PIF_VALUE: 24
PIF_VALUE: 23
PIF_VALUE: 29
PIF_VALUE: 35
PIF_VALUE: 28
PIF_VALUE: 32
PIF_VALUE: 26
PIF_VALUE: 28
PIF_VALUE: 29
PIF_VALUE: 28
PIF_VALUE: 26
PIF_VALUE: 4
PIF_VALUE: 29
PIF_VALUE: 25
PIF_VALUE: 26
PIF_VALUE: 24
PIF_VALUE: 25
PIF_VALUE: 30
PIF_VALUE: 25
PIF_VALUE: 29
PIF_VALUE: 30
PIF_VALUE: 27
PIF_VALUE: 33
PIF_VALUE: 24
PIF_VALUE: 26
PIF_VALUE: 33
PIF_VALUE: 27
PIF_VALUE: 24
PIF_VALUE: 28
PIF_VALUE: 27
PIF_VALUE: 28
PIF_VALUE: 24
PIF_VALUE: 36
PIF_VALUE: 25
PIF_VALUE: 0
PIF_VALUE: 0
PIF_VALUE: 35
PIF_VALUE: 30
PIF_VALUE: 28
PIF_VALUE: 5
PIF_VALUE: 26
PIF_VALUE: 26
PIF_VALUE: 24
PIF_VALUE: 26
PIF_VALUE: 24
PIF_VALUE: 24
PIF_VALUE: 28
PIF_VALUE: 26
PIF_VALUE: 26
PIF_VALUE: 28
PIF_VALUE: 29
PIF_VALUE: 29
PIF_VALUE: 35
PIF_VALUE: 0
PIF_VALUE: 5
PIF_VALUE: 27
PIF_VALUE: 30
PIF_VALUE: 27
PIF_VALUE: 1
PIF_VALUE: 26
PIF_VALUE: 34
PIF_VALUE: 26
PIF_VALUE: 30
PIF_VALUE: 30
PIF_VALUE: 1
PIF_VALUE: 25
PIF_VALUE: 26
PIF_VALUE: 25
PIF_VALUE: 27
PIF_VALUE: 26
PIF_VALUE: 29
PIF_VALUE: 25
PIF_VALUE: 29
PIF_VALUE: 25
PIF_VALUE: 30
PIF_VALUE: 26
PIF_VALUE: 32
PIF_VALUE: 33
PIF_VALUE: 3
PIF_VALUE: 24
PIF_VALUE: 29
PIF_VALUE: 27
PIF_VALUE: 27
PIF_VALUE: 25
PIF_VALUE: 25
PIF_VALUE: 26
PIF_VALUE: 28
PIF_VALUE: 26
PIF_VALUE: 27
PIF_VALUE: 25
PIF_VALUE: 33
PIF_VALUE: 33
PIF_VALUE: 30
PIF_VALUE: 26
PIF_VALUE: 26
PIF_VALUE: 30
PIF_VALUE: 26
PIF_VALUE: 0
PIF_VALUE: 24
PIF_VALUE: 27
PIF_VALUE: 32
PIF_VALUE: 25
PIF_VALUE: 27
PIF_VALUE: 30
PIF_VALUE: 28
PIF_VALUE: 26
PIF_VALUE: 26
PIF_VALUE: 36
PIF_VALUE: 27
PIF_VALUE: 27
PIF_VALUE: 29
PIF_VALUE: 27
PIF_VALUE: 35
PIF_VALUE: 34
PIF_VALUE: 29
PIF_VALUE: 26
PIF_VALUE: 25
PIF_VALUE: 28
PIF_VALUE: 28
PIF_VALUE: 26
PIF_VALUE: 25
PIF_VALUE: 24
PIF_VALUE: 25
PIF_VALUE: 35
PIF_VALUE: 27
PIF_VALUE: 3
PIF_VALUE: 33
PIF_VALUE: 27
PIF_VALUE: 32
PIF_VALUE: 25
PIF_VALUE: 30
PIF_VALUE: 25
PIF_VALUE: 25
PIF_VALUE: 29
PIF_VALUE: 29
PIF_VALUE: 27
PIF_VALUE: 30
PIF_VALUE: 32
PIF_VALUE: 26
PIF_VALUE: 26
PIF_VALUE: 28
PIF_VALUE: 1

## 2020-04-03 ASSESSMENT — PAIN DESCRIPTION - LOCATION
LOCATION: BACK
LOCATION: BACK

## 2020-04-03 ASSESSMENT — PAIN SCALES - GENERAL
PAINLEVEL_OUTOF10: 10
PAINLEVEL_OUTOF10: 9
PAINLEVEL_OUTOF10: 3
PAINLEVEL_OUTOF10: 9
PAINLEVEL_OUTOF10: 3
PAINLEVEL_OUTOF10: 10
PAINLEVEL_OUTOF10: 9
PAINLEVEL_OUTOF10: 9
PAINLEVEL_OUTOF10: 10
PAINLEVEL_OUTOF10: 9

## 2020-04-03 ASSESSMENT — PAIN DESCRIPTION - ONSET: ONSET: GRADUAL

## 2020-04-03 ASSESSMENT — PAIN - FUNCTIONAL ASSESSMENT: PAIN_FUNCTIONAL_ASSESSMENT: ACTIVITIES ARE NOT PREVENTED

## 2020-04-03 ASSESSMENT — PAIN DESCRIPTION - PAIN TYPE
TYPE: SURGICAL PAIN
TYPE: CHRONIC PAIN

## 2020-04-03 ASSESSMENT — PAIN DESCRIPTION - FREQUENCY: FREQUENCY: INTERMITTENT

## 2020-04-03 ASSESSMENT — PAIN DESCRIPTION - DESCRIPTORS: DESCRIPTORS: SHARP;THROBBING

## 2020-04-03 NOTE — CARE COORDINATION
4/3/20, 11:47 AM EDT    DISCHARGE ON GOING EVALUATION    [address] [place] day: 2  Location: -10/010-A Reason for admit: Lumbar discitis [M46.46]   Procedure: 4/03/2020 lumbar I&D, L5-S1 HW removal, L4-S1 decompression and fusion extension  Treatment Plan of Care: Ortho and ID following, IV fluids, Pericolace, prn Tylenol, Norco, Morphine, Zofran, daily BMP, NPO, daily weights, SCD's, up with assistance. Barriers to Discharge: Medical stability. PCP: Cheyenne Mendieta MD  Readmission Risk Score: 12%  Patient Goals/Plan/Treatment Preferences: [place] is from home residing with her x- and daughter. Monitoring for needs closer to discharge.

## 2020-04-03 NOTE — PROGRESS NOTES
Updated daughter of transfer to 2B20, daughter gathered patient personal belongings & wanted to take them to floor.

## 2020-04-03 NOTE — ANESTHESIA PRE PROCEDURE
Oral Daily Anaya Lobo MD   20 mg at 04/02/20 9777    metoprolol succinate (TOPROL XL) extended release tablet 50 mg  50 mg Oral Daily Anaya Lobo MD   50 mg at 04/01/20 1739    pantoprazole (PROTONIX) tablet 40 mg  40 mg Oral QAM AC Nahidmi A Lazara Lopez MD   40 mg at 04/03/20 0652    senna-docusate (PERICOLACE) 8.6-50 MG per tablet 2 tablet  2 tablet Oral BID Anaya Lobo MD        sodium chloride flush 0.9 % injection 10 mL  10 mL Intravenous 2 times per day Anaya Lobo MD   10 mL at 04/02/20 2046    sodium chloride flush 0.9 % injection 10 mL  10 mL Intravenous PRN Anaya Lobo MD        acetaminophen (TYLENOL) tablet 650 mg  650 mg Oral Q6H PRN Anaya Lobo MD        Or    acetaminophen (TYLENOL) suppository 650 mg  650 mg Rectal Q6H PRN Anaya Lobo MD        0.9 % sodium chloride infusion   Intravenous Continuous Anaya Lobo MD   Stopped at 04/03/20 0130    HYDROcodone-acetaminophen (NORCO) 5-325 MG per tablet 1 tablet  1 tablet Oral Q4H PRN Anaya Lobo MD   1 tablet at 04/02/20 1301    Or    HYDROcodone-acetaminophen (NORCO) 5-325 MG per tablet 2 tablet  2 tablet Oral Q4H PRN Anaya Lobo MD        morphine (PF) injection 2 mg  2 mg Intravenous Q4H PRN Anaya Lobo MD        ondansetron (ZOFRAN) injection 4 mg  4 mg Intravenous Q6H PRN Anaya Lobo MD         Facility-Administered Medications Ordered in Other Encounters   Medication Dose Route Frequency Provider Last Rate Last Dose    midazolam (VERSED) injection    PRN CALIXTO Garcia - CRNA   2 mg at 04/03/20 1310       Allergies:     Allergies   Allergen Reactions    Codeine Rash    Dilaudid [Hydromorphone Hcl] Rash       Problem List:    Patient Active Problem List   Diagnosis Code    GERD (gastroesophageal reflux disease) K21.9    Anxiety F41.9    Clinical depression F32.9    History of lumbar fusion Z98.1    Chronic left-sided low back pain with left-sided sciatica M54.42, G89.29    Paresthesia of buttock R20.2    Paresthesia of left lower extremity R20.2    Neurologic gait dysfunction R26.9    Fusion of lumbosacral spine M43.27    Back pain, lumbosacral M54.5    Lumbar discitis M46.46       Past Medical History:        Diagnosis Date    Arthritis     Esophagitis     GERD (gastroesophageal reflux disease)     Hypertension     Morbid obesity with BMI of 40.0-44.9, adult ([address]     Vertigo        Past Surgical History:        Procedure Laterality Date    BACK SURGERY      BLADDER SUSPENSION      BREAST REDUCTION SURGERY  10/2007    CERVICAL FUSION  2017    ACDF C5-C7    CHOLECYSTECTOMY      COLONOSCOPY      DILATION AND CURETTAGE OF UTERUS      ENDOSCOPY, COLON, DIAGNOSTIC      HYSTERECTOMY      LUMBAR SPINE SURGERY  10/17/2019    L5-S1    UPPER GASTROINTESTINAL ENDOSCOPY N/A 7/19/2019    EGD BIOPSY performed by Keely Hugo MD at [facility]   [address] [place] [address] Drive  7/19/2019    EGD DILATION SAVORY performed by Keely Hugo MD at [facility]       Social History:    Social History     Tobacco Use    Smoking status: Former Smoker     Packs/day: 2.00     Years: 20.00     Pack years: 40.00     Types: Cigarettes     Last attempt to quit: 1/29/2017     Years since quitting: 3.1    Smokeless tobacco: Never Used   Substance Use Topics    Alcohol use:  No                                Counseling given: Not Answered      Vital Signs (Current):   Vitals:    04/02/20 2325 04/03/20 0400 04/03/20 0600 04/03/20 0800   BP: (!) 142/67 125/64  122/71   Pulse: 66 67  60   Resp: 16 16  16   Temp: 98 °F (36.7 °C) 97.9 °F (36.6 °C)  97.7 °F (36.5 °C)   TempSrc: Oral Oral  Oral   SpO2: 98% 97%  97%   Weight:   213 lb 8 oz (96.8 kg)    Height:                                                  BP Readings from Last 3 Encounters:   04/03/20 122/71   02/07/20 134/85   12/12/19 128/80       NPO Status:                                                                                 BMI:   Wt Readings from Last 3 Encounters:   04/03/20 213 lb 8 oz (96.8 kg)   02/05/20 197 lb 11.2 oz (89.7 kg)   12/12/19 196 lb (88.9 kg)     Body mass index is 41.7 kg/m². CBC:   Lab Results   Component Value Date    WBC 6.8 04/02/2020    RBC 4.21 04/02/2020    HGB 10.4 04/02/2020    HCT 34.6 04/02/2020    MCV 82.2 04/02/2020    RDW 13.8 07/07/2017     04/02/2020       CMP:   Lab Results   Component Value Date     04/03/2020    K 4.2 04/03/2020     04/03/2020    CO2 26 04/03/2020    BUN 14 04/03/2020    CREATININE 0.6 04/03/2020    LABGLOM >90 04/03/2020    GLUCOSE 116 04/03/2020    PROT 7.0 04/01/2020    CALCIUM 9.2 04/03/2020    BILITOT 0.8 04/01/2020    ALKPHOS 126 04/01/2020    AST 18 04/01/2020    ALT 29 04/01/2020       POC Tests: No results for input(s): POCGLU, POCNA, POCK, POCCL, POCBUN, POCHEMO, POCHCT in the last 72 hours. Coags:   Lab Results   Component Value Date    INR 1.06 04/02/2020       HCG (If Applicable):   Lab Results   Component Value Date    PREGTESTUR NEGATIVE 06/29/2015        ABGs: No results found for: PHART, PO2ART, SIX3LBG, RVA4CXE, BEART, L7AAKFHI     Type & Screen (If Applicable):  No results found for: LABABO, LABRH    Anesthesia Evaluation   no history of anesthetic complications:   Airway: Mallampati: II  TM distance: >3 FB   Neck ROM: full  Mouth opening: > = 3 FB Dental:          Pulmonary:normal exam              Patient did not smoke on day of surgery. Cardiovascular:  Exercise tolerance: good (>4 METS),   (+) hypertension:,                   Neuro/Psych:   (+) psychiatric history:            GI/Hepatic/Renal:   (+) GERD:,           Endo/Other: Negative Endo/Other ROS             Pt had no PAT visit       Abdominal:           Vascular:                                        Anesthesia Plan      general     ASA 3       Induction: intravenous.   arterial line  MIPS: Postoperative opioids intended and Prophylactic antiemetics

## 2020-04-03 NOTE — OP NOTE
The wound was then closed in layer with interrupted 1 and 2-0 Vicryl sutures and dusted with vancomycin powder. A 3-0 Monocryl was used for the skin. The skin edges were sealed with Dermabond. A dry sterile dressing was applied. The patient was then returned to the hospital bed, extubated, and taken to the recovery room in stable condition. Spinal cord monitoring remained stable throughout the operation. COMPLICATIONS:  None. SPECIMENS:  Lumbar drainage, L4/L5 lamina, L4-5 disc space. ESTIMATED BLOOD LOSS:  1370 mL. POSTOPERATIVE CARE:  The patient will be recovered in PACU and then a regular nursing floor. ID already on case for antibiotic management. She will likely need a PICC line and long term IV antibiotics. Once the drainage is low and pain is under control and cultures are back, patient will be discharged home per clinical indication. Patient will follow up in the office in 6 weeks. At that time, AP and lateral x-rays of the lumbar spine will be obtained to assess instrumentation and fusion. MODIFIER 22:  Due to the patient's BMI greater than 30, modifier 22 will be applied due to the patient's case taking 50% longer due to poor visualization and orientation. Orin Flores M.D. Grace Huffman PA-C, assisted throughout the procedure with positioning, draping, retraction, wound closure, and dressing application.       Electronically signed by Mika Pederson MD on 4/3/2020 at 2:13 PM

## 2020-04-03 NOTE — PLAN OF CARE
Problem: Falls - Risk of:  Goal: Will remain free from falls  Description: Will remain free from falls  Outcome: Met This Shift  Note: Prior to surgery patient up in room with steady gait, patient uses call light appropriately & is aware of bed alarm. Problem: Discharge Planning:  Goal: Discharged to appropriate level of care  Description: Discharged to appropriate level of care  Note: Patient lives at home & is unsure of discharge plans at this time. Will continue to monitor. Problem: Skin Integrity:  Goal: Absence of new skin breakdown  Description: Absence of new skin breakdown  Outcome: Met This Shift     Problem: Pain:  Goal: Control of acute pain  Description: Control of acute pain  Outcome: Ongoing  Note: Patient has lower back & buttocks pain that is intermittent. Patient has chronic numbness & tingling to bilateral toes, present since original back surgery in 10/2019. Care plan reviewed with patient and daughter. Patient and daughter verbalize understanding of the plan of care and contribute to goal setting.

## 2020-04-03 NOTE — PROGRESS NOTES
Progress note: Infectious diseases    Patient - Alvarez Guzmán,  Age - 53 y.o.    - 1970      Room Number - 5K-10/010-A   MRN -  [id number]   Acct # - [de-identified]  Date of Admission -  2020  8:05 AM    SUBJECTIVE:   She has no new complaints  Plan for surgery today. OBJECTIVE   VITALS    height is 5' (1.524 m) and weight is 213 lb 8 oz (96.8 kg). Her oral temperature is 97.7 °F (36.5 °C). Her blood pressure is 122/71 and her pulse is 60. Her respiration is 16 and oxygen saturation is 97%.        Wt Readings from Last 3 Encounters:   20 213 lb 8 oz (96.8 kg)   20 197 lb 11.2 oz (89.7 kg)   19 196 lb (88.9 kg)       I/O (24 Hours)    Intake/Output Summary (Last 24 hours) at 4/3/2020 1026  Last data filed at 4/3/2020 0400  Gross per 24 hour   Intake 2881.46 ml   Output 1500 ml   Net 1381.46 ml       General Appearance  Awake, alert, oriented,  not  In acute distress  HEENT - normocephalic, atraumatic, pink conjunctiva,  anicteric sclera  Neck - Supple, no mass  Lungs -  Bilateral good air entry, no rhonchi, no wheeze  Cardiovascular - Heart sounds are normal.       Abdomen - soft, not distended, nontender,   Neurologic -oriented  Skin - No bruising or bleeding  Extremities - No edema, no cyanosis, clubbing     MEDICATIONS:      sodium chloride flush  10 mL Intravenous 2 times per day    ceFAZolin (ANCEF) IVPB  2 g Intravenous On Call to OR    Vitamin D  1,000 Units Oral Daily    FLUoxetine  20 mg Oral Daily    metoprolol succinate  50 mg Oral Daily    pantoprazole  40 mg Oral QAM AC    senna-docusate  2 tablet Oral BID    sodium chloride flush  10 mL Intravenous 2 times per day      sodium chloride      sodium chloride Stopped (20 0130)     sodium chloride flush, sodium chloride flush, acetaminophen **OR** acetaminophen, HYDROcodone 5 mg - acetaminophen **OR** HYDROcodone 5 mg - acetaminophen, morphine, ondansetron      LABS:     CBC:   Recent Labs     04/01/20  0841 04/02/20  0545   WBC 8.9 6.8   HGB 11.2* 10.4*    292     BMP:    Recent Labs     04/01/20  0841 04/02/20  0545 04/03/20  0457    139 143   K 4.3 4.3 4.2    104 105   CO2 24 25 26   BUN 15 13 14   CREATININE 0.7 0.7 0.6   GLUCOSE 108 115* 116*     Calcium:  Recent Labs     04/03/20  0457   CALCIUM 9.2      Recent Labs     04/02/20  0545   INR 1.06     Hepatic:   Recent Labs     04/01/20  0841   ALKPHOS 126   ALT 29   AST 18   PROT 7.0   BILITOT 0.8   LABALBU 3.9        CULTURES:   UA: No results for input(s): SPECGRAV, PHUR, COLORU, CLARITYU, MUCUS, PROTEINU, BLOODU, RBCUA, WBCUA, BACTERIA, NITRU, GLUCOSEU, BILIRUBINUR, UROBILINOGEN, KETUA, LABCAST, LABCASTTY, AMORPHOS in the last 72 hours. Invalid input(s): CRYSTALS  Micro:   Lab Results   Component Value Date    BC No growth-preliminary  04/01/2020          Problem list of patient:     Patient Active Problem List   Diagnosis Code    GERD (gastroesophageal reflux disease) K21.9    Anxiety F41.9    Clinical depression F32.9    History of lumbar fusion Z98.1    Chronic left-sided low back pain with left-sided sciatica M54.42, G89.29    Paresthesia of buttock R20.2    Paresthesia of left lower extremity R20.2    Neurologic gait dysfunction R26.9    Fusion of lumbosacral spine M43.27    Back pain, lumbosacral M54.5    Lumbar discitis M46.46         ASSESSMENT/PLAN   Discites/OM of lumbar spine: plan for surgery  Will obtain culture report in surgery.  Discussed with Dr Mattie Moran MD, [address] 4/3/2020 10:26 AM

## 2020-04-03 NOTE — ANESTHESIA PROCEDURE NOTES
Arterial Line:    An arterial line was placed using surface landmarks, in the OR for the following indication(s): continuous blood pressure monitoring and blood sampling needed. A 20 gauge (size), (length), Arrow (type) catheter was placed, Seldinger technique used, into the right radial artery, secured by tape and Tegaderm. Anesthesia type: General    Events:  patient tolerated procedure well with no complications.   Anesthesiologist: Christie Dunaway MD  Performed: Anesthesiologist   Preanesthetic Checklist  Completed: patient identified, site marked, surgical consent, pre-op evaluation, timeout performed, IV checked, risks and benefits discussed, monitors and equipment checked, anesthesia consent given, oxygen available and patient being monitored

## 2020-04-04 ENCOUNTER — APPOINTMENT (OUTPATIENT)
Dept: CT IMAGING | Age: 50
DRG: 304 | End: 2020-04-04
Payer: MEDICARE

## 2020-04-04 LAB
ANION GAP SERPL CALCULATED.3IONS-SCNC: 11 MEQ/L (ref 8–16)
BUN BLDV-MCNC: 12 MG/DL (ref 7–22)
CALCIUM SERPL-MCNC: 8.5 MG/DL (ref 8.5–10.5)
CHLORIDE BLD-SCNC: 103 MEQ/L (ref 98–111)
CO2: 22 MEQ/L (ref 23–33)
CREAT SERPL-MCNC: 0.6 MG/DL (ref 0.4–1.2)
ERYTHROCYTE [DISTWIDTH] IN BLOOD BY AUTOMATED COUNT: 16.4 % (ref 11.5–14.5)
ERYTHROCYTE [DISTWIDTH] IN BLOOD BY AUTOMATED COUNT: 49.5 FL (ref 35–45)
GFR SERPL CREATININE-BSD FRML MDRD: > 90 ML/MIN/1.73M2
GLUCOSE BLD-MCNC: 144 MG/DL (ref 70–108)
HCT VFR BLD CALC: 20.5 % (ref 37–47)
HCT VFR BLD CALC: 24.2 % (ref 37–47)
HCT VFR BLD CALC: 28.4 % (ref 37–47)
HEMOGLOBIN: 6.2 GM/DL (ref 12–16)
HEMOGLOBIN: 7.2 GM/DL (ref 12–16)
HEMOGLOBIN: 8.7 GM/DL (ref 12–16)
MCH RBC QN AUTO: 24.7 PG (ref 26–33)
MCHC RBC AUTO-ENTMCNC: 29.8 GM/DL (ref 32.2–35.5)
MCV RBC AUTO: 82.9 FL (ref 81–99)
PLATELET # BLD: 297 THOU/MM3 (ref 130–400)
PMV BLD AUTO: 10.2 FL (ref 9.4–12.4)
POTASSIUM SERPL-SCNC: 4.9 MEQ/L (ref 3.5–5.2)
RBC # BLD: 2.92 MILL/MM3 (ref 4.2–5.4)
SODIUM BLD-SCNC: 136 MEQ/L (ref 135–145)
WBC # BLD: 17.3 THOU/MM3 (ref 4.8–10.8)

## 2020-04-04 PROCEDURE — 6370000000 HC RX 637 (ALT 250 FOR IP): Performed by: PHYSICIAN ASSISTANT

## 2020-04-04 PROCEDURE — 85018 HEMOGLOBIN: CPT

## 2020-04-04 PROCEDURE — 2140000000 HC CCU INTERMEDIATE R&B

## 2020-04-04 PROCEDURE — 6370000000 HC RX 637 (ALT 250 FOR IP): Performed by: INTERNAL MEDICINE

## 2020-04-04 PROCEDURE — 6360000002 HC RX W HCPCS: Performed by: PHARMACIST

## 2020-04-04 PROCEDURE — 6360000002 HC RX W HCPCS: Performed by: INTERNAL MEDICINE

## 2020-04-04 PROCEDURE — 2580000003 HC RX 258: Performed by: PHARMACIST

## 2020-04-04 PROCEDURE — 72131 CT LUMBAR SPINE W/O DYE: CPT

## 2020-04-04 PROCEDURE — 2580000003 HC RX 258: Performed by: INTERNAL MEDICINE

## 2020-04-04 PROCEDURE — 6360000002 HC RX W HCPCS: Performed by: PHYSICIAN ASSISTANT

## 2020-04-04 PROCEDURE — 80048 BASIC METABOLIC PNL TOTAL CA: CPT

## 2020-04-04 PROCEDURE — 36415 COLL VENOUS BLD VENIPUNCTURE: CPT

## 2020-04-04 PROCEDURE — 2580000003 HC RX 258: Performed by: PHYSICIAN ASSISTANT

## 2020-04-04 PROCEDURE — 85014 HEMATOCRIT: CPT

## 2020-04-04 PROCEDURE — 94760 N-INVAS EAR/PLS OXIMETRY 1: CPT

## 2020-04-04 PROCEDURE — 36430 TRANSFUSION BLD/BLD COMPNT: CPT

## 2020-04-04 PROCEDURE — 85027 COMPLETE CBC AUTOMATED: CPT

## 2020-04-04 RX ORDER — OXYCODONE HYDROCHLORIDE AND ACETAMINOPHEN 5; 325 MG/1; MG/1
1 TABLET ORAL EVERY 4 HOURS PRN
Status: DISCONTINUED | OUTPATIENT
Start: 2020-04-04 | End: 2020-04-07 | Stop reason: HOSPADM

## 2020-04-04 RX ORDER — SODIUM CHLORIDE 9 MG/ML
INJECTION, SOLUTION INTRAVENOUS CONTINUOUS
Status: DISCONTINUED | OUTPATIENT
Start: 2020-04-04 | End: 2020-04-07 | Stop reason: HOSPADM

## 2020-04-04 RX ORDER — POLYETHYLENE GLYCOL 3350 17 G/17G
17 POWDER, FOR SOLUTION ORAL DAILY
Status: DISCONTINUED | OUTPATIENT
Start: 2020-04-04 | End: 2020-04-07 | Stop reason: HOSPADM

## 2020-04-04 RX ORDER — MORPHINE SULFATE 4 MG/ML
4 INJECTION, SOLUTION INTRAMUSCULAR; INTRAVENOUS
Status: DISCONTINUED | OUTPATIENT
Start: 2020-04-04 | End: 2020-04-07 | Stop reason: HOSPADM

## 2020-04-04 RX ORDER — ONDANSETRON 2 MG/ML
4 INJECTION INTRAMUSCULAR; INTRAVENOUS EVERY 6 HOURS PRN
Status: DISCONTINUED | OUTPATIENT
Start: 2020-04-04 | End: 2020-04-07 | Stop reason: HOSPADM

## 2020-04-04 RX ORDER — SODIUM CHLORIDE 0.9 % (FLUSH) 0.9 %
10 SYRINGE (ML) INJECTION EVERY 12 HOURS SCHEDULED
Status: DISCONTINUED | OUTPATIENT
Start: 2020-04-04 | End: 2020-04-07 | Stop reason: HOSPADM

## 2020-04-04 RX ORDER — DEXAMETHASONE SODIUM PHOSPHATE 4 MG/ML
8 INJECTION, SOLUTION INTRA-ARTICULAR; INTRALESIONAL; INTRAMUSCULAR; INTRAVENOUS; SOFT TISSUE EVERY 8 HOURS
Status: COMPLETED | OUTPATIENT
Start: 2020-04-04 | End: 2020-04-05

## 2020-04-04 RX ORDER — BISACODYL 10 MG
10 SUPPOSITORY, RECTAL RECTAL DAILY PRN
Status: DISCONTINUED | OUTPATIENT
Start: 2020-04-04 | End: 2020-04-07 | Stop reason: HOSPADM

## 2020-04-04 RX ORDER — MORPHINE SULFATE 2 MG/ML
2 INJECTION, SOLUTION INTRAMUSCULAR; INTRAVENOUS
Status: DISCONTINUED | OUTPATIENT
Start: 2020-04-04 | End: 2020-04-07 | Stop reason: HOSPADM

## 2020-04-04 RX ORDER — 0.9 % SODIUM CHLORIDE 0.9 %
20 INTRAVENOUS SOLUTION INTRAVENOUS ONCE
Status: COMPLETED | OUTPATIENT
Start: 2020-04-04 | End: 2020-04-04

## 2020-04-04 RX ORDER — SODIUM PHOSPHATE, DIBASIC AND SODIUM PHOSPHATE, MONOBASIC 7; 19 G/133ML; G/133ML
1 ENEMA RECTAL DAILY PRN
Status: DISCONTINUED | OUTPATIENT
Start: 2020-04-04 | End: 2020-04-07 | Stop reason: HOSPADM

## 2020-04-04 RX ORDER — DIAZEPAM 5 MG/1
5 TABLET ORAL EVERY 8 HOURS PRN
Status: DISCONTINUED | OUTPATIENT
Start: 2020-04-04 | End: 2020-04-07 | Stop reason: HOSPADM

## 2020-04-04 RX ORDER — PROMETHAZINE HYDROCHLORIDE 25 MG/1
12.5 TABLET ORAL EVERY 6 HOURS PRN
Status: DISCONTINUED | OUTPATIENT
Start: 2020-04-04 | End: 2020-04-07 | Stop reason: HOSPADM

## 2020-04-04 RX ORDER — SODIUM CHLORIDE 0.9 % (FLUSH) 0.9 %
10 SYRINGE (ML) INJECTION PRN
Status: DISCONTINUED | OUTPATIENT
Start: 2020-04-04 | End: 2020-04-07 | Stop reason: HOSPADM

## 2020-04-04 RX ORDER — OXYCODONE HYDROCHLORIDE AND ACETAMINOPHEN 5; 325 MG/1; MG/1
2 TABLET ORAL EVERY 4 HOURS PRN
Status: DISCONTINUED | OUTPATIENT
Start: 2020-04-04 | End: 2020-04-07 | Stop reason: HOSPADM

## 2020-04-04 RX ADMIN — OXYCODONE HYDROCHLORIDE AND ACETAMINOPHEN 2 TABLET: 5; 325 TABLET ORAL at 02:31

## 2020-04-04 RX ADMIN — SODIUM CHLORIDE: 9 INJECTION, SOLUTION INTRAVENOUS at 17:12

## 2020-04-04 RX ADMIN — PHENOL 1 SPRAY: 1.4 SPRAY ORAL at 04:04

## 2020-04-04 RX ADMIN — MORPHINE SULFATE 4 MG: 4 INJECTION, SOLUTION INTRAMUSCULAR; INTRAVENOUS at 19:40

## 2020-04-04 RX ADMIN — VANCOMYCIN HYDROCHLORIDE 1500 MG: 1 INJECTION, POWDER, LYOPHILIZED, FOR SOLUTION INTRAVENOUS at 12:04

## 2020-04-04 RX ADMIN — MEROPENEM 1 G: 1 INJECTION, POWDER, FOR SOLUTION INTRAVENOUS at 19:58

## 2020-04-04 RX ADMIN — Medication 10 ML: at 20:07

## 2020-04-04 RX ADMIN — SODIUM CHLORIDE: 9 INJECTION, SOLUTION INTRAVENOUS at 07:35

## 2020-04-04 RX ADMIN — OXYCODONE HYDROCHLORIDE AND ACETAMINOPHEN 2 TABLET: 5; 325 TABLET ORAL at 07:26

## 2020-04-04 RX ADMIN — OXYCODONE HYDROCHLORIDE AND ACETAMINOPHEN 2 TABLET: 5; 325 TABLET ORAL at 11:57

## 2020-04-04 RX ADMIN — SENNOSIDES AND DOCUSATE SODIUM 2 TABLET: 8.6; 5 TABLET ORAL at 20:07

## 2020-04-04 RX ADMIN — VANCOMYCIN HYDROCHLORIDE 1500 MG: 1 INJECTION, POWDER, LYOPHILIZED, FOR SOLUTION INTRAVENOUS at 21:23

## 2020-04-04 RX ADMIN — OXYCODONE HYDROCHLORIDE AND ACETAMINOPHEN 2 TABLET: 5; 325 TABLET ORAL at 16:34

## 2020-04-04 RX ADMIN — FLUOXETINE HYDROCHLORIDE 20 MG: 20 CAPSULE ORAL at 09:54

## 2020-04-04 RX ADMIN — SENNOSIDES AND DOCUSATE SODIUM 2 TABLET: 8.6; 5 TABLET ORAL at 09:54

## 2020-04-04 RX ADMIN — PANTOPRAZOLE SODIUM 40 MG: 40 TABLET, DELAYED RELEASE ORAL at 05:43

## 2020-04-04 RX ADMIN — SODIUM CHLORIDE: 9 INJECTION, SOLUTION INTRAVENOUS at 23:38

## 2020-04-04 RX ADMIN — METOPROLOL SUCCINATE 50 MG: 50 TABLET, EXTENDED RELEASE ORAL at 09:54

## 2020-04-04 RX ADMIN — DEXAMETHASONE SODIUM PHOSPHATE 8 MG: 4 INJECTION, SOLUTION INTRA-ARTICULAR; INTRALESIONAL; INTRAMUSCULAR; INTRAVENOUS; SOFT TISSUE at 20:02

## 2020-04-04 RX ADMIN — VITAMIN D, TAB 1000IU (100/BT) 1000 UNITS: 25 TAB at 09:54

## 2020-04-04 RX ADMIN — SODIUM CHLORIDE, PRESERVATIVE FREE 10 ML: 5 INJECTION INTRAVENOUS at 09:55

## 2020-04-04 RX ADMIN — Medication 10 ML: at 13:03

## 2020-04-04 RX ADMIN — OXYCODONE HYDROCHLORIDE AND ACETAMINOPHEN 2 TABLET: 5; 325 TABLET ORAL at 22:52

## 2020-04-04 RX ADMIN — DEXAMETHASONE SODIUM PHOSPHATE 8 MG: 4 INJECTION, SOLUTION INTRA-ARTICULAR; INTRALESIONAL; INTRAMUSCULAR; INTRAVENOUS; SOFT TISSUE at 13:01

## 2020-04-04 RX ADMIN — MEROPENEM 1 G: 1 INJECTION, POWDER, FOR SOLUTION INTRAVENOUS at 04:04

## 2020-04-04 RX ADMIN — MEROPENEM 1 G: 1 INJECTION, POWDER, FOR SOLUTION INTRAVENOUS at 14:30

## 2020-04-04 ASSESSMENT — PAIN DESCRIPTION - PAIN TYPE
TYPE: SURGICAL PAIN

## 2020-04-04 ASSESSMENT — PAIN SCALES - GENERAL
PAINLEVEL_OUTOF10: 7
PAINLEVEL_OUTOF10: 8
PAINLEVEL_OUTOF10: 7
PAINLEVEL_OUTOF10: 8

## 2020-04-04 ASSESSMENT — PAIN DESCRIPTION - ONSET
ONSET: ON-GOING

## 2020-04-04 ASSESSMENT — PAIN DESCRIPTION - FREQUENCY
FREQUENCY: INTERMITTENT

## 2020-04-04 ASSESSMENT — PAIN - FUNCTIONAL ASSESSMENT
PAIN_FUNCTIONAL_ASSESSMENT: ACTIVITIES ARE NOT PREVENTED

## 2020-04-04 ASSESSMENT — PAIN DESCRIPTION - DESCRIPTORS
DESCRIPTORS: SHARP;THROBBING

## 2020-04-04 ASSESSMENT — PAIN DESCRIPTION - LOCATION
LOCATION: BACK

## 2020-04-04 ASSESSMENT — PAIN DESCRIPTION - PROGRESSION: CLINICAL_PROGRESSION: GRADUALLY IMPROVING

## 2020-04-04 ASSESSMENT — PAIN DESCRIPTION - ORIENTATION: ORIENTATION: MID

## 2020-04-04 ASSESSMENT — PAIN DESCRIPTION - DIRECTION: RADIATING_TOWARDS: NO

## 2020-04-04 NOTE — FLOWSHEET NOTE
made an initial encounter with Jhonathan Kaye \"Lynne\" who was lying in her bed with the TV on, as well as a fan blowing on her, while eating her dinner. She was approachable and welcoming as she engaged freely in conversation, including life-review. She shared about her family and siblings experiencing the death of her brother 6 years ago through suicide. She has brothers, parents, as well as nieces and nephews for continued love and support. She will be going to be with her daughter and grandchildren support and assistance. She is coping well with her situation at this time, and hopeful for continued recovery toward a positive outcome. She was grateful for the encounter and ministry provided. She is a person of [facility], and attends [employer]. For needs, she stated \"you can share a prayer,\" which was provided prior to departing. She receives purpose and meaning in life through her family unit as she moves forward. Chaplains remain available for further emotional and spiritual support during her continuum of care. 04/04/20 1700   Encounter Summary   Services provided to: Patient   Referral/Consult From: [facility];Family members   Place of [address] [place] [address]   Continue Visiting Yes  (4/4 - P)   Complexity of Encounter Moderate   Length of Encounter 15 minutes   Spiritual Assessment Completed Yes   Routine   Type Initial   Spiritual/Religion   Type Spiritual support

## 2020-04-04 NOTE — ANESTHESIA POSTPROCEDURE EVALUATION
Department of Anesthesiology  Postprocedure Note    Patient: Melquiades Barbosa  MRN: [id number]  YOB: 1970  Date of evaluation: 4/3/2020  Time:  8:28 PM     Procedure Summary     Date:  04/03/20 Room / Location:  [facility]    Anesthesia Start:  3565 Anesthesia Stop:  7782    Procedure:  L4-S1 DECOMPRESSION AND POSTERIOR FUSION; LUMBAR I & D WITH HARDWARE REMOVAL (N/A Spine Lumbar) Diagnosis:  (LUMBAR DISCITIS)    Surgeon:  Lu Drake MD Responsible Provider:  Osmin Gasca MD    Anesthesia Type:  general ASA Status:  3          Anesthesia Type: No value filed. Jennifer Phase I: Jennifer Score: 9    Jennifer Phase II:      Last vitals: Reviewed and per EMR flowsheets. Anesthesia Post Evaluation   [address]  POST-ANESTHESIA NOTE       Name:  Melquiades Barbosa                                         Age:  53 y.o.   MRN:  [id number]      Last Vitals:  BP (!) 109/57   Pulse 79   Temp 97.9 °F (36.6 °C) (Oral)   Resp 17   Ht 5' (1.524 m)   Wt 213 lb 8 oz (96.8 kg)   LMP 10/27/2015 (Exact Date)   SpO2 98%   BMI 41.70 kg/m²   Patient Vitals for the past 4 hrs:   BP Temp Temp src Pulse Resp SpO2   04/03/20 1930 (!) 109/57 97.9 °F (36.6 °C) Oral 79 17 98 %   04/03/20 1915 (!) 109/56 98.1 °F (36.7 °C) Oral 84 18 97 %   04/03/20 1855 (!) 104/57 -- -- 65 15 99 %   04/03/20 1850 (!) 100/57 -- -- 69 16 99 %   04/03/20 1845 (!) 105/57 -- -- 73 15 97 %   04/03/20 1840 (!) 102/55 -- -- 89 19 100 %   04/03/20 1835 (!) 96/55 -- -- 64 20 95 %   04/03/20 1830 (!) 101/59 -- -- 66 16 99 %   04/03/20 1825 (!) 93/48 -- -- 79 20 99 %   04/03/20 1820 (!) 93/53 -- -- 68 14 99 %   04/03/20 1815 (!) 106/54 -- -- 65 11 100 %   04/03/20 1810 (!) 88/54 -- -- 76 17 95 %   04/03/20 1805 (!) 113/57 -- -- 67 12 98 %   04/03/20 1800 (!) 107/59 -- -- 87 16 96 %   04/03/20 1755 (!) 118/59 -- -- 66 22 99 %   04/03/20 1750 112/67 -- -- 71 13 99 %   04/03/20 1745 112/61 -- -- 72 14 98 %   04/03/20 1740 (!) 88/54 -- -- 82 26 98 %   04/03/20 1735 -- -- -- 68 21 97 %   04/03/20 1732 (!) 89/51 97.7 °F (36.5 °C) Temporal 68 15 97 %       Level of Consciousness:  Awake    Respiratory:  Stable    Oxygen Saturation:  Stable    Cardiovascular:  Stable    Hydration:  Adequate    PONV:  Stable    Post-op Pain:  Adequate analgesia    Post-op Assessment:  No apparent anesthetic complications    Additional Follow-Up / Treatment / Comment:  None    Kris Ceballos MD  April 3, 2020   8:28 PM

## 2020-04-04 NOTE — PLAN OF CARE
Problem: Falls - Risk of:  Goal: Will remain free from falls  Description: Will remain free from falls  Outcome: Ongoing  Note: Hourly rounding continues, bed and chair alarm activated for safety. Patient up to the chair this evening. Problem: Discharge Planning:  Goal: Discharged to appropriate level of care  Description: Discharged to appropriate level of care  Outcome: Ongoing  Note: Patient lives at home with family, denies additional needs at this time. Problem: Skin Integrity:  Goal: Absence of new skin breakdown  Description: Absence of new skin breakdown  Outcome: Ongoing  Note: No new skin breakdown, encouraged patient to get up to the chair this evening. Problem: Pain:  Goal: Control of acute pain  Description: Control of acute pain  Outcome: Ongoing  Note: Patient has surgical pain, will continue to assess and offer medications as ordered. Problem: Neurological  Goal: Maximum potential motor/sensory/cognitive function  Outcome: Ongoing  Note: Patient has some numbness in her right foot. Dexamethasone added for that. Will continue to assess. Problem: Cardiovascular  Goal: No DVT, peripheral vascular complications  Outcome: Ongoing  Note: Scds on when in bed. Care plan reviewed with patient. Patient verbalize understanding of the plan of care and contribute to goal setting.

## 2020-04-04 NOTE — PROGRESS NOTES
Department of Orthopedic Surgery  Spine Service  Attending Progress Note        Subjective:  Patient doing okay, c/o back pain. Denies leg pain, but new tingling/ \"cold sensation\" intermittently in toes on right foot and in fingers. No BM. Renee in place. Pt has not been out of bed yet. Chronic tingling/numbness left foot. Ct results discussed with radiologist. TLIF cage sitting anterior, but no mass effect on vessels.  Will monitor cage placement, x-ray before discharge and at post op appt    Vitals  VITALS:  BP (!) 140/75   Pulse 69   Temp 97.9 °F (36.6 °C) (Oral)   Resp 17   Ht 5' (1.524 m)   Wt 213 lb 8 oz (96.8 kg)   LMP 10/27/2015 (Exact Date)   SpO2 98%   BMI 41.70 kg/m²   24HR INTAKE/OUTPUT:      Intake/Output Summary (Last 24 hours) at 4/4/2020 1030  Last data filed at 4/4/2020 0705  Gross per 24 hour   Intake 5035.2 ml   Output 2875 ml   Net 2160.2 ml     URINARY CATHETER OUTPUT (Renee):  Urethral Catheter-Output (mL): 425 mL  DRAIN/TUBE OUTPUT:  Closed/Suction Drain Back-Output (ml): 130 ml  Closed/Suction Drain Back-Output (ml): 60 ml      PHYSICAL EXAM:    Orientation:  alert and oriented to person, place and time    Incision:  dressing in place, clean, dry, intact      Lower Extremity Motor :  quadriceps, extensor hallucis longus, dorsiflexion, plantarflexion 5/5 bilaterally  Lower Extremity Sensory:  Intact L1-S1, decreased to touch toes on left foot    DP and PT pulses 2+ and symmetric  Feet warm to touch    Flatus:  positive      LABS:    HgB:    Lab Results   Component Value Date    HGB 7.2 04/04/2020     Hemoglobin/Hematocrit:    Lab Results   Component Value Date    HGB 7.2 04/04/2020    HCT 24.2 04/04/2020     BMP:    Lab Results   Component Value Date     04/04/2020    K 4.9 04/04/2020    K 4.2 04/03/2020     04/04/2020    CO2 22 04/04/2020    BUN 12 04/04/2020    LABALBU 3.9 04/01/2020    CREATININE 0.6 04/04/2020    CALCIUM 8.5 04/04/2020    LABGLOM >90 04/04/2020

## 2020-04-05 LAB
ANION GAP SERPL CALCULATED.3IONS-SCNC: 9 MEQ/L (ref 8–16)
BUN BLDV-MCNC: 12 MG/DL (ref 7–22)
CALCIUM SERPL-MCNC: 8.6 MG/DL (ref 8.5–10.5)
CHLORIDE BLD-SCNC: 104 MEQ/L (ref 98–111)
CO2: 27 MEQ/L (ref 23–33)
CREAT SERPL-MCNC: 0.6 MG/DL (ref 0.4–1.2)
ERYTHROCYTE [DISTWIDTH] IN BLOOD BY AUTOMATED COUNT: 17.2 % (ref 11.5–14.5)
ERYTHROCYTE [DISTWIDTH] IN BLOOD BY AUTOMATED COUNT: 52.6 FL (ref 35–45)
GFR SERPL CREATININE-BSD FRML MDRD: > 90 ML/MIN/1.73M2
GLUCOSE BLD-MCNC: 167 MG/DL (ref 70–108)
HCT VFR BLD CALC: 27.1 % (ref 37–47)
HEMOGLOBIN: 8.6 GM/DL (ref 12–16)
MCH RBC QN AUTO: 26.7 PG (ref 26–33)
MCHC RBC AUTO-ENTMCNC: 31.7 GM/DL (ref 32.2–35.5)
MCV RBC AUTO: 84.2 FL (ref 81–99)
PLATELET # BLD: 237 THOU/MM3 (ref 130–400)
PMV BLD AUTO: 10.2 FL (ref 9.4–12.4)
POTASSIUM SERPL-SCNC: 4.5 MEQ/L (ref 3.5–5.2)
RBC # BLD: 3.22 MILL/MM3 (ref 4.2–5.4)
SODIUM BLD-SCNC: 140 MEQ/L (ref 135–145)
VANCOMYCIN TROUGH: 15.5 UG/ML (ref 5–15)
WBC # BLD: 19.9 THOU/MM3 (ref 4.8–10.8)

## 2020-04-05 PROCEDURE — 80048 BASIC METABOLIC PNL TOTAL CA: CPT

## 2020-04-05 PROCEDURE — 6370000000 HC RX 637 (ALT 250 FOR IP): Performed by: PHYSICIAN ASSISTANT

## 2020-04-05 PROCEDURE — 2580000003 HC RX 258: Performed by: INTERNAL MEDICINE

## 2020-04-05 PROCEDURE — 97162 PT EVAL MOD COMPLEX 30 MIN: CPT

## 2020-04-05 PROCEDURE — 2140000000 HC CCU INTERMEDIATE R&B

## 2020-04-05 PROCEDURE — 2580000003 HC RX 258: Performed by: PHYSICIAN ASSISTANT

## 2020-04-05 PROCEDURE — 6360000002 HC RX W HCPCS: Performed by: PHARMACIST

## 2020-04-05 PROCEDURE — 97535 SELF CARE MNGMENT TRAINING: CPT

## 2020-04-05 PROCEDURE — 97530 THERAPEUTIC ACTIVITIES: CPT

## 2020-04-05 PROCEDURE — 36415 COLL VENOUS BLD VENIPUNCTURE: CPT

## 2020-04-05 PROCEDURE — 85027 COMPLETE CBC AUTOMATED: CPT

## 2020-04-05 PROCEDURE — 6360000002 HC RX W HCPCS: Performed by: INTERNAL MEDICINE

## 2020-04-05 PROCEDURE — 6370000000 HC RX 637 (ALT 250 FOR IP): Performed by: INTERNAL MEDICINE

## 2020-04-05 PROCEDURE — 6360000002 HC RX W HCPCS: Performed by: PHYSICIAN ASSISTANT

## 2020-04-05 PROCEDURE — 94760 N-INVAS EAR/PLS OXIMETRY 1: CPT

## 2020-04-05 PROCEDURE — 80202 ASSAY OF VANCOMYCIN: CPT

## 2020-04-05 PROCEDURE — 97166 OT EVAL MOD COMPLEX 45 MIN: CPT

## 2020-04-05 PROCEDURE — 2580000003 HC RX 258: Performed by: PHARMACIST

## 2020-04-05 RX ORDER — DEXAMETHASONE SODIUM PHOSPHATE 4 MG/ML
8 INJECTION, SOLUTION INTRA-ARTICULAR; INTRALESIONAL; INTRAMUSCULAR; INTRAVENOUS; SOFT TISSUE EVERY 8 HOURS
Status: COMPLETED | OUTPATIENT
Start: 2020-04-05 | End: 2020-04-06

## 2020-04-05 RX ORDER — CIPROFLOXACIN 2 MG/ML
400 INJECTION, SOLUTION INTRAVENOUS EVERY 12 HOURS
Status: DISCONTINUED | OUTPATIENT
Start: 2020-04-05 | End: 2020-04-05

## 2020-04-05 RX ADMIN — SENNOSIDES AND DOCUSATE SODIUM 2 TABLET: 8.6; 5 TABLET ORAL at 08:29

## 2020-04-05 RX ADMIN — Medication 10 ML: at 08:29

## 2020-04-05 RX ADMIN — DEXAMETHASONE SODIUM PHOSPHATE 8 MG: 4 INJECTION, SOLUTION INTRA-ARTICULAR; INTRALESIONAL; INTRAMUSCULAR; INTRAVENOUS; SOFT TISSUE at 20:21

## 2020-04-05 RX ADMIN — Medication 10 ML: at 20:21

## 2020-04-05 RX ADMIN — MORPHINE SULFATE 4 MG: 4 INJECTION, SOLUTION INTRAMUSCULAR; INTRAVENOUS at 01:29

## 2020-04-05 RX ADMIN — PHENOL 1 SPRAY: 1.4 SPRAY ORAL at 04:44

## 2020-04-05 RX ADMIN — FLUOXETINE HYDROCHLORIDE 20 MG: 20 CAPSULE ORAL at 08:29

## 2020-04-05 RX ADMIN — CEFEPIME HYDROCHLORIDE 2 G: 2 INJECTION, POWDER, FOR SOLUTION INTRAVENOUS at 14:53

## 2020-04-05 RX ADMIN — DEXAMETHASONE SODIUM PHOSPHATE 8 MG: 4 INJECTION, SOLUTION INTRA-ARTICULAR; INTRALESIONAL; INTRAMUSCULAR; INTRAVENOUS; SOFT TISSUE at 12:32

## 2020-04-05 RX ADMIN — OXYCODONE HYDROCHLORIDE AND ACETAMINOPHEN 2 TABLET: 5; 325 TABLET ORAL at 16:58

## 2020-04-05 RX ADMIN — PANTOPRAZOLE SODIUM 40 MG: 40 TABLET, DELAYED RELEASE ORAL at 05:21

## 2020-04-05 RX ADMIN — POLYETHYLENE GLYCOL 3350 17 G: 17 POWDER, FOR SOLUTION ORAL at 08:28

## 2020-04-05 RX ADMIN — DEXAMETHASONE SODIUM PHOSPHATE 8 MG: 4 INJECTION, SOLUTION INTRA-ARTICULAR; INTRALESIONAL; INTRAMUSCULAR; INTRAVENOUS; SOFT TISSUE at 04:44

## 2020-04-05 RX ADMIN — VANCOMYCIN HYDROCHLORIDE 1500 MG: 1 INJECTION, POWDER, LYOPHILIZED, FOR SOLUTION INTRAVENOUS at 09:13

## 2020-04-05 RX ADMIN — MEROPENEM 1 G: 1 INJECTION, POWDER, FOR SOLUTION INTRAVENOUS at 12:28

## 2020-04-05 RX ADMIN — SENNOSIDES AND DOCUSATE SODIUM 2 TABLET: 8.6; 5 TABLET ORAL at 20:21

## 2020-04-05 RX ADMIN — METOPROLOL SUCCINATE 50 MG: 50 TABLET, EXTENDED RELEASE ORAL at 08:29

## 2020-04-05 RX ADMIN — OXYCODONE HYDROCHLORIDE AND ACETAMINOPHEN 2 TABLET: 5; 325 TABLET ORAL at 08:29

## 2020-04-05 RX ADMIN — VITAMIN D, TAB 1000IU (100/BT) 1000 UNITS: 25 TAB at 08:29

## 2020-04-05 RX ADMIN — MEROPENEM 1 G: 1 INJECTION, POWDER, FOR SOLUTION INTRAVENOUS at 04:44

## 2020-04-05 RX ADMIN — OXYCODONE HYDROCHLORIDE AND ACETAMINOPHEN 2 TABLET: 5; 325 TABLET ORAL at 12:27

## 2020-04-05 ASSESSMENT — PAIN SCALES - GENERAL
PAINLEVEL_OUTOF10: 8
PAINLEVEL_OUTOF10: 8
PAINLEVEL_OUTOF10: 7
PAINLEVEL_OUTOF10: 4
PAINLEVEL_OUTOF10: 0
PAINLEVEL_OUTOF10: 0
PAINLEVEL_OUTOF10: 5
PAINLEVEL_OUTOF10: 7

## 2020-04-05 ASSESSMENT — PAIN DESCRIPTION - LOCATION
LOCATION: BACK

## 2020-04-05 ASSESSMENT — PAIN DESCRIPTION - PROGRESSION
CLINICAL_PROGRESSION: GRADUALLY IMPROVING

## 2020-04-05 ASSESSMENT — PAIN DESCRIPTION - DIRECTION
RADIATING_TOWARDS: NO

## 2020-04-05 ASSESSMENT — PAIN DESCRIPTION - PAIN TYPE
TYPE: SURGICAL PAIN

## 2020-04-05 ASSESSMENT — PAIN - FUNCTIONAL ASSESSMENT
PAIN_FUNCTIONAL_ASSESSMENT: ACTIVITIES ARE NOT PREVENTED

## 2020-04-05 ASSESSMENT — PAIN DESCRIPTION - DESCRIPTORS
DESCRIPTORS: ACHING
DESCRIPTORS: SHARP;THROBBING
DESCRIPTORS: SHOOTING;THROBBING;SHARP
DESCRIPTORS: ACHING
DESCRIPTORS: ACHING

## 2020-04-05 ASSESSMENT — PAIN DESCRIPTION - FREQUENCY
FREQUENCY: INTERMITTENT

## 2020-04-05 ASSESSMENT — PAIN DESCRIPTION - ONSET
ONSET: ON-GOING

## 2020-04-05 ASSESSMENT — PAIN DESCRIPTION - ORIENTATION
ORIENTATION: MID

## 2020-04-05 NOTE — PROGRESS NOTES
Progress note: Infectious diseases    Patient - Reno King,  Age - 53 y.o.    - 1970      Room Number - 3B-24/024-A   MRN -  [id number]   Acct # - [de-identified]  Date of Admission -  2020  8:05 AM    SUBJECTIVE:   Lumbar wound growing pseudomonas: antibiotic de-escalated  OBJECTIVE   VITALS    height is 5' (1.524 m) and weight is 213 lb 8 oz (96.8 kg). Her oral temperature is 97.9 °F (36.6 °C). Her blood pressure is 126/66 and her pulse is 64. Her respiration is 18 and oxygen saturation is 98%.        Wt Readings from Last 3 Encounters:   20 213 lb 8 oz (96.8 kg)   20 197 lb 11.2 oz (89.7 kg)   19 196 lb (88.9 kg)       I/O (24 Hours)    Intake/Output Summary (Last 24 hours) at 2020 1422  Last data filed at 2020 0815  Gross per 24 hour   Intake 4557.04 ml   Output 3870 ml   Net 687.04 ml       General Appearance  Awake, alert, oriented,  not  In acute distress  HEENT - normocephalic, atraumatic,pale conjunctiva,  anicteric sclera  Neck - Supple, no mass  Lungs -  Bilateral good air entry, no rhonchi, no wheeze  Cardiovascular - Heart sounds are normal.       Abdomen - soft, not distended, nontender,   Neurologic -oriented  Skin - No bruising or bleeding  Extremities - No edema, no cyanosis, clubbing   Dressed lumbar wound  MEDICATIONS:      dexamethasone  8 mg Intravenous Q8H    cefepime  2 g Intravenous Q12H    sodium chloride flush  10 mL Intravenous 2 times per day    polyethylene glycol  17 g Oral Daily    Vitamin D  1,000 Units Oral Daily    FLUoxetine  20 mg Oral Daily    metoprolol succinate  50 mg Oral Daily    pantoprazole  40 mg Oral QAM AC    senna-docusate  2 tablet Oral BID      sodium chloride 50 mL/hr at 20 2338     sodium chloride flush, promethazine **OR** ondansetron, morphine **OR** morphine, bisacodyl, fleet, oxyCODONE-acetaminophen **OR**

## 2020-04-05 NOTE — PROGRESS NOTES
[facility]  INPATIENT PHYSICAL THERAPY  EVALUATION  [facility]    Time In: 0908  Time Out: 8467  Timed Code Treatment Minutes: 15 Minutes  Minutes: 30          Date: 2020  Patient Name: Minda Cooper,  Gender:  female        MRN: [id number]  : 1970  (53 y.o.)      Referring Practitioner: YUNIEL Robin  Diagnosis: lumbar discitis  Additional Pertinent Hx: s/p L5-S1 exploration of fusion, L5-S1 removal of instrumentation, lumbar I&D,  L3-S1 decompression and fusion with L4-5 and L5-S1 TLIF by Dr. Burgess Whitney. Restrictions/Precautions:  Restrictions/Precautions: General Precautions, Surgical Protocols, Fall Risk  Required Braces or Orthoses  Spinal: Lumbar Corset  Position Activity Restriction  Spinal Precautions: No Bending, No Lifting, No Twisting    Subjective:  Chart Reviewed: Yes  Patient assessed for rehabilitation services?: Yes  Family / Caregiver Present: No  Subjective: RN approved eval. pt in bed on arrival, agrees to therapy session    General:  Follows Commands: Within Functional Limits       Pain:  Yes. Pain Assessment  Pain Assessment: 0-10  Pain Level: 5       Social/Functional History:    Lives With: Family(ex-, daughter)  Type of Home: House  Home Layout: One level  Home Access: Stairs to enter without rails  Entrance Stairs - Number of Steps: 2 - platform then in to home  Home Equipment: Rolling walker, [place]                   Ambulation Assistance: Independent  Transfer Assistance: Independent    Active : Yes     Additional Comments: series of back surgeries last 6 months, no use of AD at home PTA.  discharged from all therapies    OBJECTIVE:  Range of Motion:  Bilateral Lower Extremity: WFL    Strength:  Right Lower Extremity: Impaired - 4-/5 hip and ankle, 4/5 knee   Left Lower Extremity: WFL    Balance:  Static Sitting Balance:  Stand By Assistance, Contact Guard Assistance  Dynamic Standing Balance: Contact Guard Assistance    Bed Mobility:  Supine to Sit: Contact Guard Assistance, with verbal cues , with increased time for completion, proper log roll  Scooting: Contact Guard Assistance    Transfers:  Sit to Stand: [address], cues for hand placement  Stand to [facility] [address], cues for hand placement    Ambulation:  [address], with verbal cues   Distance: 80ft, 140ft  Surface: Level Tile  Device:Rolling Walker  Gait Deviations: Forward Flexed Posture, Slow Es, Decreased Step Length Bilaterally, Decreased Gait Speed, Decreased Heel Strike Bilaterally, Mild Path Deviations and increased WB through B UEs and walker    Exercise:  Patient was guided in 1 set(s) 10 reps of exercise to both lower extremities. Ankle pumps, Glut sets, Quad sets, Heelslides, Long arc quads and Hip abduction/adduction. Exercises were completed for increased independence with functional mobility. Functional Outcome Measures: Not completed       ASSESSMENT:  Activity Tolerance:  Patient tolerance of  treatment: good. Treatment Initiated: Treatment and education initiated within context of evaluation. Evaluation time included review of current medical information, gathering information related to past medical, social and functional history, completion of standardized testing, formal and informal observation of tasks, assessment of data and development of plan of care and goals. Treatment time included skilled education and facilitation of tasks to increase safety and independence with functional mobility for improved independence and quality of life. Assessment: Body structures, Functions, Activity limitations: Decreased functional mobility , Decreased ADL status, Decreased strength, Decreased balance, Decreased endurance, Increased pain  Assessment: Pt presents s/p lumbar surgery with h/o several surgeries in last 6 months.  At this time she demos decreased strength, tolerance to activity, and gait mechanics. She is limited by high pain levels during functional mobility. She requires skilled PT services to maximize independence and mobility prior to returning home  Prognosis: Good    REQUIRES PT FOLLOW UP: Yes    Discharge Recommendations:  Discharge Recommendations: Continue to assess pending progress, Patient would benefit from continued therapy after discharge    Patient Education:  PT Education: Goals, Plan of Care, Precautions, Transfer Training, Functional Mobility Training    Equipment Recommendations:  Equipment Needed: No    Plan:  Times per week: 6x O  Current Treatment Recommendations: Strengthening, Balance Training, Functional Mobility Training, Transfer Training, Gait Training, Stair training, Endurance Training, Home Exercise Program, Safety Education & Training, Patient/Caregiver Education & Training, Equipment Evaluation, Education, & procurement, ADL/Self-care Training    Goals:  Patient goals : go home, move better  Short term goals  Time Frame for Short term goals: by d/c  Short term goal 1: pt to perform supine to/from sit at mod I to get in and out of bed  Short term goal 2: pt to perform sit to/from stand at mod I to rise from bed or chair  Short term goal 3: pt to ambulate >100ft with RW at S for household mobility  Short term goal 4: pt to negotiate 2 platform steps with RW at S for home access  Long term goals  Time Frame for Long term goals : defer d/t short LOS    Following session, patient left in safe position with all fall risk precautions in place.

## 2020-04-05 NOTE — PLAN OF CARE
Problem: Falls - Risk of:  Goal: Will remain free from falls  Description: Will remain free from falls  4/5/2020 0202 by Loni Baldwin RN  Outcome: Ongoing  Note: Assessment & interventions provided throughout shift. Bed locked & in low position, call light in reach, side-rails up x2, non-slip socks on when ambulating, reminded patient to use call light to call for assistance. Problem: Discharge Planning:  Goal: Discharged to appropriate level of care  Description: Discharged to appropriate level of care  4/5/2020 0202 by Loni Baldwin RN  Outcome: Ongoing  Note: Patient actively participates in discharge planning     Problem: Skin Integrity:  Goal: Absence of new skin breakdown  Description: Absence of new skin breakdown  4/5/2020 0202 by Loni Baldwin RN  Outcome: Ongoing  Note: Assessment & interventions provided throughout shift. Bed locked & in low position, call light in reach, side-rails up x2, non-slip socks on when ambulating, reminded patient to use call light to call for assistance. Problem: Pain:  Goal: Control of acute pain  Description: Control of acute pain  4/5/2020 0202 by Loni Baldwin RN  Outcome: Ongoing  Note: Ongoing assessment & interventions provided throughout shift. Reminded patient to report any pain, pressure, or shortness of breath to the nurse. Pain medications provided per physician's orders. Care plan reviewed with patient. Patient verbalizes understanding of the care plan and contributed to goal setting.

## 2020-04-05 NOTE — PROGRESS NOTES
Department of Orthopedic Surgery  Spine Service  Attending Progress Note        Subjective:  Patient ambulated in halls yesterday and in room today, feels strength is improved today. Right foot still feels \"tight\", denies leg pain. No change in numbness/tingling left foot. No BM. Renee removed and pt voiding on own, but felt numb to touch when wiping. Ct results discussed with radiologist. TLIF cage sitting anterior, but no mass effect on vessels.  Will monitor cage placement, x-ray before discharge and at post op appt    Vitals  VITALS:  BP (!) 152/74   Pulse 82   Temp 98 °F (36.7 °C) (Oral)   Resp 18   Ht 5' (1.524 m)   Wt 213 lb 8 oz (96.8 kg)   LMP 10/27/2015 (Exact Date)   SpO2 96%   BMI 41.70 kg/m²   24HR INTAKE/OUTPUT:      Intake/Output Summary (Last 24 hours) at 4/5/2020 1019  Last data filed at 4/5/2020 0452  Gross per 24 hour   Intake 4797.04 ml   Output 3420 ml   Net 1377.04 ml     URINARY CATHETER OUTPUT (Renee):  [REMOVED] Urethral Catheter-Output (mL): 350 mL  DRAIN/TUBE OUTPUT:  Closed/Suction Drain Back-Output (ml): 40 ml  Closed/Suction Drain Back-Output (ml): 30 ml      PHYSICAL EXAM:    Orientation:  alert and oriented to person, place and time    Incision:  dressing in place, clean, dry, intact      Lower Extremity Motor :  quadriceps, extensor hallucis longus, dorsiflexion, plantarflexion 5/5 bilaterally  Lower Extremity Sensory:  Intact L1-S1, decreased to touch toes on left foot    DP and PT pulses 2+ and symmetric  Feet warm to touch    Flatus:  positive      LABS:    HgB:    Lab Results   Component Value Date    HGB 8.6 04/05/2020     Hemoglobin/Hematocrit:    Lab Results   Component Value Date    HGB 8.6 04/05/2020    HCT 27.1 04/05/2020     BMP:    Lab Results   Component Value Date     04/05/2020    K 4.5 04/05/2020    K 4.2 04/03/2020     04/05/2020    CO2 27 04/05/2020    BUN 12 04/05/2020    LABALBU 3.9 04/01/2020    CREATININE 0.6 04/05/2020    CALCIUM 8.6

## 2020-04-06 LAB
ANION GAP SERPL CALCULATED.3IONS-SCNC: 8 MEQ/L (ref 8–16)
BUN BLDV-MCNC: 15 MG/DL (ref 7–22)
CALCIUM SERPL-MCNC: 8.7 MG/DL (ref 8.5–10.5)
CHLORIDE BLD-SCNC: 101 MEQ/L (ref 98–111)
CO2: 29 MEQ/L (ref 23–33)
CREAT SERPL-MCNC: 0.5 MG/DL (ref 0.4–1.2)
ERYTHROCYTE [DISTWIDTH] IN BLOOD BY AUTOMATED COUNT: 17.9 % (ref 11.5–14.5)
ERYTHROCYTE [DISTWIDTH] IN BLOOD BY AUTOMATED COUNT: 55.5 FL (ref 35–45)
GFR SERPL CREATININE-BSD FRML MDRD: > 90 ML/MIN/1.73M2
GLUCOSE BLD-MCNC: 166 MG/DL (ref 70–108)
HCT VFR BLD CALC: 26.6 % (ref 37–47)
HEMOGLOBIN: 8.2 GM/DL (ref 12–16)
MCH RBC QN AUTO: 26.5 PG (ref 26–33)
MCHC RBC AUTO-ENTMCNC: 30.8 GM/DL (ref 32.2–35.5)
MCV RBC AUTO: 85.8 FL (ref 81–99)
PLATELET # BLD: 237 THOU/MM3 (ref 130–400)
PMV BLD AUTO: 10.1 FL (ref 9.4–12.4)
POTASSIUM SERPL-SCNC: 4.3 MEQ/L (ref 3.5–5.2)
RBC # BLD: 3.1 MILL/MM3 (ref 4.2–5.4)
SODIUM BLD-SCNC: 138 MEQ/L (ref 135–145)
WBC # BLD: 17.7 THOU/MM3 (ref 4.8–10.8)

## 2020-04-06 PROCEDURE — 6370000000 HC RX 637 (ALT 250 FOR IP): Performed by: PHYSICIAN ASSISTANT

## 2020-04-06 PROCEDURE — 94760 N-INVAS EAR/PLS OXIMETRY 1: CPT

## 2020-04-06 PROCEDURE — 2580000003 HC RX 258: Performed by: INTERNAL MEDICINE

## 2020-04-06 PROCEDURE — 85027 COMPLETE CBC AUTOMATED: CPT

## 2020-04-06 PROCEDURE — 6360000002 HC RX W HCPCS: Performed by: INTERNAL MEDICINE

## 2020-04-06 PROCEDURE — 97116 GAIT TRAINING THERAPY: CPT

## 2020-04-06 PROCEDURE — 2140000000 HC CCU INTERMEDIATE R&B

## 2020-04-06 PROCEDURE — 6370000000 HC RX 637 (ALT 250 FOR IP): Performed by: INTERNAL MEDICINE

## 2020-04-06 PROCEDURE — 97530 THERAPEUTIC ACTIVITIES: CPT

## 2020-04-06 PROCEDURE — 6360000002 HC RX W HCPCS: Performed by: PHYSICIAN ASSISTANT

## 2020-04-06 PROCEDURE — 36415 COLL VENOUS BLD VENIPUNCTURE: CPT

## 2020-04-06 PROCEDURE — 97535 SELF CARE MNGMENT TRAINING: CPT

## 2020-04-06 PROCEDURE — 80048 BASIC METABOLIC PNL TOTAL CA: CPT

## 2020-04-06 RX ADMIN — CEFEPIME HYDROCHLORIDE 2 G: 2 INJECTION, POWDER, FOR SOLUTION INTRAVENOUS at 14:41

## 2020-04-06 RX ADMIN — OXYCODONE HYDROCHLORIDE AND ACETAMINOPHEN 2 TABLET: 5; 325 TABLET ORAL at 16:50

## 2020-04-06 RX ADMIN — POLYETHYLENE GLYCOL 3350 17 G: 17 POWDER, FOR SOLUTION ORAL at 08:26

## 2020-04-06 RX ADMIN — OXYCODONE HYDROCHLORIDE AND ACETAMINOPHEN 2 TABLET: 5; 325 TABLET ORAL at 01:24

## 2020-04-06 RX ADMIN — PANTOPRAZOLE SODIUM 40 MG: 40 TABLET, DELAYED RELEASE ORAL at 08:26

## 2020-04-06 RX ADMIN — VITAMIN D, TAB 1000IU (100/BT) 1000 UNITS: 25 TAB at 08:22

## 2020-04-06 RX ADMIN — OXYCODONE HYDROCHLORIDE AND ACETAMINOPHEN 2 TABLET: 5; 325 TABLET ORAL at 12:27

## 2020-04-06 RX ADMIN — DEXAMETHASONE SODIUM PHOSPHATE 8 MG: 4 INJECTION, SOLUTION INTRA-ARTICULAR; INTRALESIONAL; INTRAMUSCULAR; INTRAVENOUS; SOFT TISSUE at 03:48

## 2020-04-06 RX ADMIN — SODIUM CHLORIDE: 9 INJECTION, SOLUTION INTRAVENOUS at 16:47

## 2020-04-06 RX ADMIN — CEFEPIME HYDROCHLORIDE 2 G: 2 INJECTION, POWDER, FOR SOLUTION INTRAVENOUS at 01:24

## 2020-04-06 RX ADMIN — SENNOSIDES AND DOCUSATE SODIUM 2 TABLET: 8.6; 5 TABLET ORAL at 21:39

## 2020-04-06 RX ADMIN — FLUOXETINE HYDROCHLORIDE 20 MG: 20 CAPSULE ORAL at 08:22

## 2020-04-06 RX ADMIN — OXYCODONE HYDROCHLORIDE AND ACETAMINOPHEN 2 TABLET: 5; 325 TABLET ORAL at 08:25

## 2020-04-06 RX ADMIN — METOPROLOL SUCCINATE 50 MG: 50 TABLET, EXTENDED RELEASE ORAL at 08:22

## 2020-04-06 RX ADMIN — SENNOSIDES AND DOCUSATE SODIUM 2 TABLET: 8.6; 5 TABLET ORAL at 08:23

## 2020-04-06 ASSESSMENT — PAIN SCALES - GENERAL
PAINLEVEL_OUTOF10: 7
PAINLEVEL_OUTOF10: 6
PAINLEVEL_OUTOF10: 7
PAINLEVEL_OUTOF10: 8

## 2020-04-06 ASSESSMENT — PAIN DESCRIPTION - PROGRESSION
CLINICAL_PROGRESSION: GRADUALLY IMPROVING
CLINICAL_PROGRESSION: NOT CHANGED
CLINICAL_PROGRESSION: GRADUALLY IMPROVING

## 2020-04-06 ASSESSMENT — PAIN DESCRIPTION - ONSET: ONSET: ON-GOING

## 2020-04-06 ASSESSMENT — PAIN DESCRIPTION - ORIENTATION: ORIENTATION: MID

## 2020-04-06 ASSESSMENT — PAIN DESCRIPTION - FREQUENCY: FREQUENCY: INTERMITTENT

## 2020-04-06 ASSESSMENT — PAIN DESCRIPTION - PAIN TYPE: TYPE: SURGICAL PAIN

## 2020-04-06 ASSESSMENT — PAIN - FUNCTIONAL ASSESSMENT: PAIN_FUNCTIONAL_ASSESSMENT: ACTIVITIES ARE NOT PREVENTED

## 2020-04-06 ASSESSMENT — PAIN DESCRIPTION - LOCATION: LOCATION: BACK

## 2020-04-06 ASSESSMENT — PAIN DESCRIPTION - DESCRIPTORS: DESCRIPTORS: ACHING

## 2020-04-06 NOTE — PLAN OF CARE
Problem: Falls - Risk of:  Goal: Will remain free from falls  Description: Will remain free from falls  Outcome: Ongoing  Note: No falls this shift, call light in reach. Bed alarm on. Up with back brace, walker and 1 assist. PT/OT working with patient. Problem: Discharge Planning:  Goal: Discharged to appropriate level of care  Description: Discharged to appropriate level of care  Outcome: Ongoing  Note: Plans on returning home with family, home health, and home infusion. Problem: Skin Integrity:  Goal: Absence of new skin breakdown  Description: Absence of new skin breakdown  Outcome: Ongoing  Note: Pt turns and repositions self frequently. Dressing to back incision dry and intact     Problem: Pain:  Goal: Control of acute pain  Description: Control of acute pain  Outcome: Ongoing  Note: Complains on surgical pain rated 8/10 with pain goal of 4. Percocet being given with some relief. Pt repositioned for comfort. Problem: Cardiovascular  Goal: Hemodynamic stability  Outcome: Ongoing  Note: Vital signs stable, telemetry monitor on      Problem: Activity:  Goal: Ability to tolerate increased activity will improve  Description: Ability to tolerate increased activity will improve  Outcome: Ongoing  Note: Working with PT/OT, up with 1 assist walker and back brace     Problem: Bowel/Gastric:  Goal: Gastrointestinal status for postoperative course will improve  Description: Gastrointestinal status for postoperative course will improve  Outcome: Ongoing  Note: No bowel movement this shift     Care plan reviewed with patient. Patient verbalize understanding of the plan of care and contribute to goal setting.

## 2020-04-06 NOTE — PROGRESS NOTES
Deviations:  Cues for erect posture and dec WB BUE on walker, no LOB    Balance:  Static Sitting Balance:  Modified Independent  Dynamic Sitting Balance: Modified Independent, reaching shoulder to waist level  Static Standing Balance: Stand By Assistance, with RW  Dynamic Standing Balance: Stand By Assistance, reaching shoulder to waist level    Pt with good understanding of back precautions and body mechanics, pt able to doff back brace with supervision    Functional Outcome Measures: Completed  AM-PAC Inpatient Mobility without Stair Climbing Raw Score : 16  AM-PAC Inpatient without Stair Climbing T-Scale Score : 45.54    ASSESSMENT:  Assessment: Patient progressing toward established goals. Activity Tolerance:  Patient tolerance of  treatment: good.       Equipment Recommendations:Equipment Needed: No  Discharge Recommendations:  Continue to assess pending progress, Patient would benefit from continued therapy after discharge    Plan: Times per week: 6x O  Current Treatment Recommendations: Strengthening, Balance Training, Functional Mobility Training, Transfer Training, Gait Training, Stair training, Endurance Training, Home Exercise Program, Safety Education & Training, Patient/Caregiver Education & Training, Equipment Evaluation, Education, & procurement, ADL/Self-care Training    Patient Education  Patient Education: Precautions/Restrictions, Gait    Goals:  Patient goals : go home, move better  Short term goals  Time Frame for Short term goals: by d/c  Short term goal 1: pt to perform supine to/from sit at mod I to get in and out of bed  Short term goal 2: pt to perform sit to/from stand at mod I to rise from bed or chair  Short term goal 3: pt to ambulate >100ft with RW at S for household mobility  Short term goal 4: pt to negotiate 2 platform steps with RW at S for home access  Long term goals  Time Frame for Long term goals : defer d/t short LOS    Following session, patient left in safe position with all fall risk precautions in place.

## 2020-04-06 NOTE — CARE COORDINATION
4/6/20, 11:45 AM EDT    DISCHARGE ONGOING EVALUATION:     Keisha Fleming day: 5  Location: [facility]24/024-A Reason for admit: Lumbar discitis [M46.46]   Treatment Plan of Care: Pt transferred from [place] [address]. Spoke with Dr. Destini Harden this am, planning 4-6 weeks iv antibiotics. ID ok with discharge when ok with Ortho. Barriers to Discharge: Not medically ready. PCP: Carollynn Dandy, MD  Readmission Risk Score: 13%  Patient Goals/Plan/Treatment Preferences: Planning home with iv antibiotics at discharge. Pt prefers [facility] and [facility]. Referral made.

## 2020-04-06 NOTE — PROGRESS NOTES
Department of Orthopedic Surgery  Spine Service  Attending Progress Note        Subjective:  Back pain controlled, denies leg pain. \"tightness\" in right foot improving, no change in numbness. Ambulating in hull without difficulty. Wound positive for pseudomonas, plan is for PICC line and long term IV antibiotics. Pt reports yesterday she \"dribbled\" when ambulating to the bathroom, still had urge to go. Ct results discussed with radiologist. TLIF cage sitting anterior, but no mass effect on vessels.  Will monitor cage placement, x-ray before discharge and at post op appt    Vitals  VITALS:  /68   Pulse 66   Temp 97.8 °F (36.6 °C) (Oral)   Resp 17   Ht 5' (1.524 m)   Wt 213 lb 8 oz (96.8 kg)   LMP 10/27/2015 (Exact Date)   SpO2 97%   BMI 41.70 kg/m²   24HR INTAKE/OUTPUT:      Intake/Output Summary (Last 24 hours) at 4/6/2020 0658  Last data filed at 4/6/2020 0354  Gross per 24 hour   Intake 1142.17 ml   Output 2570 ml   Net -1427.83 ml     URINARY CATHETER OUTPUT (Renee):  [REMOVED] Urethral Catheter-Output (mL): 350 mL  DRAIN/TUBE OUTPUT:  Closed/Suction Drain Back-Output (ml): 90 ml  Closed/Suction Drain Back-Output (ml): 10 ml      PHYSICAL EXAM:    Orientation:  alert and oriented to person, place and time    Incision:  dressing in place, clean, dry, intact      Lower Extremity Motor :  quadriceps, extensor hallucis longus, dorsiflexion, plantarflexion 5/5 bilaterally  Lower Extremity Sensory:  Intact L1-S1, decreased to touch toes on left foot    DP and PT pulses 2+ and symmetric  Feet warm to touch    Flatus:  positive      LABS:    HgB:    Lab Results   Component Value Date    HGB 8.2 04/06/2020     Hemoglobin/Hematocrit:    Lab Results   Component Value Date    HGB 8.2 04/06/2020    HCT 26.6 04/06/2020     BMP:    Lab Results   Component Value Date     04/06/2020    K 4.3 04/06/2020    K 4.2 04/03/2020     04/06/2020    CO2 29 04/06/2020    BUN 15 04/06/2020    LABALBU 3.9

## 2020-04-07 ENCOUNTER — APPOINTMENT (OUTPATIENT)
Dept: GENERAL RADIOLOGY | Age: 50
DRG: 304 | End: 2020-04-07
Payer: MEDICARE

## 2020-04-07 VITALS
WEIGHT: 213.5 LBS | OXYGEN SATURATION: 98 % | SYSTOLIC BLOOD PRESSURE: 120 MMHG | DIASTOLIC BLOOD PRESSURE: 62 MMHG | HEART RATE: 60 BPM | HEIGHT: 60 IN | BODY MASS INDEX: 41.91 KG/M2 | TEMPERATURE: 97.7 F | RESPIRATION RATE: 16 BRPM

## 2020-04-07 LAB
ANION GAP SERPL CALCULATED.3IONS-SCNC: 9 MEQ/L (ref 8–16)
BLOOD CULTURE, ROUTINE: NORMAL
BLOOD CULTURE, ROUTINE: NORMAL
BUN BLDV-MCNC: 23 MG/DL (ref 7–22)
CALCIUM SERPL-MCNC: 8.7 MG/DL (ref 8.5–10.5)
CHLORIDE BLD-SCNC: 101 MEQ/L (ref 98–111)
CO2: 30 MEQ/L (ref 23–33)
CREAT SERPL-MCNC: 0.7 MG/DL (ref 0.4–1.2)
ERYTHROCYTE [DISTWIDTH] IN BLOOD BY AUTOMATED COUNT: 18.6 % (ref 11.5–14.5)
ERYTHROCYTE [DISTWIDTH] IN BLOOD BY AUTOMATED COUNT: 58.4 FL (ref 35–45)
GFR SERPL CREATININE-BSD FRML MDRD: 89 ML/MIN/1.73M2
GLUCOSE BLD-MCNC: 106 MG/DL (ref 70–108)
HCT VFR BLD CALC: 27 % (ref 37–47)
HEMOGLOBIN: 8.2 GM/DL (ref 12–16)
MCH RBC QN AUTO: 26.5 PG (ref 26–33)
MCHC RBC AUTO-ENTMCNC: 30.4 GM/DL (ref 32.2–35.5)
MCV RBC AUTO: 87.4 FL (ref 81–99)
PLATELET # BLD: 218 THOU/MM3 (ref 130–400)
PMV BLD AUTO: 9.8 FL (ref 9.4–12.4)
POTASSIUM SERPL-SCNC: 4.8 MEQ/L (ref 3.5–5.2)
RBC # BLD: 3.09 MILL/MM3 (ref 4.2–5.4)
SODIUM BLD-SCNC: 140 MEQ/L (ref 135–145)
WBC # BLD: 14.5 THOU/MM3 (ref 4.8–10.8)

## 2020-04-07 PROCEDURE — 02HV33Z INSERTION OF INFUSION DEVICE INTO SUPERIOR VENA CAVA, PERCUTANEOUS APPROACH: ICD-10-PCS | Performed by: INTERNAL MEDICINE

## 2020-04-07 PROCEDURE — C1751 CATH, INF, PER/CENT/MIDLINE: HCPCS

## 2020-04-07 PROCEDURE — 6370000000 HC RX 637 (ALT 250 FOR IP): Performed by: INTERNAL MEDICINE

## 2020-04-07 PROCEDURE — 80048 BASIC METABOLIC PNL TOTAL CA: CPT

## 2020-04-07 PROCEDURE — 6370000000 HC RX 637 (ALT 250 FOR IP): Performed by: PHYSICIAN ASSISTANT

## 2020-04-07 PROCEDURE — 6360000002 HC RX W HCPCS: Performed by: INTERNAL MEDICINE

## 2020-04-07 PROCEDURE — 36410 VNPNXR 3YR/> PHY/QHP DX/THER: CPT

## 2020-04-07 PROCEDURE — 76937 US GUIDE VASCULAR ACCESS: CPT

## 2020-04-07 PROCEDURE — 36415 COLL VENOUS BLD VENIPUNCTURE: CPT

## 2020-04-07 PROCEDURE — 97535 SELF CARE MNGMENT TRAINING: CPT

## 2020-04-07 PROCEDURE — 36592 COLLECT BLOOD FROM PICC: CPT

## 2020-04-07 PROCEDURE — 2580000003 HC RX 258: Performed by: PHYSICIAN ASSISTANT

## 2020-04-07 PROCEDURE — 2580000003 HC RX 258: Performed by: INTERNAL MEDICINE

## 2020-04-07 PROCEDURE — 97530 THERAPEUTIC ACTIVITIES: CPT

## 2020-04-07 PROCEDURE — 85027 COMPLETE CBC AUTOMATED: CPT

## 2020-04-07 PROCEDURE — 94760 N-INVAS EAR/PLS OXIMETRY 1: CPT

## 2020-04-07 PROCEDURE — 71045 X-RAY EXAM CHEST 1 VIEW: CPT

## 2020-04-07 PROCEDURE — 72100 X-RAY EXAM L-S SPINE 2/3 VWS: CPT

## 2020-04-07 RX ORDER — DIAZEPAM 5 MG/1
5 TABLET ORAL EVERY 8 HOURS PRN
Qty: 30 TABLET | Refills: 0 | Status: SHIPPED | OUTPATIENT
Start: 2020-04-07 | End: 2020-04-17

## 2020-04-07 RX ORDER — OXYCODONE HYDROCHLORIDE AND ACETAMINOPHEN 5; 325 MG/1; MG/1
1 TABLET ORAL EVERY 6 HOURS PRN
Qty: 28 TABLET | Refills: 0 | Status: SHIPPED | OUTPATIENT
Start: 2020-04-07 | End: 2020-04-14

## 2020-04-07 RX ADMIN — OXYCODONE HYDROCHLORIDE AND ACETAMINOPHEN 2 TABLET: 5; 325 TABLET ORAL at 01:09

## 2020-04-07 RX ADMIN — POLYETHYLENE GLYCOL 3350 17 G: 17 POWDER, FOR SOLUTION ORAL at 08:48

## 2020-04-07 RX ADMIN — METOPROLOL SUCCINATE 50 MG: 50 TABLET, EXTENDED RELEASE ORAL at 08:48

## 2020-04-07 RX ADMIN — FLUOXETINE HYDROCHLORIDE 20 MG: 20 CAPSULE ORAL at 08:49

## 2020-04-07 RX ADMIN — PANTOPRAZOLE SODIUM 40 MG: 40 TABLET, DELAYED RELEASE ORAL at 08:48

## 2020-04-07 RX ADMIN — Medication 10 ML: at 08:49

## 2020-04-07 RX ADMIN — OXYCODONE HYDROCHLORIDE AND ACETAMINOPHEN 2 TABLET: 5; 325 TABLET ORAL at 19:28

## 2020-04-07 RX ADMIN — CEFEPIME HYDROCHLORIDE 2 G: 2 INJECTION, POWDER, FOR SOLUTION INTRAVENOUS at 01:09

## 2020-04-07 RX ADMIN — VITAMIN D, TAB 1000IU (100/BT) 1000 UNITS: 25 TAB at 08:48

## 2020-04-07 RX ADMIN — OXYCODONE HYDROCHLORIDE AND ACETAMINOPHEN 1 TABLET: 5; 325 TABLET ORAL at 05:51

## 2020-04-07 RX ADMIN — CEFEPIME HYDROCHLORIDE 2 G: 2 INJECTION, POWDER, FOR SOLUTION INTRAVENOUS at 18:15

## 2020-04-07 RX ADMIN — OXYCODONE HYDROCHLORIDE AND ACETAMINOPHEN 2 TABLET: 5; 325 TABLET ORAL at 11:48

## 2020-04-07 ASSESSMENT — PAIN SCALES - GENERAL
PAINLEVEL_OUTOF10: 8
PAINLEVEL_OUTOF10: 8
PAINLEVEL_OUTOF10: 7
PAINLEVEL_OUTOF10: 7
PAINLEVEL_OUTOF10: 6

## 2020-04-07 ASSESSMENT — PAIN DESCRIPTION - ORIENTATION: ORIENTATION: MID

## 2020-04-07 ASSESSMENT — PAIN DESCRIPTION - LOCATION: LOCATION: BACK

## 2020-04-07 ASSESSMENT — PAIN DESCRIPTION - PAIN TYPE: TYPE: ACUTE PAIN;SURGICAL PAIN

## 2020-04-07 NOTE — PROGRESS NOTES
[facility] [address] [place] [address]  Occupational Therapy  Daily Note  Time:   Time In: 8420  Time Out: 5082  Timed Code Treatment Minutes: 26 Minutes  Minutes: 26          Date: 2020  Patient Name: Jose Castro,   Gender: female      Room: -24/024-A  MRN: [id number]  : 1970  (53 y.o.)  Referring Practitioner: CHRISTINA BRICE  Diagnosis: lumbar discitis  Additional Pertinent Hx: 53 y.o. female who presents to the Emergency Department 20 for the evaluation of back pain. This is chronic back pain. Patient's pain has been increasing over the past several days. Patient has history of lumbar back surgery. She states that she developed a postop infection in her lumbar back. She was evaluated at the [facility] clinic. Patient has an infectious disease doctor, Lata Vaughn who admitted her in the recent past.  The patient received IV antibiotics and was discharged home with a PIC line and was receiving vancomycin and meropenem through her IV. The IV antibiotics were stopped in the past 2 weeks. After having labs completed, pt advised by her ID doctor to come in . Pt s/p  L5-S1 exploration of fusion, L5-S1 removal of instrumentation, Lumbar I&D,  L3-S1 decompression and fusion with L4-5 and L5-S1 TLIF on 4/3/20. Restrictions/Precautions:  Restrictions/Precautions: General Precautions, Surgical Protocols, Fall Risk  Required Braces or Orthoses  Spinal: Lumbar Corset  Position Activity Restriction  Spinal Precautions: No Bending, No Lifting, No Twisting    SUBJECTIVE: Pt up in recliner with nurse leaving and okay'd tx. Pt may be going home today. PAIN: Feels okay, some discomfort     COGNITION: WFL    ADL:   Toileting: Supervision. Toilet Transfer: Supervision. BALANCE:  Standing Balance: Stand By Assistance. BED MOBILITY:  Sit to Supine: Modified Independent, with head of bed raised     TRANSFERS:  Sit to Stand:  Supervision. Stand to Sit: Supervision.      FUNCTIONAL

## 2020-04-07 NOTE — PROGRESS NOTES
acetaminophen      LABS:     CBC:   Recent Labs     04/05/20  0357 04/06/20  0344 04/07/20  0326   WBC 19.9* 17.7* 14.5*   HGB 8.6* 8.2* 8.2*    237 218     BMP:    Recent Labs     04/05/20  0357 04/06/20  0344 04/07/20  0326    138 140   K 4.5 4.3 4.8    101 101   CO2 27 29 30   BUN 12 15 23*   CREATININE 0.6 0.5 0.7   GLUCOSE 167* 166* 106     Calcium:  Recent Labs     04/07/20  0326   CALCIUM 8.7         CULTURES:   UA: No results for input(s): SPECGRAV, PHUR, COLORU, CLARITYU, MUCUS, PROTEINU, BLOODU, RBCUA, WBCUA, BACTERIA, NITRU, GLUCOSEU, BILIRUBINUR, UROBILINOGEN, KETUA, LABCAST, LABCASTTY, AMORPHOS in the last 72 hours. Invalid input(s): CRYSTALS  Micro:   Lab Results   Component Value Date    BC No growth-preliminary No growth  04/01/2020          Problem list of patient:     Patient Active Problem List   Diagnosis Code    GERD (gastroesophageal reflux disease) K21.9    Anxiety F41.9    Clinical depression F32.9    History of lumbar fusion Z98.1    Chronic left-sided low back pain with left-sided sciatica M54.42, G89.29    Paresthesia of buttock R20.2    Paresthesia of left lower extremity R20.2    Neurologic gait dysfunction R26.9    Fusion of lumbosacral spine M43.27    Back pain, lumbosacral M54.5    Lumbar discitis M46.46         ASSESSMENT/PLAN   Discites/OM of lumbar spine: had surgery. Wound growins pseudomonas.   Continue iv cefepime  Discharge plan  Karina Ng MD, FACP 4/7/2020 8:46 AM

## 2020-04-07 NOTE — PROGRESS NOTES
[facility]  PHYSICAL THERAPY MISSED TREATMENT NOTE  ACUTE CARE  [facility] CCU-STEPDOWN 3B              Missed Treatment  Pt is in bed, feeling exhausted. Pt reports she has been up a couple hours this am, had OT and had PICC placed. She is hoping to go home later today.  Pt kindly refused and had no further questions for PT.

## 2020-04-07 NOTE — PROGRESS NOTES
Patients has had periodic shortness of breath with low o2 saturation. Family noted, hospice nurse in to see patient and will be discharging patient to Inpatient hospice service.

## 2020-04-07 NOTE — PROGRESS NOTES
INTERNAL MEDICINE Progress Note  4/7/2020 5:10 PM  Subjective:   Admit Date: 4/1/2020  PCP: Guera San MD  Interval History:   She is doing well today. No fever no chills. 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 Objective:   Vitals: /62   Pulse 60   Temp 97.7 °F (36.5 °C) (Oral)   Resp 16   Ht 5' (1.524 m)   Wt 213 lb 8 oz (96.8 kg)   LMP 10/27/2015 (Exact Date)   SpO2 98%   BMI 41.70 kg/m²   General appearance: alert and cooperative with exam  HEENT:  atraumatic  Neck:  no JVD  Lungs: clear to auscultation bilaterally  Heart: S1, S2 normal  Abdomen: soft, non-tender; bowel sounds normal; no masses,  no organomegaly  Extremities: no edema,   Neurologic: Alert, oriented, thought content appropriate   Back: Dressing to the lower lumbar incision.   Drains x2 in situ      Medications:   Scheduled Meds:   cefepime  2 g Intravenous Q12H    sodium chloride flush  10 mL Intravenous 2 times per day    polyethylene glycol  17 g Oral Daily    Vitamin D  1,000 Units Oral Daily    FLUoxetine  20 mg Oral Daily    metoprolol succinate  50 mg Oral Daily    pantoprazole  40 mg Oral QAM AC    senna-docusate  2 tablet Oral BID     Continuous Infusions:   sodium chloride 50 mL/hr at 04/06/20 1647       Lab Results:   CBC:   Recent Labs     04/05/20  0357 04/06/20  0344 04/07/20  0326   WBC 19.9* 17.7* 14.5*   HGB 8.6* 8.2* 8.2*    237 218     BMP:    Recent Labs     04/05/20  0357 04/06/20  0344 04/07/20  0326    138 140   K 4.5 4.3 4.8    101 101   CO2 27 29 30   BUN 12 15 23*   CREATININE 0.6 0.5 0.7   GLUCOSE 167* 166* 106     Magnesium:    Lab Results   Component Value Date    MG 2.0 10/24/2019     HgBA1c:    Lab Results   Component Value Date    LABA1C 5.6 02/21/2019     TSH:    Lab Results   Component Value Date    TSH 2.080 02/01/2020     FOLATE:    Lab Results   Component Value Date    FOLATE 4.9 02/01/2020     IRON:    Lab Results   Component Value Date    IRON 30 01/31/2020     FERRITIN:    Lab Results   Component Value Date    FERRITIN 129 01/31/2020     Microbiology  Source: tissue       Site: lumbar spine          Current Antibiotics: not stated   Susceptibility Pseudomonas aeruginosa (1)   Antibiotic Interpretation ANURAG Status    cefepime Sensitive 2 mcg/mL Final    piperacillin-tazobactam Sensitive 8 mcg/mL Final    gentamicin Sensitive <=1 mcg/mL Final    ciprofloxacin Sensitive <=0.25 mcg/mL Final    tobramycin Sensitive <=1 mcg/mL Final          Assessment and Plan:   1. Discitis/osteomyelitis at the L4-5 level status post L5-S1 exploration of fusion, L5-S1 removal of instrumentation, lumbar I&D, L3-S1 decompression and fusion with L4-L5 and L5-S1 TLIF on 4/3/2020.   2. HTN  3. Acute blood loss anemia, status post 2 units packed red cell transfusion. Continue IV cefepime   Analgesics. SCD. PICC in place, will dc home with IV cefepime per ID.     Tere Reddy MD

## 2020-04-08 LAB
AEROBIC CULTURE: ABNORMAL
AEROBIC CULTURE: NORMAL
ANAEROBIC CULTURE: ABNORMAL
ANAEROBIC CULTURE: NORMAL
GRAM STAIN RESULT: ABNORMAL
GRAM STAIN RESULT: NORMAL
ORGANISM: ABNORMAL

## 2020-04-14 ENCOUNTER — HOSPITAL ENCOUNTER (OUTPATIENT)
Age: 50
Setting detail: SPECIMEN
Discharge: HOME | End: 2020-04-14
Payer: MEDICARE

## 2020-04-14 LAB
ALBUMIN SERPL-MCNC: 3.6 G/DL (ref 3.5–5.1)
ALP BLD-CCNC: 96 U/L (ref 38–126)
ALT SERPL-CCNC: 17 U/L (ref 11–66)
ANION GAP SERPL CALCULATED.3IONS-SCNC: 11 MEQ/L (ref 8–16)
AST SERPL-CCNC: 16 U/L (ref 5–40)
BASOPHILS # BLD: 0.6 %
BASOPHILS ABSOLUTE: 0 THOU/MM3 (ref 0–0.1)
BILIRUB SERPL-MCNC: 0.6 MG/DL (ref 0.3–1.2)
BILIRUBIN DIRECT: < 0.2 MG/DL (ref 0–0.3)
BUN BLDV-MCNC: 19 MG/DL (ref 7–22)
C-REACTIVE PROTEIN: 1.16 MG/DL (ref 0–1)
CALCIUM SERPL-MCNC: 9.3 MG/DL (ref 8.5–10.5)
CHLORIDE BLD-SCNC: 101 MEQ/L (ref 98–111)
CO2: 26 MEQ/L (ref 23–33)
CREAT SERPL-MCNC: 0.5 MG/DL (ref 0.4–1.2)
EOSINOPHIL # BLD: 4 %
EOSINOPHILS ABSOLUTE: 0.3 THOU/MM3 (ref 0–0.4)
ERYTHROCYTE [DISTWIDTH] IN BLOOD BY AUTOMATED COUNT: 18.7 % (ref 11.5–14.5)
ERYTHROCYTE [DISTWIDTH] IN BLOOD BY AUTOMATED COUNT: 60.3 FL (ref 35–45)
GFR SERPL CREATININE-BSD FRML MDRD: > 90 ML/MIN/1.73M2
GLUCOSE BLD-MCNC: 100 MG/DL (ref 70–108)
HCT VFR BLD CALC: 31.1 % (ref 37–47)
HEMOGLOBIN: 9.3 GM/DL (ref 12–16)
IMMATURE GRANS (ABS): 0.29 THOU/MM3 (ref 0–0.07)
IMMATURE GRANULOCYTES: 4.2 %
LYMPHOCYTES # BLD: 24.1 %
LYMPHOCYTES ABSOLUTE: 1.7 THOU/MM3 (ref 1–4.8)
MCH RBC QN AUTO: 26.6 PG (ref 26–33)
MCHC RBC AUTO-ENTMCNC: 29.9 GM/DL (ref 32.2–35.5)
MCV RBC AUTO: 88.9 FL (ref 81–99)
MONOCYTES # BLD: 8.4 %
MONOCYTES ABSOLUTE: 0.6 THOU/MM3 (ref 0.4–1.3)
NUCLEATED RED BLOOD CELLS: 0 /100 WBC
PLATELET # BLD: 253 THOU/MM3 (ref 130–400)
PMV BLD AUTO: 10.5 FL (ref 9.4–12.4)
POTASSIUM SERPL-SCNC: 4.5 MEQ/L (ref 3.5–5.2)
RBC # BLD: 3.5 MILL/MM3 (ref 4.2–5.4)
SEG NEUTROPHILS: 58.7 %
SEGMENTED NEUTROPHILS ABSOLUTE COUNT: 4.1 THOU/MM3 (ref 1.8–7.7)
SODIUM BLD-SCNC: 138 MEQ/L (ref 135–145)
TOTAL PROTEIN: 6.3 G/DL (ref 6.1–8)
WBC # BLD: 6.9 THOU/MM3 (ref 4.8–10.8)

## 2020-04-14 PROCEDURE — 80053 COMPREHEN METABOLIC PANEL: CPT

## 2020-04-14 PROCEDURE — 82248 BILIRUBIN DIRECT: CPT

## 2020-04-14 PROCEDURE — 86140 C-REACTIVE PROTEIN: CPT

## 2020-04-14 PROCEDURE — 85025 COMPLETE CBC W/AUTO DIFF WBC: CPT

## 2020-04-21 ENCOUNTER — HOSPITAL ENCOUNTER (OUTPATIENT)
Age: 50
Setting detail: SPECIMEN
Discharge: HOME | End: 2020-04-21
Payer: MEDICARE

## 2020-04-21 LAB
ALBUMIN SERPL-MCNC: 3.9 G/DL (ref 3.5–5.1)
ALP BLD-CCNC: 107 U/L (ref 38–126)
ALT SERPL-CCNC: 20 U/L (ref 11–66)
ANION GAP SERPL CALCULATED.3IONS-SCNC: 12 MEQ/L (ref 8–16)
AST SERPL-CCNC: 17 U/L (ref 5–40)
BILIRUB SERPL-MCNC: 1 MG/DL (ref 0.3–1.2)
BILIRUBIN DIRECT: < 0.2 MG/DL (ref 0–0.3)
BUN BLDV-MCNC: 19 MG/DL (ref 7–22)
C-REACTIVE PROTEIN: 0.37 MG/DL (ref 0–1)
CALCIUM SERPL-MCNC: 9.4 MG/DL (ref 8.5–10.5)
CHLORIDE BLD-SCNC: 102 MEQ/L (ref 98–111)
CO2: 26 MEQ/L (ref 23–33)
CREAT SERPL-MCNC: 0.5 MG/DL (ref 0.4–1.2)
ERYTHROCYTE [DISTWIDTH] IN BLOOD BY AUTOMATED COUNT: 17.6 % (ref 11.5–14.5)
ERYTHROCYTE [DISTWIDTH] IN BLOOD BY AUTOMATED COUNT: 56.7 FL (ref 35–45)
GFR SERPL CREATININE-BSD FRML MDRD: > 90 ML/MIN/1.73M2
GLUCOSE BLD-MCNC: 101 MG/DL (ref 70–108)
HCT VFR BLD CALC: 32.6 % (ref 37–47)
HEMOGLOBIN: 9.5 GM/DL (ref 12–16)
MCH RBC QN AUTO: 25.7 PG (ref 26–33)
MCHC RBC AUTO-ENTMCNC: 29.1 GM/DL (ref 32.2–35.5)
MCV RBC AUTO: 88.3 FL (ref 81–99)
PLATELET # BLD: 257 THOU/MM3 (ref 130–400)
PMV BLD AUTO: 10 FL (ref 9.4–12.4)
POTASSIUM SERPL-SCNC: 4.5 MEQ/L (ref 3.5–5.2)
RBC # BLD: 3.69 MILL/MM3 (ref 4.2–5.4)
SODIUM BLD-SCNC: 140 MEQ/L (ref 135–145)
TOTAL PROTEIN: 6.4 G/DL (ref 6.1–8)
WBC # BLD: 5.2 THOU/MM3 (ref 4.8–10.8)

## 2020-04-21 PROCEDURE — 82248 BILIRUBIN DIRECT: CPT

## 2020-04-21 PROCEDURE — 85027 COMPLETE CBC AUTOMATED: CPT

## 2020-04-21 PROCEDURE — 80053 COMPREHEN METABOLIC PANEL: CPT

## 2020-04-21 PROCEDURE — 86140 C-REACTIVE PROTEIN: CPT

## 2020-04-28 ENCOUNTER — HOSPITAL ENCOUNTER (OUTPATIENT)
Age: 50
Setting detail: SPECIMEN
Discharge: HOME | End: 2020-04-28
Payer: MEDICARE

## 2020-04-28 LAB
ALBUMIN SERPL-MCNC: 4.2 G/DL (ref 3.5–5.1)
ALP BLD-CCNC: 121 U/L (ref 38–126)
ALT SERPL-CCNC: 15 U/L (ref 11–66)
ANION GAP SERPL CALCULATED.3IONS-SCNC: 10 MEQ/L (ref 8–16)
AST SERPL-CCNC: 14 U/L (ref 5–40)
BILIRUB SERPL-MCNC: 1.1 MG/DL (ref 0.3–1.2)
BILIRUBIN DIRECT: < 0.2 MG/DL (ref 0–0.3)
BUN BLDV-MCNC: 20 MG/DL (ref 7–22)
C-REACTIVE PROTEIN: 0.32 MG/DL (ref 0–1)
CALCIUM SERPL-MCNC: 9.7 MG/DL (ref 8.5–10.5)
CHLORIDE BLD-SCNC: 101 MEQ/L (ref 98–111)
CO2: 26 MEQ/L (ref 23–33)
CREAT SERPL-MCNC: 0.5 MG/DL (ref 0.4–1.2)
ERYTHROCYTE [DISTWIDTH] IN BLOOD BY AUTOMATED COUNT: 16.9 % (ref 11.5–14.5)
ERYTHROCYTE [DISTWIDTH] IN BLOOD BY AUTOMATED COUNT: 53.6 FL (ref 35–45)
GFR SERPL CREATININE-BSD FRML MDRD: > 90 ML/MIN/1.73M2
GLUCOSE BLD-MCNC: 146 MG/DL (ref 70–108)
HCT VFR BLD CALC: 36.3 % (ref 37–47)
HEMOGLOBIN: 10.6 GM/DL (ref 12–16)
MCH RBC QN AUTO: 25.5 PG (ref 26–33)
MCHC RBC AUTO-ENTMCNC: 29.2 GM/DL (ref 32.2–35.5)
MCV RBC AUTO: 87.5 FL (ref 81–99)
PLATELET # BLD: 318 THOU/MM3 (ref 130–400)
PMV BLD AUTO: 10.5 FL (ref 9.4–12.4)
POTASSIUM SERPL-SCNC: 4.2 MEQ/L (ref 3.5–5.2)
RBC # BLD: 4.15 MILL/MM3 (ref 4.2–5.4)
SODIUM BLD-SCNC: 137 MEQ/L (ref 135–145)
TOTAL PROTEIN: 6.6 G/DL (ref 6.1–8)
WBC # BLD: 6.9 THOU/MM3 (ref 4.8–10.8)

## 2020-04-28 PROCEDURE — 85027 COMPLETE CBC AUTOMATED: CPT

## 2020-04-28 PROCEDURE — 86140 C-REACTIVE PROTEIN: CPT

## 2020-04-28 PROCEDURE — 80053 COMPREHEN METABOLIC PANEL: CPT

## 2020-04-28 PROCEDURE — 82248 BILIRUBIN DIRECT: CPT

## 2020-05-05 ENCOUNTER — HOSPITAL ENCOUNTER (OUTPATIENT)
Age: 50
Setting detail: SPECIMEN
Discharge: HOME | End: 2020-05-05
Payer: MEDICARE

## 2020-05-05 LAB
ALBUMIN SERPL-MCNC: 4.2 G/DL (ref 3.5–5.1)
ALP BLD-CCNC: 120 U/L (ref 38–126)
ALT SERPL-CCNC: 17 U/L (ref 11–66)
ANION GAP SERPL CALCULATED.3IONS-SCNC: 16 MEQ/L (ref 8–16)
AST SERPL-CCNC: 17 U/L (ref 5–40)
BASOPHILS # BLD: 0.6 %
BASOPHILS ABSOLUTE: 0 THOU/MM3 (ref 0–0.1)
BILIRUB SERPL-MCNC: 1 MG/DL (ref 0.3–1.2)
BILIRUBIN DIRECT: < 0.2 MG/DL (ref 0–0.3)
BUN BLDV-MCNC: 16 MG/DL (ref 7–22)
C-REACTIVE PROTEIN: 0.4 MG/DL (ref 0–1)
CALCIUM SERPL-MCNC: 9.7 MG/DL (ref 8.5–10.5)
CHLORIDE BLD-SCNC: 103 MEQ/L (ref 98–111)
CO2: 24 MEQ/L (ref 23–33)
CREAT SERPL-MCNC: 0.6 MG/DL (ref 0.4–1.2)
EOSINOPHIL # BLD: 3.7 %
EOSINOPHILS ABSOLUTE: 0.2 THOU/MM3 (ref 0–0.4)
ERYTHROCYTE [DISTWIDTH] IN BLOOD BY AUTOMATED COUNT: 16.1 % (ref 11.5–14.5)
ERYTHROCYTE [DISTWIDTH] IN BLOOD BY AUTOMATED COUNT: 51 FL (ref 35–45)
GFR SERPL CREATININE-BSD FRML MDRD: > 90 ML/MIN/1.73M2
GLUCOSE BLD-MCNC: 112 MG/DL (ref 70–108)
HCT VFR BLD CALC: 34.5 % (ref 37–47)
HEMOGLOBIN: 9.9 GM/DL (ref 12–16)
HYPOCHROMIA: PRESENT
IMMATURE GRANS (ABS): 0.05 THOU/MM3 (ref 0–0.07)
IMMATURE GRANULOCYTES: 1 %
LYMPHOCYTES # BLD: 23.8 %
LYMPHOCYTES ABSOLUTE: 1.2 THOU/MM3 (ref 1–4.8)
MCH RBC QN AUTO: 25.1 PG (ref 26–33)
MCHC RBC AUTO-ENTMCNC: 28.7 GM/DL (ref 32.2–35.5)
MCV RBC AUTO: 87.6 FL (ref 81–99)
MONOCYTES # BLD: 8.1 %
MONOCYTES ABSOLUTE: 0.4 THOU/MM3 (ref 0.4–1.3)
NUCLEATED RED BLOOD CELLS: 0 /100 WBC
PLATELET # BLD: 247 THOU/MM3 (ref 130–400)
PLATELET ESTIMATE: ADEQUATE
PMV BLD AUTO: 11.3 FL (ref 9.4–12.4)
POTASSIUM SERPL-SCNC: 4.1 MEQ/L (ref 3.5–5.2)
RBC # BLD: 3.94 MILL/MM3 (ref 4.2–5.4)
SCAN OF BLOOD SMEAR: NORMAL
SEG NEUTROPHILS: 62.8 %
SEGMENTED NEUTROPHILS ABSOLUTE COUNT: 3.2 THOU/MM3 (ref 1.8–7.7)
SODIUM BLD-SCNC: 143 MEQ/L (ref 135–145)
TOTAL PROTEIN: 6.5 G/DL (ref 6.1–8)
WBC # BLD: 5.1 THOU/MM3 (ref 4.8–10.8)

## 2020-05-05 PROCEDURE — 80053 COMPREHEN METABOLIC PANEL: CPT

## 2020-05-05 PROCEDURE — 86140 C-REACTIVE PROTEIN: CPT

## 2020-05-05 PROCEDURE — 82248 BILIRUBIN DIRECT: CPT

## 2020-05-05 PROCEDURE — 85025 COMPLETE CBC W/AUTO DIFF WBC: CPT

## 2020-05-12 ENCOUNTER — HOSPITAL ENCOUNTER (OUTPATIENT)
Age: 50
Setting detail: SPECIMEN
Discharge: HOME | End: 2020-05-12
Payer: MEDICARE

## 2020-05-12 LAB
ALBUMIN SERPL-MCNC: 4.3 G/DL (ref 3.5–5.1)
ALP BLD-CCNC: 114 U/L (ref 38–126)
ALT SERPL-CCNC: 19 U/L (ref 11–66)
ANION GAP SERPL CALCULATED.3IONS-SCNC: 10 MEQ/L (ref 8–16)
AST SERPL-CCNC: 14 U/L (ref 5–40)
BILIRUB SERPL-MCNC: 0.9 MG/DL (ref 0.3–1.2)
BILIRUBIN DIRECT: < 0.2 MG/DL (ref 0–0.3)
BUN BLDV-MCNC: 19 MG/DL (ref 7–22)
C-REACTIVE PROTEIN: 1.36 MG/DL (ref 0–1)
CALCIUM SERPL-MCNC: 9.8 MG/DL (ref 8.5–10.5)
CHLORIDE BLD-SCNC: 102 MEQ/L (ref 98–111)
CO2: 26 MEQ/L (ref 23–33)
CREAT SERPL-MCNC: 0.6 MG/DL (ref 0.4–1.2)
ERYTHROCYTE [DISTWIDTH] IN BLOOD BY AUTOMATED COUNT: 15.5 % (ref 11.5–14.5)
ERYTHROCYTE [DISTWIDTH] IN BLOOD BY AUTOMATED COUNT: 47 FL (ref 35–45)
GFR SERPL CREATININE-BSD FRML MDRD: > 90 ML/MIN/1.73M2
GLUCOSE BLD-MCNC: 87 MG/DL (ref 70–108)
HCT VFR BLD CALC: 32.3 % (ref 37–47)
HEMOGLOBIN: 9.6 GM/DL (ref 12–16)
MCH RBC QN AUTO: 25.1 PG (ref 26–33)
MCHC RBC AUTO-ENTMCNC: 29.7 GM/DL (ref 32.2–35.5)
MCV RBC AUTO: 84.6 FL (ref 81–99)
PLATELET # BLD: 232 THOU/MM3 (ref 130–400)
PMV BLD AUTO: 11.1 FL (ref 9.4–12.4)
POTASSIUM SERPL-SCNC: 4.6 MEQ/L (ref 3.5–5.2)
RBC # BLD: 3.82 MILL/MM3 (ref 4.2–5.4)
SODIUM BLD-SCNC: 138 MEQ/L (ref 135–145)
TOTAL PROTEIN: 6.5 G/DL (ref 6.1–8)
WBC # BLD: 4.6 THOU/MM3 (ref 4.8–10.8)

## 2020-05-12 PROCEDURE — 86140 C-REACTIVE PROTEIN: CPT

## 2020-05-12 PROCEDURE — 82248 BILIRUBIN DIRECT: CPT

## 2020-05-12 PROCEDURE — 80053 COMPREHEN METABOLIC PANEL: CPT

## 2020-05-12 PROCEDURE — 85027 COMPLETE CBC AUTOMATED: CPT

## 2020-11-11 ENCOUNTER — HOSPITAL ENCOUNTER (OUTPATIENT)
Dept: PHYSICAL THERAPY | Age: 50
Setting detail: THERAPIES SERIES
Discharge: HOME | End: 2020-11-11
Payer: MEDICARE

## 2020-11-11 PROCEDURE — 97110 THERAPEUTIC EXERCISES: CPT

## 2020-11-11 PROCEDURE — 97162 PT EVAL MOD COMPLEX 30 MIN: CPT

## 2020-11-11 NOTE — FLOWSHEET NOTE
** PLEASE SIGN, DATE AND TIME CERTIFICATION BELOW AND RETURN TO [facility] OUTPATIENT REHABILITATION (FAX #: [phone]). ATTEST/CO-SIGN IF ACCESSING VIA INStroz Friedberg. THANK YOU.**    I certify that I have examined the patient below and determined that Physical Medicine and Rehabilitation service is necessary and that I approve the established plan of care for up to 90 days or as specifically noted. Attestation, signature or co-signature of physician indicates approval of certification requirements.    ________________________ ____________ __________  Physician Signature   Date   Time  [facility]  PHYSICAL THERAPY  [x] EVALUATION  [] DAILY NOTE (LAND) [] DAILY NOTE (AQUATIC ) [] PROGRESS NOTE [] DISCHARGE NOTE    [x] [facility]   [] [place] [address]    [] [facility]   [] [facility]    Date: 2020  Patient Name:  Garth De Guzman  : 1970  MRN: [id number]    Referring Practitioner Tito Ruff MD   Diagnosis Sciatica, right side [M54.31]    Treatment Diagnosis Chronic and post-op lumbar and hip pain, numbness in feet and right pelvis, core and hip weakness, restricted lumbar and hip ROM, difficulty ambulating   Date of Evaluation 20    Additional Pertinent History HTN, vertigo/dizziness, anxiety/depression, obesity, arthritis. Back surgery Oct and 2019, 2020. Breast reduction . C5-7 fusion . Functional Outcome Measure Used Oswestry   Functional Outcome Score 56% (20)       Insurance: Primary: Payor: Gladys Santo /  /  / ,   Secondary:    Authorization Information: 3/30 allowed visits used this year; Aquatics and modalities covered except ionto, HP/CP, telehealth covered   Visit # 1, 1/10 for progress note   Visits Allowed:    Recertification Date: 22   Physician Follow-Up: Isidro Arellano of follow up date.    Physician Orders:    History of Present Illness: Pt has had back and neck, feet; 2/10 on good days, infrequent, 10/10 at worst   Palpation Tender R>L lumbosacral region, R>L piriformis   Observation Swelling at lower cervical, decreased lumbar lordosis; overweight   Posture        Range of Motion Lumbar: flexion standing reaches to knees with pain, lateral flexion limited 75%  Hip: restricted 75% d/t pain   Strength Moderate core weakness with bridging  Hip 3+/5 to 4-/5 B  Knee 4+/5   Ankle df 5/5   Coordination    Sensation Intermittent numbness in feet, numb right inferior glute   Bed Mobility Mod I- labored   Transfers Mod I   Ambulation Decreased pace; lateral trunk sway d/t pain; equal step length   Stairs Non-reciprocating with 1 handrail, pain, anterior pelvic tilt on descent   Balance    Special Tests + SLR B, + TERRY and FADIR B         TREATMENT   Precautions:    Pain: 6/10 lumbar, hips, neck, legs    X in shaded column indicates activity completed today   Modalities Parameters/  Location  Notes                     Manual Therapy Time/Technique  Notes                     Exercise/Intervention   Notes   Educated on benefits of aquatic therapy to reduce pain and improve mobility   x                                                                            Specific Interventions Next Treatment: aquatics for mobility, stretching, strengthening, pain management    Activity/Treatment Tolerance:  []  Patient tolerated treatment well  []  Patient limited by fatigue  [x]  Patient limited by pain   []  Patient limited by medical complications  []  Other:     Assessment: 53year old presents with chronic, post-op lumbar and hip pain affecting sensation in right pelvic region and feet. Pain varies day to day and significantly affects her function.  Pt demos restricted lumbar and hip ROM, core and hip weakness, impaired posture and difficulty walking affecting ADLs, IADLs, tolerance to sitting, standing and walking, sleep, etc. Pt notes balance issues causing her to sit when showering. Pt will benefit from aquatic therapy to reduce body weight and allow patient to move easier to build strength and improve flexibility. Body Structures/Functions/Activity Limitations: impaired balance, impaired ROM, impaired sensation, impaired strength, pain and abnormal gait  Prognosis: fair    GOALS:  Patient Goal: Reduce pain    Short Term Goals:  Time Frame: 6 weeks  Patient will report reduced pain to 6/10 at worst to tolerate sitting, standing, and walking longer durations. Patient will demonstrate good core engagement and postural awareness during therapy session without cues  to carry over to functional activities, lifting, and housework. Patient will have <50% lumbar and hip AROM restriction for ease bending over and completing ADLs. Patient will improve B hip strength to 4/5 for stabilization when walking, lifting, and cleaning. Long Term Goals:  Time Frame: 12 weeks  Patient will be independent and compliant with HEP daily to achieve above goals. Pt will reduce Oswestry score from 56% to 40% to tolerate standing and sitting longer durations and sleep. Patient Education:   [x]  HEP/Education Completed: Plan of Care, Goals, attendance policy, pool guidelines, pool area  Tony Ghosh [address] [place] [address] Access Code:  []  No new Education completed  []  Reviewed Prior HEP      [x]  Patient verbalized and/or demonstrated understanding of education provided. []  Patient unable to verbalize and/or demonstrate understanding of education provided. Will continue education. []  Barriers to learning:     PLAN:  Treatment Recommendations: Strengthening, Range of Motion, Balance Training, Functional Mobility Training, Gait Training, Manual Therapy - Soft Tissue Mobilization, Pain Management, Home Exercise Program, Patient Education, Aquatics and Modalities    [x]  Plan of care initiated. Plan to see patient 2-3 times per week for 12 weeks to address the treatment planned outlined above.   []  Continue with

## 2020-11-13 ENCOUNTER — HOSPITAL ENCOUNTER (OUTPATIENT)
Dept: PHYSICAL THERAPY | Age: 50
Setting detail: THERAPIES SERIES
Discharge: HOME | End: 2020-11-13
Payer: MEDICARE

## 2020-11-13 PROCEDURE — 97113 AQUATIC THERAPY/EXERCISES: CPT

## 2020-11-13 NOTE — PROGRESS NOTES
[address] [facility]  PHYSICAL THERAPY  [] EVALUATION  [] DAILY NOTE (LAND) [x] DAILY NOTE (AQUATIC ) [] PROGRESS NOTE [] DISCHARGE NOTE    [x] [facility] [place]   [] [facility]    [] [facility]   [] Genny Wall    Date: 2020  Patient Name:  Stacie Mcgee  : 1970  MRN: [id number]    Referring Practitioner Deniz Lind MD   Diagnosis Sciatica, right side [M54.31]    Treatment Diagnosis Chronic and post-op lumbar and hip pain, numbness in feet and right pelvis, core and hip weakness, restricted lumbar and hip ROM, difficulty ambulating   Date of Evaluation 20    Additional Pertinent History HTN, vertigo/dizziness, anxiety/depression, obesity, arthritis. Back surgery Oct and 2019, 2020. Breast reduction . C5-7 fusion . Functional Outcome Measure Used Oswestry   Functional Outcome Score 56% (20)       Insurance: Primary: Payor: Clement Cam /  /  / ,   Secondary:    Authorization Information: 3/30 allowed visits used this year; Aquatics and modalities covered except ionto, HP/CP, telehealth covered   Visit # 2, 2/10 for progress note   Visits Allowed:    Recertification Date: 29   Physician Follow-Up: Griselda Bade of follow up date. Physician Orders:    History of Present Illness: Pt has had 3 lumbar surgeries in past year, most recent being 4/3/20 for fusion. 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 Upper cervical issues have developed. SUBJECTIVE: Pt reports yesterday was a bad day but today she hasn't done too much so feeling better.      AQUATICS TREATMENT   Precautions:    Pain: 4/10 neck and low back    X in shaded column indicates activity completed today   Exercise/Intervention Sets/Sec  Notes   Walk Forward 2 laps  x    Walk Backward 2 laps  x    Walk Sideways 2 laps   x           Lower Extremity Exercises:       Heel/Toe Raises 10  x    Marches 10  x    Squats 10  x 3 Way Hip 10  x    Hamstring Curls 10  x    Lunges       Step-Ups              Lower Extremity Stretches:              Seated Exercises:              Upper Extremity Exercises:       Shoulder Flexion 10  x    Shoulder ABD/ADD 10  x    Shoulder Horizontal ABD/ADD 10  x    Shoulder IR/ER       Shoulder Circles       Shoulder Shrugs       Rows 10  x    Bicep Curls              Upper Extremity Stretches:              Balance:              Dynamic Gait:              Deep Water:       Hang x5 min  x    Bicycle x2 min  x    Hip ABD/ADD 1 min  x    Hip Flex/Ext 1 min  x        Specific Interventions Next Treatment: aquatics for mobility, stretching, strengthening, pain management    Activity/Treatment Tolerance:  []  Patient tolerated treatment well  []  Patient limited by fatigue  [x]  Patient limited by pain   []  Patient limited by medical complications  []  Other:     Assessment: Initiated aquatic session focusing on strengthening and mobility. Pt notes numbess in left toes is more pronounced while hanging and was getting electric shocks in her feet during standing exercises. No change in pain at end of session; neck feels good. Pt required frequent cues for abdominal bracing during session. GOALS:  Patient Goal: Reduce pain    Short Term Goals:  Time Frame: 6 weeks  Patient will report reduced pain to 6/10 at worst to tolerate sitting, standing, and walking longer durations. Patient will demonstrate good core engagement and postural awareness during therapy session without cues  to carry over to functional activities, lifting, and housework. Patient will have <50% lumbar and hip AROM restriction for ease bending over and completing ADLs. Patient will improve B hip strength to 4/5 for stabilization when walking, lifting, and cleaning. Long Term Goals:  Time Frame: 12 weeks  Patient will be independent and compliant with HEP daily to achieve above goals.    Pt will reduce Oswestry score from 56% to 40% to tolerate standing and sitting longer durations and sleep. Patient Education:   [x]  HEP/Education Completed: monitor symptoms   Medbridge Access Code:  []  No new Education completed  []  Reviewed Prior HEP      [x]  Patient verbalized and/or demonstrated understanding of education provided. []  Patient unable to verbalize and/or demonstrate understanding of education provided. Will continue education. []  Barriers to learning:     PLAN:  []  Plan of care initiated. Plan to see patient 2-3 times per week for 12 weeks to address the treatment planned outlined above.   [x]  Continue with current plan of care  []  Modify plan of care as follows:    []  Hold pending physician visit  []  Discharge    Time In 1347   Time Out 1430   Timed Code Minutes: 43 min   Total Treatment Time: 43 min       Electronically Signed by: Abhay Peterson

## 2020-11-16 ENCOUNTER — HOSPITAL ENCOUNTER (OUTPATIENT)
Dept: PHYSICAL THERAPY | Age: 50
Setting detail: THERAPIES SERIES
Discharge: HOME | End: 2020-11-16
Payer: MEDICARE

## 2020-11-20 ENCOUNTER — APPOINTMENT (OUTPATIENT)
Dept: PHYSICAL THERAPY | Age: 50
End: 2020-11-20
Payer: MEDICARE

## 2020-11-23 ENCOUNTER — HOSPITAL ENCOUNTER (OUTPATIENT)
Dept: PHYSICAL THERAPY | Age: 50
Setting detail: THERAPIES SERIES
Discharge: HOME | End: 2020-11-23
Payer: MEDICARE

## 2020-11-23 PROCEDURE — 97113 AQUATIC THERAPY/EXERCISES: CPT

## 2020-11-23 NOTE — PROGRESS NOTES
[address] [facility]  PHYSICAL THERAPY  [] EVALUATION  [] DAILY NOTE (LAND) [x] DAILY NOTE (AQUATIC ) [] PROGRESS NOTE [] DISCHARGE NOTE    [x] OUTPATIENT REHABILITATION [facility]   [] [place] [address]    [] [facility]   [] Anat Hand    Date: 2020  Patient Name:  Segundo Lopez  : 1970  MRN: [id number]    Referring Practitioner Ephraim Suh MD   Diagnosis Sciatica, right side [M54.31]    Treatment Diagnosis Chronic and post-op lumbar and hip pain, numbness in feet and right pelvis, core and hip weakness, restricted lumbar and hip ROM, difficulty ambulating   Date of Evaluation 20    Additional Pertinent History HTN, vertigo/dizziness, anxiety/depression, obesity, arthritis. Back surgery Oct and 2019, 2020. Breast reduction . C5-7 fusion . Functional Outcome Measure Used Oswestry   Functional Outcome Score 56% (20)       Insurance: Primary: Payor: William Woodruff /  /  / ,   Secondary:    Authorization Information: 3/30 allowed visits used this year; Aquatics and modalities covered except ionto, HP/CP, telehealth covered   Visit # 3, 3/10 for progress note   Visits Allowed: 27   Recertification Date: 3/5/06   Physician Follow-Up: Isaac Savage of follow up date. Physician Orders:    History of Present Illness: Pt has had 3 lumbar surgeries in past year, most recent being 4/3/20 for fusion. 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 Upper cervical issues have developed. SUBJECTIVE: Pt reports she is feeling better as she had some sinus congestion last week. Pt reports having increased pain after last session that lasted a couple days. Pt 8 minute late getting into pool area.      AQUATICS TREATMENT   Precautions:    Pain: 3/10 neck and low back    X in shaded column indicates activity completed today   Exercise/Intervention Sets/Sec  Notes   Walk Forward 2 laps  x    Walk Backward 2 laps  x    Walk Sideways 2 laps   x           Lower Extremity Exercises:       Heel/Toe Raises 10  x    Marches 10  x    Squats 10  x    3 Way Hip 10  x    Hamstring Curls 10  x    Lunges       Step-Ups              Lower Extremity Stretches:              Seated Exercises:              Upper Extremity Exercises:       Shoulder Flexion 10      Shoulder ABD/ADD 10      Shoulder Horizontal ABD/ADD 10      Shoulder IR/ER       Shoulder Circles       Shoulder Shrugs       Rows 10      Bicep Curls              Upper Extremity Stretches:              Balance:              Dynamic Gait:              Deep Water:       Hang x8 min  x    Bicycle x2 min  x    Hip ABD/ADD 1 min  x    Hip Flex/Ext 1 min  x        Specific Interventions Next Treatment: aquatics for mobility, stretching, strengthening, pain management    Activity/Treatment Tolerance:  []  Patient tolerated treatment well  []  Patient limited by fatigue  [x]  Patient limited by pain   []  Patient limited by medical complications  []  Other:     Assessment: Continued with aquatic program, holding UE exercises d/t time. No pain while hanging but pain increased to 4-5/10 upon exiting pool. Pt required periodic cues for abdominal bracing during exercises and staying on task. GOALS:  Patient Goal: Reduce pain    Short Term Goals:  Time Frame: 6 weeks  Patient will report reduced pain to 6/10 at worst to tolerate sitting, standing, and walking longer durations. Patient will demonstrate good core engagement and postural awareness during therapy session without cues  to carry over to functional activities, lifting, and housework. Patient will have <50% lumbar and hip AROM restriction for ease bending over and completing ADLs. Patient will improve B hip strength to 4/5 for stabilization when walking, lifting, and cleaning. Long Term Goals:  Time Frame: 12 weeks  Patient will be independent and compliant with HEP daily to achieve above goals.    Pt will reduce Oswestry score from 56% to 40% to tolerate standing and sitting longer durations and sleep. Patient Education:   []  HEP/Education Completed: monitor symptoms   Medbridge Access Code:  [x]  No new Education completed  []  Reviewed Prior HEP      []  Patient verbalized and/or demonstrated understanding of education provided. []  Patient unable to verbalize and/or demonstrate understanding of education provided. Will continue education. []  Barriers to learning:     PLAN:  []  Plan of care initiated. Plan to see patient 2-3 times per week for 12 weeks to address the treatment planned outlined above.   [x]  Continue with current plan of care  []  Modify plan of care as follows:    []  Hold pending physician visit  []  Discharge    Time In 1508   Time Out 1545   Timed Code Minutes: 37 min   Total Treatment Time: 37 min       Electronically Signed by: Avery Thompson

## 2020-11-30 ENCOUNTER — APPOINTMENT (OUTPATIENT)
Dept: PHYSICAL THERAPY | Age: 50
End: 2020-11-30
Payer: MEDICARE

## 2020-12-11 ENCOUNTER — OFFICE VISIT (OUTPATIENT)
Dept: BARIATRICS/WEIGHT MGMT | Age: 50
End: 2020-12-11
Payer: MEDICARE

## 2020-12-11 VITALS
HEIGHT: 60 IN | HEART RATE: 72 BPM | TEMPERATURE: 96.8 F | RESPIRATION RATE: 18 BRPM | BODY MASS INDEX: 43.11 KG/M2 | WEIGHT: 219.6 LBS

## 2020-12-11 PROCEDURE — 99204 OFFICE O/P NEW MOD 45 MIN: CPT | Performed by: SURGERY

## 2020-12-11 PROCEDURE — G8427 DOCREV CUR MEDS BY ELIG CLIN: HCPCS | Performed by: SURGERY

## 2020-12-11 PROCEDURE — 1036F TOBACCO NON-USER: CPT | Performed by: SURGERY

## 2020-12-11 PROCEDURE — G8417 CALC BMI ABV UP PARAM F/U: HCPCS | Performed by: SURGERY

## 2020-12-11 PROCEDURE — G8484 FLU IMMUNIZE NO ADMIN: HCPCS | Performed by: SURGERY

## 2020-12-11 RX ORDER — CIPROFLOXACIN 500 MG/1
500 TABLET, FILM COATED ORAL DAILY
COMMUNITY
Start: 2020-10-19 | End: 2021-01-17

## 2020-12-11 RX ORDER — OXYCODONE HYDROCHLORIDE AND ACETAMINOPHEN 325; 5 MG/5ML; MG/5ML
SOLUTION ORAL EVERY 4 HOURS PRN
COMMUNITY
End: 2021-06-07

## 2020-12-11 RX ORDER — CYCLOBENZAPRINE HCL 10 MG
10 TABLET ORAL PRN
COMMUNITY
Start: 2019-12-29 | End: 2022-06-22

## 2020-12-12 ASSESSMENT — ENCOUNTER SYMPTOMS
DIARRHEA: 0
EYE REDNESS: 0
VOMITING: 0
SORE THROAT: 0
BLOOD IN STOOL: 0
EYE PAIN: 0
SINUS PRESSURE: 0
COUGH: 0
ALLERGIC/IMMUNOLOGIC NEGATIVE: 1
STRIDOR: 0
FACIAL SWELLING: 0
ABDOMINAL DISTENTION: 0
ABDOMINAL PAIN: 0
BACK PAIN: 1
CHEST TIGHTNESS: 0
SHORTNESS OF BREATH: 1
CONSTIPATION: 0
EYE ITCHING: 0
PHOTOPHOBIA: 0
VOICE CHANGE: 0
EYE DISCHARGE: 0
COLOR CHANGE: 0
RECTAL PAIN: 0
CHOKING: 0
ANAL BLEEDING: 0
RHINORRHEA: 0
TROUBLE SWALLOWING: 0
WHEEZING: 0
APNEA: 0
NAUSEA: 1

## 2020-12-12 NOTE — PROGRESS NOTES
Subjective:      Patient ID: Elvin Wray is a 53 y.o. female. Chief Complaint   Patient presents with    Bariatric, Initial Visit     New Patient 6 month requirement      HPI  Jeancarlos Mack is a 72-year-old female who presents for initial evaluation secondary to her morbid obesity. BMI 42. Current weight 219 pounds. She has multiple comorbid conditions including sleep apnea. Bang score 3-4. She admits to being overweight most of her adult life but especially since pregnancy. She has 3 children ages 21, 32 and 32. She has tried multiple times at weight loss but has never been successful long-term. She has tried medications such as Adipex as well as high-protein diet such as Atkins without long-term success. She has had to have multiple back surgeries which has completely changed the way that she is living. She admits having permanent damage from previous back surgery sending electrical shocks all the way down to her feet. The excess body weight is drastically making her lower extremities worse when it is added to her back issues. Some shortness of breath especially with increased activity because of excess body weight. Denies significant abdominal or chest pain. No new urinary complaints. No hematochezia or melena. She admits that the excess body weight is not only affecting her physically but also socially and mentally. She feels she is in need of further help. She knows it is time to make a lifestyle change. Admits that if she is not able to lose enough adequate excess body weight with medical management only she would like to proceed with a sleeve gastrectomy. Review of Systems   Constitutional: Negative for activity change, appetite change, chills, diaphoresis, fatigue, fever and unexpected weight change.    HENT: Negative for congestion, dental problem, drooling, ear discharge, ear pain, facial swelling, hearing loss, mouth sores, nosebleeds, postnasal drip, rhinorrhea, sinus pressure, sneezing, sore throat, tinnitus, trouble swallowing and voice change. Eyes: Negative for photophobia, pain, discharge, redness, itching and visual disturbance. Respiratory: Positive for shortness of breath. Negative for apnea, cough, choking, chest tightness, wheezing and stridor. Cardiovascular: Negative for chest pain, palpitations and leg swelling. Gastrointestinal: Positive for nausea. Negative for abdominal distention, abdominal pain, anal bleeding, blood in stool, constipation, diarrhea, rectal pain and vomiting. Endocrine: Negative. Genitourinary: Negative for decreased urine volume, difficulty urinating, dyspareunia, dysuria, enuresis, flank pain, frequency, genital sores, hematuria, menstrual problem, pelvic pain, urgency, vaginal bleeding, vaginal discharge and vaginal pain. Musculoskeletal: Positive for back pain, myalgias and neck pain. Negative for arthralgias, gait problem, joint swelling and neck stiffness. Skin: Negative for color change, pallor, rash and wound. Allergic/Immunologic: Negative. Neurological: Negative for dizziness, tremors, seizures, syncope, facial asymmetry, speech difficulty, weakness, light-headedness, numbness and headaches. Hematological: Negative for adenopathy. Does not bruise/bleed easily. Psychiatric/Behavioral: Negative for agitation, behavioral problems, confusion, decreased concentration, dysphoric mood, hallucinations, self-injury, sleep disturbance and suicidal ideas. The patient is nervous/anxious. The patient is not hyperactive.         Past Medical History:   Diagnosis Date    Anxiety     Arthritis     Bell's palsy     Esophagitis     Fatty liver     GERD (gastroesophageal reflux disease)     Hypertension     IBS (irritable bowel syndrome)     Morbid obesity with BMI of 40.0-44.9, adult ([facility])     MRSA (methicillin resistant staph aureus) culture positive     UTI (urinary tract infection)     Vertigo        Past Surgical History:   Procedure Laterality Date    BACK SURGERY      BLADDER SUSPENSION      BREAST REDUCTION SURGERY  10/2007    CARPAL TUNNEL RELEASE      CERVICAL FUSION  2017    ACDF C5-C7    CHOLECYSTECTOMY      COLONOSCOPY      DILATION AND CURETTAGE OF UTERUS      ENDOSCOPY, COLON, DIAGNOSTIC      HYSTERECTOMY      LUMBAR FUSION N/A 4/3/2020    L4-S1 DECOMPRESSION AND POSTERIOR FUSION; LUMBAR I & D WITH HARDWARE REMOVAL performed by Cleopatra Garza MD at [facility]  10/17/2019    L5-S1    UPPER GASTROINTESTINAL ENDOSCOPY N/A 7/19/2019    EGD BIOPSY performed by Brenda Meek MD at [facility]  7/19/2019    EGD DILATION SAVORY performed by Brenda Meek MD at [facility] End[facility]       Current Outpatient Medications   Medication Sig Dispense Refill    ciprofloxacin (CIPRO) 500 MG tablet Take 500 mg by mouth daily      cyclobenzaprine (FLEXERIL) 10 MG tablet Take 10 mg by mouth as needed      oxyCODONE-acetaminophen (ROXICET) 5-325 MG/5ML solution Take by mouth every 4 hours as needed for Pain.  FLUoxetine (PROZAC) 20 MG capsule Take 20 mg by mouth daily      metoprolol succinate (TOPROL XL) 50 MG extended release tablet Take 1 tablet by mouth daily 30 tablet 3    pantoprazole (PROTONIX) 40 MG tablet Take 1 tablet by mouth every morning (before breakfast) 30 tablet 0    diclofenac sodium 1 % GEL Apply 4 g topically 4 times daily as needed for Pain 1 Tube 3    Cholecalciferol (VITAMIN D3) 25 MCG (1000 UT) TABS TAKE ONE TABLET BY MOUTH EVERY DAY  1     No current facility-administered medications for this visit.         Allergies   Allergen Reactions    Codeine Rash    Dilaudid [Hydromorphone Hcl] Rash       Family History   Problem Relation Age of Onset    Ulcerative Colitis Mother     Cancer Mother     Arthritis Mother     Cancer Father         skin    High Blood Pressure Father     Diabetes Father     Heart Disease Father     Arthritis Father     Heart Disease Brother     Cancer Brother     Arthritis Brother     Arthritis Sister     Obesity Sister     Cancer Paternal Grandfather     Diabetes Paternal Grandfather     Heart Disease Paternal Grandfather     Obesity Paternal Grandfather     Arthritis Paternal Grandfather     Arthritis Paternal Grandmother     Arthritis Maternal Grandfather     Arthritis Maternal Grandmother     Obesity Maternal Grandmother     Stroke Maternal Grandmother     Heart Disease Maternal Grandmother        Social History     Socioeconomic History    Marital status:      Spouse name: Not on file    Number of children: 3    Years of education: 15    Highest education level: Not on file   Occupational History    Occupation: [employer] [address] resource strain: Not on file    Food insecurity     Worry: Not on file     Inability: Not on file    Transportation needs     Medical: Not on file     Non-medical: Not on file   Tobacco Use    Smoking status: Former Smoker     Packs/day: 2.00     Years: 20.00     Pack years: 40.00     Types: Cigarettes     Quit date: 1/29/2017     Years since quitting: 3.8    Smokeless tobacco: Never Used   Substance and Sexual Activity    Alcohol use: Yes     Comment: occ    Drug use: No    Sexual activity: Not on file   Lifestyle    Physical activity     Days per week: Not on file     Minutes per session: Not on file    Stress: Not on file   Relationships    Social connections     Talks on phone: Not on file     Gets together: Not on file     Attends Religion service: Not on file     Active member of club or organization: Not on file     Attends meetings of clubs or organizations: Not on file     Relationship status: Not on file    Intimate partner violence     Fear of current or ex partner: Not on file     Emotionally abused: Not on file     Physically abused: Not on file     Forced sexual activity: Not on file Other Topics Concern    Not on file   Social History Narrative    Not on file     Body mass index is 42.53 kg/m². Vitals:    12/11/20 0937   Pulse: 72   Resp: 18   Temp: 96.8 °F (36 °C)   TempSrc: Infrared   Weight: 219 lb 9.6 oz (99.6 kg)   Height: 5' 0.25\" (1.53 m)     Objective:   Physical Exam  Vitals signs reviewed. Constitutional:       General: She is not in acute distress. Appearance: She is well-developed. She is not diaphoretic. HENT:      Head: Normocephalic and atraumatic. Right Ear: External ear normal.      Left Ear: External ear normal.      Nose: Nose normal.   Eyes:      General: No scleral icterus. Right eye: No discharge. Left eye: No discharge. Conjunctiva/sclera: Conjunctivae normal.   Neck:      Musculoskeletal: Normal range of motion and neck supple. Cardiovascular:      Rate and Rhythm: Normal rate and regular rhythm. Heart sounds: Normal heart sounds. Pulmonary:      Effort: Pulmonary effort is normal. No respiratory distress. Breath sounds: Normal breath sounds. No wheezing or rales. Chest:      Chest wall: No tenderness. Abdominal:      General: Bowel sounds are normal. There is no distension. Palpations: Abdomen is soft. There is no mass. Tenderness: There is no abdominal tenderness. There is no guarding or rebound. Musculoskeletal: Normal range of motion. General: No tenderness. Skin:     General: Skin is warm and dry. Coloration: Skin is not pale. Findings: No erythema or rash. Neurological:      Mental Status: She is alert and oriented to person, place, and time. Cranial Nerves: No cranial nerve deficit. Psychiatric:         Behavior: Behavior normal.         Thought Content:  Thought content normal.         Judgment: Judgment normal.         CBC   Lab Results   Component Value Date    WBC 4.6 05/12/2020    RBC 3.82 05/12/2020    HGB 9.6 05/12/2020    HCT 32.3 05/12/2020    MCV 84.6 05/12/2020    MCH 25.1 05/12/2020    MCHC 29.7 05/12/2020    RDW 13.8 07/07/2017     05/12/2020    MPV 11.1 05/12/2020    RBCMORP NORMAL 07/07/2017    SEGSPCT 62.8 05/05/2020    LABLYMP 23.8 05/05/2020    MONOPCT 8.1 05/05/2020    LABEOS 3.7 05/05/2020    BASO 0.6 05/05/2020    NRBC 0 05/05/2020    ANISOCYTOSIS 1+ 06/29/2015    SEGSABS 3.2 05/05/2020    LYMPHSABS 1.2 05/05/2020    MONOSABS 0.4 05/05/2020    EOSABS 0.2 05/05/2020    BASOSABS 0.0 05/05/2020      BMP/CMP   Lab Results   Component Value Date    GLUCOSE 87 05/12/2020    CREATININE 0.6 05/12/2020    BUN 19 05/12/2020     05/12/2020    K 4.6 05/12/2020    K 4.2 04/03/2020     05/12/2020    CO2 26 05/12/2020    CALCIUM 9.8 05/12/2020    AST 14 05/12/2020    ALKPHOS 114 05/12/2020    PROT 6.5 05/12/2020    LABALBU 4.3 05/12/2020    BILITOT 0.9 05/12/2020    ALT 19 05/12/2020      PREALBUMIN   Lab Results   Component Value Date    PREALBUMIN 23.9 07/07/2017      VITAMIN B12   Lab Results   Component Value Date    RZTAVOEX20 112 02/01/2020      VITAMIN D   Lab Results   Component Value Date    VITD25 29 02/07/2020      PTH  No results found for: IPTH  VITAMIN B1/ THIAMINE   No results found for: RCIR1OIUHVI   LIPID SCREEN (FASTING)   Lab Results   Component Value Date    CHOL 186 02/21/2019    TRIG 100 02/21/2019    HDL 54 02/21/2019    LDLCALC 112 02/21/2019   ,   HGA1C (T2DM ONLY)   Lab Results   Component Value Date    LABA1C 5.6 02/21/2019    AVGG 108 02/21/2019      TSH   Lab Results   Component Value Date    TSH 2.080 02/01/2020      IRON   Lab Results   Component Value Date    IRON 30 01/31/2020      TIBC  No results found for: TIBC  FERRITIN  Lab Results   Component Value Date    FERRITIN 129 01/31/2020     VITAMIN A  No results found for: RETINOL  NICOTINE  No results found for: NMET  UDS  No results found for: UDP  PSA  No results found for: LABPSA  GFR  Lab Results   Component Value Date    LABGLOM >90 05/12/2020     DEXA  No results found for this or any previous visit. Imaging - none    Patient Active Problem List   Diagnosis    GERD (gastroesophageal reflux disease)    Anxiety    Clinical depression    History of lumbar fusion    Chronic left-sided low back pain with left-sided sciatica    Paresthesia of buttock    Paresthesia of left lower extremity    Neurologic gait dysfunction    Fusion of lumbosacral spine    Back pain, lumbosacral    Lumbar discitis     Assessment:      1. Morbid obesity (BMI 42)  2. Sleep apnea  3. Anxiety  4. GERD  5. Fatty liver disease  6. Bell's palsy  7. Chronic lower back pain  8. Depression  9. Hypertension  10. Irritable bowel syndrome      Plan:      1. Long discussion about the pros and cons of weight loss surgery. The risks benefits and alternatives to laparoscopic adjustable band, gastric sleeve and gastric Boyd-en-Y bypass were discussed in detail. The pros and cons of robotic assisted, laparoscopic and open techniques were discussed. 2.  Behavior modification discussed in detail in regards to dietary habits. 3.  Nutritional education occurred during visit. Will set up a consultation/evaluation with dietitian for further evaluation. 4.  Options for medical management of morbid obesity discussed. 5.  Improvement in fitness/exercise discussed with patient and the need for this with/without surgery. 6.  Obtain medical necessity letter from PCP as needed. 7.  Follow-up in one month at weight management program at [address] [place] [address]. 8.  Signs and symptoms reviewed with patient that would be concerning and need her to return to office for re-evaluation. Patient states she will call if she has questions or concerns. 9. Multivitamin  10. Psychology evaluation  11. EGD prior to any surgical intervention  12. Encouraged support groups  13. Encouraged Naturally Slim/Rev It Up  14.   Discussed the pros and cons along with possible side effects/complications of weight loss medications. All questions answered. Patient states that if she is not able to lose enough adequate excess body weight with medical management only then she would be like to proceed with surgical intervention such as sleeve gastrectomy/gastric bypass for further weight loss. More than 30 minutes spent with patient today. Greater than 50% of the time was involved counseling, educaton and coordinating care.             Carolina Rodriguez MD

## 2020-12-14 NOTE — DISCHARGE SUMMARY
[facility]    Patient Name: Do Douglas        CSN: [id number]   YOB: 1970  Gender: female  Eddie Prieto MD,    Sciatica, right side [M54.31] ,      Patient is discharged from Physical Therapy services at this time. See last note for details related to results of therapy and goal achievement. Reason for discharge: Attendance. Pt hasn't been seen since 11/24/20 d/t multiple cancellations. Pt was notified last week that if she missed another appointment she would be discharged. Pt discharged from PT on 12/14/20 at 1352.        Aftab Eric DPT, #[id number]

## 2020-12-15 NOTE — PROGRESS NOTES
Patient is a 53 y.o. female seen for   initial  MNT visit for  pre op bariatric surgery desires sleeve    Vitals:    12/16/20 1132   Temp: 97 °F (36.1 °C)    BMI: Body mass index is 43 kg/m². Obesity Classification: Class III    Weight History: Wt Readings from Last 3 Encounters:   12/16/20 222 lb (100.7 kg)   12/11/20 219 lb 9.6 oz (99.6 kg)   04/03/20 213 lb 8 oz (96.8 kg)     Pt has seen Dr Jeffrey Loya in past- states had esophagus stretched last summer- hx of reflux- prefers to go back to this MD prior to surgery. Pt was seeing PCP last three months - September, October, and November 2020. Was placed on Adipex September 2020 by PCP and finished this in November and weight was ~ 219 lbs. Pt signed JUAN to obtain office visit notes. Pt interested in using these three months towards bariatric surgery. Patient has not had a mammogram with the past year. - Has referral to get one [facility]    Patient is taking a Multivitamin daily:  Pt does not take a MVI. Last two weeks has been taking B12 and Zinc.  Taking Vitamin D3 1,000IU's    Describe your current weight: increased back problems with multiple surgeries in last three years. How does your weight affect your daily activities? concern over medical problems, inability to do household activities, limited social activities . Patient's lowest adult weight was 140 lbs at age 40. The lowest weight was achieved through low carbohydrate diet. Hysterectomy in 2014 and hasn't worked since. Patient's highest adult weight was 350 lbs at age 45. Went from 350 lbs to 140 lbs and took two years to do this. - mainly from low carbohhydrate diet . Over last 3-5 years has put weight back on with multiple surgeries and back problems. Patient has participated in the following weight loss programs: Atkins Diet. Patient has participated in meal replacement/liquid diets. Patient has participated in weight loss medications. - Adipex    Patient is not lactose intolerant. Patient does not have Nondenominational/cultural food concerns. Patient does not have food allergies. Patient dines out to a sit down restaurant 1 time per week \"as often is someone invites me\". Patient has fast food or picks up carry out 1 times per week. Grocery Shopping in household done by: self  Cooking in household done by: self    Pt lives with her mom and sometimes goes and stays with daughter. Pt wakes up around 8am-9am.  \"My eating habits are terrible\"- last two months. States eats a lot of cheese and pan fries a lot of food at home  24 hour recall/food frequency chart:  Breakfast: no.  Snack: no.   Lunch: no. -skips often. Yesterday with daughter had Jasiel Muck - fish and fries, double large sweet tea  Snack: no.   Dinner: yes - 5:30pm- last nite made pork chop, peppers,onions and butter and potato  Snack: no.   Drinks throughout the day: diet mountain dew- ~ 3 cans/day, very little water, no coffee, hot tea, whole milk on occasion    Do you drink alcohol? - very rarely     Patient does not meet the criteria for binge eating disorder. Patient sometimes has grazing. Patient does not have night eating. Patient does have a history of emotional eating or eating out of boredom. \"I am a stress eater\"    It does not take an unusually large amount of food for the patient to feel comfortably full. Patient describes self as slow eater      Patient describes level of activity as sedentary with ongoing back problems. Patient will plan to attend Fitness Orientation on: - on hold at this time   Patient was dismissed from Outpatient Therapy last week for water therapy as canceled several times. Provided pt with chair exercise booklet- advised pt to check with Dr Cesar zuniga to do these.   Pt to review further with PA next month regarding restarting PT outpatient as most likely best option due to back problems prior to using fitness center     Assessment  Nutritional Needs: Araceli Herron = 1548 kcal x 1.2 (activity factor)= 1858 kcal - 500 calories/day  (for 1-2 lb weight loss/week)= ~ 1358 calories per day  Food recall reveals often only eating once a day later in the day. Recognizes eats during stress times. Very low water intake  PES Statement:  Overweight/Obesity related to lack of exercise, sedentary lifestyle, unhealthy eating habits, and unsuccessful diet attempts as evidenced by  Body mass index is 43 kg/m². .    Surgery  Surgical procedure desired: gastric sleeve  Patient's greatest concern about having surgery is: has hx of post op complications. Patient describes the motivation for weight loss surgery to be: Increased quality of life. The expectations following the surgery were reviewed in detail with patient today and patient made aware will require to improve water intake, improve meal planning with balanced meals and regular meal times to assist with weight loss. she does feel she can deal with the dietary restrictions. Patient states she does understand the consequences of not complying with post-op food guidelines. Patient states she understands the long term changes in food intake that will be necessary for all occasions after surgery for the rest of her life. Patient is deemed nutritionally appropriate to proceed if able to show progress towards nutrition behavior changes discussed today. Plan  Patient will begin working on recommendations from Pre-Weight Loss Surgery Behavior Change Goal Sheet that was reviewed today to assist with weight loss and establish a  long term healthy eating pattern. Individual goals set with patient to be reviewed at monthly office visits. Weight loss goal of 3-5% from initial weight at first visit with Dr Darling Neville reviewed with patient to achieve over 6 month period per insurance requirements. Initial weight was: 219 lbs   3-5% Weight Loss goal will be minimum of: 7-11 lbs weight loss.      Plan/Recommendations: My Plate Method, Portion Size Guide, Food Journal, Online Support Groups    -Followup visit: 4 weeks with dietitian.    Total time involved in direct patient education: 45 minutes    [address] TA[address]NA Victor  Dietitian- [facility]

## 2020-12-16 ENCOUNTER — OFFICE VISIT (OUTPATIENT)
Dept: BARIATRICS/WEIGHT MGMT | Age: 50
End: 2020-12-16

## 2020-12-16 VITALS — BODY MASS INDEX: 43.59 KG/M2 | TEMPERATURE: 97 F | HEIGHT: 60 IN | WEIGHT: 222 LBS

## 2021-01-06 ENCOUNTER — HOSPITAL ENCOUNTER (OUTPATIENT)
Age: 51
Discharge: HOME | End: 2021-01-06
Payer: MEDICARE

## 2021-01-06 DIAGNOSIS — Z13.21 MALNUTRITION SCREEN: ICD-10-CM

## 2021-01-06 DIAGNOSIS — E66.01 MORBID OBESITY WITH BMI OF 40.0-44.9, ADULT (HCC): ICD-10-CM

## 2021-01-06 DIAGNOSIS — E55.9 VITAMIN D DEFICIENCY: ICD-10-CM

## 2021-01-06 DIAGNOSIS — Z01.818 PRE-OP TESTING: ICD-10-CM

## 2021-01-06 LAB
ALBUMIN SERPL-MCNC: 4.5 G/DL (ref 3.5–5.1)
ALP BLD-CCNC: 103 U/L (ref 38–126)
ALT SERPL-CCNC: 23 U/L (ref 11–66)
ANION GAP SERPL CALCULATED.3IONS-SCNC: 10 MEQ/L (ref 8–16)
AST SERPL-CCNC: 17 U/L (ref 5–40)
AVERAGE GLUCOSE: 114 MG/DL (ref 70–126)
BASOPHILS # BLD: 0.6 %
BASOPHILS ABSOLUTE: 0 THOU/MM3 (ref 0–0.1)
BILIRUB SERPL-MCNC: 0.8 MG/DL (ref 0.3–1.2)
BUN BLDV-MCNC: 18 MG/DL (ref 7–22)
CALCIUM SERPL-MCNC: 9.7 MG/DL (ref 8.5–10.5)
CHLORIDE BLD-SCNC: 102 MEQ/L (ref 98–111)
CHOLESTEROL, TOTAL: 195 MG/DL (ref 100–199)
CO2: 28 MEQ/L (ref 23–33)
CREAT SERPL-MCNC: 0.7 MG/DL (ref 0.4–1.2)
EKG ATRIAL RATE: 70 BPM
EKG P AXIS: 42 DEGREES
EKG P-R INTERVAL: 184 MS
EKG Q-T INTERVAL: 398 MS
EKG QRS DURATION: 88 MS
EKG QTC CALCULATION (BAZETT): 429 MS
EKG R AXIS: -19 DEGREES
EKG T AXIS: 15 DEGREES
EKG VENTRICULAR RATE: 70 BPM
EOSINOPHIL # BLD: 2.9 %
EOSINOPHILS ABSOLUTE: 0.2 THOU/MM3 (ref 0–0.4)
ERYTHROCYTE [DISTWIDTH] IN BLOOD BY AUTOMATED COUNT: 15.6 % (ref 11.5–14.5)
ERYTHROCYTE [DISTWIDTH] IN BLOOD BY AUTOMATED COUNT: 46.9 FL (ref 35–45)
FERRITIN: 21 NG/ML (ref 10–291)
FOLATE: 6.1 NG/ML (ref 4.8–24.2)
GFR SERPL CREATININE-BSD FRML MDRD: 89 ML/MIN/1.73M2
GLUCOSE BLD-MCNC: 105 MG/DL (ref 70–108)
HBA1C MFR BLD: 5.8 % (ref 4.4–6.4)
HCT VFR BLD CALC: 43.2 % (ref 37–47)
HDLC SERPL-MCNC: 55 MG/DL
HEMOGLOBIN: 13.2 GM/DL (ref 12–16)
IMMATURE GRANS (ABS): 0.04 THOU/MM3 (ref 0–0.07)
IMMATURE GRANULOCYTES: 0.8 %
INR BLD: 0.91 (ref 0.85–1.13)
IRON: 74 UG/DL (ref 50–170)
LDL CHOLESTEROL CALCULATED: 111 MG/DL
LYMPHOCYTES # BLD: 21.2 %
LYMPHOCYTES ABSOLUTE: 1.1 THOU/MM3 (ref 1–4.8)
MCH RBC QN AUTO: 25.4 PG (ref 26–33)
MCHC RBC AUTO-ENTMCNC: 30.6 GM/DL (ref 32.2–35.5)
MCV RBC AUTO: 83.1 FL (ref 81–99)
MONOCYTES # BLD: 6.5 %
MONOCYTES ABSOLUTE: 0.3 THOU/MM3 (ref 0.4–1.3)
NUCLEATED RED BLOOD CELLS: 0 /100 WBC
PLATELET # BLD: 252 THOU/MM3 (ref 130–400)
PMV BLD AUTO: 10.1 FL (ref 9.4–12.4)
POTASSIUM SERPL-SCNC: 4.3 MEQ/L (ref 3.5–5.2)
PTH INTACT: 39.7 PG/ML (ref 15–65)
RBC # BLD: 5.2 MILL/MM3 (ref 4.2–5.4)
SEG NEUTROPHILS: 68 %
SEGMENTED NEUTROPHILS ABSOLUTE COUNT: 3.5 THOU/MM3 (ref 1.8–7.7)
SODIUM BLD-SCNC: 140 MEQ/L (ref 135–145)
TOTAL IRON BINDING CAPACITY: 428 UG/DL (ref 171–450)
TOTAL PROTEIN: 7.1 G/DL (ref 6.1–8)
TRIGL SERPL-MCNC: 145 MG/DL (ref 0–199)
TSH SERPL DL<=0.05 MIU/L-ACNC: 2.02 UIU/ML (ref 0.4–4.2)
VITAMIN B-12: 529 PG/ML (ref 211–911)
VITAMIN D 25-HYDROXY: 29 NG/ML (ref 30–100)
WBC # BLD: 5.2 THOU/MM3 (ref 4.8–10.8)

## 2021-01-06 PROCEDURE — 93005 ELECTROCARDIOGRAM TRACING: CPT | Performed by: PHYSICIAN ASSISTANT

## 2021-01-06 PROCEDURE — 83036 HEMOGLOBIN GLYCOSYLATED A1C: CPT

## 2021-01-06 PROCEDURE — G0480 DRUG TEST DEF 1-7 CLASSES: HCPCS

## 2021-01-06 PROCEDURE — 36415 COLL VENOUS BLD VENIPUNCTURE: CPT

## 2021-01-06 PROCEDURE — 83970 ASSAY OF PARATHORMONE: CPT

## 2021-01-06 PROCEDURE — 83550 IRON BINDING TEST: CPT

## 2021-01-06 PROCEDURE — 84425 ASSAY OF VITAMIN B-1: CPT

## 2021-01-06 PROCEDURE — 82306 VITAMIN D 25 HYDROXY: CPT

## 2021-01-06 PROCEDURE — 80053 COMPREHEN METABOLIC PANEL: CPT

## 2021-01-06 PROCEDURE — 82728 ASSAY OF FERRITIN: CPT

## 2021-01-06 PROCEDURE — 85025 COMPLETE CBC W/AUTO DIFF WBC: CPT

## 2021-01-06 PROCEDURE — 82607 VITAMIN B-12: CPT

## 2021-01-06 PROCEDURE — 80307 DRUG TEST PRSMV CHEM ANLYZR: CPT

## 2021-01-06 PROCEDURE — 84443 ASSAY THYROID STIM HORMONE: CPT

## 2021-01-06 PROCEDURE — 80061 LIPID PANEL: CPT

## 2021-01-06 PROCEDURE — 83540 ASSAY OF IRON: CPT

## 2021-01-06 PROCEDURE — 82746 ASSAY OF FOLIC ACID SERUM: CPT

## 2021-01-06 PROCEDURE — 85610 PROTHROMBIN TIME: CPT

## 2021-01-09 LAB
3-OH-COTININE: < 2 NG/ML
COTININE: < 2 NG/ML
NICOTINE: < 2 NG/ML
VITAMIN B1 WHOLE BLOOD: 108 NMOL/L (ref 70–180)

## 2021-01-10 LAB — URINE DRUG PROFILE: NORMAL

## 2021-01-18 NOTE — PROGRESS NOTES
Assessment: Patient is a 49 y.o. female seen for   month one  follow up MNT visit for  pre op bariatric surgery desires sleeve    Vitals from current and previous visits:  Vitals 6/78/3007   SYSTOLIC 079   DIASTOLIC 80   Site Right Upper Arm   Position Sitting   Cuff Size Large Adult   Pulse 88   Temp 97.3   Resp 18   SpO2    Weight 223 lb   Height 5' 0.25\"   Body mass index 43.19 kg/m2   Waist (Inches)    Neck circumference        Initial weight at start of Weight Management Program was: 219 lbs     Eusebia wt is stable / unchanged from last month  -Weight goal: lose weight.     -Nutritionally relevant labs: gfr-89, glucose-105, ferritin-21, iron-74, vit D-29,  Lab Results   Component Value Date/Time    LABA1C 5.8 01/06/2021 12:04 PM    LABA1C 5.6 02/21/2019 11:57 AM    GLUCOSE 105 01/06/2021 12:04 PM    GLUCOSE 87 05/12/2020 02:15 PM    CHOL 195 01/06/2021 12:04 PM    HDL 55 01/06/2021 12:04 PM    LDLCALC 111 01/06/2021 12:04 PM    TRIG 145 01/06/2021 12:04 PM                  Lab Results   Component Value Date    VITD25 29 01/06/2021     Pt has seen Dr Meaghan Brown in past- states had esophagus stretched last summer- hx of reflux- prefers to go back to this MD prior to surgery. Pt was seeing PCP last three months - September, October, and November 2020. Was placed on Adipex September 2020 by PCP and finished this in November and weight was ~ 219 lbs. Pt interested in using these three months towards bariatric surgery. Has referral to get mammogram thru [facility]- has not done this yet  Will need a cardiology clearance. Patient is taking a Multivitamin daily:  Pt does not take a MVI.  taking B12 and Zinc.  Taking Vitamin D3 1,000IU's- advised to increase this to 5,000IU's D3 daily for level of 29. Going to Dr Andrew Boss tomorrow for initial eval    Pt lives with her mom and sometimes goes and stays with daughter. Pt wakes up around 8am-9am.   -Food Recall:    \"It has been a bad month\"- when asked how food choices going.  Pt did have food journal in office  Breakfast: often skips. Or pancake, orange juice  Lunch: guevara sandwich on wheat bread with fraga or skips or chicken bites, fries and cheese stix at [employer]. Dinner: lasagna from [employer] [facility] [address] dressing or spinach dips and chips or cheeseburger,onion, lettuce with onion rings, vanilla shake  Snacks: pineapple upside down cake. Main Beverages:  \"I did drink five water bottles this month\"  diet mountain dew- ~ 2 cans or less /day, very little water, no coffee, hot tea, whole milk on occasion. Started some vinayak delight juices- demonstrated to pt the sugar content of [employer]  -Impression of Dietary Intake: per food journal often only eating once a day. - Pt  is working towards  avoiding high fat/high sugar foods. Pt  is working towards including protein at meals and snacks. Exercise:  Patient has not  attended [employer]. \"I do some chair exercises\"- when asked pt if starting doing chair exercises given. Recommend now work up to 10 minutes twice a day of chair and/or therapy exercises given in past.  Discussed outpatient therapy again today and declines  -Physical Activity is:  Patient was dismissed from Outpatient Therapy last month for water therapy as canceled several times. Nutrition Diagnosis: Overweight/Obesity related to currently undergoing MNT as evidenced by BMI of 43. Intervention:   Pt recognizes requires to put forth more effort with meal timing, planning and choices to be successful with weight loss. Did demonstrate food label reading with pt to identify sugar content of beverage choices. Requires to continue working on additional water and eliminate carbonation to be considered a surgical candidate. Reviewed with pt how to apply for financial assistance for vitamins, education handouts and monthly goals. Patient has not attended support groups online yet.   Provided new resource list of online You Tube Videos    Handouts given: Top Notch Protein Foods and Reducing Fat and Calories in Meal Planning    -  Patient Instructions   Goals:  1 Nutrition Goal:  I will use my food journal to record meal times, serving sizes and bring back to next dietitian visit  2. Water Goal:  I will aim for at least one bottle of water a day. 3. Exercise Goal: Start Chair exercises work up to 10 minutes twice a day - use booklet given to you and old therapy exercises  4. To apply for Financial Assistance on Vitamins:  Go to:  [email or web address] > scroll to bottom of home page and click on \"[employer] Assist\" > read instructions and complete application online   5. Increase your Vitamin D3 to 5,000IU's daily - can get this over the counter       -Followup visit: 4 weeks with dietitian.      Total time involved in direct patient education: 30 minutes    Kenzie Connolly RD, LD  Dietitian- [facility]

## 2021-01-19 ENCOUNTER — OFFICE VISIT (OUTPATIENT)
Dept: BARIATRICS/WEIGHT MGMT | Age: 51
End: 2021-01-19
Payer: MEDICARE

## 2021-01-19 ENCOUNTER — OFFICE VISIT (OUTPATIENT)
Dept: BARIATRICS/WEIGHT MGMT | Age: 51
End: 2021-01-19

## 2021-01-19 VITALS
HEIGHT: 60 IN | DIASTOLIC BLOOD PRESSURE: 80 MMHG | SYSTOLIC BLOOD PRESSURE: 122 MMHG | TEMPERATURE: 97.3 F | BODY MASS INDEX: 43.78 KG/M2 | RESPIRATION RATE: 18 BRPM | HEART RATE: 88 BPM | WEIGHT: 223 LBS

## 2021-01-19 DIAGNOSIS — G89.29 CHRONIC BILATERAL LOW BACK PAIN, UNSPECIFIED WHETHER SCIATICA PRESENT: ICD-10-CM

## 2021-01-19 DIAGNOSIS — I10 HYPERTENSION, UNSPECIFIED TYPE: ICD-10-CM

## 2021-01-19 DIAGNOSIS — E55.9 VITAMIN D DEFICIENCY: ICD-10-CM

## 2021-01-19 DIAGNOSIS — E66.01 MORBID OBESITY WITH BMI OF 40.0-44.9, ADULT (HCC): Primary | ICD-10-CM

## 2021-01-19 DIAGNOSIS — Z01.818 PRE-OP TESTING: ICD-10-CM

## 2021-01-19 DIAGNOSIS — R94.31 ABNORMAL EKG: ICD-10-CM

## 2021-01-19 DIAGNOSIS — K58.9 IRRITABLE BOWEL SYNDROME, UNSPECIFIED TYPE: ICD-10-CM

## 2021-01-19 DIAGNOSIS — K21.9 GASTROESOPHAGEAL REFLUX DISEASE, UNSPECIFIED WHETHER ESOPHAGITIS PRESENT: ICD-10-CM

## 2021-01-19 DIAGNOSIS — G47.30 SLEEP APNEA, UNSPECIFIED TYPE: ICD-10-CM

## 2021-01-19 DIAGNOSIS — M54.50 CHRONIC BILATERAL LOW BACK PAIN, UNSPECIFIED WHETHER SCIATICA PRESENT: ICD-10-CM

## 2021-01-19 DIAGNOSIS — K76.0 FATTY LIVER: ICD-10-CM

## 2021-01-19 DIAGNOSIS — F32.A DEPRESSION, UNSPECIFIED DEPRESSION TYPE: ICD-10-CM

## 2021-01-19 DIAGNOSIS — F41.9 ANXIETY: ICD-10-CM

## 2021-01-19 PROBLEM — G47.33 OBSTRUCTIVE SLEEP APNEA (ADULT) (PEDIATRIC): Status: ACTIVE | Noted: 2019-11-18

## 2021-01-19 PROBLEM — M43.17 SPONDYLOLISTHESIS, LUMBOSACRAL REGION: Status: ACTIVE | Noted: 2019-03-06

## 2021-01-19 PROBLEM — Z82.49 FAMILY HX OF ISCHEM HEART DIS AND OTH DIS OF THE CIRC SYS: Status: ACTIVE | Noted: 2019-10-17

## 2021-01-19 PROBLEM — Z80.9 FAMILY HISTORY OF MALIGNANT NEOPLASM, UNSPECIFIED: Status: ACTIVE | Noted: 2019-10-17

## 2021-01-19 PROBLEM — M48.061 SPINAL STENOSIS, LUMBAR REGION WITHOUT NEUROGENIC CLAUDICATION: Status: ACTIVE | Noted: 2019-10-17

## 2021-01-19 PROBLEM — Z83.3 FAMILY HISTORY OF DIABETES MELLITUS: Status: ACTIVE | Noted: 2019-10-17

## 2021-01-19 PROBLEM — M54.16 RADICULOPATHY, LUMBAR REGION: Status: ACTIVE | Noted: 2019-10-17

## 2021-01-19 PROCEDURE — G8417 CALC BMI ABV UP PARAM F/U: HCPCS | Performed by: PHYSICIAN ASSISTANT

## 2021-01-19 PROCEDURE — G8484 FLU IMMUNIZE NO ADMIN: HCPCS | Performed by: PHYSICIAN ASSISTANT

## 2021-01-19 PROCEDURE — 99999 PR OFFICE/OUTPT VISIT,PROCEDURE ONLY: CPT

## 2021-01-19 PROCEDURE — G8427 DOCREV CUR MEDS BY ELIG CLIN: HCPCS | Performed by: PHYSICIAN ASSISTANT

## 2021-01-19 PROCEDURE — 1036F TOBACCO NON-USER: CPT | Performed by: PHYSICIAN ASSISTANT

## 2021-01-19 PROCEDURE — 3017F COLORECTAL CA SCREEN DOC REV: CPT | Performed by: PHYSICIAN ASSISTANT

## 2021-01-19 PROCEDURE — 99214 OFFICE O/P EST MOD 30 MIN: CPT | Performed by: PHYSICIAN ASSISTANT

## 2021-01-19 RX ORDER — CIPROFLOXACIN 250 MG/1
250 TABLET, FILM COATED ORAL DAILY
COMMUNITY
End: 2022-06-22

## 2021-01-19 NOTE — PATIENT INSTRUCTIONS
Goals:  1 Nutrition Goal:  I will use my food journal to record meal times, serving sizes and bring back to next dietitian visit  2. Water Goal:  I will aim for at least one bottle of water a day. 3. Exercise Goal: Start Chair exercises work up to 10 minutes twice a day - use booklet given to you and old therapy exercises  4. To apply for Financial Assistance on Vitamins:  Go to:  [email or web address] > scroll to bottom of home page and click on \"[employer] Assist\" > read instructions and complete application online   5.   Increase your Vitamin D3 to 5,000IU's daily - can get this over the counter

## 2021-01-19 NOTE — PATIENT INSTRUCTIONS
· Behavior modification discussed in detail in regards to dietary habits. · Nutritional education occurred during visit. Continue following recommendations  per dietitian. · Improvement in fitness/exercise discussed with patient and the need for this  with/without surgery. · Schedule orientation with Elmo Curran, Exercise Physiologist, at the fitness  center. · Has JESSE- not currently wearing CPAP-referral sent  · Cardiac Clearance needed prior to surgery- Dr. Jamal Obando referral sent  · Pulmonary Clearance needed prior to surgery- referral sent  · Psychology evaluation with Dr. Lorilee Essex 1/20/21 and 2/19/21  · Physical Therapy referral prn  · EGD prior to any surgical intervention- referral sent to Dr. Rissa Echevarria  · Encouraged to attend support groups. Has attended x 1.   · Goal to lose 3-5% TBW prior to surgery. · Seca scale next month  · Initial labs completed and reviewed  · Vit D 29- advised 5,000IU vit D3 OTC daily  · EKG NSR/ minimal voltage criteria for LVH/may be nml variant. · Advised not to get pregnant for 18-24 months post bariatric surgery as this can increase risk of malnourishment potentially leading to low birth weight or malformation. · Will need to be off work for 3-4 weeks post-bariatric surgery. · No lifting/pushing/pulling over 20# for 4 weeks post-op  · No NSAIDS 10 days prior to bariatric surgery. Avoid NSAID use post-op  · Discussed Lovenox post-op bariatric surgery  · LOMN received.

## 2021-01-19 NOTE — PROGRESS NOTES
hypertension, mild sleep apnea- untreated currently/ has CPAP but wearing, depression, anxiety, IBS. Discussed that reflux can worsen post-op requiring additional medication or revision surgery. Initial labs completed and reviewed. Vit D 29- currently taking D3 1000IU- advised to start 5,000IU daily. LOMN received. Completed support groups x 1. EKG completed and reviewed. Patient reports a  hx of ventricular tachycardia for which she has followed with Dr. Shirley Liz in the last.  EGD to be completed with Dr. Cedric Sol per patient request. Cardiac clearance needed prior to surgery . Pulmonary clearance needed prior to surgery. Psychological clearance with Dr. Ro Coronado scheduled for 1/20/21 and 2/19/21. If she does not lose enough weight with medical management she would like to proceed with sleeve gastrectomy. Physical Activity: walking  Days per week: 3-4 days  Time per session: 60 minutes    Current BMI: Body mass index is 43.19 kg/m².   Current Weight:   Wt Readings from Last 3 Encounters:   01/19/21 223 lb (101.2 kg)   12/16/20 222 lb (100.7 kg)   12/11/20 219 lb 9.6 oz (99.6 kg)     Initial Body [id number]    Past Medical History:  Past Medical History:   Diagnosis Date    Anxiety     Arthritis     Bell's palsy     Esophagitis     Fatty liver     GERD (gastroesophageal reflux disease)     Hypertension     IBS (irritable bowel syndrome)     Morbid obesity with BMI of 40.0-44.9, adult (HCC)     MRSA (methicillin resistant staph aureus) culture positive     UTI (urinary tract infection)     Vertigo        Past Surgical History:  Past Surgical History:   Procedure Laterality Date    BACK SURGERY      BLADDER SUSPENSION      BREAST REDUCTION SURGERY  10/2007    CARPAL TUNNEL RELEASE      CERVICAL FUSION  2017    ACDF C5-C7    CHOLECYSTECTOMY      COLONOSCOPY      DILATION AND CURETTAGE OF UTERUS      ENDOSCOPY, COLON, DIAGNOSTIC      HYSTERECTOMY      LUMBAR FUSION N/A 4/3/2020    L4-S1 Outpatient Medications   Medication Sig Dispense Refill    Cyanocobalamin (B-12 PO) Take 1 tablet by mouth daily      NONFORMULARY 1 tablet daily Zinc with elderberry      ciprofloxacin (CIPRO) 250 MG tablet Take 250 mg by mouth daily      oxyCODONE-acetaminophen (ROXICET) 5-325 MG/5ML solution Take by mouth every 4 hours as needed for Pain.  metoprolol succinate (TOPROL XL) 50 MG extended release tablet Take 1 tablet by mouth daily 30 tablet 3    pantoprazole (PROTONIX) 40 MG tablet Take 1 tablet by mouth every morning (before breakfast) 30 tablet 0    diclofenac sodium 1 % GEL Apply 4 g topically 4 times daily as needed for Pain 1 Tube 3    Cholecalciferol (VITAMIN D3) 25 MCG (1000 UT) TABS TAKE ONE TABLET BY MOUTH EVERY DAY  1    cyclobenzaprine (FLEXERIL) 10 MG tablet Take 10 mg by mouth as needed      FLUoxetine (PROZAC) 20 MG capsule Take 20 mg by mouth daily       No current facility-administered medications for this visit. Allergies: Allergies   Allergen Reactions    Codeine Rash    Dilaudid [Hydromorphone Hcl] Rash       Subjective:    Review of Systems:  Constitutional: Denies any fever, chills, (+)fatigue. Wound: Denies any rash, skin color changes or wound problems. Resp: Denies any cough, (+)shortness of breath. CV: Denies any chest pain, orthopnea or syncope. MS: Denies myalgias, (+)arthralgias- back/neck  GI: Denies any nausea, vomiting, (+)diarrhea, constipation, melena, hematochezia. (+) reflux  : Denies any hematuria, hesitancy or dysuria. NEURO: Denies seizures, headache. Objective:    /80 (Site: Right Upper Arm, Position: Sitting, Cuff Size: Large Adult)   Pulse 88   Temp 97.3 °F (36.3 °C) (Infrared)   Resp 18   Ht 5' 0.25\" (1.53 m)   Wt 223 lb (101.2 kg)   LMP 10/27/2015 (Exact Date)   BMI 43.19 kg/m²   Constitutional: Alert and oriented to person, place and time. Well-developed, well- nourished.   Head: Normocephalic and atraumatic  Neck: Supple. Eyes: EOMI b/l. Conjunctivae normal.  No scleral icterus. Respiratory: Effort normal. No respiratory distress. Abdomen: Obese  Ext:  Movement x 4. No edema  Skin; Warm and dry, no visible rashes, lesions or ulcers.    Neuro: Cranial Nerves Grossly Intact; nml coordination    CBC   Lab Results   Component Value Date    WBC 5.2 01/06/2021    RBC 5.20 01/06/2021    HGB 13.2 01/06/2021    HCT 43.2 01/06/2021    MCV 83.1 01/06/2021    MCH 25.4 01/06/2021    MCHC 30.6 01/06/2021    RDW 13.8 07/07/2017     01/06/2021    MPV 10.1 01/06/2021    RBCMORP NORMAL 07/07/2017    SEGSPCT 68.0 01/06/2021    LABLYMP 21.2 01/06/2021    MONOPCT 6.5 01/06/2021    LABEOS 2.9 01/06/2021    BASO 0.6 01/06/2021    NRBC 0 01/06/2021    ANISOCYTOSIS 1+ 06/29/2015    SEGSABS 3.5 01/06/2021    LYMPHSABS 1.1 01/06/2021    MONOSABS 0.3 01/06/2021    EOSABS 0.2 01/06/2021    BASOSABS 0.0 01/06/2021        BMP/CMP   Lab Results   Component Value Date    GLUCOSE 105 01/06/2021    CREATININE 0.7 01/06/2021    BUN 18 01/06/2021     01/06/2021    K 4.3 01/06/2021    K 4.2 04/03/2020     01/06/2021    CO2 28 01/06/2021    CALCIUM 9.7 01/06/2021    AST 17 01/06/2021    ALKPHOS 103 01/06/2021    PROT 7.1 01/06/2021    LABALBU 4.5 01/06/2021    BILITOT 0.8 01/06/2021    ALT 23 01/06/2021        PREALBUMIN   Lab Results   Component Value Date    PREALBUMIN 23.9 07/07/2017        VITAMIN B12   Lab Results   Component Value Date    UFQXXTLL64 399 01/06/2021        VITAMIN D   Lab Results   Component Value Date    VITD25 29 01/06/2021        PTH  Lab Results   Component Value Date    IPTH 39.7 01/06/2021       VITAMIN B1/ THIAMINE   Lab Results   Component Value Date    [id number] 108 01/06/2021        LIPID SCREEN (FASTING)   Lab Results   Component Value Date    CHOL 195 01/06/2021    TRIG 145 01/06/2021    HDL 55 01/06/2021    LDLCALC 111 01/06/2021   ,     HGA1C (T2DM ONLY)   Lab Results   Component Value Date    LABA1C 5.8 01/06/2021    AVGG 114 01/06/2021        TSH   Lab Results   Component Value Date    TSH 2.020 01/06/2021        IRON   Lab Results   Component Value Date    IRON 74 01/06/2021        TIBC  Lab Results   Component Value Date    TIBC 428 01/06/2021       FERRITIN  Lab Results   Component Value Date    FERRITIN 21 01/06/2021       VITAMIN A  No results found for: RETINOL    NICOTINE  No results found for: NMET    UDS  Lab Results   Component Value Date    UDP SEE BELOW 01/06/2021       PSA  No results found for: LABPSA    GFR  Lab Results   Component Value Date    LABGLOM 89 01/06/2021       DEXA  No results found for this or any previous visit. Assessment:       Diagnosis Orders   1. BMI 40.0-44.9, adult [facility])  Dion Ruiz MD, Pulmonology, Blanca Hutchins MD, Cardiology, [address] [place] [address]   2. Vitamin D deficiency     3. Gastroesophageal reflux disease, unspecified whether esophagitis present     4. Fatty liver     5. Chronic bilateral low back pain, unspecified whether sciatica present     6. Depression, unspecified depression type     7. Hypertension, unspecified type     8. Irritable bowel syndrome, unspecified type     9. Anxiety     10. Sleep apnea, unspecified type  Dion Ruiz MD, Pulmonology, [address] [place] [address]   11. Abnormal EKG  Guillermina Valenzuela MD, Cardiology, [address] [place] [address]   12. Pre-op testing  Pearly Dubin, MD, Cardiology, [address] [place] [address]       Plan:    · Behavior modification discussed in detail in regards to dietary habits. · Nutritional education occurred during visit. Continue following recommendations  per dietitian. · Improvement in fitness/exercise discussed with patient and the need for this  with/without surgery. · Schedule orientation with Marc Allan, Exercise Physiologist, at the fitness  center.   · Has JESSE- not currently wearing CPAP-referral sent  · Cardiac Clearance needed prior to surgery-  [facility] referral sent  · Pulmonary Clearance needed prior to surgery- referral sent  · Psychology evaluation with Dr. Briscoe Necessary 1/20/21 and 2/19/21  · Physical Therapy referral prn  · EGD prior to any surgical intervention- referral sent to Dr. Ayesha Nelson  · Encouraged to attend support groups. Has attended x 1.   · Goal to lose 3-5% TBW prior to surgery. · Seca scale next month  · Initial labs completed and reviewed  · Vit D 29- advised 5,000IU vit D3 OTC daily  · EKG NSR/ minimal voltage criteria for LVH/may be nml variant. · Advised not to get pregnant for 18-24 months post bariatric surgery as this can increase risk of malnourishment potentially leading to low birth weight or malformation. · Will need to be off work for 3-4 weeks post-bariatric surgery. · No lifting/pushing/pulling over 20# for 4 weeks post-op  · No NSAIDS 10 days prior to bariatric surgery. Avoid NSAID use post-op  · Discussed Lovenox post-op bariatric surgery  · LOMN received. Return in about 1 month (around 2/19/2021) for Follow up. I spent over 30 minutes with the patient, with greater that 50% of that time spent on education, counseling and coordination of care.      Electronically signed by YUNIEL Johnson on 1/19/2021 at 4:23 PM

## 2021-01-20 ENCOUNTER — OFFICE VISIT (OUTPATIENT)
Dept: PSYCHIATRY | Age: 51
End: 2021-01-20
Payer: MEDICARE

## 2021-01-20 DIAGNOSIS — F45.1 MODERATE SOMATIC SYMPTOM DISORDER WITH PREDOMINANT PAIN: Primary | ICD-10-CM

## 2021-01-20 DIAGNOSIS — E66.01 MORBID OBESITY (HCC): ICD-10-CM

## 2021-01-20 PROCEDURE — 90791 PSYCH DIAGNOSTIC EVALUATION: CPT | Performed by: PSYCHOLOGIST

## 2021-01-20 PROCEDURE — 96130 PSYCL TST EVAL PHYS/QHP 1ST: CPT | Performed by: PSYCHOLOGIST

## 2021-01-20 PROCEDURE — 96131 PSYCL TST EVAL PHYS/QHP EA: CPT | Performed by: PSYCHOLOGIST

## 2021-01-20 PROCEDURE — 1036F TOBACCO NON-USER: CPT | Performed by: PSYCHOLOGIST

## 2021-01-20 NOTE — PROGRESS NOTES
This note will not be viewable in [employer] for the following reason(s). This is a Psychotherapy Note. ROUTINE PSYCHOLOGICAL EVALUATION FOR BARIATRIC SURGERY:    Ms. Tutu Bah is a 49year-old, female with morbid obesity (BMI = 43.19), referred for a routine psychiatric evaluation for bariatric surgery. WEIGHT HISTORY    Ms. Tutu Bah has considered bariatric surgery for: 1-2 months  Overweight since: 17years old (for past 33 years)  Weight Loss Attempts include (self-directed/formal): Praveena Galea and other fad diets, lost 10-20 lbs. Each time, but gained weight back. Low carb diet in 2007 lost 179 pounds over 3 years. Highest weight 350 lb. down to 140 lb. Lowest weight. Gained most of weight back. Adipex in 2019, lost 20 lbs, and gained back. Adipex in Sept. 2020 lost 17lbs. , and gained back. Eating Behaviors endorsed by patient: Patient endorsed poor food choices, large portions, and skipping meals. However, she notes improvements in emotional eating (stress and depression). Patient does note that she skips meals if she needs to leave house for appointments to avoid IBS symptoms, stating she has BM 10-50 times per day when she eats or drinks. Caffeine and Carbonated beverages (last use/average use): Reports 2 - 16 oz bottles of Diet Mountain Dew per day, which is a decrease from previous use of 3 bottles per day. Of note, per EMR, it is documented with weight management team that patient was previously drinking pop all day long. Drinks McDonalds 16 oz. sweet tea twice per month, and 8 oz. hot tea with milk and stevia once per week. Last consumed hot tea 2 months ago. Patient reports she has just started drinking 1 bottle of water per day. Previously drank 5 bottles water per month. Exercise Habits: Since September 2020 after last surgery, patient reports doing arm exercises with resistant bands, 2 sets x 10 minutes each set every day.   Patient also reports walking 1-2 hours while holding onto a shopping cart at [facility] 2-3 times per week since May 2020. Binging/Purging/Restrictive Behaviors (including SIV, laxative/stimulant abuse/obsessive exercise): Denies purging or restrictive behaviors. Patient noted binging in the evening when she only eats one meal per day due to her IBS and has to leave the house for appointments. Patient does not appear to have a clear plan of how she is going to manage this. Patient does report that weight management team is aware of her skipping meals and patient reports that it was recommended that she has to eat 3 meals per day. Pre-surgery Weight Loss Goal/Progress: Patient denies any weight loss since starting weight management program. She reports her pre-surgical weight loss goal is 7-11 lbs. KNOWLEDGE OF SURGERY/GOALS  Ms. Geoff Martinez does not seem to have a clear idea of expectations of weight loss surgery. For example, she was able to state that carbonation is not allowed after surgery; however, she was unable to identify why this was. Patient will NEED additional psychoeducation regarding weight loss surgery guidelines and expectations. Patient appears to be motivated; has identified the following goals/reasons to lose weight:    1. Current medical co-morbidities: Depression, Hypertension, GERD, Degenerative Joint Disease, Sleep Apnea  3. Activity goals: increase physical mobility, decrease medical problems, decrease back pain, decrease shortness of breath with activities. PSYCHOSOCIAL HISTORY  Marital status/lives with: Twice  but reports she has maintained good relationship with ex-husbands. Lives with her mother. She does have 3 adult aged children who live outside of the home, 2 of which live nearby, and 1 out of state. All supportive of patient going through bariatric process. Education Attainment: high school graduate, denies learning difficulties.      Employment status (full-time/part-time, SSD, employment disability): unemployed, gets SSI - social security supplement $529.00 per month, Medicaid, and food stamps. Ex- has helped pay bills for her since 2014. Patient last worked in Oct. 2014, stopped due to health problems. Holiness beliefs affecting medical care/transfusion/diet: Denies     experience: Denies  Relevant legal history/current issues: Patient reports filing for bankruptcy in 2007 with her ex-, and this has since been resolved. She does report having a single arrest in 1989 for interfering with the police and brother at which time she reports she was trying to calm her brother down. Currently identified stressors include: health problems, 3 back surgeries and carpal tunnel surgery in past 1-2 years. Son in army, moving to [facility]. Current coping strategies include: Patient reports meditation, breathing techniques, talking with God, visit grandchildren, helping others, talking with mother. Most anticipated challenge with the bariatric process (pre or post): Paitient reports GERD may worsen, and not being able to eat pizza, pasta, cheese will be challenging. SUPPORT SYSTEM FOR BARIATRIC SURGERY   Supports include: mother, children, friends  Patient has at least 1 individual to stay with her 24/7 after surgery, provide transportation, and assist with medications and emotional support following surgery. Patient reported two of her children that live nearby will take turns helping her at home  Advanced Directive:  All three children shared decision making: Shirin Montiel #[phone], Siena Mackey #[phone], Jose Alfredo Garcia #[phone]    MENTAL HEALTH TREATMENT HISTORY  Has seen a psychiatrist/psychologist/ in the past (summary of previous treatment): Denies  Previous medication trials include:  Valium  Currently in outpatient treatment (e.g. psychotherapy, medication management):Denies  Current psychiatric medications include: [id number] QD since Sept. 2020, prescribed by PCP Tay Perdue. Hospitalizations:  Denies  Previous Suicide Attempts: Denies  History of depressive episodes:Patient reports 2019 through 2020 with health issues. Prozac helping with anxiety. PSYCHIATRIC SYMPTOMS  DEPRESSION:  Patient reports difficulty with memory, fatigue, disrupted sleep- wakes every 2-3 hours from back/hip discomfort. Denies anhedonia. Denies suicidal thoughts, plan, and intent. Denies firearms in the home. ANXIETY:  Patient reports anxiety since 2019 which was likely triggered by the start of multiple surgeries and health problems. Patient reports having fatigue, sleep disturbance, muscle tension, and restlessness from back surgeries discomfort. Denies excessive/uncontrollable worry, OCD, panic, and medical anxiety. History of abuse/ trauma, tragedy: Brother  of suicide in , Another brother became quadraplegic in  - fell when abusing alcohol. Father  in  shortly after her neck surgery and patient reports feels guilty about not being able to help him during his last days. Denies symptoms of PTSD related to any of these situations. History of DAVID: Denies    History of PSYCHOSIS: Denies    History of INTERPERSONAL DIFFICULTY (e.g., anger management problems, impulsivity, conflict with medical staff/history of leaving AMA):  Denies    FAMILY PSYCHIATRIC HISTORY:   Brother - depression and completed suicide  Daughter - takes medication to manage depression    FAMILY CHEMICAL DEPENDENCY HISTORY:   2 brothers with alcohol abuse    SUBSTANCE USE HISTORY  (Including last use, average use, consequences related to use. )  Alcohol: drinks 4-5 times per year socially with friends, max per episode 2 strawberry daquari's, last used 1 week ago. Denies history of problematic alcohol use. Tobacco: Denies current nicotine use. She does report a history of smoking 4ppd from  -  at which time she reports she was able to stop abruptly. Illicit Drugs:  Denies current illicit drug use. Patient reports a distant history of using marijuana one time in . Patient understands and accepts abstinence from alcohol, tobacco, and illicit drugs. Addiction transfer was thoroughly discussed with the patient and she appears to understand. SUBSTANCE TREATMENT HISTORY: Denies    MENTAL STATUS EXAM  General appearance:  Well nourished, dressed appropriately  Attitude & Behavior: good attitude, positive  Motor Activity & Musculoskeletal: bilateral extremities full movement  Speech & Language: clear  Gait & Station: gait steady  Mood: content  Affect: congruent with mood, appropriate to setting  Thought content: intact  Suicidal ideation: denies  Homicidal ideation: denies  Thought process & Associations: intact  Perceptions: intact  Orientation: A&O x4  Attention & Concentration: intact  Recent & Remote Memory: intact  Executive function: intact  Visual spatial: intact  Fund of knowledge: intact  Insight: low  Judgment: low    NEUROCOGNITIVE FUNCTIONING  Patient denies a history of closed head injury, seizures, or stroke. MEDICAL LITERACY:  REALM-R Score: 8/8  REALM-SF Score: 7/7  Score indicates a reading level of: >9th grade reading level. MEDICAL NUMBERS  4/4 questions correct; patient able to do basic arithmetic needed for independent medication compliance. Mukesh Cognitive Assessment: 29  of 30   Difficulty noted in the area(s) of:   Executive Functionin/5; alternating trail making 1/1; copy cube 1/1; clock drawing 3/3. Attention / Concentration: 5/6; digits forward / backward 1/2; tapping A's 1/1; serial 7's 3/3. Language: 6/6; animal names 3/3; sentence repeat 2/2; word fluency 1/1. Abstraction: similarities 2/2. Memory: delayed item recall: 5/5 words at 5 minutes. Arousal / Orientation: 6/6. Essentially cognitively intact on brief screening.       DSM DIAGNOSTIC IMPRESSION  Somatic symptom disorder, with predominant pain, moderate  Morbid obesity       FORMULATION OF BARIATRIC ISSUES/PLANS:     PSYCHIATRIC ISSUES/plan: Patient reports symptoms of mood that appear to be predominantly related to her pain. She also reports experiencing some guilt related to having multiple surgeries which meant she was unable to physically be there as much as she wanted to for her father prior to his passing. She also reports anxiety that started when she started having multiple surgeries in 2019. She denies a history of engaging in mental health care. She is currently prescribed Prozac 10mg QD and this is managed by her PCP. She reports a psychiatric medication trial of Valium. Denies suicidal thoughts, plan, and intent. COGNITIVE FUNCTIONING/plan: Patient is cognitively intact on brief screener (29/30, normal is 26/20). She appears to have adequate medical literacy and reads at a greater than 9th grade level. She has adequate numerical literacy to be able to make independent medication changes. Patient obtained a high school diploma, and is unemployed since Oct. 2014, with Social security income, medicaid, food stamps. Patient also reports financial assist from ex- as needed. SUBSTANCE ABUSE/DEPENDENCY ISSUES/PLAN: Reports last consumed 1 alcoholic beverage one week ago, and reports average use is 2 alcoholic drinks, 4-5 times per year. Denies current nicotine use but does report a history of smoking 4ppd from [id number]. Denies current and past illicit drug use. Of note, she is prescribed multiple pain medications to help manage pain. Addiction transfer was thoroughly discussed. SUPPORT:  Patient identified her 2 girls and mother will be primary support people throughout the [facility] process. ADVANCED DIRECTIVE: Patient identified 3 adult children as shared primary surrogate decision makers. EATING BEHAVIORS/plan: Patient endorsed large portions, poor food choices, stress eating, depression eating, and skipping meals. She reports drinking 2 -16 oz. Bottles of Diet [employer] per day, a sweet tea from [employer] twice per month, and hot teas with milk and stevia once per week. Reports planned exercise of walking 2-3 times per week, 1-2 hours each episode, and arm strengthening with bands 20 minutes per day. Patient denies losing weight. Patient started food log 1/9/21 with the blue log journal from weight management. Encouraged using food/exercise log consistently, and eliminate caffiene/carbonation use prior to surgery. Patient verbalized goal is to stop by February. OTHER: there is some concern that patient is not being forthcoming throughout the evaluation today. For example, patient initially denied she was every diagnosed with sleep apnea, even though it is clearly documented in her EMR. Patient was confronted with this and then she did state that she had been diagnosed with JESSE, and she does not use her CPAP. Additionally, she reports the most soda she ever would drink in a day was 3 sodas per day; however, per recent weight management note, it was documented that patient reported she was drinking soda all day long. Patient was confronted about this discrepancy and she denies that she would ever drink soda all day long and was adamant that it was only 3 sodas per day. Patient will also need to find a way to eat 3 meals per day as she is currently skipping meals due to IBS. Furthermore, it does not appear that patient has an adequate understanding of bariatric guidelines and why these guidelines are imperative for her safety. OTHER RECOMMENDATIONS:    Encouraged to participate in the Bariatric Support Groups    Increase physical activity and muscle strengthening.  Continue weight loss as required by surgeon.     Jose Hui MSN, BSN, RN Psychaitric Nurse Practitioner Student Formerly [facility]      PSYCHOLOGICAL SCREENING RESULTS:     TESTING COMPLETED BY: Poly Almanza, PHD  TIME SPENT WITH TESTIN HOURS    TESTS PERFORMED: [place] Cognitive Screening (MoCA), Medical Literacy (Realm-r, SF), Numerical Literacy (Medical Numbers), Emotional Eating Scale, [place]ge Food Addiction Scale, and MMPI-2. Results for the MoCA, Medical Literacy, and Numerical Literacy are provided above. Results for MMPI-2 will be documented in follow up progress note.

## 2021-01-27 ENCOUNTER — INITIAL CONSULT (OUTPATIENT)
Dept: PULMONOLOGY | Age: 51
End: 2021-01-27
Payer: MEDICARE

## 2021-01-27 VITALS
TEMPERATURE: 98.2 F | SYSTOLIC BLOOD PRESSURE: 120 MMHG | DIASTOLIC BLOOD PRESSURE: 78 MMHG | BODY MASS INDEX: 44.76 KG/M2 | OXYGEN SATURATION: 98 % | HEIGHT: 60 IN | HEART RATE: 70 BPM | WEIGHT: 228 LBS

## 2021-01-27 DIAGNOSIS — Z01.818 PRE-OP EVALUATION: ICD-10-CM

## 2021-01-27 DIAGNOSIS — G47.33 OSA (OBSTRUCTIVE SLEEP APNEA): Primary | ICD-10-CM

## 2021-01-27 DIAGNOSIS — I10 ESSENTIAL HYPERTENSION: ICD-10-CM

## 2021-01-27 DIAGNOSIS — F32.A DEPRESSION, UNSPECIFIED DEPRESSION TYPE: ICD-10-CM

## 2021-01-27 PROCEDURE — 3017F COLORECTAL CA SCREEN DOC REV: CPT | Performed by: INTERNAL MEDICINE

## 2021-01-27 PROCEDURE — G8417 CALC BMI ABV UP PARAM F/U: HCPCS | Performed by: INTERNAL MEDICINE

## 2021-01-27 PROCEDURE — 99204 OFFICE O/P NEW MOD 45 MIN: CPT | Performed by: INTERNAL MEDICINE

## 2021-01-27 PROCEDURE — G8427 DOCREV CUR MEDS BY ELIG CLIN: HCPCS | Performed by: INTERNAL MEDICINE

## 2021-01-27 PROCEDURE — G8484 FLU IMMUNIZE NO ADMIN: HCPCS | Performed by: INTERNAL MEDICINE

## 2021-01-27 PROCEDURE — 1036F TOBACCO NON-USER: CPT | Performed by: INTERNAL MEDICINE

## 2021-01-27 NOTE — PATIENT INSTRUCTIONS
Recommendations/Plan:  -She was offered a referral to [address] [place] [address] Pain management [place] for further evaluation and management. How ever she refused referral.  -I had a discussion with patient regarding avialable treatment options for her sleep disorder breathing including but not limited to CPAP titration in the sleep lab Vs.Dental appliance placement with referral to a local dentist Vs other available surgical options including Uvulopalatopharyngoplasty, maxillomandibular ostomy and tracheostomy as last option. At the end of discussion, she decided on continuing her a CPAP with previous pressure settings as a treatment for her obstructive sleep apnea at this time.  -At the request of patient, she was given prescription for CPAP supplies to submit to DME company.  -She will be referred to [employer]( [address] [place]) [place] for mask refit with a different style mask for better compliance and comfort.  -She was advised to continue current positive airway pressure therapy with above described pressure.  -She advised to keep good compliance with current recommended pressure to get optimal results and clinical improvement.  -Follow with my clinic in 6 to 8 weeks for clinical reevaluation with review of download.  -He is aware of the consequences of poor compliance to his recommended positive air way pressure therapy including increased risk for cardiovascular and cerebrovascular events and morbidity including death.  -She was advised to call [employer] regarding supplies if needed.  -She was advised to practice good sleep hygiene techniques. She was provided with a hand out. -She was educated about sleep restriction therapy and advised to practice to improve her insomnia. -She was educated about stimulus control therapy and advise to practice to improve her insomnia. Nabeel Ordaz advised to loose weight by controlling diet and doing exercise.  She is currently following with [address] [place] [address] weight management center with  MD Radha  -She was advised call my office for earlier appointment if needed for worsening of sleep symptoms.  -Patrica Jc educated about my impression and plan. Patient verbalizes understanding.

## 2021-01-27 NOTE — PROGRESS NOTES
Chief Complaint: New sleep study prior studies Psg 11/27/17    Mallampati airway Class:3  Neck Circumference:.16 Inches    [place] sleepiness score 1/27/21: .       Diagnostic Data: Psg 11/27/17 Omkar Terrazas

## 2021-01-28 ENCOUNTER — OFFICE VISIT (OUTPATIENT)
Dept: CARDIOLOGY CLINIC | Age: 51
End: 2021-01-28
Payer: MEDICARE

## 2021-01-28 VITALS
SYSTOLIC BLOOD PRESSURE: 124 MMHG | HEART RATE: 76 BPM | DIASTOLIC BLOOD PRESSURE: 80 MMHG | WEIGHT: 228 LBS | BODY MASS INDEX: 44.76 KG/M2 | HEIGHT: 60 IN

## 2021-01-28 DIAGNOSIS — Z01.818 PRE-OP EVALUATION: ICD-10-CM

## 2021-01-28 DIAGNOSIS — R94.31 ABNORMAL ECG: ICD-10-CM

## 2021-01-28 DIAGNOSIS — I10 ESSENTIAL HYPERTENSION: Primary | ICD-10-CM

## 2021-01-28 PROCEDURE — G8417 CALC BMI ABV UP PARAM F/U: HCPCS | Performed by: NUCLEAR MEDICINE

## 2021-01-28 PROCEDURE — G8484 FLU IMMUNIZE NO ADMIN: HCPCS | Performed by: NUCLEAR MEDICINE

## 2021-01-28 PROCEDURE — G8428 CUR MEDS NOT DOCUMENT: HCPCS | Performed by: NUCLEAR MEDICINE

## 2021-01-28 PROCEDURE — 3017F COLORECTAL CA SCREEN DOC REV: CPT | Performed by: NUCLEAR MEDICINE

## 2021-01-28 PROCEDURE — 1036F TOBACCO NON-USER: CPT | Performed by: NUCLEAR MEDICINE

## 2021-01-28 PROCEDURE — 99214 OFFICE O/P EST MOD 30 MIN: CPT | Performed by: NUCLEAR MEDICINE

## 2021-01-28 NOTE — PROGRESS NOTES
[id number]  [address]  [address]  [facility] [address]  Dept: [phone]  Dept Fax: [phone]  Loc: [phone]    Visit Date: 1/28/2021    David Garsia is a 49 y.o. female who presents todayfor:  Chief Complaint   Patient presents with    New Patient    Hypertension    Pre-op Exam     Here for evaluation for gastric surgery   found to have abnormal ECG   Borderline LVH   Here for pre op  Had stress test at [address] [place] [address]  No ischemia   No chest pain  Some palpitation   Intermittent in nature  No know CAD  BP is stable   Had ?/ V tach at [address] [place] [address]  Does have severe limitation from her back   No smoking  Family history of CAD     HPI:  HPI  Past Medical History:   Diagnosis Date    Anxiety     Arthritis     Bell's palsy     Esophagitis     Fatty liver     GERD (gastroesophageal reflux disease)     Hypertension     IBS (irritable bowel syndrome)     Morbid obesity with BMI of 40.0-44.9, adult (HCC)     MRSA (methicillin resistant staph aureus) culture positive     UTI (urinary tract infection)     Vertigo       Past Surgical History:   Procedure Laterality Date    BACK SURGERY      BLADDER SUSPENSION      BREAST REDUCTION SURGERY  10/2007    CARPAL TUNNEL RELEASE      CERVICAL FUSION  2017    ACDF C5-C7    CHOLECYSTECTOMY      COLONOSCOPY      DILATION AND CURETTAGE OF UTERUS      ENDOSCOPY, COLON, DIAGNOSTIC      HYSTERECTOMY      LUMBAR FUSION N/A 4/3/2020    L4-S1 DECOMPRESSION AND POSTERIOR FUSION; LUMBAR I & D WITH HARDWARE REMOVAL performed by Derrick Khan MD at [address] [place] [address]  10/17/2019    L5-S1    UPPER GASTROINTESTINAL ENDOSCOPY N/A 7/19/2019    EGD BIOPSY performed by Charleen Franks [facility]    UPPER GASTROINTESTINAL ENDOSCOPY  7/19/2019    EGD DILATION SAVORY performed by Charleen Franks MD at [facility] End[facility]     Family History   Problem Relation Age of Onset    Ulcerative Colitis Mother     Cancer Mother     Arthritis Mother     Cancer Father         skin    High Blood Pressure Father     Diabetes Father     Heart Disease Father     Arthritis Father     Heart Disease Brother     Cancer Brother     Arthritis Brother     Arthritis Sister     Obesity Sister     Cancer Paternal Grandfather     Diabetes Paternal Grandfather     Heart Disease Paternal Grandfather     Obesity Paternal Grandfather     Arthritis Paternal Grandfather     Arthritis Paternal Grandmother     Arthritis Maternal Grandfather     Arthritis Maternal Grandmother     Obesity Maternal Grandmother     Stroke Maternal Grandmother     Heart Disease Maternal Grandmother      Social History     Tobacco Use    Smoking status: Former Smoker     Packs/day: 2.00     Years: 20.00     Pack years: 40.00     Types: Cigarettes     Quit date: 2017     Years since quittin.0    Smokeless tobacco: Never Used   Substance Use Topics    Alcohol use: Yes     Comment: occ      Current Outpatient Medications   Medication Sig Dispense Refill    Cyanocobalamin (B-12 PO) Take 1 tablet by mouth daily      NONFORMULARY 1 tablet daily Zinc with elderberry      ciprofloxacin (CIPRO) 250 MG tablet Take 250 mg by mouth daily      cyclobenzaprine (FLEXERIL) 10 MG tablet Take 10 mg by mouth as needed      oxyCODONE-acetaminophen (ROXICET) 5-325 MG/5ML solution Take by mouth every 4 hours as needed for Pain.       FLUoxetine (PROZAC) 20 MG capsule Take 20 mg by mouth daily      metoprolol succinate (TOPROL XL) 50 MG extended release tablet Take 1 tablet by mouth daily 30 tablet 3    pantoprazole (PROTONIX) 40 MG tablet Take 1 tablet by mouth every morning (before breakfast) 30 tablet 0    diclofenac sodium 1 % GEL Apply 4 g topically 4 times daily as needed for Pain 1 Tube 3    Cholecalciferol (VITAMIN D3) 25 MCG (1000 UT) TABS TAKE ONE TABLET BY MOUTH EVERY DAY  1     No current facility-administered medications for this visit. Allergies   Allergen Reactions    Codeine Rash    Dilaudid [Hydromorphone Hcl] Rash     Health Maintenance   Topic Date Due    Hepatitis C screen  1970    HIV screen  01/14/1986    DTaP/Tdap/Td vaccine (1 - Tdap) 01/14/1990    Cervical cancer screen  01/14/1992    Flu vaccine (1) 09/01/2020    Shingles Vaccine (1 of 2) 01/14/2021    Breast cancer screen  01/14/2021    Colon cancer screen colonoscopy  01/14/2021    A1C test (Diabetic or Prediabetic)  01/06/2022    Potassium monitoring  01/06/2022    Creatinine monitoring  01/06/2022    Lipid screen  01/06/2026    Hepatitis A vaccine  Aged Out    Hepatitis B vaccine  Aged Out    Hib vaccine  Aged Out    Meningococcal (ACWY) vaccine  Aged Out    Pneumococcal 0-64 years Vaccine  Aged Out       Subjective:  Review of Systems  General:   No fever, no chills, some fatigue or weight loss  Pulmonary:    some dyspnea, no wheezing  Cardiac:    Denies recent chest pain,   GI:     No nausea or vomiting, no abdominal pain  Neuro:     No dizziness or light headedness,   Musculoskeletal:  No recent active issues  Extremities:   No edema, no obvious claudication       Objective:  Physical Exam  /80   Pulse 76   Ht 5' (1.524 m)   Wt 228 lb (103.4 kg)   LMP 10/27/2015 (Exact Date)   BMI 44.53 kg/m²   General:   Well developed, well nourished  Lungs:   Clear to auscultation  Heart:    Normal S1 S2, Slight murmur. no rubs, no gallops  Abdomen:   Soft, non tender, no organomegalies, positive bowel sounds  Extremities:   No edema, no cyanosis, good peripheral pulses  Neurological:   Awake, alert, oriented. No obvious focal deficits  Musculoskelatal:  No obvious deformities    Assessment:      Diagnosis Orders   1. Essential hypertension     2. Abnormal ECG     3. Pre-op evaluation     as above  Lower risk for CAD  Borderline ECG     Plan:  No follow-ups on file.   Clear for surgery   No LVH by echo No indication for a stress test   Continue risk factor modification and medical management  ,Thank you for allowing me to participate in the care of your patient. Please don't hesitate to contact me regarding any further issues related to the patient care    Orders Placed:  No orders of the defined types were placed in this encounter. Medications Prescribed:  No orders of the defined types were placed in this encounter. Discussed use, benefit, and side effects of prescribed medications. All patient questions answered. Pt voicedunderstanding. Instructed to continue current medications, diet and exercise. Continue risk factor modification and medical management. Patient agreed with treatment plan. Follow up as directed.     Electronically signedby Taylor Pedro MD on 1/28/2021 at 3:52 PM

## 2021-02-19 ENCOUNTER — VIRTUAL VISIT (OUTPATIENT)
Dept: PSYCHIATRY | Age: 51
End: 2021-02-19
Payer: MEDICARE

## 2021-02-19 DIAGNOSIS — F45.1 SOMATIC SYMPTOM DISORDER, MODERATE, WITH PREDOMINANT PAIN: Primary | ICD-10-CM

## 2021-02-19 DIAGNOSIS — E66.01 MORBID OBESITY (HCC): ICD-10-CM

## 2021-02-19 PROCEDURE — 1036F TOBACCO NON-USER: CPT | Performed by: PSYCHOLOGIST

## 2021-02-19 PROCEDURE — 90834 PSYTX W PT 45 MINUTES: CPT | Performed by: PSYCHOLOGIST

## 2021-02-19 NOTE — PROGRESS NOTES
Assessment: Patient is a 49 y.o. female seen for   month two  follow up MNT visit for  pre op bariatric surgery desires sleeve    Vitals from current and previous visits:  Vitals 1/11/5929   SYSTOLIC 761   DIASTOLIC 78   Site Right Upper Arm   Position Sitting   Cuff Size Large Adult   Pulse    Temp 97.2   Resp 18   SpO2    Weight 229 lb 12.8 oz   Height 5' 0.25\"   Body mass index 44.5 kg/m2   Waist (Inches)    Neck circumference        Initial weight at start of Weight Management Program was: 219 lbs     Eusebia gained 6 lbs over one month  -Weight goal: lose weight.     -Nutritionally relevant labs: gfr-89, glucose-105, ferritin-21, iron-74, vit D-29,  Lab Results   Component Value Date/Time    LABA1C 5.8 01/06/2021 12:04 PM    LABA1C 5.6 02/21/2019 11:57 AM    GLUCOSE 105 01/06/2021 12:04 PM    GLUCOSE 87 05/12/2020 02:15 PM    CHOL 195 01/06/2021 12:04 PM    HDL 55 01/06/2021 12:04 PM    LDLCALC 111 01/06/2021 12:04 PM    TRIG 145 01/06/2021 12:04 PM                  Lab Results   Component Value Date    VITD25 29 01/06/2021       Pt has seen Dr Richard Lin in past- and had recent EGD again  Pt was seeing PCP last three months - September, October, and November 2020.  Was placed on Adipex September 2020 by PCP and finished this in November and weight was ~ 219 lbs. Pt interested in using these three months towards bariatric surgery. Obtained Cardiac Clearance and Psych Clearance  Patient is taking a Multivitamin daily:  Pt does not take a MVI.  taking B12 and Zinc.  Increased  Vitamin D3 to 5,000IU's D3 daily for level of 29.       Pt lives with her mom. Pt wakes up around 8am-9am.   -Food Recall:  Pt had phone in office and now using [employer] to track foods  Breakfast: 8a-10am- banana or one egg with 1tbsp shredded cheese and 1tbsp mixed berries with carbmaster yogurt or egg sandwich . Lunch: 12:30p- salad with grilled chicken and lite Khmer dressing or 3oz hamburger [place] and protein shake. Dinner: [employer] or [address] chicken and shrimp . Snacks: some roasted almonds or no snacks. Main Beverages: has improved water intake this month and now drinking at least four bottles water a day with sugar free flavor packets, stopped all diet mountain dew  some hot tea, stopped orange juice    -Impression of Dietary Intake: improved effort [facility] food choices and meal timing. - Pt  is working towards  avoiding high fat/high sugar foods. Pt  is working towards  including protein at meals and snacks. Exercise:  Patient has not  attended Fitness Orientation- on hold at this time  Has declined outpatient PT. Did apply for financial scholarship at [facility] and can start using pool now until finds out if approved- plans to use pool every other day for activity. Encouraged pt to continue chair exercises on other days to stay active   Patient was dismissed from [facility]  for water therapy as canceled several times.      Nutrition Diagnosis: Overweight/Obesity related to currently undergoing MNT as evidenced by BMI of 44.5    Intervention:   Healthy behaviors: consistently logging food intake, eliminated carbonated beverages, not skipping meals and adequate fluid intake  Patient has attended support groups online via [address] [place] [address] in December and plans to attend March Zoom meeting  Improved effort with nutrition behaviors recommended for surgery. Pt did qualify for [employer] bariatric vitamins at no cost.  Did review vitamins with pt today when time comes to order from [employer]. Goals reviewed with pt. -  Patient Instructions   Goals:  1 Nutrition Goal: Continue to aim for regular meal times- choose lean protein foods first, followed by vegetables and fruit, then starch food last if still hungry  2. Water Goal: Continue at least 4 bottle of water a day or greater  3. Exercise Goal: Start water therapy at least every other day and chair exercises on other day  4.   Keep tracking your food intake in food journal or phone maral           -Followup visit: 4 weeks with dietitian.          Epi Swain RD, LD  Dietitian- [facility]

## 2021-02-19 NOTE — PROGRESS NOTES
This note will not be viewable in [facility] for the following reason(s). This is a Psychotherapy Note. TELEPSYCHOLOGY VISIT -- Audio/Visual (During DZDAN-20 public health emergency)    This session was conducted as a telepsychology visit due to one or more of the following COVID-19 risk factors being present in this patient:  ? The patient is aged 61 or older  ? The patient reports chronic health problems  ? The patient reports immune suppressed or immune compromised status  ? The patient reports being at risk or potentially exposed to the virus    Patient Location: Home    Provider Location: [facility]    Patient gave verbal consent for teleservices. This virtual visit was conducted via interactive/real-time audio/video. PSYCHOLOGICAL FOLLOW-UP FOR BARIATRIC SURGERY:      History of Presenting Illness:   Phani Wilcox was initially referred for a routine psychological evaluation for bariatric surgery on 01/20/2021. At this evaluation, the following requirements were given prior to psychological clearance for WLS:  - have a plan in place to decrease skipping meals which should decrease night eating/over eating dinner  - eliminate all carbonation use  - Gain better understanding of why bariatric guidelines are in place    Assessment:    Eating behaviors: She reports she is now eating 3 meals per day. She reports having premier protein shakes for lunch and is also using whey protein shakes from time to time. She reports she is trying to eat a lower carb diet. She reports having healthier options for sweets such as Atkins chocolate. She also purchased a low carb cookbook. Carbonation: She reports she has not had a diet mountain dew since last visit, and is only drinking sugar free flavored water. Exercise: She reports trying to do some stretching and walk at stores (due to the cold). She reports she recently applied for a scholarship program at the  so she can start doing pool aerobics. Weight loss: Patient reports she has lost 4 pounds since last session (228 to 224 on home scale). She reports she was logging her food intake in blue journal and plans to use [employer] phone application. She reports she just downloaded [employer] phone maral this morning. Pain: she does have significant chronic pain with the most pressure coming on her upper spine/neck. She is hoping that weight loss will help relieve pressure on her spine. Knowledge of surgery: she reports gaining significant knowledge as to why bariatric guidelines are in place. This appears to be a significant improvement from previous visit. Biggest perceived barrier: She reports her biggest concern is how she will be able to sleep for the first days following surgery as she reports she has to sleep on her side, and is unable to sleep on her back. CPAP adherence: she reports she has completed 22 or 23 straight days using CPAP and she reports her sleep has been great. Psychiatric medications: Continuing to take Prozac 10mg QD that is managed by her PCP. She reports she has been depressed for most of her life and she perceives symptoms of mood initially started due to her heavy weight which was then compounded by significant life stressors. She is hoping to wean off of this medication after surgery. Discussed the importance of staying on psychiatric medication for at least 12 months s/p sleeve gastrectomy. Mood: She reports her mood has been great over the past few weeks. She reports she is in general a happy person. She reports she is sleeping in her dad's bed where he passed away and this is at times upsetting to her. She reports it can be stressful at times living on a fixed income, and living with her mother. She reports she has applied to [employer] in December 2020. She reports she does get along well with her mother. Alcohol use: 1 strawberry daiquiri since last visit. She reports drinking very rarely < 5 times per year. She reports 2 brothers demonstrated alcohol abuse and this led to physical consequences for them. Numerical Literacy: 4/4 patient able to complete basic math necessary for independent medication changes. Psychological Interpretation of MMPI-2: Patient's profile should be interpreted with some caution due to elevations on MARISA, and L scales. Patient's profile reflects a spike 3 which suggests that patient may have strong needs to be accepted and liked. They often worry about not being accepted by their social group and may become uncomfortable in confrontive situations and may require assertiveness when dealing with an authority figure. They have a tendency to be optimistic with others and minimize any unusual or negative behaviors in themselves. They often have difficulty to express anger, and it is usually expressed in an impulsive manner.         PSYCHIATRIC EXAM  General appearance: dressed appropriately, well-groomed  Attitude & Behavior: pleasant and cooperative  Motor Activity & Musculoskeletal: no abnormal movement  Speech & Language: normal rate, volume, and prosody  Gait & Station: sitting  Mood: content  Affect: congruent with mood, appropriate to setting  Thought content: appropriate  Suicidal ideation: denies  Homicidal ideation: denies  Thought process & Associations: logical, coherent, goal-directed  Perceptions: appropriate Orientation: to person, place, and time  Attention & Concentration: appears intact  Fund of knowledge: average  Insight: much improved, average  Judgment: much improved, average    DSM DIAGNOSIS:  Somatic symptom disorder, moderate, with predominant pain   Morbid obesity      PLAN:  Titi Gil has completed minimum requirements and is now cleared from a psychological/substance perspective for bariatric surgery. She appears to have made significant changes over the past month and has exhibited weight loss. She reports feeling very confident that she will be able to maintain these changes. No follow up is necessary at this time. Psychology Clinician:  Cletis Hamman, PhD     Start time: 12:30pm  End time: 1:12pm      Pursuant to the emergency declaration under the [facility], [facility]5 waiver authority and the [employer] Act, this Virtual Visit was conducted, with patient's consent, to reduce the patient's risk of exposure to COVID-19 and provide continuity of care for an established patient. Services were provided through a video synchronous discussion virtually to substitute for in-person clinic visit.          Electronically signed by Fina Medina, PhD on 2/19/21 at 12:22 PM EST

## 2021-02-22 ENCOUNTER — OFFICE VISIT (OUTPATIENT)
Dept: BARIATRICS/WEIGHT MGMT | Age: 51
End: 2021-02-22

## 2021-02-22 ENCOUNTER — OFFICE VISIT (OUTPATIENT)
Dept: BARIATRICS/WEIGHT MGMT | Age: 51
End: 2021-02-22
Payer: MEDICARE

## 2021-02-22 VITALS
RESPIRATION RATE: 18 BRPM | WEIGHT: 229.8 LBS | DIASTOLIC BLOOD PRESSURE: 78 MMHG | HEIGHT: 60 IN | BODY MASS INDEX: 45.12 KG/M2 | TEMPERATURE: 97.2 F | SYSTOLIC BLOOD PRESSURE: 124 MMHG

## 2021-02-22 DIAGNOSIS — K76.0 FATTY LIVER: ICD-10-CM

## 2021-02-22 DIAGNOSIS — M54.50 CHRONIC BILATERAL LOW BACK PAIN, UNSPECIFIED WHETHER SCIATICA PRESENT: ICD-10-CM

## 2021-02-22 DIAGNOSIS — E66.01 MORBID OBESITY WITH BMI OF 40.0-44.9, ADULT (HCC): Primary | ICD-10-CM

## 2021-02-22 DIAGNOSIS — F32.A DEPRESSION, UNSPECIFIED DEPRESSION TYPE: ICD-10-CM

## 2021-02-22 DIAGNOSIS — G47.30 SLEEP APNEA, UNSPECIFIED TYPE: ICD-10-CM

## 2021-02-22 DIAGNOSIS — I10 HYPERTENSION, UNSPECIFIED TYPE: ICD-10-CM

## 2021-02-22 DIAGNOSIS — K58.9 IRRITABLE BOWEL SYNDROME, UNSPECIFIED TYPE: ICD-10-CM

## 2021-02-22 DIAGNOSIS — G89.29 CHRONIC BILATERAL LOW BACK PAIN, UNSPECIFIED WHETHER SCIATICA PRESENT: ICD-10-CM

## 2021-02-22 DIAGNOSIS — E55.9 VITAMIN D DEFICIENCY: ICD-10-CM

## 2021-02-22 DIAGNOSIS — K21.9 GASTROESOPHAGEAL REFLUX DISEASE, UNSPECIFIED WHETHER ESOPHAGITIS PRESENT: ICD-10-CM

## 2021-02-22 DIAGNOSIS — F41.9 ANXIETY: ICD-10-CM

## 2021-02-22 PROCEDURE — G8484 FLU IMMUNIZE NO ADMIN: HCPCS | Performed by: PHYSICIAN ASSISTANT

## 2021-02-22 PROCEDURE — G8427 DOCREV CUR MEDS BY ELIG CLIN: HCPCS | Performed by: PHYSICIAN ASSISTANT

## 2021-02-22 PROCEDURE — 3017F COLORECTAL CA SCREEN DOC REV: CPT | Performed by: PHYSICIAN ASSISTANT

## 2021-02-22 PROCEDURE — 1036F TOBACCO NON-USER: CPT | Performed by: PHYSICIAN ASSISTANT

## 2021-02-22 PROCEDURE — 99213 OFFICE O/P EST LOW 20 MIN: CPT | Performed by: PHYSICIAN ASSISTANT

## 2021-02-22 PROCEDURE — G8417 CALC BMI ABV UP PARAM F/U: HCPCS | Performed by: PHYSICIAN ASSISTANT

## 2021-02-22 NOTE — PATIENT INSTRUCTIONS
Goals:  1 Nutrition Goal: Continue to aim for regular meal times- choose lean protein foods first, followed by vegetables and fruit, then starch food last if still hungry  2. Water Goal: Continue at least 4 bottle of water a day or greater  3. Exercise Goal: Start water therapy at least every other day and chair exercises on other day  4.   Keep tracking your food intake in food journal or phone maral

## 2021-02-22 NOTE — PATIENT INSTRUCTIONS
· Behavior modification discussed in detail in regards to dietary habits. · Nutritional education occurred during visit. Continue following recommendations  per dietitian. · Improvement in fitness/exercise discussed with patient and the need for this  with/without surgery. · Schedule orientation with Lana Chaudhari, Exercise Physiologist, at the fitness  center. ·  Continue CPAP for JESSE  · Cardiac Clearance received from Dr. Martinez Richard  · Pulmonary Clearance needed prior to surgery- Dr. Estefani Bass 3/3/21  · Psychology evaluation with Dr. Miguel Hernandez completed and reviewed  · EGD completed with  Dr. Maritza Connelly- will call for notes/path-report. Follow up scheduled for 3/3/21. · Encouraged to attend support groups. Has attended x 1.   · Goal to lose 3-5% TBW prior to surgery. · Seca scale completed and reviewed  · Vit D 29- advised 5,000IU vit D3 OTC daily. Repeat level next month. · EKG NSR/ minimal voltage criteria for LVH/may be nml variant. · Advised not to get pregnant for 18-24 months post bariatric surgery as this can increase risk of malnourishment potentially leading to low birth weight or malformation. · Will need to be off work for 3-4 weeks post-bariatric surgery. · No lifting/pushing/pulling over 20# for 4 weeks post-op  · No NSAIDS 10 days prior to bariatric surgery. Avoid NSAID use post-op  · Discussed Lovenox post-op bariatric surgery  · LOMN received. · Mammogram completed at [facility] to call for results.

## 2021-02-22 NOTE — PROGRESS NOTES
[facility]  [address]  [place], [address]  [phone]      Visit Date:  2/22/2021  Weight Management Pre-Op Follow-up    HPI:    Medically Supervised follow-up- Month #2 of 3 ( Completed 3 months: Sept/Oct/Nov with PCP)    Alex Gu is here today for continued supervised weight management of morbid obesity. Weight today 229#. Up 6# since last month. BMI 44. Frustrated with weight gain as she feels as she had made many positive changes over the month. Has started eating 3 meals daily. Tracking all food intake. Making better decisions with nutrition. Has not drank any pop in over a month. Drinking at least 64oz of water/ sugar free drinks. Has been trying to increase dedicated activity. Walking [employer] 3-4 times per week for at least 30 minutes. Plans to get a membership to [facility] in [place] this month so that she can start swimming. Comorbid conditions include gerd, fatty liver, chronic back pain, hypertension, mild sleep apnea, depression, anxiety, IBS. Started wearing CPAP 2 weeks ago- scheduled for pulmonary follow up 3/3/21. LOMN received. Completed support groups x 1. EKG completed and reviewed. EGD completed 2/5/21 with Dr. Urszula Arias. Apt 3/3/21 to go over results. Cardiac clearance recieved. Pulmonary clearance scheduled for 3/3/21. Psychological clearance with Dr. Danielle Martinez completed. Mammogram completed at [facility] call for results. Seca scale completed and reviewed. Current BMI: Body mass index is 44.51 kg/m².   Current Weight:   Wt Readings from Last 3 Encounters:   02/22/21 229 lb 12.8 oz (104.2 kg)   01/28/21 228 lb (103.4 kg)   01/27/21 228 lb (103.4 kg)     Initial Body CUDSTK:772    Past Medical History:  Past Medical History:   Diagnosis Date    Anxiety     Arthritis     Bell's palsy     Esophagitis     Fatty liver     GERD (gastroesophageal reflux disease)     Hypertension     IBS (irritable bowel syndrome)     Morbid obesity with BMI of 40.0-44.9, adult (HCC)     MRSA (methicillin resistant staph aureus) culture positive     UTI (urinary tract infection)     Vertigo        Past Surgical History:  Past Surgical History:   Procedure Laterality Date    BACK SURGERY      BLADDER SUSPENSION      BREAST REDUCTION SURGERY  10/2007    CARPAL TUNNEL RELEASE      CERVICAL FUSION      ACDF C5-C7    CHOLECYSTECTOMY      COLONOSCOPY      DILATION AND CURETTAGE OF UTERUS      ENDOSCOPY, COLON, DIAGNOSTIC      HYSTERECTOMY      LUMBAR FUSION N/A 4/3/2020    L4-S1 DECOMPRESSION AND POSTERIOR FUSION; LUMBAR I & D WITH HARDWARE REMOVAL performed by Lindsey Craven MD at [address] [place] [address]  10/17/2019    L5-S1    UPPER GASTROINTESTINAL ENDOSCOPY N/A 2019    EGD BIOPSY performed by Capo Morin MD at [facility]  2019    EGD DILATION SAVORY performed by Capo Morin MD at [facility]       Past Social History:  Social History     Socioeconomic History    Marital status:      Spouse name: Not on file    Number of children: 3    Years of education: 12    Highest education level: Not on file   Occupational History    Occupation: [address] resource strain: Not on file    Food insecurity     Worry: Not on file     Inability: Not on file    Transportation needs     Medical: Not on file     Non-medical: Not on file   Tobacco Use    Smoking status: Former Smoker     Packs/day: 2.00     Years: 20.00     Pack years: 40.00     Types: Cigarettes     Quit date: 2017     Years since quittin.0    Smokeless tobacco: Never Used   Substance and Sexual Activity    Alcohol use: Yes     Comment: occ    Drug use: No    Sexual activity: Not on file   Lifestyle    Physical activity     Days per week: Not on file     Minutes per session: Not on file    Stress: Not on file   Relationships    Social connections     Talks on phone: Not on file     Gets together: Not on file     Attends Synagogue service: Not on file     Active member of club or organization: Not on file     Attends meetings of clubs or organizations: Not on file     Relationship status: Not on file    Intimate partner violence     Fear of current or ex partner: Not on file     Emotionally abused: Not on file     Physically abused: Not on file     Forced sexual activity: Not on file   Other Topics Concern    Not on file   Social History Narrative    Not on file        Medications:   Current Outpatient Medications   Medication Sig Dispense Refill    Cyanocobalamin (B-12 PO) Take 1 tablet by mouth daily      NONFORMULARY 1 tablet daily Zinc with elderberry      ciprofloxacin (CIPRO) 250 MG tablet Take 250 mg by mouth daily      cyclobenzaprine (FLEXERIL) 10 MG tablet Take 10 mg by mouth as needed      oxyCODONE-acetaminophen (ROXICET) 5-325 MG/5ML solution Take by mouth every 4 hours as needed for Pain.  metoprolol succinate (TOPROL XL) 50 MG extended release tablet Take 1 tablet by mouth daily 30 tablet 3    pantoprazole (PROTONIX) 40 MG tablet Take 1 tablet by mouth every morning (before breakfast) 30 tablet 0    diclofenac sodium 1 % GEL Apply 4 g topically 4 times daily as needed for Pain 1 Tube 3    Cholecalciferol (VITAMIN D3) 25 MCG (1000 UT) TABS TAKE ONE TABLET BY MOUTH EVERY DAY  1    FLUoxetine (PROZAC) 20 MG capsule Take 20 mg by mouth daily       No current facility-administered medications for this visit. Allergies: Allergies   Allergen Reactions    Codeine Rash    Dilaudid [Hydromorphone Hcl] Rash       Subjective:    Review of Systems:  Constitutional: Denies any fever, chills, (+)fatigue. Wound: Denies any rash, skin color changes or wound problems. Resp: Denies any cough, (+)shortness of breath. CV: Denies any chest pain, orthopnea or syncope.    MS: Denies myalgias, (+)arthralgias- back/neck  GI: Denies any nausea, 07/07/2017        VITAMIN B12   Lab Results   Component Value Date    [id number] 547 01/06/2021        VITAMIN D   Lab Results   Component Value Date    VITD25 29 01/06/2021        PTH  Lab Results   Component Value Date    IPTH 39.7 01/06/2021       VITAMIN B1/ THIAMINE   Lab Results   Component Value Date    [id number] 108 01/06/2021        LIPID SCREEN (FASTING)   Lab Results   Component Value Date    CHOL 195 01/06/2021    TRIG 145 01/06/2021    HDL 55 01/06/2021    LDLCALC 111 01/06/2021   ,     HGA1C (T2DM ONLY)   Lab Results   Component Value Date    LABA1C 5.8 01/06/2021    AVGG 114 01/06/2021        TSH   Lab Results   Component Value Date    TSH 2.020 01/06/2021        IRON   Lab Results   Component Value Date    IRON 74 01/06/2021        TIBC  Lab Results   Component Value Date    TIBC 428 01/06/2021       FERRITIN  Lab Results   Component Value Date    FERRITIN 21 01/06/2021       VITAMIN A  No results found for: RETINOL    NICOTINE  No results found for: NMET    UDS  Lab Results   Component Value Date    UDP SEE BELOW 01/06/2021       PSA  No results found for: LABPSA    GFR  Lab Results   Component Value Date    LABGLOM 89 01/06/2021       DEXA  No results found for this or any previous visit. Assessment:       Diagnosis Orders   1. BMI 40.0-44.9, adult ([address]     2. Gastroesophageal reflux disease, unspecified whether esophagitis present     3. Fatty liver     4. Chronic bilateral low back pain, unspecified whether sciatica present     5. Depression, unspecified depression type     6. Hypertension, unspecified type     7. Irritable bowel syndrome, unspecified type     8. Anxiety     9. Sleep apnea, unspecified type     10. Vitamin D deficiency         Plan:    · Behavior modification discussed in detail in regards to dietary habits. · Nutritional education occurred during visit. Continue following recommendations  per dietitian.   · Improvement in fitness/exercise discussed with patient and the need for this  with/without surgery. · Schedule orientation with Miguelina Ram, Exercise Physiologist, at the fitness  center. ·  Continue CPAP for JESSE  · Cardiac Clearance received from Dr. Nicole Mcneil  · Pulmonary Clearance needed prior to surgery- Dr. Tierney Coates 3/3/21  · Psychology evaluation with Dr. Joyce Johansen completed and reviewed  · EGD completed with  Dr. Senait Lassiter- will call for notes/path-report. Follow up scheduled for 3/3/21. · Encouraged to attend support groups. Has attended x 1.   · Goal to lose 3-5% TBW prior to surgery. · Seca scale completed and reviewed  · Vit D 29- advised 5,000IU vit D3 OTC daily. Repeat level next month. · EKG NSR/ minimal voltage criteria for LVH/may be nml variant. · Advised not to get pregnant for 18-24 months post bariatric surgery as this can increase risk of malnourishment potentially leading to low birth weight or malformation. · Will need to be off work for 3-4 weeks post-bariatric surgery. · No lifting/pushing/pulling over 20# for 4 weeks post-op  · No NSAIDS 10 days prior to bariatric surgery. Avoid NSAID use post-op  · Discussed Lovenox post-op bariatric surgery  · LOMN received. · Mammogram completed at [address] [facility] [address] to call for results. Return in about 1 month (around 3/22/2021) for Follow up. I spent over 20 minutes with the patient, with greater that 50% of that time spent on education, counseling and coordination of care.      Electronically signed by YUNIEL Elliott on 2/22/2021 at 1:00 PM

## 2021-02-28 NOTE — PROGRESS NOTES
[facility] and [address] [facility] Follow up note    April Moreno           Chief Complaint:  Alonso Rouse is here today for a sleep follow up with a SERENITY Batista. Chief complaint and [place]: April Moreno is a 49 y. o.oldfemale came for follow up regarding her sleep apnea. She is currently using her positive airway pressure device with a CPAP pressure of 9cm H20. She was issued a new CPAP device at the last visit. She denies any problems with machine. However, she developed mild rash over her cheeks. Patient feels that the rash is due to her current mask. She is sleeping well at night with out difficulty. She denies any daytime sleepiness. She is still waiting for gastric sleeve surgery by bariatric center at the [facility]. .    Review of Systems:   General/Constitutional: she gained 2lbs of weight from the last visit with normal appetite. No fever or chills. HENT: Negative. Eyes: Negative. Upper respiratory tract: No nasal stuffiness or post nasal drip. Lower respiratory tract/ lungs: No cough or sputum production. No hemoptysis. Cardiovascular: No palpitations or chest pain. Gastrointestinal: No nausea or vomiting. Neurological: No focal neurologiacal weakness. Extremities: No edema. Musculoskeletal: No complaints. Genitourinary: No complaints. Hematological: Negative. Psychiatric/Behavioral: Negative. Skin: No itching.         Past Medical History:   Diagnosis Date    Anxiety     Arthritis     Bell's palsy     Esophagitis     Fatty liver     GERD (gastroesophageal reflux disease)     Hypertension     IBS (irritable bowel syndrome)     Morbid obesity with BMI of 40.0-44.9, adult (HCC)     MRSA (methicillin resistant staph aureus) culture positive     UTI (urinary tract infection)     Vertigo        Past Surgical History:   Procedure Laterality Date    BACK SURGERY      BLADDER SUSPENSION      BREAST REDUCTION SURGERY  10/2007    CARPAL TUNNEL RELEASE      CERVICAL FUSION      ACDF C5-C7    CHOLECYSTECTOMY      COLONOSCOPY      DILATION AND CURETTAGE OF UTERUS      ENDOSCOPY, COLON, DIAGNOSTIC      HYSTERECTOMY      LUMBAR FUSION N/A 4/3/2020    L4-S1 DECOMPRESSION AND POSTERIOR FUSION; LUMBAR I & D WITH HARDWARE REMOVAL performed by Delia Childs MD at [address] [place] [address]  10/17/2019    L5-S1    UPPER GASTROINTESTINAL ENDOSCOPY N/A 2019    EGD BIOPSY performed by Ro Galeana MD at  [facility]   [address] [place] [address]  2019    EGD DILATION SAVORY performed by Ro Galeana MD at  [facility]       Social History     Tobacco Use    Smoking status: Former Smoker     Packs/day: 2.00     Years: 20.00     Pack years: 40.00     Types: Cigarettes     Quit date: 2017     Years since quittin.0    Smokeless tobacco: Never Used   Substance Use Topics    Alcohol use: Yes     Comment: occ    Drug use: No       Allergies   Allergen Reactions    Codeine Rash    Dilaudid [Hydromorphone Hcl] Rash       Current Outpatient Medications   Medication Sig Dispense Refill    Cyanocobalamin (B-12 PO) Take 1 tablet by mouth daily      NONFORMULARY 1 tablet daily Zinc with elderberry      ciprofloxacin (CIPRO) 250 MG tablet Take 250 mg by mouth daily      cyclobenzaprine (FLEXERIL) 10 MG tablet Take 10 mg by mouth as needed      oxyCODONE-acetaminophen (ROXICET) 5-325 MG/5ML solution Take by mouth every 4 hours as needed for Pain.       FLUoxetine (PROZAC) 20 MG capsule Take 20 mg by mouth daily      metoprolol succinate (TOPROL XL) 50 MG extended release tablet Take 1 tablet by mouth daily 30 tablet 3    pantoprazole (PROTONIX) 40 MG tablet Take 1 tablet by mouth every morning (before breakfast) 30 tablet 0    diclofenac sodium 1 % GEL Apply 4 g topically 4 times daily as needed for Pain 1 Tube 3    Cholecalciferol (VITAMIN D3) 25 MCG (1000 UT) TABS TAKE ONE TABLET BY MOUTH EVERY DAY  1     No current facility-administered medications for this visit. Family History   Problem Relation Age of Onset    Ulcerative Colitis Mother     Cancer Mother     Arthritis Mother     Cancer Father         skin    High Blood Pressure Father     Diabetes Father     Heart Disease Father     Arthritis Father     Heart Disease Brother     Cancer Brother     Arthritis Brother     Arthritis Sister     Obesity Sister     Cancer Paternal Grandfather     Diabetes Paternal Grandfather     Heart Disease Paternal Grandfather     Obesity Paternal Grandfather     Arthritis Paternal Grandfather     Arthritis Paternal Grandmother     Arthritis Maternal Grandfather     Arthritis Maternal Grandmother     Obesity Maternal Grandmother     Stroke Maternal Grandmother     Heart Disease Maternal Grandmother           /78 (Site: Right Upper Arm, Position: Sitting, Cuff Size: Large Adult)   Pulse 63   Temp 97.8 °F (36.6 °C) (Temporal)   Ht 5' 0.25\" (1.53 m)   Wt 230 lb (104.3 kg)   LMP 10/27/2015 (Exact Date)   SpO2 100% Comment: Room air at rest  BMI 44.55 kg/m²     BMI:  Body mass index is 44.55 kg/m². Mallampati airway Class:  3  Neck Circumference:  16 Inches  [place] sleepiness score 3/3/21:  3. SAQLI: 93      Physical Exam :  Constitutional: Patient appears well built and well nourished. No distress. Patient is oriented to person, place, and time. HENT:   Head: Normocephalic and atraumatic. Right Ear: External ear normal.   Left Ear: External ear normal.   Mouth/Throat: Oropharynx is clear and moist.   Eyes: Conjunctivae are normal. Pupils are equal and reactive to light. No scleral icterus. Neck: Neck supple. No JVD present. Cardiovascular: Normal rate, regular rhythm, normal heart sounds. No murmur heard. Pulmonary/Chest: Effort normal and breath sounds normal. No stridor. No respiratory distress.   No wheezes. No rales. Abdominal: Soft. Patient exhibits no distension. No tenderness. Musculoskeletal: Normal range of motion. Extremities:Patient exhibits no edema and no tenderness. Lymphadenopathy:  No cervical adenopathy. Neurological: Patient is alert and oriented to person, place, and time. Skin: Skin is warm and dry. Patient is not diaphoretic. No obvious skin rash, erythema or papules were noted over her face. However the patient claims that her face is slightly erythematous for the cheeks. Psychiatric: Patient  has a normal mood and affect. Diagnostic Data:    Diagnostic Data: 11/27/2017  PAP Download:  [employer]  Recorded compliance dates:  02/09/2021 -  03/02/2021  More than 4hour usage compliance was:  86.4%. Average residual Apnea- Hypoapnea index on current pressue was:2.1        PAP Type CPAP   Level  9      Average usuage hours per day was: 6 hr 22 min 5 sec     Interface: FF     Provider:  []?-NATALY             []?Timothy                        [x]? Yovani          []? Tommie                           []?[facility]      []? Other:     Assesment:  -Mild obstructive sleep apnea on treatment with a CPAP pressure of 9cm H20 - she is using her CPAP device with excellent compliance to >4 hours of therapy. Her respiratory events were under good control with current therapy. Her clinical symptoms improved. She is benefiting from current CPAP therapy and would like to continue the therapy. -Depression currently on treatment with medications.  -Essential hypertension currently on treatment with medications under control.  -Chronic low back pain on pain medication with Roxicet. She takes Roxicet as needed. She underwent surgery by an orthopedic surgeon in [facility]. -Deviated nasal septum to the left side due to injury during her childhood.  -She is currently waiting for bariatric surgery at the [facility].        Recommendations/Plan:   -She will be referred to [employer] for mask refit with a different style mask to avoid irritation over her cheeks.  -She was advised to continue current positive airway pressure therapy with above described pressure.  -She was advised to keep good compliance with current recommended pressure to get optimal results and clinical improvement.  -She was instructed to extend her sleep schedule to 7 to 9 hours in a given 24hour period continuously. -Follow with my clinic in 6months for clinical reevaluation with review of download. She was advised to make a follow-up appointment with her dermatologist in [address] [place] [address] at [facility] dermatology service whom she is following for surveillance of moles if her skin rash do not improve over the cheeks.   -Patient can go for planned surgery from sleep medicine point of view. Her obstructive sleep apnea is under optimal control with current CPAP therapy.  -She need to kept on her CPAP machine with current recommended pressure/s during and after planned procedure if she requires sedation. Patient respiratory status need to be monitored closely in the post operative period by a monitored bed in a hospital setting. She was informed about my recommendations. She verbalizes understanding.  -She was advised to call [employer] regarding supplies if needed.  -She was advised to call my office for earlier appointment if needed for worsening of sleep symptoms.  -She was advised to continue to practice good sleep hygiene practices.  -She was advised to loose weight by controlling diet and doing exercise once cleared by her family physician.   -Yonas Nesbitt was educated about my impression and plan.  Patient verbalizes understanding.      -I personally reviewed updated the Past medical hx, Past surgical hx,Social hx, Family hx, Medications, Allergies in the discrete data section of the patient chart along with labs, Pulmonary medicine,Sleep medicine related, Pathological, Microbiological and Radiological investigations.

## 2021-03-03 ENCOUNTER — OFFICE VISIT (OUTPATIENT)
Dept: PULMONOLOGY | Age: 51
End: 2021-03-03
Payer: MEDICARE

## 2021-03-03 VITALS
DIASTOLIC BLOOD PRESSURE: 78 MMHG | TEMPERATURE: 97.8 F | WEIGHT: 230 LBS | HEIGHT: 60 IN | BODY MASS INDEX: 45.16 KG/M2 | OXYGEN SATURATION: 100 % | HEART RATE: 63 BPM | SYSTOLIC BLOOD PRESSURE: 122 MMHG

## 2021-03-03 DIAGNOSIS — G47.33 OSA ON CPAP: Primary | ICD-10-CM

## 2021-03-03 DIAGNOSIS — Z99.89 OSA ON CPAP: Primary | ICD-10-CM

## 2021-03-03 PROCEDURE — G8484 FLU IMMUNIZE NO ADMIN: HCPCS | Performed by: INTERNAL MEDICINE

## 2021-03-03 PROCEDURE — 99214 OFFICE O/P EST MOD 30 MIN: CPT | Performed by: INTERNAL MEDICINE

## 2021-03-03 PROCEDURE — G8417 CALC BMI ABV UP PARAM F/U: HCPCS | Performed by: INTERNAL MEDICINE

## 2021-03-03 PROCEDURE — G8427 DOCREV CUR MEDS BY ELIG CLIN: HCPCS | Performed by: INTERNAL MEDICINE

## 2021-03-03 PROCEDURE — 3017F COLORECTAL CA SCREEN DOC REV: CPT | Performed by: INTERNAL MEDICINE

## 2021-03-03 PROCEDURE — 1036F TOBACCO NON-USER: CPT | Performed by: INTERNAL MEDICINE

## 2021-03-03 NOTE — PROGRESS NOTES
Chief Complaint:  Sheri Barbosa is here today for a sleep follow up with a [employer] download. Mallampati airway Class:  3  Neck Circumference:  16 Inches    [place] sleepiness score 3/3/21:  3. SAQLI: 93      Diagnostic Data: 11/27/2017  PAP Download:  [employer]  Recorded compliance dates:  02/09/2021 -  03/02/2021  More than 4hour usage compliance was:  86.4%.   Average residual Apnea- Hypoapnea index on current pressue was:2.1       PAP Type CPAP   Level  9      Average usuage hours per day was: 6 hr 22 min 5 sec    Interface: FFM    Provider:  [][facility]  []Timothy  [x][employer]  [][employer]         [][facility] []Other:

## 2021-03-03 NOTE — PATIENT INSTRUCTIONS
Recommendations/Plan:   -She will be referred to [employer]( [address] [employer] for mask refit with a different style mask to avoid irritation over her cheeks.  -She was advised to continue current positive airway pressure therapy with above described pressure.  -She was advised to keep good compliance with current recommended pressure to get optimal results and clinical improvement.  -She was instructed to extend her sleep schedule to 7 to 9 hours in a given 24hour period continuously. -Follow with my clinic in 6months for clinical reevaluation with review of download. She was advised to make a follow-up appointment with her dermatologist in [address] [place] [address] at [facility] dermatology service whom she is following for surveillance of moles if her skin rash do not improve over the cheeks.   -She was advised to call [employer] regarding supplies if needed.  -She was advised to call my office for earlier appointment if needed for worsening of sleep symptoms.  -She was advised to continue to practice good sleep hygiene practices.  -She was advised to loose weight by controlling diet and doing exercise once cleared by her family physician.   -Erick Zamora was educated about my impression and plan. Patient verbalizes understanding.

## 2021-03-15 NOTE — PROGRESS NOTES
Assessment: Patient is a 49 y.o. female seen for  Month three  follow up MNT visit for  pre op bariatric surgery desires sleeve    Vitals from current and previous visits:  Vitals 5/41/5100   SYSTOLIC 719   DIASTOLIC 78   Site Right Upper Arm   Position Sitting   Cuff Size Large Adult   Pulse    Temp 97.2   Resp 18   SpO2    Weight 229 lb 12.8 oz   Height 5' 0.25\"   Body mass index 44.5 kg/m2   Waist (Inches)    Neck circumference      Vitals 6/80/7527   SYSTOLIC 935   DIASTOLIC 90   Site Left Upper Arm   Position Sitting   Cuff Size Large Adult   Pulse 66   Temp 97   Resp 18   SpO2    Weight 225 lb 3.2 oz   Height 5' 0.25\"   Body mass index 43.61 kg/m2   Waist (Inches)    Neck circumference        Initial weight at start of Weight Management Program was: 219 lbs     Eusebia lost 5  lbs over one month  -Weight goal: lose weight.     -Nutritionally relevant labs: gfr-89, glucose-105, ferritin-21, iron-74, vit D-29,  Lab Results   Component Value Date/Time    LABA1C 5.8 01/06/2021 12:04 PM    LABA1C 5.6 02/21/2019 11:57 AM    GLUCOSE 105 01/06/2021 12:04 PM    GLUCOSE 87 05/12/2020 02:15 PM    CHOL 195 01/06/2021 12:04 PM    HDL 55 01/06/2021 12:04 PM    LDLCALC 111 01/06/2021 12:04 PM    TRIG 145 01/06/2021 12:04 PM                  Lab Results   Component Value Date    VITD25 29 01/06/2021     Seen  Dr Anne Marie Albrecht -EGD done  Pt was seeing PCP last three months - September, October, and November 2020.  Was placed on Adipex September 2020 by PCP and finished this in November and weight was ~ 219 lbs. Pt interested in using these three months towards bariatric surgery. Obtained Cardiac Clearance and Psych Clearance  Patient is taking a Multivitamin daily:  Pt does not take a MVI.  taking B12 and Zinc.  Increased  Vitamin D3 to 5,000IU's D3 daily for level of 29. Stopped taking Prozac last couple weeks when her mother was admitted to [facility] for stroke and forgot take medication with her.   States plans to discuss with PCP of just staying off of it.     Pt lives with her mom. Mom currently in rehab for stroke and may not return home    -Food Recall: Continues  using [address] but does admit had some days when didn't record due to mom in hospital.  Has been trying to eat three times a day despite mother being in hospital.  Having a protein shake (premier Protein shake)  in morning or making an egg sandwich. [place] ([facility]) sandwich for lunch or salmon with veggies. Atkins frozen meals in microwave while away from home. Was able to get some groceries for the [facility] place staying at in [place]. Now back home since sunday  Main Beverages: up to 4-5 bottles water a day with sugar free flavor packets, stopped all diet mountain dew  some hot tea, stopped orange juice    -Impression of Dietary Intake: continued  improved effort wtih food choices and meal timing. times two visits - Pt  is working towards  avoiding high fat/high sugar foods. Pt  is working towards  including protein at meals and snacks. Exercise:  Patient has not  attended Fitness Orientation- on hold at this time  Doing some walking around hospital while mother was admitted. Had been going to [employer]  Prior to this. Pt open to getting back to [facility] with mom back in [facility].  New exercise goal set with pt    Nutrition Diagnosis: Overweight/Obesity related to currently undergoing MNT as evidenced by BMI of 44.5    Intervention:   Healthy behaviors: adequate fluid intake, not skipping meals and aiming for regular meal times, eliminated carbonated beverages  Patient has attended support groups online via [address] [place] [address] Avenue in December and  attended March Zoom meeting  Improved effort with nutrition behaviors recommended for surgery times two months. Pt voiced disappointed can not submit to insurance yet.   Explained requires to lose more weight under initial weight prior and encouraged pt to continue implementing the behaviors that are in place as overall does feel better. Goals reviewed with pt        -  Patient Instructions   Goals:  1 Nutrition Goal: Continue to aim for regular meal times- choose lean protein foods first, followed by vegetables and fruit, then starch food last if still hungry  2. Water Goal: Continue at least 4 bottle of water a day or greater  3. Exercise Goal: Resume going to [facility] at least three days a week. Keep challenging yourself to increase your steps every day. Chair exercises while visiting your mom  4. Keep tracking your food intake in food journal or phone maral  5. Get Vitamin D lab level repeated.              -Followup visit: 4 weeks with dietitian and [address] [facility], RD, LD  Dietitian- [facility]

## 2021-03-16 ENCOUNTER — OFFICE VISIT (OUTPATIENT)
Dept: BARIATRICS/WEIGHT MGMT | Age: 51
End: 2021-03-16
Payer: MEDICARE

## 2021-03-16 ENCOUNTER — OFFICE VISIT (OUTPATIENT)
Dept: BARIATRICS/WEIGHT MGMT | Age: 51
End: 2021-03-16

## 2021-03-16 VITALS
HEART RATE: 66 BPM | RESPIRATION RATE: 18 BRPM | BODY MASS INDEX: 44.21 KG/M2 | HEIGHT: 60 IN | WEIGHT: 225.2 LBS | DIASTOLIC BLOOD PRESSURE: 90 MMHG | TEMPERATURE: 97 F | SYSTOLIC BLOOD PRESSURE: 133 MMHG

## 2021-03-16 DIAGNOSIS — E66.01 MORBID OBESITY WITH BMI OF 40.0-44.9, ADULT (HCC): Primary | ICD-10-CM

## 2021-03-16 DIAGNOSIS — Z99.89 OSA ON CPAP: ICD-10-CM

## 2021-03-16 DIAGNOSIS — K21.9 GASTROESOPHAGEAL REFLUX DISEASE, UNSPECIFIED WHETHER ESOPHAGITIS PRESENT: ICD-10-CM

## 2021-03-16 DIAGNOSIS — K76.0 FATTY LIVER: ICD-10-CM

## 2021-03-16 DIAGNOSIS — K58.9 IRRITABLE BOWEL SYNDROME, UNSPECIFIED TYPE: ICD-10-CM

## 2021-03-16 DIAGNOSIS — I10 HYPERTENSION, UNSPECIFIED TYPE: ICD-10-CM

## 2021-03-16 DIAGNOSIS — G89.29 CHRONIC BILATERAL LOW BACK PAIN, UNSPECIFIED WHETHER SCIATICA PRESENT: ICD-10-CM

## 2021-03-16 DIAGNOSIS — G47.33 OSA ON CPAP: ICD-10-CM

## 2021-03-16 DIAGNOSIS — E55.9 VITAMIN D DEFICIENCY: ICD-10-CM

## 2021-03-16 DIAGNOSIS — F41.9 ANXIETY: ICD-10-CM

## 2021-03-16 DIAGNOSIS — M54.50 CHRONIC BILATERAL LOW BACK PAIN, UNSPECIFIED WHETHER SCIATICA PRESENT: ICD-10-CM

## 2021-03-16 DIAGNOSIS — F32.A DEPRESSION, UNSPECIFIED DEPRESSION TYPE: ICD-10-CM

## 2021-03-16 PROCEDURE — 3017F COLORECTAL CA SCREEN DOC REV: CPT | Performed by: PHYSICIAN ASSISTANT

## 2021-03-16 PROCEDURE — G8417 CALC BMI ABV UP PARAM F/U: HCPCS | Performed by: PHYSICIAN ASSISTANT

## 2021-03-16 PROCEDURE — G8427 DOCREV CUR MEDS BY ELIG CLIN: HCPCS | Performed by: PHYSICIAN ASSISTANT

## 2021-03-16 PROCEDURE — G8484 FLU IMMUNIZE NO ADMIN: HCPCS | Performed by: PHYSICIAN ASSISTANT

## 2021-03-16 PROCEDURE — 1036F TOBACCO NON-USER: CPT | Performed by: PHYSICIAN ASSISTANT

## 2021-03-16 PROCEDURE — 99213 OFFICE O/P EST LOW 20 MIN: CPT | Performed by: PHYSICIAN ASSISTANT

## 2021-03-16 NOTE — PATIENT INSTRUCTIONS
Goals:  1 Nutrition Goal: Continue to aim for regular meal times- choose lean protein foods first, followed by vegetables and fruit, then starch food last if still hungry  2. Water Goal: Continue at least 4 bottle of water a day or greater  3. Exercise Goal: Resume going to [facility] at least three days a week. Keep challenging yourself to increase your steps every day. Chair exercises while visiting your mom  4. Keep tracking your food intake in food journal or phone maral  5. Get Vitamin D lab level repeated.

## 2021-03-16 NOTE — PROGRESS NOTES
[address] [facility]  [address]  [place], [address] [facility]  [phone]      Visit Date:  3/16/2021  Weight Management Pre-Op Follow-up    HPI:    Medically Supervised follow-up- Month #3 of 3 (Completed 3 months: Sept/Oct/Nov with PCP)    Dali Pickett is here today for continued supervised weight management of morbid obesity. Weight today 225#. Down 4# since last month. Continues to be up 6# since starting with weight management. BMI 43. Rough month. Her mother had a massive stroke and has spent the majority of her month in [facility] with her. Notes that she did really well staying on track with nutrition over the month. \" I have to get healthy to be there for my mother. \" . Continues to eat 3 meals most days. Tracking food almost daily. Consciously making better choices. Drinking at least 64oz of water/ sugar free packets daily. No pop. No juice. No coffee. No sweets or processed sugars. Has been tracking steps- averaging 5k daily. Comorbid conditions include gerd, fatty liver, chronic back pain, hypertension, mild sleep apnea, depression, anxiety, IBS. Continues to take Vit D OTC for an initial level of 29. Will repeat Vit D. Stopped taking Prozac about 2 weeks ago. Feels that she is doing really well without medication. LOMN received. Completed support groups. EKG completed and reviewed. EGD completed with Dr. Markus Mcknight. Cardiac clearance recieved. Pulmonary clearance recieved. Mammogram completed and reviewed. Psychological clearance with Dr. Carol Goodpasture completed and reviewed. If she does not lose enough weight with medical management she would like to proceed with sleeve gastrectomy. Current BMI: Body mass index is 43.62 kg/m².   Current Weight:   Wt Readings from Last 3 Encounters:   03/16/21 225 lb 3.2 oz (102.2 kg)   03/03/21 230 lb (104.3 kg)   02/22/21 229 lb 12.8 oz (104.2 kg)     Initial Body [id number]    Past Medical History:  Past Medical History: Diagnosis Date    Anxiety     Arthritis     Bell's palsy     Esophagitis     Fatty liver     GERD (gastroesophageal reflux disease)     Hypertension     IBS (irritable bowel syndrome)     Morbid obesity with BMI of 40.0-44.9, adult (HCC)     MRSA (methicillin resistant staph aureus) culture positive     UTI (urinary tract infection)     Vertigo        Past Surgical History:  Past Surgical History:   Procedure Laterality Date    BACK SURGERY      BLADDER SUSPENSION      BREAST REDUCTION SURGERY  10/2007    CARPAL TUNNEL RELEASE      CERVICAL FUSION      ACDF C5-C7    CHOLECYSTECTOMY      COLONOSCOPY      DILATION AND CURETTAGE OF UTERUS      ENDOSCOPY, COLON, DIAGNOSTIC      HYSTERECTOMY      LUMBAR FUSION N/A 4/3/2020    L4-S1 DECOMPRESSION AND POSTERIOR FUSION; LUMBAR I & D WITH HARDWARE REMOVAL performed by Sukh Benson MD at [place] [address]  10/17/2019    L5-S1    UPPER GASTROINTESTINAL ENDOSCOPY N/A 2019    EGD BIOPSY performed by Darlene Hui MD at [address] [place] [address]  2019    EGD DILATION SAVORY performed by Darlene Hui MD at [facility]       Past Social History:  Social History     Socioeconomic History    Marital status:      Spouse name: Not on file    Number of children: 3    Years of education: 15    Highest education level: Not on file   Occupational History    Occupation: [employer] strain: Not on file    Food insecurity     Worry: Not on file     Inability: Not on file    Transportation needs     Medical: Not on file     Non-medical: Not on file   Tobacco Use    Smoking status: Former Smoker     Packs/day: 2.00     Years: 20.00     Pack years: 40.00     Types: Cigarettes     Quit date: 2017     Years since quittin.1    Smokeless tobacco: Never Used   Substance and Sexual Activity    Alcohol use: Yes     Comment: occ    Drug use: No    Sexual activity: Not on file   Lifestyle    Physical activity     Days per week: Not on file     Minutes per session: Not on file    Stress: Not on file   Relationships    Social connections     Talks on phone: Not on file     Gets together: Not on file     Attends Jainism service: Not on file     Active member of club or organization: Not on file     Attends meetings of clubs or organizations: Not on file     Relationship status: Not on file    Intimate partner violence     Fear of current or ex partner: Not on file     Emotionally abused: Not on file     Physically abused: Not on file     Forced sexual activity: Not on file   Other Topics Concern    Not on file   Social History Narrative    Not on file        Medications:   Current Outpatient Medications   Medication Sig Dispense Refill    Cyanocobalamin (B-12 PO) Take 1 tablet by mouth daily      NONFORMULARY 1 tablet daily Zinc with elderberry      ciprofloxacin (CIPRO) 250 MG tablet Take 250 mg by mouth daily      metoprolol succinate (TOPROL XL) 50 MG extended release tablet Take 1 tablet by mouth daily 30 tablet 3    pantoprazole (PROTONIX) 40 MG tablet Take 1 tablet by mouth every morning (before breakfast) 30 tablet 0    diclofenac sodium 1 % GEL Apply 4 g topically 4 times daily as needed for Pain 1 Tube 3    Cholecalciferol (VITAMIN D3) 25 MCG (1000 UT) TABS TAKE ONE TABLET BY MOUTH EVERY DAY  1    cyclobenzaprine (FLEXERIL) 10 MG tablet Take 10 mg by mouth as needed      oxyCODONE-acetaminophen (ROXICET) 5-325 MG/5ML solution Take by mouth every 4 hours as needed for Pain.  FLUoxetine (PROZAC) 20 MG capsule Take 20 mg by mouth daily       No current facility-administered medications for this visit. Allergies: Allergies   Allergen Reactions    Codeine Rash    Dilaudid [Hydromorphone Hcl] Rash       Subjective:    Review of Systems:  Constitutional: Denies any fever, chills, (+)fatigue.   Wound: Denies any rash, AST 17 01/06/2021    ALKPHOS 103 01/06/2021    PROT 7.1 01/06/2021    LABALBU 4.5 01/06/2021    BILITOT 0.8 01/06/2021    ALT 23 01/06/2021        PREALBUMIN   Lab Results   Component Value Date    PREALBUMIN 23.9 07/07/2017        VITAMIN B12   Lab Results   Component Value Date    GOOMJDSF14 423 01/06/2021        VITAMIN D   Lab Results   Component Value Date    VITD25 29 01/06/2021        PTH  Lab Results   Component Value Date    IPTH 39.7 01/06/2021       VITAMIN B1/ THIAMINE   Lab Results   Component Value Date    ZEHH3QJVETU 108 01/06/2021        LIPID SCREEN (FASTING)   Lab Results   Component Value Date    CHOL 195 01/06/2021    TRIG 145 01/06/2021    HDL 55 01/06/2021    LDLCALC 111 01/06/2021   ,     HGA1C (T2DM ONLY)   Lab Results   Component Value Date    LABA1C 5.8 01/06/2021    AVGG 114 01/06/2021        TSH   Lab Results   Component Value Date    TSH 2.020 01/06/2021        IRON   Lab Results   Component Value Date    IRON 74 01/06/2021        TIBC  Lab Results   Component Value Date    TIBC 428 01/06/2021       FERRITIN  Lab Results   Component Value Date    FERRITIN 21 01/06/2021       VITAMIN A  No results found for: RETINOL    NICOTINE  No results found for: NMET    UDS  Lab Results   Component Value Date    UDP SEE BELOW 01/06/2021       PSA  No results found for: LABPSA    GFR  Lab Results   Component Value Date    LABGLOM 89 01/06/2021       DEXA  No results found for this or any previous visit. Assessment:       Diagnosis Orders   1. BMI 40.0-44.9, adult ([facility] [address]     2. Gastroesophageal reflux disease, unspecified whether esophagitis present     3. Fatty liver     4. Chronic bilateral low back pain, unspecified whether sciatica present     5. Depression, unspecified depression type     6. Hypertension, unspecified type     7. Irritable bowel syndrome, unspecified type     8. Anxiety     9. JESSE on CPAP     10.  Vitamin D deficiency  Vitamin D 25 Hydroxy       Plan:    · Behavior modification discussed in detail in regards to dietary habits. · Nutritional education occurred during visit. Continue following recommendations  per dietitian. · Improvement in fitness/exercise discussed with patient and the need for this  with/without surgery. · Schedule orientation with Mohan Sarmiento, Exercise Physiologist, at the fitness  center. ·  Continue CPAP for JESSE  · Cardiac Clearance received from Dr. Luis Kwon  · Pulmonary Clearance received from Dr. Noah Leija   · Psychology evaluation with Dr. Colletta Gent completed and reviewed  · EGD completed with  Dr. Fei Aragon- Op note/ path reviewed. (-)H. Pylori  · Encouraged to attend support groups. Has attended x 2.  · Goal to lose 3-5% TBW prior to surgery. · Vit D 29- continue 5,000IU vit D3 OTC daily. Order given to repeat level. · EKG NSR/ minimal voltage criteria for LVH/may be nml variant. · Advised not to get pregnant for 18-24 months post bariatric surgery as this can increase risk of malnourishment potentially leading to low birth weight or malformation. · Will need to be off work for 3-4 weeks post-bariatric surgery. · No lifting/pushing/pulling over 20# for 4 weeks post-op  · No NSAIDS 10 days prior to bariatric surgery. Avoid NSAID use post-op  · Discussed Lovenox post-op bariatric surgery  · LOMN received. · Mammogram completed and reviewed. No concerning findings. Return in about 1 month (around 4/16/2021) for Follow up. I spent over 20 minutes with the patient, with greater that 50% of that time spent on education, counseling and coordination of care.      Electronically signed by YUNIEL Mayer on 3/16/2021 at 9:36 AM

## 2021-03-16 NOTE — PATIENT INSTRUCTIONS
· Behavior modification discussed in detail in regards to dietary habits. · Nutritional education occurred during visit. Continue following recommendations  per dietitian. · Improvement in fitness/exercise discussed with patient and the need for this  with/without surgery. · Schedule orientation with Marissa Richards, Exercise Physiologist, at the fitness  center. ·  Continue CPAP for JESSE  · Cardiac Clearance received from Dr. Melva Grajeda  · Pulmonary Clearance received from Dr. Derrell Patel   · Psychology evaluation with Dr. Tl Menendez completed and reviewed  · EGD completed with  Dr. Jose Alas- Op note/ path reviewed. (-)H. Pylori  · Encouraged to attend support groups. Has attended x 2.  · Goal to lose 3-5% TBW prior to surgery. · Vit D 29- continue 5,000IU vit D3 OTC daily. Order given to repeat level. · EKG NSR/ minimal voltage criteria for LVH/may be nml variant. · Advised not to get pregnant for 18-24 months post bariatric surgery as this can increase risk of malnourishment potentially leading to low birth weight or malformation. · Will need to be off work for 3-4 weeks post-bariatric surgery. · No lifting/pushing/pulling over 20# for 4 weeks post-op  · No NSAIDS 10 days prior to bariatric surgery. Avoid NSAID use post-op  · Discussed Lovenox post-op bariatric surgery  · LOMN received. · Mammogram completed and reviewed. No concerning findings.

## 2021-03-24 ENCOUNTER — HOSPITAL ENCOUNTER (OUTPATIENT)
Age: 51
Discharge: HOME | End: 2021-03-24
Payer: MEDICARE

## 2021-03-24 DIAGNOSIS — E55.9 VITAMIN D DEFICIENCY: ICD-10-CM

## 2021-03-24 LAB — VITAMIN D 25-HYDROXY: 34 NG/ML (ref 30–100)

## 2021-03-24 PROCEDURE — 36415 COLL VENOUS BLD VENIPUNCTURE: CPT

## 2021-03-24 PROCEDURE — 82306 VITAMIN D 25 HYDROXY: CPT

## 2021-04-07 NOTE — PROGRESS NOTES
Assessment: Patient is a 49 y.o. female seen for month four  follow up MNT visit for  pre op bariatric surgery desires sleeve    Vitals from current and previous visits:  Vitals 4/84/1359   SYSTOLIC 769   DIASTOLIC 78   Site Right Upper Arm   Position Sitting   Cuff Size Large Adult   Pulse    Temp 97.2   Resp 18   SpO2    Weight 229 lb 12.8 oz   Height 5' 0.25\"   Body mass index 44.5 kg/m2   Waist (Inches)    Neck circumference      Vitals 6/87/7642   SYSTOLIC 637   DIASTOLIC 90   Site Left Upper Arm   Position Sitting   Cuff Size Large Adult   Pulse 66   Temp 97   Resp 18   SpO2    Weight 225 lb 3.2 oz   Height 5' 0.25\"   Body mass index 43.61 kg/m2   Waist (Inches)    Neck circumference      Vitals 7/9/3664   SYSTOLIC 954   DIASTOLIC 80   Site Left Upper Arm   Position Sitting   Cuff Size Large Adult   Pulse 79   Temp 97.5   Resp 18   SpO2    Weight 219 lb 12.8 oz   Height 5' 0.25\"   Body mass index 42.57 kg/m2   Waist (Inches)    Neck circumference      Initial weight at start of Weight Management Program was: 219 lbs     Eusebia lost 6  lbs over one month  -Weight goal: lose weight.     -Nutritionally relevant labs: gfr-89, glucose-105, ferritin-21, iron-74, vit D-29 to 34,  Lab Results   Component Value Date/Time    LABA1C 5.8 01/06/2021 12:04 PM    LABA1C 5.6 02/21/2019 11:57 AM    GLUCOSE 105 01/06/2021 12:04 PM    GLUCOSE 87 05/12/2020 02:15 PM    CHOL 195 01/06/2021 12:04 PM    HDL 55 01/06/2021 12:04 PM    LDLCALC 111 01/06/2021 12:04 PM    TRIG 145 01/06/2021 12:04 PM                  Lab Results   Component Value Date    VITD25 34 03/24/2021     Seen  Dr Madeline Palencia -EGD done  Pt was seeing PCP last three months - September, October, and November 2020.  Was placed on Adipex September 2020 by PCP and finished this in November and weight was ~ 219 lbs. Pt interested in using these three months towards bariatric surgery.     Obtained Cardiac Clearance and Psych Clearance  Patient is taking a Multivitamin daily:  Pt does not take a MVI.  taking B12 and Zinc.  Increased  Vitamin D3 to 5,000IU's D3 daily- repeat level now 34  Stopped taking Prozac last couple weeks when her mother was admitted to [facility] for stroke and forgot take medication with her. [facility] plans to discuss with PCP of just staying off of it.         -Food Recall: Continues  using [employer] Phone [employer]. \"I'm eating so right it isn't even funny\". Reports is eating three times a day- does sometimes have to grab a Premier Protein shake for breakfast.  Usually having a salad or cottage cheese for lunch. Cooking in evening at home - chicken in evening,  Cabbage, and peppers. States has stayed away from fast food. Has had ground turkey and cabbage  Main Beverages: up to 5-8 bottles water a day with sugar free flavor packets, stopped all diet mountain dew  some hot tea, stopped orange juice    -Impression of Dietary Intake: continued  improved effort [facility] food choices and meal timing. times three visits - Pt  is working towards  avoiding high fat/high sugar foods. Pt  is working towards  including protein at meals and snacks. Exercise:  Patient has not  attended Fitness Orientation- on hold at this time  Averaging ~ 5,000-7,000 steps a day. .States has gone to [facility] a few times      Nutrition Diagnosis: Overweight/Obesity related to currently undergoing MNT as evidenced by BMI of 44.5    Intervention:   Healthy behaviors: adequate fluid intake, not skipping meals and aiming for regular meal times, eliminated carbonated beverages  Patient has attended support groups online via [address] [place] [address] Avenue in December and  attended March Zoom meeting  Improved effort with nutrition behaviors recommended for surgery times three months. Encouraged pt to continue implementing the behaviors that are in place as overall does feel better.   Goals reviewed with pt      Patient Instructions   Goals:  1 Nutrition Goal: Continue to aim for regular meal times- choose lean protein foods first, followed by vegetables and fruit, then starch food last if still hungry  2. Water Goal: Continue at least 5 bottles of water a day or greater- great job with this! 3.  Exercise Goal: Aim for at least 5,000-7,000 steps a day, go to [facility] at least 2-3 times a week  4.  Keep tracking your food intake in food journal or phone maral          -Followup visit: 4 weeks with dietitian and Dr Jacey Copeland for insurance submission if weight continues under starting weight        Lenin Gavin RD, LD  Dietitian- [facility]

## 2021-04-08 ENCOUNTER — OFFICE VISIT (OUTPATIENT)
Dept: BARIATRICS/WEIGHT MGMT | Age: 51
End: 2021-04-08
Payer: MEDICARE

## 2021-04-08 ENCOUNTER — OFFICE VISIT (OUTPATIENT)
Dept: BARIATRICS/WEIGHT MGMT | Age: 51
End: 2021-04-08

## 2021-04-08 VITALS
BODY MASS INDEX: 43.15 KG/M2 | WEIGHT: 219.8 LBS | HEART RATE: 79 BPM | DIASTOLIC BLOOD PRESSURE: 80 MMHG | TEMPERATURE: 97.5 F | SYSTOLIC BLOOD PRESSURE: 123 MMHG | RESPIRATION RATE: 18 BRPM | HEIGHT: 60 IN

## 2021-04-08 DIAGNOSIS — I10 HYPERTENSION, UNSPECIFIED TYPE: ICD-10-CM

## 2021-04-08 DIAGNOSIS — G47.33 OSA ON CPAP: ICD-10-CM

## 2021-04-08 DIAGNOSIS — E55.9 VITAMIN D DEFICIENCY: ICD-10-CM

## 2021-04-08 DIAGNOSIS — F41.9 ANXIETY: ICD-10-CM

## 2021-04-08 DIAGNOSIS — E66.01 MORBID OBESITY WITH BMI OF 40.0-44.9, ADULT (HCC): Primary | ICD-10-CM

## 2021-04-08 DIAGNOSIS — K58.9 IRRITABLE BOWEL SYNDROME, UNSPECIFIED TYPE: ICD-10-CM

## 2021-04-08 DIAGNOSIS — K76.0 FATTY LIVER: ICD-10-CM

## 2021-04-08 DIAGNOSIS — M54.50 CHRONIC BILATERAL LOW BACK PAIN, UNSPECIFIED WHETHER SCIATICA PRESENT: ICD-10-CM

## 2021-04-08 DIAGNOSIS — K21.9 GASTROESOPHAGEAL REFLUX DISEASE, UNSPECIFIED WHETHER ESOPHAGITIS PRESENT: ICD-10-CM

## 2021-04-08 DIAGNOSIS — F32.A DEPRESSION, UNSPECIFIED DEPRESSION TYPE: ICD-10-CM

## 2021-04-08 DIAGNOSIS — Z99.89 OSA ON CPAP: ICD-10-CM

## 2021-04-08 DIAGNOSIS — G89.29 CHRONIC BILATERAL LOW BACK PAIN, UNSPECIFIED WHETHER SCIATICA PRESENT: ICD-10-CM

## 2021-04-08 PROCEDURE — 1036F TOBACCO NON-USER: CPT | Performed by: PHYSICIAN ASSISTANT

## 2021-04-08 PROCEDURE — 99212 OFFICE O/P EST SF 10 MIN: CPT | Performed by: PHYSICIAN ASSISTANT

## 2021-04-08 PROCEDURE — 3017F COLORECTAL CA SCREEN DOC REV: CPT | Performed by: PHYSICIAN ASSISTANT

## 2021-04-08 PROCEDURE — G8417 CALC BMI ABV UP PARAM F/U: HCPCS | Performed by: PHYSICIAN ASSISTANT

## 2021-04-08 PROCEDURE — G8427 DOCREV CUR MEDS BY ELIG CLIN: HCPCS | Performed by: PHYSICIAN ASSISTANT

## 2021-04-08 NOTE — PROGRESS NOTES
[address] [facility]  [address]  [address], [address]  [place], [address] [facility]  [phone]      Visit Date:  4/8/2021  Weight Management Pre-Op Follow-up    HPI:    Medically Supervised follow-up- Month #4 (Completed 3 months: Sept/Oct/Nov with PCP)    Duran Valdez is here today for continued supervised weight management of morbid obesity. Weight today 219#. Down 6# since last month. Down 10# since the last 2 months. Weight is stable since initial apt. BMI 42. Continues to take care of her mother who had a massive stroke last month. Recently moved her to a local nursing home. Although she has felt very stressed she has had another good month with nutrition. Staying on track. Following dietitian recommendations. Eating 3 meals daily. occasionally supplementing a meal with a protein shake on occasion. Tracking all food intake. Drinking 5-8 bottles of water daily. No pop. No juice. No coffee. Driniking hot tea once daily. Comorbid conditions include gerd, fatty liver, chronic back pain, hypertension, mild sleep apnea, depression, anxiety, IBS. Repeat Vit D now within normal range at 34. LOMN received. Completed support groups. EKG completed and reviewed. EGD completed with Dr. Desiree Mohan. Cardiac clearance recieved. Pulmonary clearance received. Psychological clearance with Dr. Debbie Chaparro completed and reviewed. If she does not lose enough weight with medical management she would like to proceed with sleeve gastrectomy. Current BMI: Body mass index is 42.57 kg/m².   Current Weight:   Wt Readings from Last 3 Encounters:   04/08/21 219 lb 12.8 oz (99.7 kg)   03/16/21 225 lb 3.2 oz (102.2 kg)   03/03/21 230 lb (104.3 kg)     Initial Body [id number]    Past Medical History:  Past Medical History:   Diagnosis Date    Anxiety     Arthritis     Bell's palsy     Esophagitis     Fatty liver     GERD (gastroesophageal reflux disease)     Hypertension     IBS (irritable bowel syndrome)     Morbid obesity with BMI of 40.0-44.9, adult (HCC)     MRSA (methicillin resistant staph aureus) culture positive     UTI (urinary tract infection)     Vertigo        Past Surgical History:  Past Surgical History:   Procedure Laterality Date    BACK SURGERY      BLADDER SUSPENSION      BREAST REDUCTION SURGERY  10/2007    CARPAL TUNNEL RELEASE      CERVICAL FUSION  2017    ACDF C5-C7    CHOLECYSTECTOMY      COLONOSCOPY      DILATION AND CURETTAGE OF UTERUS      ENDOSCOPY, COLON, DIAGNOSTIC      HYSTERECTOMY      LUMBAR FUSION N/A 4/3/2020    L4-S1 DECOMPRESSION AND POSTERIOR FUSION; LUMBAR I & D WITH HARDWARE REMOVAL performed by Deloris Bañuelos MD at [address] [place] [address]  10/17/2019    L5-S1    UPPER GASTROINTESTINAL ENDOSCOPY N/A 2019    EGD BIOPSY performed by Kandace Plascencia MD at [place] [address]  2019    EGD DILATION SAVORY performed by Kandace Plascencia MD at [facility]       Past Social History:  Social History     Socioeconomic History    Marital status:      Spouse name: Not on file    Number of children: 3    Years of education: 12    Highest education level: Not on file   Occupational History    Occupation: [address] resource strain: Not on file    Food insecurity     Worry: Not on file     Inability: Not on file    Transportation needs     Medical: Not on file     Non-medical: Not on file   Tobacco Use    Smoking status: Former Smoker     Packs/day: 2.00     Years: 20.00     Pack years: 40.00     Types: Cigarettes     Quit date: 2017     Years since quittin.1    Smokeless tobacco: Never Used   Substance and Sexual Activity    Alcohol use: Yes     Comment: occ    Drug use: No    Sexual activity: Not on file   Lifestyle    Physical activity     Days per week: Not on file     Minutes per session: Not on file    Stress: Not on file   Relationships    Social connections     Talks on phone: Not on file     Gets together: Not on file     Attends Druze service: Not on file     Active member of club or organization: Not on file     Attends meetings of clubs or organizations: Not on file     Relationship status: Not on file    Intimate partner violence     Fear of current or ex partner: Not on file     Emotionally abused: Not on file     Physically abused: Not on file     Forced sexual activity: Not on file   Other Topics Concern    Not on file   Social History Narrative    Not on file        Medications:   Current Outpatient Medications   Medication Sig Dispense Refill    Cyanocobalamin (B-12 PO) Take 1 tablet by mouth daily      NONFORMULARY 1 tablet daily Zinc with elderberry      ciprofloxacin (CIPRO) 250 MG tablet Take 250 mg by mouth daily      cyclobenzaprine (FLEXERIL) 10 MG tablet Take 10 mg by mouth as needed      oxyCODONE-acetaminophen (ROXICET) 5-325 MG/5ML solution Take by mouth every 4 hours as needed for Pain.  metoprolol succinate (TOPROL XL) 50 MG extended release tablet Take 1 tablet by mouth daily 30 tablet 3    pantoprazole (PROTONIX) 40 MG tablet Take 1 tablet by mouth every morning (before breakfast) 30 tablet 0    diclofenac sodium 1 % GEL Apply 4 g topically 4 times daily as needed for Pain 1 Tube 3    Cholecalciferol (VITAMIN D3) 25 MCG (1000 UT) TABS TAKE ONE TABLET BY MOUTH EVERY DAY  1     No current facility-administered medications for this visit. Allergies: Allergies   Allergen Reactions    Codeine Rash    Dilaudid [Hydromorphone Hcl] Rash       Subjective:    Review of Systems:  Constitutional: Denies any fever, chills, (+)fatigue. Wound: Denies any rash, skin color changes or wound problems. Resp: Denies any cough, (+)shortness of breath. CV: Denies any chest pain, orthopnea or syncope.    MS: Denies myalgias, (+)arthralgias- back/neck  GI: Denies any nausea, vomiting, (+)diarrhea, constipation, melena, hematochezia. (+) reflux  : Denies any hematuria, hesitancy or dysuria. NEURO: Denies seizures, headache. Objective:    /80 (Site: Left Upper Arm, Position: Sitting, Cuff Size: Large Adult)   Pulse 79   Temp 97.5 °F (36.4 °C) (Infrared)   Resp 18   Ht 5' 0.25\" (1.53 m)   Wt 219 lb 12.8 oz (99.7 kg)   LMP 10/27/2015 (Exact Date)   BMI 42.57 kg/m²   Physical Examination:   Constitutional: Alert and oriented to person, place and time. Well-developed, well- nourished. Head: Normocephalic and atraumatic  Neck: Supple. Eyes: EOMI b/l. Conjunctivae normal.  No scleral icterus. Respiratory: Effort normal. No respiratory distress. Abd: Benign  Ext:  Movement x 4. No edema  Skin; Warm and dry, no visible rashes, lesions or ulcers.    Neuro: Cranial Nerves Grossly Intact; nml coordination          CBC   Lab Results   Component Value Date    WBC 5.2 01/06/2021    RBC 5.20 01/06/2021    HGB 13.2 01/06/2021    HCT 43.2 01/06/2021    MCV 83.1 01/06/2021    MCH 25.4 01/06/2021    MCHC 30.6 01/06/2021    RDW 13.8 07/07/2017     01/06/2021    MPV 10.1 01/06/2021    RBCMORP NORMAL 07/07/2017    SEGSPCT 68.0 01/06/2021    LABLYMP 21.2 01/06/2021    MONOPCT 6.5 01/06/2021    LABEOS 2.9 01/06/2021    BASO 0.6 01/06/2021    NRBC 0 01/06/2021    ANISOCYTOSIS 1+ 06/29/2015    SEGSABS 3.5 01/06/2021    LYMPHSABS 1.1 01/06/2021    MONOSABS 0.3 01/06/2021    EOSABS 0.2 01/06/2021    BASOSABS 0.0 01/06/2021        BMP/CMP   Lab Results   Component Value Date    GLUCOSE 105 01/06/2021    CREATININE 0.7 01/06/2021    BUN 18 01/06/2021     01/06/2021    K 4.3 01/06/2021    K 4.2 04/03/2020     01/06/2021    CO2 28 01/06/2021    CALCIUM 9.7 01/06/2021    AST 17 01/06/2021    ALKPHOS 103 01/06/2021    PROT 7.1 01/06/2021    LABALBU 4.5 01/06/2021    BILITOT 0.8 01/06/2021    ALT 23 01/06/2021        PREALBUMIN   Lab Results   Component Value Date    PREALBUMIN 23.9 07/07/2017        VITAMIN B12   Lab Results   Component Value Date    [id number] 694 01/06/2021        VITAMIN D   Lab Results   Component Value Date    VITD25 34 03/24/2021        PTH  Lab Results   Component Value Date    IPTH 39.7 01/06/2021       VITAMIN B1/ THIAMINE   Lab Results   Component Value Date    OHBX0FVILYQ 108 01/06/2021        LIPID SCREEN (FASTING)   Lab Results   Component Value Date    CHOL 195 01/06/2021    TRIG 145 01/06/2021    HDL 55 01/06/2021    LDLCALC 111 01/06/2021   ,     HGA1C (T2DM ONLY)   Lab Results   Component Value Date    LABA1C 5.8 01/06/2021    AVGG 114 01/06/2021        TSH   Lab Results   Component Value Date    TSH 2.020 01/06/2021        IRON   Lab Results   Component Value Date    IRON 74 01/06/2021        TIBC  Lab Results   Component Value Date    TIBC 428 01/06/2021       FERRITIN  Lab Results   Component Value Date    FERRITIN 21 01/06/2021       VITAMIN A  No results found for: RETINOL    NICOTINE  No results found for: NMET    UDS  Lab Results   Component Value Date    UDP SEE BELOW 01/06/2021       PSA  No results found for: LABPSA    GFR  Lab Results   Component Value Date    LABGLOM 89 01/06/2021       DEXA  No results found for this or any previous visit. Assessment:       Diagnosis Orders   1. BMI 40.0-44.9, adult ([facility] [address]     2. Gastroesophageal reflux disease, unspecified whether esophagitis present     3. Fatty liver     4. Chronic bilateral low back pain, unspecified whether sciatica present     5. Depression, unspecified depression type     6. Hypertension, unspecified type     7. Irritable bowel syndrome, unspecified type     8. Anxiety     9. JESSE on CPAP     10. Vitamin D deficiency         Plan:    · Behavior modification discussed in detail in regards to dietary habits. · Nutritional education occurred during visit. Continue following recommendations  per dietitian. · Improvement in fitness/exercise discussed with patient and the need for this with/without  surgery.   · Schedule orientation with Steve Cardona, Exercise Physiologist, at the fitness center. ·  Continue CPAP for JESSE  · Cardiac Clearance received from Dr. Britney Saldana  · Pulmonary Clearance received from Dr. Nataly Quiles   · Psychology evaluation with Dr. Sanaz Kellogg completed and reviewed  · EGD completed with  Dr. Ria Sandoval- Op note/ path reviewed. (-)H. Pylori  · Encouraged to attend support groups. Has attended x 2.  · Goal to lose 3-5% TBW prior to surgery. · Repeat Vit D no within normal range at 34. · EKG NSR/ minimal voltage criteria for LVH/may be nml variant. · Advised not to get pregnant for 18-24 months post bariatric surgery as this can increase risk of malnourishment potentially leading to low birth weight or malformation. · Will need to be off work for 3-4 weeks post-bariatric surgery. · No lifting/pushing/pulling over 20# for 4 weeks post-op  · No NSAIDS 10 days prior to bariatric surgery. Avoid NSAID use post-op  · Discussed Lovenox post-op bariatric surgery  · LOMN received. · Mammogram completed and reviewed. No concerning findings. Return in about 1 month (around 5/8/2021) for Follow up. I spent over 15 minutes with the patient, with greater that 50% of that time spent on education, counseling and coordination of care.      Electronically signed by YUNEIL Lea on 4/8/2021 at 2:52 PM

## 2021-04-08 NOTE — PATIENT INSTRUCTIONS
· Behavior modification discussed in detail in regards to dietary habits. · Nutritional education occurred during visit. Continue following recommendations  per dietitian. · Improvement in fitness/exercise discussed with patient and the need for this  with/without surgery. · Schedule orientation with Cristhian Bass, Exercise Physiologist, at the fitness  center. ·  Continue CPAP for JESSE  · Cardiac Clearance received from Dr. Alyce Ray  · Pulmonary Clearance received from Dr. Moraima Harper   · Psychology evaluation with Dr. Joyce Mayorga completed and reviewed  · EGD completed with  Dr. Benites Pickamara- Op note/ path reviewed. (-)H. Pylori  · Encouraged to attend support groups. Has attended x 2.  · Goal to lose 3-5% TBW prior to surgery. · Repeat Vit D no within normal range at 34. · EKG NSR/ minimal voltage criteria for LVH/may be nml variant. · Advised not to get pregnant for 18-24 months post bariatric surgery as this can increase risk of malnourishment potentially leading to low birth weight or malformation. · Will need to be off work for 3-4 weeks post-bariatric surgery. · No lifting/pushing/pulling over 20# for 4 weeks post-op  · No NSAIDS 10 days prior to bariatric surgery. Avoid NSAID use post-op  · Discussed Lovenox post-op bariatric surgery  · LOMN received. · Mammogram completed and reviewed. No concerning findings.

## 2021-04-14 DIAGNOSIS — Z01.818 PRE-OP TESTING: Primary | ICD-10-CM

## 2021-05-03 DIAGNOSIS — Z01.818 PRE-OP TESTING: Primary | ICD-10-CM

## 2021-05-06 NOTE — PROGRESS NOTES
Assessment: Patient is a 49 y.o. female seen for month five  follow up MNT visit for  pre op bariatric surgery desires sleeve    Vitals from current and previous visits:      Vitals 4/8/2442   SYSTOLIC 669   DIASTOLIC 80   Site Left Upper Arm   Position Sitting   Cuff Size Large Adult   Pulse 79   Temp 97.5   Resp 18   SpO2    Weight 219 lb 12.8 oz   Height 5' 0.25\"   Body mass index 42.57 kg/m2   Waist (Inches)    Neck circumference      Vitals 2/9/2215   SYSTOLIC    DIASTOLIC    Site    Position    Cuff Size    Pulse    Temp 97.5   Resp 18   SpO2    Weight 216 lb 12.8 oz   Height 5' 0.25\"   Body mass index 41.99 kg/m2   Waist (Inches)    Neck circumference      Initial weight at start of Weight Management Program was: 219 lbs    Eusebia lost 3  lbs over one month  -Weight goal: lose weight.     -Nutritionally relevant labs: gfr-89, glucose-105, ferritin-21, iron-74, vit D-29 to 34,  Lab Results   Component Value Date/Time    LABA1C 5.8 01/06/2021 12:04 PM    LABA1C 5.6 02/21/2019 11:57 AM    GLUCOSE 105 01/06/2021 12:04 PM    GLUCOSE 87 05/12/2020 02:15 PM    CHOL 195 01/06/2021 12:04 PM    HDL 55 01/06/2021 12:04 PM    LDLCALC 111 01/06/2021 12:04 PM    TRIG 145 01/06/2021 12:04 PM                  Lab Results   Component Value Date    VITD25 34 03/24/2021     Seen  Dr Anna Guzmán -EGD done  Pt was seeing PCP last three months - September, October, and November 2020.  Was placed on Adipex September 2020 by PCP and finished this in November and weight was ~ 219 lbs. Pt interested in using these three months towards bariatric surgery. Obtained Cardiac Clearance and Psych Clearance  Patient is taking a Multivitamin daily:  Pt does not take a MVI.  taking B12 and Zinc. Vitamin D3 now will take  2,000IU's  daily- repeat level now 34  Continues to stay off Prozac for last several months. - states told Dr Gerardo Stockton about this     -Food Recall: Continues  using [employer].  Reports is eating three times a day- does sometimes have to grab a Premier Protein shake for breakfast.   Main Beverages: up to 5-9 bottles water a day with sugar free flavor packets, stopped all diet mountain dew  some hot tea, stopped orange juice    -Impression of Dietary Intake: continued  improved effort [facility] food choices and meal timing. times three visits - Pt  is working towards  avoiding high fat/high sugar foods. Pt  is working towards  including protein at meals and snacks. Exercise:  Patient has not  attended Fitness Orientation- on hold at this time  Averaging ~ 5,000-7,000 steps a day. .States has gone to [employer] a few times- adjusting with mom being in SNF      Nutrition Diagnosis: Overweight/Obesity related to currently undergoing MNT as evidenced by BMI of 44.5    Intervention:   Healthy behaviors: adequate fluid intake, not skipping meals and aiming for regular meal times, eliminated carbonated beverages  Patient has attended support groups online via [address] [place] [address] in December and  attended March Zoom meeting  Improved effort with nutrition behaviors recommended for surgery times four months. Reviewed out of pocket cost for bariatric vitamins and protein powder. Samples given. Pt qualifies for [employer] and will receive vitamins at no cost.  Reviewed ordering procedure with pt      Patient Instructions   Goals:  1. Remember you will need to separate you liquids from solids after surgery. No liquids 30 minutes before your meals and no liquids 30 minutes after your meals. 2. You will need to take your time with meals after surgery and begin practicing this now - chew foods really well and take 20-30 minutes at each meal.  3.  Aim for consistent and set meal times- choose lean protein foods first, followed by vegetables and fruit, then starch food last if still hungry. Consistency is key following bariatric surgery.   4.  Continue regular physical activity- recommend stay as active as you can leading up to surgery and after surgery this will remain important for your weight loss efforts         -Followup visit: 5/24/21 for pre op teaching class        Deanie Boxer, RD, LD  Dietitian- [facility] Arguello

## 2021-05-07 ENCOUNTER — OFFICE VISIT (OUTPATIENT)
Dept: BARIATRICS/WEIGHT MGMT | Age: 51
End: 2021-05-07

## 2021-05-07 ENCOUNTER — OFFICE VISIT (OUTPATIENT)
Dept: BARIATRICS/WEIGHT MGMT | Age: 51
End: 2021-05-07
Payer: MEDICARE

## 2021-05-07 VITALS
HEART RATE: 76 BPM | BODY MASS INDEX: 42.56 KG/M2 | HEIGHT: 60 IN | DIASTOLIC BLOOD PRESSURE: 70 MMHG | TEMPERATURE: 97.5 F | RESPIRATION RATE: 18 BRPM | WEIGHT: 216.8 LBS | SYSTOLIC BLOOD PRESSURE: 112 MMHG

## 2021-05-07 DIAGNOSIS — M54.50 CHRONIC BILATERAL LOW BACK PAIN, UNSPECIFIED WHETHER SCIATICA PRESENT: ICD-10-CM

## 2021-05-07 DIAGNOSIS — K76.0 FATTY LIVER: ICD-10-CM

## 2021-05-07 DIAGNOSIS — Z99.89 OSA ON CPAP: ICD-10-CM

## 2021-05-07 DIAGNOSIS — I10 HYPERTENSION, UNSPECIFIED TYPE: ICD-10-CM

## 2021-05-07 DIAGNOSIS — K58.9 IRRITABLE BOWEL SYNDROME, UNSPECIFIED TYPE: ICD-10-CM

## 2021-05-07 DIAGNOSIS — E66.01 MORBID OBESITY WITH BMI OF 40.0-44.9, ADULT (HCC): Primary | ICD-10-CM

## 2021-05-07 DIAGNOSIS — K21.9 GASTROESOPHAGEAL REFLUX DISEASE, UNSPECIFIED WHETHER ESOPHAGITIS PRESENT: ICD-10-CM

## 2021-05-07 DIAGNOSIS — F41.9 ANXIETY: ICD-10-CM

## 2021-05-07 DIAGNOSIS — F32.A DEPRESSION, UNSPECIFIED DEPRESSION TYPE: ICD-10-CM

## 2021-05-07 DIAGNOSIS — G89.29 CHRONIC BILATERAL LOW BACK PAIN, UNSPECIFIED WHETHER SCIATICA PRESENT: ICD-10-CM

## 2021-05-07 DIAGNOSIS — G47.33 OSA ON CPAP: ICD-10-CM

## 2021-05-07 PROCEDURE — 99213 OFFICE O/P EST LOW 20 MIN: CPT | Performed by: SURGERY

## 2021-05-07 PROCEDURE — 3017F COLORECTAL CA SCREEN DOC REV: CPT | Performed by: SURGERY

## 2021-05-07 PROCEDURE — G8417 CALC BMI ABV UP PARAM F/U: HCPCS | Performed by: SURGERY

## 2021-05-07 PROCEDURE — G8427 DOCREV CUR MEDS BY ELIG CLIN: HCPCS | Performed by: SURGERY

## 2021-05-07 PROCEDURE — 1036F TOBACCO NON-USER: CPT | Performed by: SURGERY

## 2021-05-07 RX ORDER — BIOTIN 10 MG
10 TABLET ORAL DAILY
COMMUNITY
End: 2022-06-22

## 2021-05-07 NOTE — PATIENT INSTRUCTIONS
Goals: 1. Remember you will need to separate you liquids from solids after surgery. No liquids 30 minutes before your meals and no liquids 30 minutes after your meals. 2. You will need to take your time with meals after surgery and begin practicing this now - chew foods really well and take 20-30 minutes at each meal.  3.  Aim for consistent and set meal times- choose lean protein foods first, followed by vegetables and fruit, then starch food last if still hungry. Consistency is key following bariatric surgery.   4.  Continue regular physical activity- recommend stay as active as you can leading up to surgery and after surgery this will remain important for your weight loss efforts

## 2021-05-09 ASSESSMENT — ENCOUNTER SYMPTOMS
ANAL BLEEDING: 0
VOMITING: 0
BACK PAIN: 1
NAUSEA: 0
SINUS PRESSURE: 0
EYE REDNESS: 0
ABDOMINAL DISTENTION: 0
DIARRHEA: 0
RHINORRHEA: 0
CHEST TIGHTNESS: 0
STRIDOR: 0
EYE ITCHING: 0
SHORTNESS OF BREATH: 0
COUGH: 0
ALLERGIC/IMMUNOLOGIC NEGATIVE: 1
CHOKING: 0
CONSTIPATION: 0
PHOTOPHOBIA: 0
FACIAL SWELLING: 0
APNEA: 0
BLOOD IN STOOL: 0
TROUBLE SWALLOWING: 0
ABDOMINAL PAIN: 0
COLOR CHANGE: 0
RECTAL PAIN: 0
VOICE CHANGE: 0
SORE THROAT: 0
WHEEZING: 0
EYE DISCHARGE: 0
EYE PAIN: 0

## 2021-05-09 NOTE — PROGRESS NOTES
Dawood East (:  1970)     ASSESSMENT:  1.  Morbid obesity (BMI 42)  2. Sleep apnea  3. Anxiety  4. GERD  5. Fatty liver disease  6. Bell's palsy  7. Chronic lower back pain  8. Depression  9. Hypertension  10. Irritable bowel syndrome    PLAN:  1. Discussion again about the pros and cons of weight loss surgery. The risks benefits and alternatives to laparoscopic adjustable band, gastric sleeve and gastric Boyd-en-Y bypass were discussed in detail. The pros and cons of robotic assisted, laparoscopic and open techniques were discussed. 2.  Behavior modification discussed again in regards to dietary habits. 3.  Nutritional education occurred during visit. Follow up with dietitian for further evaluation. Continue to follow recommendations as directed. 4.  Options for medical management of morbid obesity discussed. 5.  Improvement in fitness/exercise discussed with patient and the need for this with/without surgery. 6.  Medical necessity letter from PCP. 7.  Follow-up in one month at weight management program at [facility]. 8.  Signs and symptoms reviewed with patient that would be concerning and need her to return to office for re-evaluation. Patient states she will call if she has questions or concerns. 9. Multivitamin  10. Psychology evaluation completed. Follow-up as needed. 11. EGD Completed. H. pylori negative. Follow-up with GI as directed. 12.  Encouraged support groups  13. Send LOMN    Patient states that if she is not able to lose enough adequate excess body weight with medical management only then she would be like to proceed with surgical intervention such as sleeve gastrectomy/gastric bypass for further weight loss. More than 15 minutes spent with patient today. Greater than 50% of the time was involved counseling, educaton and coordinating care.     SUBJECTIVE/OBJECTIVE:    Chief Complaint   Patient presents with    Weight Loss     H&P - submit to insurance? ?     HPI  Nazia Garvey is a 69-year-old female presents for follow-up at the weight management program secondary to her morbid obesity. BMI 42. Current weight 216 pounds. Initial weight 219 pounds in the program.She states she has not been able to lose enough adequate excess body weight and would like to proceed with a robotic sleeve gastrectomy. She has completed psychology evaluation. Attended support groups. Completed upper endoscopy. H Pylori negative. Following with the dietitian. Continuing to work on portion control and food selection. She denies chest or abdominal pain. No hematochezia or melena. No new urinary complaints. She states she is excited to proceed with weight loss surgery so as to continue to work towards her weight loss goals. Patient is currently not taking a multivitamin however she is taking a vitamin B12 as well as zinc.  Also taking vitamin D3.Currently she has been going to the [facility] a few times. Trying to get at least [id number] steps per day. Review of Systems   Constitutional: Negative for activity change, appetite change, chills, diaphoresis, fatigue, fever and unexpected weight change. HENT: Negative for congestion, dental problem, drooling, ear discharge, ear pain, facial swelling, hearing loss, mouth sores, nosebleeds, postnasal drip, rhinorrhea, sinus pressure, sneezing, sore throat, tinnitus, trouble swallowing and voice change. Eyes: Negative for photophobia, pain, discharge, redness, itching and visual disturbance. Respiratory: Negative for apnea, cough, choking, chest tightness, shortness of breath, wheezing and stridor. Cardiovascular: Negative for chest pain, palpitations and leg swelling. Gastrointestinal: Negative for abdominal distention, abdominal pain, anal bleeding, blood in stool, constipation, diarrhea, nausea, rectal pain and vomiting. Endocrine: Negative.     Genitourinary: Negative for decreased urine volume, difficulty GASTROINTESTINAL ENDOSCOPY N/A 7/19/2019    EGD BIOPSY performed by Gaye Rivera MD at [facility]  7/19/2019    EGD DILATION SAVORY performed by Gaye Rivera MD at [facility]       Current Outpatient Medications   Medication Sig Dispense Refill    Biotin 10 MG tablet Take 10 mg by mouth daily      Cyanocobalamin (B-12 PO) Take 1 tablet by mouth daily      NONFORMULARY 1 tablet daily Zinc with elderberry      ciprofloxacin (CIPRO) 250 MG tablet Take 250 mg by mouth daily      metoprolol succinate (TOPROL XL) 50 MG extended release tablet Take 1 tablet by mouth daily 30 tablet 3    pantoprazole (PROTONIX) 40 MG tablet Take 1 tablet by mouth every morning (before breakfast) 30 tablet 0    Cholecalciferol (VITAMIN D3) 25 MCG (1000 UT) TABS TAKE ONE TABLET BY MOUTH EVERY DAY  1    cyclobenzaprine (FLEXERIL) 10 MG tablet Take 10 mg by mouth as needed      oxyCODONE-acetaminophen (ROXICET) 5-325 MG/5ML solution Take by mouth every 4 hours as needed for Pain.  diclofenac sodium 1 % GEL Apply 4 g topically 4 times daily as needed for Pain 1 Tube 3     No current facility-administered medications for this visit.         Allergies   Allergen Reactions    Codeine Rash    Dilaudid [Hydromorphone Hcl] Rash       Family History   Problem Relation Age of Onset    Ulcerative Colitis Mother     Cancer Mother     Arthritis Mother     Cancer Father         skin    High Blood Pressure Father     Diabetes Father     Heart Disease Father     Arthritis Father     Heart Disease Brother     Cancer Brother     Arthritis Brother     Arthritis Sister     Obesity Sister     Cancer Paternal Grandfather     Diabetes Paternal Grandfather     Heart Disease Paternal Grandfather     Obesity Paternal Grandfather     Arthritis Paternal Grandfather     Arthritis Paternal Grandmother     Arthritis Maternal Grandfather     Arthritis Maternal Grandmother     Obesity Maternal Grandmother     Stroke Maternal Grandmother     Heart Disease Maternal Grandmother        Social History     Socioeconomic History    Marital status:      Spouse name: Not on file    Number of children: 3    Years of education: 15    Highest education level: Not on file   Occupational History    Occupation: [employer] Financial resource strain: Not on file    Food insecurity     Worry: Not on file     Inability: Not on file    Transportation needs     Medical: Not on file     Non-medical: Not on file   Tobacco Use    Smoking status: Former Smoker     Packs/day: 2.00     Years: 20.00     Pack years: 40.00     Types: Cigarettes     Quit date: 2017     Years since quittin.2    Smokeless tobacco: Never Used   Substance and Sexual Activity    Alcohol use: Yes     Comment: occ    Drug use: No    Sexual activity: Not on file   Lifestyle    Physical activity     Days per week: Not on file     Minutes per session: Not on file    Stress: Not on file   Relationships    Social connections     Talks on phone: Not on file     Gets together: Not on file     Attends Jew service: Not on file     Active member of club or organization: Not on file     Attends meetings of clubs or organizations: Not on file     Relationship status: Not on file    Intimate partner violence     Fear of current or ex partner: Not on file     Emotionally abused: Not on file     Physically abused: Not on file     Forced sexual activity: Not on file   Other Topics Concern    Not on file   Social History Narrative    Not on file     Vitals:    21 1105   BP: 112/70   Site: Right Upper Arm   Position: Sitting   Cuff Size: Large Adult   Pulse: 76   Resp: 18   Temp: 97.5 °F (36.4 °C)   TempSrc: Infrared   Weight: 216 lb 12.8 oz (98.3 kg)   Height: 5' 0.25\" (1.53 m)     Body mass index is 41.99 kg/m².     Wt Readings from Last 3 Encounters:   21 216 lb 12.8 oz (98.3 kg) 04/08/21 219 lb 12.8 oz (99.7 kg)   03/16/21 225 lb 3.2 oz (102.2 kg)     Physical Exam  Vitals signs reviewed. Constitutional:       General: She is not in acute distress. Appearance: She is well-developed. She is not diaphoretic. HENT:      Head: Normocephalic and atraumatic. Right Ear: External ear normal.      Left Ear: External ear normal.      Nose: Nose normal.   Eyes:      General: No scleral icterus. Right eye: No discharge. Left eye: No discharge. Conjunctiva/sclera: Conjunctivae normal.   Neck:      Musculoskeletal: Normal range of motion and neck supple. Cardiovascular:      Rate and Rhythm: Normal rate and regular rhythm. Heart sounds: Normal heart sounds. Pulmonary:      Effort: Pulmonary effort is normal. No respiratory distress. Breath sounds: Normal breath sounds. No wheezing or rales. Chest:      Chest wall: No tenderness. Abdominal:      General: Bowel sounds are normal. There is no distension. Palpations: Abdomen is soft. There is no mass. Tenderness: There is no abdominal tenderness. There is no guarding or rebound. Musculoskeletal: Normal range of motion. General: No tenderness. Skin:     General: Skin is warm and dry. Coloration: Skin is not pale. Findings: No erythema or rash. Neurological:      Mental Status: She is alert and oriented to person, place, and time. Cranial Nerves: No cranial nerve deficit. Psychiatric:         Behavior: Behavior normal.         Thought Content:  Thought content normal.         Judgment: Judgment normal.       CBC   Lab Results   Component Value Date    WBC 5.2 01/06/2021    RBC 5.20 01/06/2021    HGB 13.2 01/06/2021    HCT 43.2 01/06/2021    MCV 83.1 01/06/2021    MCH 25.4 01/06/2021    MCHC 30.6 01/06/2021    RDW 13.8 07/07/2017     01/06/2021    MPV 10.1 01/06/2021    RBCMORP NORMAL 07/07/2017    SEGSPCT 68.0 01/06/2021    LABLYMP 21.2 01/06/2021    MONOPCT 6.5

## 2021-05-20 ENCOUNTER — TELEPHONE (OUTPATIENT)
Dept: BARIATRICS/WEIGHT MGMT | Age: 51
End: 2021-05-20

## 2021-05-20 NOTE — TELEPHONE ENCOUNTER
LVM-Bariatric prior auth received-  Or date is June 14-time TBD  Call PCP and get visit scheduled  Get pre op labs done prior to that visit  See you May 24 for pre op

## 2021-05-24 ENCOUNTER — INITIAL CONSULT (OUTPATIENT)
Dept: BARIATRICS/WEIGHT MGMT | Age: 51
End: 2021-05-24

## 2021-05-24 VITALS — WEIGHT: 220.6 LBS | TEMPERATURE: 97 F | HEIGHT: 60 IN | BODY MASS INDEX: 43.31 KG/M2

## 2021-05-24 DIAGNOSIS — E66.01 MORBID OBESITY WITH BMI OF 40.0-44.9, ADULT (HCC): Primary | ICD-10-CM

## 2021-05-24 NOTE — PROGRESS NOTES
Cassidy Monroe gained 1 lb  since joining Weight Management Program.  After nutrition evaluation and education, patient is considered to be a good surgical candidate from a MNT perspective. Patient's body composition for pre-op teaching class  was measured using the [facility] Scale. Results were saved and reviewed with the patient. Patient was educated on 2- week pre-operative nutrition protocol and post test completed. Pre-operative and post-operative diet guidelines were discussed and written information was provided. Nectar protein supplements were purchased. Vitamin regimen with Bariatric Advantage and/or Celebrate vitamins was explained, and patient qualified for bariatric vitamins at no cost thru [employer]. Importance of vitamin compliance was discussed and potential of vitamin deficiencies was reviewed. Encouraged continued physical activity. Patient signed agreement stating they are committed to lifelong follow up, smoking and alcohol cessation, vitamin compliance, and 2 week pre-operative dietary protocol.

## 2021-05-26 RX ORDER — ONDANSETRON 4 MG/1
4 TABLET, FILM COATED ORAL EVERY 4 HOURS PRN
Qty: 30 TABLET | Refills: 0 | Status: SHIPPED | OUTPATIENT
Start: 2021-06-14 | End: 2021-06-28

## 2021-05-26 RX ORDER — METOCLOPRAMIDE 10 MG/1
10 TABLET ORAL EVERY 6 HOURS PRN
Qty: 30 TABLET | Refills: 0 | Status: SHIPPED | OUTPATIENT
Start: 2021-06-14 | End: 2022-06-22

## 2021-05-26 RX ORDER — ASPIRIN 81 MG
100 TABLET, DELAYED RELEASE (ENTERIC COATED) ORAL 2 TIMES DAILY
Qty: 30 TABLET | Refills: 1 | Status: SHIPPED | OUTPATIENT
Start: 2021-06-14 | End: 2022-06-22

## 2021-05-27 ENCOUNTER — HOSPITAL ENCOUNTER (OUTPATIENT)
Dept: GENERAL RADIOLOGY | Age: 51
Discharge: HOME | End: 2021-05-27
Payer: MEDICARE

## 2021-05-27 ENCOUNTER — HOSPITAL ENCOUNTER (OUTPATIENT)
Age: 51
Discharge: HOME | End: 2021-05-27
Payer: MEDICARE

## 2021-05-27 DIAGNOSIS — Z01.818 PRE-OP TESTING: ICD-10-CM

## 2021-05-27 LAB
ANION GAP SERPL CALCULATED.3IONS-SCNC: 9 MEQ/L (ref 8–16)
BASOPHILS # BLD: 0.3 %
BASOPHILS ABSOLUTE: 0 THOU/MM3 (ref 0–0.1)
BUN BLDV-MCNC: 23 MG/DL (ref 7–22)
CALCIUM SERPL-MCNC: 9.5 MG/DL (ref 8.5–10.5)
CHLORIDE BLD-SCNC: 105 MEQ/L (ref 98–111)
CO2: 30 MEQ/L (ref 23–33)
CREAT SERPL-MCNC: 0.6 MG/DL (ref 0.4–1.2)
EKG ATRIAL RATE: 57 BPM
EKG P AXIS: 47 DEGREES
EKG P-R INTERVAL: 200 MS
EKG Q-T INTERVAL: 434 MS
EKG QRS DURATION: 90 MS
EKG QTC CALCULATION (BAZETT): 422 MS
EKG R AXIS: -11 DEGREES
EKG T AXIS: 21 DEGREES
EKG VENTRICULAR RATE: 57 BPM
EOSINOPHIL # BLD: 1.9 %
EOSINOPHILS ABSOLUTE: 0.1 THOU/MM3 (ref 0–0.4)
ERYTHROCYTE [DISTWIDTH] IN BLOOD BY AUTOMATED COUNT: 14.5 % (ref 11.5–14.5)
ERYTHROCYTE [DISTWIDTH] IN BLOOD BY AUTOMATED COUNT: 45.2 FL (ref 35–45)
GFR SERPL CREATININE-BSD FRML MDRD: > 90 ML/MIN/1.73M2
GLUCOSE BLD-MCNC: 101 MG/DL (ref 70–108)
HCT VFR BLD CALC: 43.7 % (ref 37–47)
HEMOGLOBIN: 13.2 GM/DL (ref 12–16)
IMMATURE GRANS (ABS): 0.05 THOU/MM3 (ref 0–0.07)
IMMATURE GRANULOCYTES: 0.9 %
LYMPHOCYTES # BLD: 28.6 %
LYMPHOCYTES ABSOLUTE: 1.7 THOU/MM3 (ref 1–4.8)
MCH RBC QN AUTO: 25.9 PG (ref 26–33)
MCHC RBC AUTO-ENTMCNC: 30.2 GM/DL (ref 32.2–35.5)
MCV RBC AUTO: 85.9 FL (ref 81–99)
MONOCYTES # BLD: 6 %
MONOCYTES ABSOLUTE: 0.3 THOU/MM3 (ref 0.4–1.3)
NUCLEATED RED BLOOD CELLS: 0 /100 WBC
PLATELET # BLD: 227 THOU/MM3 (ref 130–400)
PMV BLD AUTO: 10.7 FL (ref 9.4–12.4)
POTASSIUM SERPL-SCNC: 4.4 MEQ/L (ref 3.5–5.2)
RBC # BLD: 5.09 MILL/MM3 (ref 4.2–5.4)
SEG NEUTROPHILS: 62.3 %
SEGMENTED NEUTROPHILS ABSOLUTE COUNT: 3.6 THOU/MM3 (ref 1.8–7.7)
SODIUM BLD-SCNC: 144 MEQ/L (ref 135–145)
WBC # BLD: 5.8 THOU/MM3 (ref 4.8–10.8)

## 2021-05-27 PROCEDURE — 36415 COLL VENOUS BLD VENIPUNCTURE: CPT

## 2021-05-27 PROCEDURE — 80048 BASIC METABOLIC PNL TOTAL CA: CPT

## 2021-05-27 PROCEDURE — 85025 COMPLETE CBC W/AUTO DIFF WBC: CPT

## 2021-05-27 PROCEDURE — 71046 X-RAY EXAM CHEST 2 VIEWS: CPT

## 2021-05-27 PROCEDURE — 93005 ELECTROCARDIOGRAM TRACING: CPT | Performed by: SURGERY

## 2021-07-06 ENCOUNTER — OFFICE VISIT (OUTPATIENT)
Dept: BARIATRICS/WEIGHT MGMT | Age: 51
End: 2021-07-06
Payer: MEDICARE

## 2021-07-06 VITALS
HEIGHT: 60 IN | WEIGHT: 219.6 LBS | RESPIRATION RATE: 18 BRPM | TEMPERATURE: 98.6 F | DIASTOLIC BLOOD PRESSURE: 70 MMHG | BODY MASS INDEX: 43.11 KG/M2 | HEART RATE: 66 BPM | SYSTOLIC BLOOD PRESSURE: 128 MMHG

## 2021-07-06 DIAGNOSIS — G89.29 CHRONIC BILATERAL LOW BACK PAIN, UNSPECIFIED WHETHER SCIATICA PRESENT: ICD-10-CM

## 2021-07-06 DIAGNOSIS — I10 HYPERTENSION, UNSPECIFIED TYPE: ICD-10-CM

## 2021-07-06 DIAGNOSIS — K58.9 IRRITABLE BOWEL SYNDROME, UNSPECIFIED TYPE: ICD-10-CM

## 2021-07-06 DIAGNOSIS — F41.9 ANXIETY: ICD-10-CM

## 2021-07-06 DIAGNOSIS — K76.0 FATTY LIVER: ICD-10-CM

## 2021-07-06 DIAGNOSIS — F32.A DEPRESSION, UNSPECIFIED DEPRESSION TYPE: ICD-10-CM

## 2021-07-06 DIAGNOSIS — G47.33 OSA ON CPAP: ICD-10-CM

## 2021-07-06 DIAGNOSIS — K21.9 GASTROESOPHAGEAL REFLUX DISEASE, UNSPECIFIED WHETHER ESOPHAGITIS PRESENT: ICD-10-CM

## 2021-07-06 DIAGNOSIS — Z99.89 OSA ON CPAP: ICD-10-CM

## 2021-07-06 DIAGNOSIS — M54.50 CHRONIC BILATERAL LOW BACK PAIN, UNSPECIFIED WHETHER SCIATICA PRESENT: ICD-10-CM

## 2021-07-06 PROCEDURE — G8427 DOCREV CUR MEDS BY ELIG CLIN: HCPCS | Performed by: PHYSICIAN ASSISTANT

## 2021-07-06 PROCEDURE — 3017F COLORECTAL CA SCREEN DOC REV: CPT | Performed by: PHYSICIAN ASSISTANT

## 2021-07-06 PROCEDURE — 1036F TOBACCO NON-USER: CPT | Performed by: PHYSICIAN ASSISTANT

## 2021-07-06 PROCEDURE — 99213 OFFICE O/P EST LOW 20 MIN: CPT | Performed by: PHYSICIAN ASSISTANT

## 2021-07-06 PROCEDURE — G8417 CALC BMI ABV UP PARAM F/U: HCPCS | Performed by: PHYSICIAN ASSISTANT

## 2021-07-06 NOTE — PATIENT INSTRUCTIONS
· Behavior modification discussed in detail in regards to dietary habits. · Nutritional education occurred during visit. Continue following recommendations  per dietitian. · Improvement in fitness/exercise discussed with patient and the need for this with/without  surgery. · Schedule orientation with Morro Trujillo, Exercise Physiologist, at the fitness center. ·  Continue CPAP for JESSE  · Cardiac Clearance received from Dr. Concepción Ladd  · Pulmonary Clearance received from Dr. Jackelin Jeronimo   · Psychology evaluation with Dr. Tia Ricardo completed and reviewed  · EGD completed with  Dr. Doris Machado- Op note/ path reviewed. (-)H. Pylori  · Encouraged to attend support groups. Has attended x 2.  · Goal to lose 3-5% TBW prior to surgery. · Repeat Vit D no within normal range at 34. · EKG NSR/ minimal voltage criteria for LVH/may be nml variant. · Advised not to get pregnant for 18-24 months post bariatric surgery as this can increase risk of malnourishment potentially leading to low birth weight or malformation. · Will need to be off work for 3-4 weeks post-bariatric surgery. · No lifting/pushing/pulling over 20# for 4 weeks post-op  · No NSAIDS 10 days prior to bariatric surgery. Avoid NSAID use post-op  · Discussed Lovenox post-op bariatric surgery  · LOMN received. · Mammogram completed and reviewed. No concerning findings. · Take prescription medications, as rx  · Encourage to call and make apt with Dr. Tia Ricardo  · Follow up with the dietitian in 6 weeks.

## 2021-07-06 NOTE — PROGRESS NOTES
[address] [facility] [employer]  [address]  [place], [address] [facility]  [phone]      Visit Date:  7/6/2021  Weight Management Pre-Op Follow-up    HPI:    Medically Supervised follow-up- check in prior to surgery. Ellen Peck is here today for continued supervised weight management of morbid obesity. Had to cancel scheduled surgery on 6/14/21 due to personal issues. Mom passed away 6 days ago and has been grieving her loss. Has not yet rescheduled surgery. Weight today 219#. Weight is stable since last appointment and since starting with weight management. BMI 42. Reports that her nutrition has been off. Was eating from a vending machine or not eating for the week her mom was in the hospital before she passed. Admits that she needs to get back on track with nutrition- but not there yet. Has not been taking her prescription medications for the last 2 days - encouraged to get back to taking. Comorbid conditions include gerd, fatty liver, chronic back pain, hypertension, mild sleep apnea, depression, anxiety, IBS. LOMN received. Completed support groups. EKG completed and reviewed. EGD completed. Cardiac clearance recieved. Pulmonary clearance received. Psychological clearance with Dr. Veda Ordoñez completed and reviewed. Reports that she is not ready mentally ready to proceed with bariatric surgery. Has a lot of things to work through prior to committing to surgery. Grieving. Has not yet made an appointment with Dr. Veda Ordoñez- but plans to. Trying to find a place to live but current wait is 2-4 years for government housing. Will check in with the dietitian in another 6 weeks. Current BMI: Body mass index is 42.53 kg/m².   Current Weight:   Wt Readings from Last 3 Encounters:   07/06/21 219 lb 9.6 oz (99.6 kg)   05/24/21 220 lb 9.6 oz (100.1 kg)   05/07/21 216 lb 12.8 oz (98.3 kg)     Initial Body [id number]    Past Medical History:  Past Medical History:   Diagnosis Date    Anxiety     Arthritis     Bell's palsy     Body mass index 40.0-44.9, adult ([facility]) 2021    Esophagitis     Fatty liver     GERD (gastroesophageal reflux disease)     Hypertension     IBS (irritable bowel syndrome)     Morbid obesity with BMI of 40.0-44.9, adult ([facility])     MRSA (methicillin resistant staph aureus) culture positive     JESSE (obstructive sleep apnea) 2021    UTI (urinary tract infection)     V tach ([facility])     Vertigo        Past Surgical History:  Past Surgical History:   Procedure Laterality Date    BACK SURGERY      BLADDER SUSPENSION      BREAST REDUCTION SURGERY  10/2007    CARPAL TUNNEL RELEASE      CERVICAL FUSION      ACDF C5-C7    CHOLECYSTECTOMY      COLONOSCOPY      DILATION AND CURETTAGE OF UTERUS      ENDOSCOPY, COLON, DIAGNOSTIC      HYSTERECTOMY      LUMBAR FUSION N/A 4/3/2020    L4-S1 DECOMPRESSION AND POSTERIOR FUSION; LUMBAR I & D WITH HARDWARE REMOVAL performed by Radha Rodriguez MD at [address] [place] [address]  10/17/2019    L5-S1    UPPER GASTROINTESTINAL ENDOSCOPY N/A 2019    [facility]    UPPER GASTROINTESTINAL ENDOSCOPY  2019    EGD [facility]       Past Social History:  Social History     Socioeconomic History    Marital status:      Spouse name: Not on file    Number of children: 3    Years of education: 15    Highest education level: Not on file   Occupational History    Occupation: Home Health Aide   Tobacco Use    Smoking status: Former Smoker     Packs/day: 2.00     Years: 20.00     Pack years: 40.00     Types: Cigarettes     Quit date: 2017     Years since quittin.4    Smokeless tobacco: Never Used   Vaping Use    Vaping Use: Never used   Substance and Sexual Activity    Alcohol use: Not Currently     Comment: occ    Drug use: No    Sexual activity: Not on file   Other Topics Concern  Not on file   Social History Narrative    Not on file     Social Determinants of Health     Financial Resource Strain:     Difficulty of Paying Living Expenses:    Food Insecurity:     Worried About Running Out of Food in the Last Year:     [address] in the Last Year:    Transportation Needs:     Lack of Transportation (Medical):      Lack of Transportation (Non-Medical):    Physical Activity:     Days of Exercise per Week:     Minutes of Exercise per Session:    Stress:     Feeling of Stress :    Social Connections:     Frequency of Communication with Friends and Family:     Frequency of Social Gatherings with Friends and Family:     Attends Denominational Services:     Active Member of Clubs or Organizations:     Attends Club or Organization Meetings:     Marital Status:    Intimate Partner Violence:     Fear of Current or Ex-Partner:     Emotionally Abused:     Physically Abused:     Sexually Abused:         Medications:   Current Outpatient Medications   Medication Sig Dispense Refill    NONFORMULARY Celebrate Plus vitamins per Dr John Aguirre office (Patient not taking: Reported on 7/6/2021)      Docusate Sodium 100 MG TABS Take 100 mg by mouth 2 times daily Take as needed to prevent constipation (Patient not taking: Reported on 7/6/2021) 30 tablet 1    metoclopramide (REGLAN) 10 MG tablet Take 1 tablet by mouth every 6 hours as needed (nausea/vomiting) 30 tablet 0    Biotin 10 MG tablet Take 10 mg by mouth daily (Patient not taking: Reported on 7/6/2021)      Cyanocobalamin (B-12 PO) Take 1 tablet by mouth daily (Patient not taking: Reported on 7/6/2021)      NONFORMULARY 1 tablet daily Zinc with elderberry (Patient not taking: Reported on 7/6/2021)      ciprofloxacin (CIPRO) 250 MG tablet Take 250 mg by mouth daily Every other day (Patient not taking: Reported on 7/6/2021)      cyclobenzaprine (FLEXERIL) 10 MG tablet Take 10 mg by mouth as needed (Patient not taking: Reported on 7/6/2021)      metoprolol succinate (TOPROL XL) 50 MG extended release tablet Take 1 tablet by mouth daily (Patient not taking: Reported on 7/6/2021) 30 tablet 3    pantoprazole (PROTONIX) 40 MG tablet Take 1 tablet by mouth every morning (before breakfast) (Patient not taking: Reported on 7/6/2021) 30 tablet 0    diclofenac sodium 1 % GEL Apply 4 g topically 4 times daily as needed for Pain (Patient not taking: Reported on 7/6/2021) 1 Tube 3    Cholecalciferol (VITAMIN D3) 25 MCG (1000 UT) TABS TAKE ONE TABLET BY MOUTH EVERY DAY (Patient not taking: Reported on 7/6/2021)  1     No current facility-administered medications for this visit. Allergies: Allergies   Allergen Reactions    Codeine Rash    Dilaudid [Hydromorphone Hcl] Rash       Subjective:    Review of Systems:  Constitutional: Denies any fever, chills, (+)fatigue. Wound: Denies any rash, skin color changes or wound problems. Resp: Denies any cough, (+)shortness of breath. CV: Denies any chest pain, orthopnea or syncope. MS: Denies myalgias, (+)arthralgias- back/neck  GI: Denies any nausea, vomiting, (+)diarrhea, constipation, melena, hematochezia. (+) reflux  : Denies any hematuria, hesitancy or dysuria. NEURO: Denies seizures, headache. Objective:    /70 (Site: Right Upper Arm, Position: Sitting, Cuff Size: Large Adult)   Pulse 66   Temp 98.6 °F (37 °C) (Oral)   Resp 18   Ht 5' 0.25\" (1.53 m)   Wt 219 lb 9.6 oz (99.6 kg)   LMP 10/27/2015 (Exact Date)   BMI 42.53 kg/m²   Physical Examination:   Constitutional: Alert and oriented to person, place and time. Well-developed, well- nourished. Head: Normocephalic and atraumatic  Neck: Supple. Eyes: EOMI b/l. Conjunctivae normal.  No scleral icterus. Respiratory: Effort normal. No respiratory distress. Abd: Benign  Ext:  Movement x 4. No edema  Skin; Warm and dry, no visible rashes, lesions or ulcers.    Neuro: Cranial Nerves Grossly Intact; nml coordination        CBC   Lab Results   Component Value Date    WBC 5.8 05/27/2021    RBC 5.09 05/27/2021    HGB 13.2 05/27/2021    HCT 43.7 05/27/2021    MCV 85.9 05/27/2021    MCH 25.9 05/27/2021    MCHC 30.2 05/27/2021    RDW 13.8 07/07/2017     05/27/2021    MPV 10.7 05/27/2021    RBCMORP NORMAL 07/07/2017    SEGSPCT 62.3 05/27/2021    LABLYMP 28.6 05/27/2021    MONOPCT 6.0 05/27/2021    LABEOS 1.9 05/27/2021    BASO 0.3 05/27/2021    NRBC 0 05/27/2021    ANISOCYTOSIS 1+ 06/29/2015    SEGSABS 3.6 05/27/2021    LYMPHSABS 1.7 05/27/2021    MONOSABS 0.3 05/27/2021    EOSABS 0.1 05/27/2021    BASOSABS 0.0 05/27/2021        BMP/CMP   Lab Results   Component Value Date    GLUCOSE 101 05/27/2021    CREATININE 0.6 05/27/2021    BUN 23 05/27/2021     05/27/2021    K 4.4 05/27/2021    K 4.2 04/03/2020     05/27/2021    CO2 30 05/27/2021    CALCIUM 9.5 05/27/2021    AST 17 01/06/2021    ALKPHOS 103 01/06/2021    PROT 7.1 01/06/2021    LABALBU 4.5 01/06/2021    BILITOT 0.8 01/06/2021    ALT 23 01/06/2021        PREALBUMIN   Lab Results   Component Value Date    PREALBUMIN 23.9 07/07/2017        VITAMIN B12   Lab Results   Component Value Date    QLHSRBHJ31 278 01/06/2021        VITAMIN D   Lab Results   Component Value Date    VITD25 34 03/24/2021        PTH  Lab Results   Component Value Date    IPTH 39.7 01/06/2021       VITAMIN B1/ THIAMINE   Lab Results   Component Value Date    [id number] 108 01/06/2021        LIPID SCREEN (FASTING)   Lab Results   Component Value Date    CHOL 195 01/06/2021    TRIG 145 01/06/2021    HDL 55 01/06/2021    LDLCALC 111 01/06/2021   ,     HGA1C (T2DM ONLY)   Lab Results   Component Value Date    LABA1C 5.8 01/06/2021    AVGG 114 01/06/2021        TSH   Lab Results   Component Value Date    TSH 2.020 01/06/2021        IRON   Lab Results   Component Value Date    IRON 74 01/06/2021        TIBC  Lab Results   Component Value Date    TIBC 428 01/06/2021       FERRITIN  Lab Results   Component Value Date    FERRITIN 21 01/06/2021       VITAMIN A  No results found for: RETINOL    NICOTINE  No results found for: NMET    UDS  Lab Results   Component Value Date    UDP SEE BELOW 01/06/2021       PSA  No results found for: LABPSA    GFR  Lab Results   Component Value Date    LABGLOM >90 05/27/2021       DEXA  No results found for this or any previous visit. Assessment:       Diagnosis Orders   1. BMI 40.0-44.9, adult ([address]     2. Gastroesophageal reflux disease, unspecified whether esophagitis present     3. Hypertension, unspecified type     4. Depression, unspecified depression type     5. Chronic bilateral low back pain, unspecified whether sciatica present     6. Fatty liver     7. Irritable bowel syndrome, unspecified type     8. Anxiety     9. JESSE on CPAP         Plan:    · Behavior modification discussed in detail in regards to dietary habits. · Nutritional education occurred during visit. Continue following recommendations  per dietitian. · Improvement in fitness/exercise discussed with patient and the need for this with/without  surgery. · Schedule orientation with Parish Champion, Exercise Physiologist, at the fitness center. ·  Continue CPAP for JESSE  · Cardiac Clearance received from Dr. Ailyn Mesa  · Pulmonary Clearance received from Dr. Marley Jarrett   · Psychology evaluation with Dr. See Nicole completed and reviewed  · EGD completed with  Dr. John Paul Berrios- Op note/ path reviewed. (-)H. Pylori  · Encouraged to attend support groups. Has attended x 2.  · Goal to lose 3-5% TBW prior to surgery. · Repeat Vit D no within normal range at 34. · EKG NSR/ minimal voltage criteria for LVH/may be nml variant. · Advised not to get pregnant for 18-24 months post bariatric surgery as this can increase risk of malnourishment potentially leading to low birth weight or malformation. · Will need to be off work for 3-4 weeks post-bariatric surgery.   · No lifting/pushing/pulling over 20# for 4 weeks

## 2022-05-23 ENCOUNTER — HOSPITAL ENCOUNTER (OUTPATIENT)
Age: 52
Discharge: HOME | End: 2022-05-23
Payer: MEDICARE

## 2022-05-23 LAB
ALBUMIN SERPL-MCNC: 4.7 G/DL (ref 3.5–5.1)
ALP BLD-CCNC: 99 U/L (ref 38–126)
ALT SERPL-CCNC: 50 U/L (ref 11–66)
ANION GAP SERPL CALCULATED.3IONS-SCNC: 7 MEQ/L (ref 8–16)
AST SERPL-CCNC: 26 U/L (ref 5–40)
AVERAGE GLUCOSE: 117 MG/DL (ref 70–126)
BASOPHILS # BLD: 0.8 %
BASOPHILS ABSOLUTE: 0 THOU/MM3 (ref 0–0.1)
BILIRUB SERPL-MCNC: 1 MG/DL (ref 0.3–1.2)
BUN BLDV-MCNC: 22 MG/DL (ref 7–22)
CALCIUM SERPL-MCNC: 9.3 MG/DL (ref 8.5–10.5)
CHLORIDE BLD-SCNC: 103 MEQ/L (ref 98–111)
CHOLESTEROL, TOTAL: 200 MG/DL (ref 100–199)
CO2: 30 MEQ/L (ref 23–33)
CREAT SERPL-MCNC: 0.7 MG/DL (ref 0.4–1.2)
EKG ATRIAL RATE: 66 BPM
EKG P AXIS: 36 DEGREES
EKG P-R INTERVAL: 174 MS
EKG Q-T INTERVAL: 414 MS
EKG QRS DURATION: 90 MS
EKG QTC CALCULATION (BAZETT): 434 MS
EKG R AXIS: 0 DEGREES
EKG T AXIS: 19 DEGREES
EKG VENTRICULAR RATE: 66 BPM
EOSINOPHIL # BLD: 2.5 %
EOSINOPHILS ABSOLUTE: 0.1 THOU/MM3 (ref 0–0.4)
ERYTHROCYTE [DISTWIDTH] IN BLOOD BY AUTOMATED COUNT: 13.7 % (ref 11.5–14.5)
ERYTHROCYTE [DISTWIDTH] IN BLOOD BY AUTOMATED COUNT: 43.8 FL (ref 35–45)
GFR SERPL CREATININE-BSD FRML MDRD: 88 ML/MIN/1.73M2
GLUCOSE BLD-MCNC: 113 MG/DL (ref 70–108)
HBA1C MFR BLD: 5.9 % (ref 4.4–6.4)
HCT VFR BLD CALC: 42.5 % (ref 37–47)
HDLC SERPL-MCNC: 49 MG/DL
HEMOGLOBIN: 13.4 GM/DL (ref 12–16)
IMMATURE GRANS (ABS): 0.1 THOU/MM3 (ref 0–0.07)
IMMATURE GRANULOCYTES: 1.7 %
LDL CHOLESTEROL CALCULATED: 131 MG/DL
LYMPHOCYTES # BLD: 32.9 %
LYMPHOCYTES ABSOLUTE: 1.9 THOU/MM3 (ref 1–4.8)
MAGNESIUM: 2 MG/DL (ref 1.6–2.4)
MCH RBC QN AUTO: 27.7 PG (ref 26–33)
MCHC RBC AUTO-ENTMCNC: 31.5 GM/DL (ref 32.2–35.5)
MCV RBC AUTO: 87.8 FL (ref 81–99)
MONOCYTES # BLD: 5.2 %
MONOCYTES ABSOLUTE: 0.3 THOU/MM3 (ref 0.4–1.3)
NUCLEATED RED BLOOD CELLS: 0 /100 WBC
PLATELET # BLD: 247 THOU/MM3 (ref 130–400)
PMV BLD AUTO: 10.2 FL (ref 9.4–12.4)
POTASSIUM SERPL-SCNC: 4.5 MEQ/L (ref 3.5–5.2)
RBC # BLD: 4.84 MILL/MM3 (ref 4.2–5.4)
SEG NEUTROPHILS: 56.9 %
SEGMENTED NEUTROPHILS ABSOLUTE COUNT: 3.4 THOU/MM3 (ref 1.8–7.7)
SODIUM BLD-SCNC: 140 MEQ/L (ref 135–145)
TOTAL PROTEIN: 6.9 G/DL (ref 6.1–8)
TRIGL SERPL-MCNC: 99 MG/DL (ref 0–199)
TSH SERPL DL<=0.05 MIU/L-ACNC: 2.01 UIU/ML (ref 0.4–4.2)
VITAMIN D 25-HYDROXY: 32 NG/ML (ref 30–100)
WBC # BLD: 5.9 THOU/MM3 (ref 4.8–10.8)

## 2022-05-23 PROCEDURE — 85025 COMPLETE CBC W/AUTO DIFF WBC: CPT

## 2022-05-23 PROCEDURE — 82306 VITAMIN D 25 HYDROXY: CPT

## 2022-05-23 PROCEDURE — 80053 COMPREHEN METABOLIC PANEL: CPT

## 2022-05-23 PROCEDURE — 83036 HEMOGLOBIN GLYCOSYLATED A1C: CPT

## 2022-05-23 PROCEDURE — 93005 ELECTROCARDIOGRAM TRACING: CPT | Performed by: NURSE PRACTITIONER

## 2022-05-23 PROCEDURE — 36415 COLL VENOUS BLD VENIPUNCTURE: CPT

## 2022-05-23 PROCEDURE — 80061 LIPID PANEL: CPT

## 2022-05-23 PROCEDURE — 83735 ASSAY OF MAGNESIUM: CPT

## 2022-05-23 PROCEDURE — 84443 ASSAY THYROID STIM HORMONE: CPT

## 2022-05-23 PROCEDURE — 93010 ELECTROCARDIOGRAM REPORT: CPT | Performed by: INTERNAL MEDICINE

## 2022-06-22 ENCOUNTER — OFFICE VISIT (OUTPATIENT)
Dept: CARDIOLOGY CLINIC | Age: 52
End: 2022-06-22
Payer: MEDICARE

## 2022-06-22 VITALS
HEART RATE: 64 BPM | WEIGHT: 242 LBS | DIASTOLIC BLOOD PRESSURE: 88 MMHG | BODY MASS INDEX: 47.51 KG/M2 | SYSTOLIC BLOOD PRESSURE: 152 MMHG | HEIGHT: 60 IN

## 2022-06-22 DIAGNOSIS — R06.02 SHORTNESS OF BREATH: ICD-10-CM

## 2022-06-22 DIAGNOSIS — R07.9 CHEST PAIN, UNSPECIFIED TYPE: ICD-10-CM

## 2022-06-22 DIAGNOSIS — I10 PRIMARY HYPERTENSION: Primary | ICD-10-CM

## 2022-06-22 PROCEDURE — 99214 OFFICE O/P EST MOD 30 MIN: CPT | Performed by: NUCLEAR MEDICINE

## 2022-06-22 PROCEDURE — G8427 DOCREV CUR MEDS BY ELIG CLIN: HCPCS | Performed by: NUCLEAR MEDICINE

## 2022-06-22 PROCEDURE — G8417 CALC BMI ABV UP PARAM F/U: HCPCS | Performed by: NUCLEAR MEDICINE

## 2022-06-22 PROCEDURE — 3017F COLORECTAL CA SCREEN DOC REV: CPT | Performed by: NUCLEAR MEDICINE

## 2022-06-22 PROCEDURE — 1036F TOBACCO NON-USER: CPT | Performed by: NUCLEAR MEDICINE

## 2022-06-22 NOTE — PROGRESS NOTES
[address] [facility] .  [address] [place] [address]  Dept: [phone]  Dept Fax: [phone]  Loc: [phone]    Visit Date: 6/22/2022    Jean Claude Ortiz is a 47 y.o. female who presents todayfor:  Chief Complaint   Patient presents with    Hypertension    Shortness of Breath     Seen PCP   Had some arrhythmia  Stress test was okay   Done at [facility]   No report   Does have chest pain   Some times exertional  More exertional dyspnea  Untreated sleep apnea  Higher BP today   Not followed   Major family history of CAD  Severe obesity     HPI:  HPI  Past Medical History:   Diagnosis Date    Anxiety     Arthritis     Bell's palsy     Body mass index 40.0-44.9, adult ([facility] 05/06/2021    Esophagitis     Fatty liver     GERD (gastroesophageal reflux disease)     Hypertension     IBS (irritable bowel syndrome)     Morbid obesity with BMI of 40.0-44.9, adult ([facility]     MRSA (methicillin resistant staph aureus) culture positive     JESSE (obstructive sleep apnea) 05/06/2021    UTI (urinary tract infection)     V tach ([address]     Vertigo       Past Surgical History:   Procedure Laterality Date    BACK SURGERY      BLADDER SUSPENSION      BREAST REDUCTION SURGERY  10/2007    CARPAL TUNNEL RELEASE      CERVICAL FUSION  2017    ACDF C5-C7    CHOLECYSTECTOMY      COLONOSCOPY      DILATION AND CURETTAGE OF UTERUS      ENDOSCOPY, COLON, DIAGNOSTIC      HYSTERECTOMY      LUMBAR FUSION N/A 4/3/2020    L4-S1 DECOMPRESSION AND POSTERIOR FUSION; LUMBAR I & D WITH HARDWARE REMOVAL performed [facility] at [facility]  10/17/2019    L5-S1    UPPER GASTROINTESTINAL ENDOSCOPY N/A 7/19/2019    EGD BIOPSY [facility] by Satinder Hutchins MD at [facility]  7/19/2019    EGD DILATION SAVORY performed by Satinder Hutchins MD [facility]     Family History   Problem Relation Age of Onset    Ulcerative Colitis Mother     Cancer Mother     Arthritis Mother     Cancer Father         skin    High Blood Pressure Father     Diabetes Father     Heart Disease Father     Arthritis Father     Heart Disease Brother     Cancer Brother     Arthritis Brother     Arthritis Sister     Obesity Sister     Cancer Paternal Grandfather     Diabetes Paternal Grandfather     Heart Disease Paternal Grandfather     Obesity Paternal Grandfather     Arthritis Paternal Grandfather     Arthritis Paternal Grandmother     Arthritis Maternal Grandfather     Arthritis Maternal Grandmother     Obesity Maternal Grandmother     Stroke Maternal Grandmother     Heart Disease Maternal Grandmother      Social History     Tobacco Use    Smoking status: Former Smoker     Packs/day: 2.00     Years: 20.00     Pack years: 40.00     Types: Cigarettes     Quit date: 2017     Years since quittin.3    Smokeless tobacco: Never Used   Substance Use Topics    Alcohol use: Not Currently     Comment: occ      Current Outpatient Medications   Medication Sig Dispense Refill    metoprolol succinate (TOPROL XL) 50 MG extended release tablet Take 1 tablet by mouth daily 30 tablet 3    pantoprazole (PROTONIX) 40 MG tablet Take 1 tablet by mouth every morning (before breakfast) 30 tablet 0    Cholecalciferol (VITAMIN D3) 25 MCG (1000 UT) TABS TAKE ONE TABLET BY MOUTH EVERY DAY  1     No current facility-administered medications for this visit.      Allergies   Allergen Reactions    Codeine Rash    Dilaudid [Hydromorphone Hcl] Rash     Health Maintenance   Topic Date Due    COVID-19 Vaccine (1) Never done    Depression Monitoring  Never done    HIV screen  Never done    Hepatitis C screen  Never done    DTaP/Tdap/Td vaccine (1 - Tdap) Never done    Colorectal Cancer Screen  Never done    Breast cancer screen  Never done    Shingles vaccine (1 of 2) Never done    Low dose CT lung screening  Never done  Flu vaccine (Season Ended) 09/01/2022    A1C test (Diabetic or Prediabetic)  05/23/2023    Lipids  05/23/2027    Hepatitis A vaccine  Aged Out    Hepatitis B vaccine  Aged Out    Hib vaccine  Aged Out    Meningococcal (ACWY) vaccine  Aged Out    Pneumococcal 0-64 years Vaccine  Aged Out       Subjective:  Review of Systems  General:   No fever, no chills, some fatigue or weight loss  Pulmonary:    some more dyspnea, no wheezing  Cardiac:    Denies recent chest pain,   GI:     No nausea or vomiting, no abdominal pain  Neuro:     No dizziness or light headedness,   Musculoskeletal:  No recent active issues  Extremities:   No edema, no obvious claudication       Objective:  Physical Exam  BP (!) 152/88   Pulse 64   Ht 5' (1.524 m)   Wt 242 lb (109.8 kg)   LMP 10/27/2015 (Exact Date)   BMI 47.26 kg/m²   General:   Well developed, well nourished  Lungs:    Clear to auscultation  Heart:    Normal S1 S2, Slight murmur. no rubs, no gallops  Abdomen:   Soft, non tender, no organomegalies, positive bowel sounds  Extremities:   No edema, no cyanosis, good peripheral pulses  Neurological:   Awake, alert, oriented. No obvious focal deficits  Musculoskelatal:  No obvious deformities    Assessment:      Diagnosis Orders   1. Primary hypertension     2. Shortness of breath     as above  Risk for CAD  Needs her sleep apnea treated   Need to monitor the BP and monitor the rhythm for A fib   Need to lose weight   Family history is very major at young age     Plan:  No follow-ups on file. As above  Discussed going back to the machine  ? ? Cath   ? ? Event monitor   Continue risk factor modification and medical management    Thank you for allowing me to participate in the care of your patient. Please don't hesitate to contact me regarding any further issues related to the patient care        Orders Placed:  No orders of the defined types were placed in this encounter.       Medications Prescribed:  No orders of the defined types were placed in this encounter. Discussed use, benefit, and side effects of prescribed medications. All patient questions answered. Pt voicedunderstanding. Instructed to continue current medications, diet and exercise. Continue risk factor modification and medical management. Patient agreed with treatment plan. Follow up as directed.     Electronically signedby Enrique Joaquin MD on 6/22/2022 at 8:52 AM

## 2022-06-23 ENCOUNTER — TELEPHONE (OUTPATIENT)
Dept: CARDIOLOGY CLINIC | Age: 52
End: 2022-06-23

## 2022-06-23 NOTE — TELEPHONE ENCOUNTER
Okay. Will see how she odes on the table before we start .  I wont start the case unless she is comfortable and sedated

## 2022-06-23 NOTE — TELEPHONE ENCOUNTER
Pt called office with concerns that she wont be able to hold still during her cath, she said during her back sx they hit a nerve and she is constantly moving     I advised pt that they use versed and fentanyl which should help her relax     She wanted us to let Dr. Fanta Colmenares to see if he had any other suggestions

## 2022-07-14 ENCOUNTER — PREP FOR PROCEDURE (OUTPATIENT)
Dept: CARDIOLOGY | Age: 52
End: 2022-07-14

## 2022-07-14 RX ORDER — ASPIRIN 325 MG
325 TABLET ORAL ONCE
Status: CANCELLED | OUTPATIENT
Start: 2022-07-14 | End: 2022-07-14

## 2022-07-14 RX ORDER — SODIUM CHLORIDE 0.9 % (FLUSH) 0.9 %
5-40 SYRINGE (ML) INJECTION EVERY 12 HOURS SCHEDULED
Status: CANCELLED | OUTPATIENT
Start: 2022-07-14

## 2022-07-14 RX ORDER — SODIUM CHLORIDE 9 MG/ML
INJECTION, SOLUTION INTRAVENOUS CONTINUOUS
Status: CANCELLED | OUTPATIENT
Start: 2022-07-14

## 2022-07-14 RX ORDER — NITROGLYCERIN 0.4 MG/1
0.4 TABLET SUBLINGUAL EVERY 5 MIN PRN
Status: CANCELLED | OUTPATIENT
Start: 2022-07-14

## 2022-07-14 RX ORDER — SODIUM CHLORIDE 0.9 % (FLUSH) 0.9 %
5-40 SYRINGE (ML) INJECTION PRN
Status: CANCELLED | OUTPATIENT
Start: 2022-07-14

## 2022-07-14 RX ORDER — SODIUM CHLORIDE 9 MG/ML
INJECTION, SOLUTION INTRAVENOUS PRN
Status: CANCELLED | OUTPATIENT
Start: 2022-07-14

## 2022-07-14 NOTE — PRE-PROCEDURE INSTRUCTIONS
No answer, left message to:   [place] on 2nd floor [facility]  NPO after midnight  Bring drivers license and insurance information  Wear comfortable clean clothes  Shower morning of and night before with liquid antibacterial soap  Remove jewelry   May have to stay overnight if have PTCA/stent - bring small overnight bag  Bring medications in original bottles - may take am meds with small amount of water. Do not take any diabetic meds day of procedure. Made aware of visitors limit to 2 at a time  Follow all instructions given by your physician  Please notify doctor office if you need to cancel or reschedule your procedure   needed at discharge  Bring and wear your mask.   If you use CPap or BiPap for sleep apnea, please bring machine with you  Leave valuables at home

## 2022-07-15 ENCOUNTER — HOSPITAL ENCOUNTER (OUTPATIENT)
Dept: INPATIENT UNIT | Age: 52
Discharge: HOME | End: 2022-07-15
Attending: NUCLEAR MEDICINE | Admitting: NUCLEAR MEDICINE
Payer: MEDICARE

## 2022-07-15 VITALS
DIASTOLIC BLOOD PRESSURE: 72 MMHG | OXYGEN SATURATION: 96 % | BODY MASS INDEX: 47.12 KG/M2 | TEMPERATURE: 98.5 F | HEART RATE: 67 BPM | WEIGHT: 240 LBS | RESPIRATION RATE: 21 BRPM | SYSTOLIC BLOOD PRESSURE: 123 MMHG | HEIGHT: 60 IN

## 2022-07-15 PROBLEM — I20.89 ANGINA OF EFFORT: Status: ACTIVE | Noted: 2022-07-15

## 2022-07-15 PROBLEM — I20.8 ANGINA OF EFFORT (HCC): Status: ACTIVE | Noted: 2022-07-15

## 2022-07-15 LAB
ABO: NORMAL
ANION GAP SERPL CALCULATED.3IONS-SCNC: 10 MEQ/L (ref 8–16)
ANTIBODY SCREEN: NORMAL
APTT: 33.2 SECONDS (ref 22–38)
BUN BLDV-MCNC: 27 MG/DL (ref 7–22)
CALCIUM SERPL-MCNC: 9.5 MG/DL (ref 8.5–10.5)
CHLORIDE BLD-SCNC: 104 MEQ/L (ref 98–111)
CHOLESTEROL, TOTAL: 195 MG/DL (ref 100–199)
CO2: 25 MEQ/L (ref 23–33)
CREAT SERPL-MCNC: 0.7 MG/DL (ref 0.4–1.2)
EKG ATRIAL RATE: 59 BPM
EKG P AXIS: 39 DEGREES
EKG P-R INTERVAL: 204 MS
EKG Q-T INTERVAL: 422 MS
EKG QRS DURATION: 94 MS
EKG QTC CALCULATION (BAZETT): 417 MS
EKG R AXIS: -14 DEGREES
EKG T AXIS: 14 DEGREES
EKG VENTRICULAR RATE: 59 BPM
ERYTHROCYTE [DISTWIDTH] IN BLOOD BY AUTOMATED COUNT: 13.7 % (ref 11.5–14.5)
ERYTHROCYTE [DISTWIDTH] IN BLOOD BY AUTOMATED COUNT: 42.5 FL (ref 35–45)
GFR SERPL CREATININE-BSD FRML MDRD: 88 ML/MIN/1.73M2
GLUCOSE BLD-MCNC: 113 MG/DL (ref 70–108)
HCG,BETA SUBUNIT,QUAL,SERUM: 6.7 MIU/ML (ref 0–5)
HCT VFR BLD CALC: 41.6 % (ref 37–47)
HDLC SERPL-MCNC: 48 MG/DL
HEMOGLOBIN: 13.3 GM/DL (ref 12–16)
INR BLD: 0.86 (ref 0.85–1.13)
LDL CHOLESTEROL CALCULATED: 124 MG/DL
MCH RBC QN AUTO: 27.4 PG (ref 26–33)
MCHC RBC AUTO-ENTMCNC: 32 GM/DL (ref 32.2–35.5)
MCV RBC AUTO: 85.6 FL (ref 81–99)
PLATELET # BLD: 237 THOU/MM3 (ref 130–400)
PMV BLD AUTO: 9.9 FL (ref 9.4–12.4)
POTASSIUM SERPL-SCNC: 4.6 MEQ/L (ref 3.5–5.2)
PREGNANCY, SERUM: POSITIVE
RBC # BLD: 4.86 MILL/MM3 (ref 4.2–5.4)
RH FACTOR: NORMAL
SODIUM BLD-SCNC: 139 MEQ/L (ref 135–145)
TRIGL SERPL-MCNC: 115 MG/DL (ref 0–199)
WBC # BLD: 6 THOU/MM3 (ref 4.8–10.8)

## 2022-07-15 PROCEDURE — 85027 COMPLETE CBC AUTOMATED: CPT

## 2022-07-15 PROCEDURE — 85730 THROMBOPLASTIN TIME PARTIAL: CPT

## 2022-07-15 PROCEDURE — 6360000004 HC RX CONTRAST MEDICATION: Performed by: NUCLEAR MEDICINE

## 2022-07-15 PROCEDURE — 2500000003 HC RX 250 WO HCPCS

## 2022-07-15 PROCEDURE — 84702 CHORIONIC GONADOTROPIN TEST: CPT

## 2022-07-15 PROCEDURE — 93010 ELECTROCARDIOGRAM REPORT: CPT | Performed by: INTERNAL MEDICINE

## 2022-07-15 PROCEDURE — 86901 BLOOD TYPING SEROLOGIC RH(D): CPT

## 2022-07-15 PROCEDURE — 93458 L HRT ARTERY/VENTRICLE ANGIO: CPT | Performed by: NUCLEAR MEDICINE

## 2022-07-15 PROCEDURE — 86900 BLOOD TYPING SEROLOGIC ABO: CPT

## 2022-07-15 PROCEDURE — C1894 INTRO/SHEATH, NON-LASER: HCPCS

## 2022-07-15 PROCEDURE — 93458 L HRT ARTERY/VENTRICLE ANGIO: CPT

## 2022-07-15 PROCEDURE — C1769 GUIDE WIRE: HCPCS

## 2022-07-15 PROCEDURE — 99152 MOD SED SAME PHYS/QHP 5/>YRS: CPT

## 2022-07-15 PROCEDURE — 84703 CHORIONIC GONADOTROPIN ASSAY: CPT

## 2022-07-15 PROCEDURE — 2580000003 HC RX 258: Performed by: PHYSICIAN ASSISTANT

## 2022-07-15 PROCEDURE — 85610 PROTHROMBIN TIME: CPT

## 2022-07-15 PROCEDURE — 86850 RBC ANTIBODY SCREEN: CPT

## 2022-07-15 PROCEDURE — 6360000002 HC RX W HCPCS

## 2022-07-15 PROCEDURE — 93005 ELECTROCARDIOGRAM TRACING: CPT | Performed by: PHYSICIAN ASSISTANT

## 2022-07-15 PROCEDURE — 80061 LIPID PANEL: CPT

## 2022-07-15 PROCEDURE — 36415 COLL VENOUS BLD VENIPUNCTURE: CPT

## 2022-07-15 PROCEDURE — 6370000000 HC RX 637 (ALT 250 FOR IP): Performed by: PHYSICIAN ASSISTANT

## 2022-07-15 PROCEDURE — 80048 BASIC METABOLIC PNL TOTAL CA: CPT

## 2022-07-15 RX ORDER — SODIUM CHLORIDE 0.9 % (FLUSH) 0.9 %
5-40 SYRINGE (ML) INJECTION PRN
Status: DISCONTINUED | OUTPATIENT
Start: 2022-07-15 | End: 2022-07-15 | Stop reason: HOSPADM

## 2022-07-15 RX ORDER — CYCLOBENZAPRINE HCL 10 MG
10 TABLET ORAL 3 TIMES DAILY PRN
COMMUNITY

## 2022-07-15 RX ORDER — SODIUM CHLORIDE 9 MG/ML
INJECTION, SOLUTION INTRAVENOUS PRN
Status: DISCONTINUED | OUTPATIENT
Start: 2022-07-15 | End: 2022-07-15 | Stop reason: HOSPADM

## 2022-07-15 RX ORDER — HYDROCHLOROTHIAZIDE 12.5 MG/1
12.5 CAPSULE, GELATIN COATED ORAL DAILY PRN
COMMUNITY

## 2022-07-15 RX ORDER — NITROGLYCERIN 0.4 MG/1
0.4 TABLET SUBLINGUAL EVERY 5 MIN PRN
Status: DISCONTINUED | OUTPATIENT
Start: 2022-07-15 | End: 2022-07-15 | Stop reason: HOSPADM

## 2022-07-15 RX ORDER — ACETAMINOPHEN 325 MG/1
650 TABLET ORAL EVERY 4 HOURS PRN
Status: DISCONTINUED | OUTPATIENT
Start: 2022-07-15 | End: 2022-07-15 | Stop reason: HOSPADM

## 2022-07-15 RX ORDER — SODIUM CHLORIDE 9 MG/ML
INJECTION, SOLUTION INTRAVENOUS CONTINUOUS
Status: DISCONTINUED | OUTPATIENT
Start: 2022-07-15 | End: 2022-07-15 | Stop reason: HOSPADM

## 2022-07-15 RX ORDER — CIPROFLOXACIN 500 MG/1
500 TABLET, FILM COATED ORAL ONCE
COMMUNITY

## 2022-07-15 RX ORDER — ATROPINE SULFATE 0.4 MG/ML
0.5 AMPUL (ML) INJECTION
Status: DISCONTINUED | OUTPATIENT
Start: 2022-07-15 | End: 2022-07-15 | Stop reason: HOSPADM

## 2022-07-15 RX ORDER — SODIUM CHLORIDE 0.9 % (FLUSH) 0.9 %
5-40 SYRINGE (ML) INJECTION EVERY 12 HOURS SCHEDULED
Status: DISCONTINUED | OUTPATIENT
Start: 2022-07-15 | End: 2022-07-15 | Stop reason: HOSPADM

## 2022-07-15 RX ORDER — ASPIRIN 325 MG
325 TABLET ORAL ONCE
Status: COMPLETED | OUTPATIENT
Start: 2022-07-15 | End: 2022-07-15

## 2022-07-15 RX ADMIN — IOPAMIDOL 40 ML: 755 INJECTION, SOLUTION INTRAVENOUS at 13:03

## 2022-07-15 RX ADMIN — SODIUM CHLORIDE: 9 INJECTION, SOLUTION INTRAVENOUS at 12:03

## 2022-07-15 RX ADMIN — ASPIRIN 325 MG: 325 TABLET ORAL at 11:22

## 2022-07-15 NOTE — DISCHARGE INSTRUCTIONS
Discharge Instructions for Radial Heart Catheterization without Stents    A heart attack (myocardial infarction, or MI) occurs when one or more of the coronary arteries, which supply the heart with oxygen-rich blood, is blocked. A blockage usually occurs when plaque inside the artery breaks open and a blood clot forms in the artery. After a heart attack, you may be worried about your future. Over the next several weeks, your heart will start to heal. Though it can be hard to break old habits, you can prevent another heart attack by making some lifestyle changes and by taking medicines. You may use this information for ideas about what to do at home to speed your recovery. ACTIVITY: Minimize movement of wrist for 24 hour, after discharge. May drive after 24 hours of going home. If [place] no driving for 5 days  Walking is a good choice. Bit by bit, increase the amount you walk every day. Try for at least 30 minutes on most days of the week. Talk with your doctor before you start an exercise program to make sure it is safe for you. Limit activities for 2 weeks after going home. No heavy lifting of 5  lbs for the 72 hours. May shower but no tub bath, swimming, hot tub for 7 days. WOUND CARE:  keep wound clean and dry and ice to area for comfort    Wash area with separate wash cloth to prevent infection  Keep band aid on for 24 hours after going home then remove  You may have a little bruising. If bleeding occurs hold pressure for 15 minutes and call your cardiologist.If a lump redness, or tenderness, any drainage occurs notify the cardiologist.    DIET:  low fat, low cholesterol, minimize simple sugars, and 2 liter/day fluid restriction  Eat a heart-healthy diet that is low in cholesterol, saturated fat, and salt, and is full of fruits, vegetables and whole-grains. Eat at least two servings of fish each week.  You may get more details about how to eat healthy, but these tips can help you get started. Follow the HEALTHY DIET booklet for more information      MEDICATION INSTRUCTIONS:  DO NOT STOP TAKING ASPIRIN OR PLAVIX UNTIL DIRECTED BY CARDIOLOGIST  Take nitroglycerin if any chest pain or shortness of breath. Take tylenol for pain every 4-6 hours. No over the counter antacids, or stomach medication should be taken at same as antiplatelet medication (plavix). Call if any of the following symptoms occur:    fever, chills, and fatigue  Call physican for any signs of bleeding, swelling, pain, redness, and coolness of extremity. MISC:  May return to work after seen by cardiologist at follow up appt. FOLLOW UP CARE  Follow-up care is a key part of your treatment and safety. Be sure to make and go to all appointments, and call your doctor if you are having problems. It's also a good idea to know your test results and keep a list of the medicines you take. If your doctor has not set you up with a cardiac rehabilitation (rehab) program, talk to him or her about whether that is right for you. Cardiac rehab includes supervised exercise, help with diet and lifestyle changes, and emotional support. It may reduce your risk of future heart problems.   If you do not hear from them please call [phone]    Follow up with OBGYN regarding positive pregnancy test and elevated beta hcg

## 2022-07-15 NOTE — PROCEDURES
[address] [place] [address]                            CARDIAC CATHETERIZATION    PATIENT NAME: Scot Lipoma                :        1970  MED REC NO:   [id number]                           ROOM:       0014  ACCOUNT NO:   [de-identified]                           ADMIT DATE: 07/15/2022  PROVIDER:     Sarah Martinez M.D.    Eugenene Wilsonius:  07/15/2022    CLINICAL HISTORY AND INDICATION:  This is a 23-year-old patient with  extensive family history of coronary artery disease who has had symptoms  of shortness of breath and chest pain, was referred for cardiac  catheterization to evaluate coronary anatomy. PROCEDURE:  1. Left heart cath with LV-gram.  2.  Coronary angiogram, right and left. 3.  Sedation with 4 of Versed and 100 of fentanyl between 12:00 and  01:00 p.m. in my presence under my supervision. 4.  Blood loss, less than 10 mL. PROCEDURE DETAILS:  Please refer to catheterization detailed note. HEMODYNAMIC RESULTS:  LEFT VENTRICULOGRAM:  Left ventricular end-diastolic pressure was 12  mmHg with no significant change before and after contrast injection. No  significant gradient across the aortic valve to signify aortic stenosis. Left ventricular function was globally within normal limits. EF 60%. CORONARY ANGIOGRAM RESULTS:  1. Left main is patent, gives rise to left anterior descending and left  circumflex artery. 2.  Left anterior descending artery is pretty much patent. 3.  Left circumflex artery is nondominant, patent. 4.  Right coronary artery is large, dominant, and patent. CONCLUSIONS:  1.  No significant coronary artery disease. 2.  Normal LV function. 3.  No complication. RECOMMENDATIONS:  At this point, continued medical treatment is  recommended.         Pramod Swanson M.D.    D: 07/15/2022 13:11:20       T: 07/15/2022 13:13:48     [id number]  Job#: [id number]     Doc#: [id number]    CC:

## 2022-07-15 NOTE — FLOWSHEET NOTE
1120 Patient admitted to 2E14  Ambulatory for heart catherization  Patient NPO. Patient accompanied by daughters  Vital signs obtained. Assessment and data collection intiated. Oriented to room. Policies and procedures for 2E explained. All questions answered with no further questions at this time. Fall precautions reviewed with patient. [address] reviewed with patient and daughters. Patient and daughters verbalize understanding of the plan of care and contribute to goal setting. 1219 to cath lab per bed, stable condition. 1310 care taken over from cath lab, site with vasc band on right radial dry and intact with 10 ml/air in band. Patient instructed to leave arm on pillow     1430 up in room, tolerated activity well. Started to remove air from band. 1700 Discharge instructions given. [id number] Discharged per wheelchair, stable condition. Philly Robert .Patient goals/plan/treatment preferences discussed by this RN. Discharge planning provided by this RN. Patient goals/plan/treatment preferences reviewed with patient/family. Patient/family verbalize understanding of discharge plan and are in agreement with goal/plan/treatment preferences. Understanding was demonstrated using the teach back method. AVS provided by this RN at time of discharge, which includes all necessary medical information pertaining to the patients current course of illness, treatment, post-discharge goals of care, and treatment preferences.

## 2022-07-15 NOTE — H&P
[address] [place] [address]  Sedation/Analgesia History & Physical    Pt Name: Essie Mak  Account number: [de-identified]  MRN: [id number]  YOB: 1970  Provider Performing Procedure: Machelle Reardon MD MD [facility] [place]  Primary Care Physician: Machelle Reardon MD  Date: 7/15/2022    PRE-PROCEDURE    Code Status: FULL CODE  Brief History/Pre-Procedure Diagnosis:   Angina        Consent: : I have discussed with the patient risks, benefits, and alternatives (and relevant risks, benefits, and side effects related to alternatives or not receiving care), and likelihood of the success. The patient and/or representative appear to understand and agree to proceed. The discussion encompasses risks, benefits, and side effects related to the alternatives and the risks related to not receiving the proposed care, treatment, and services. MEDICAL HISTORY  []ASHD/ANGINA/MI/CHF   [x]Hypertension  []Diabetes  []Hyperlipidemia  []Smoking  []Family Hx of ASHD  []Additional information:       has a past medical history of Anxiety, Arthritis, Bell's palsy, Body mass index 40.0-44.9, adult (HCC), Esophagitis, Fatty liver, GERD (gastroesophageal reflux disease), Hypertension, IBS (irritable bowel syndrome), Morbid obesity with BMI of 40.0-44.9, adult ([address] MRSA (methicillin resistant staph aureus) culture positive, JESSE (obstructive sleep apnea), UTI (urinary tract infection), V tach ([address] and Vertigo. SURGICAL HISTORY   has a past surgical history that includes Cholecystectomy; Dilation and curettage of uterus; Breast reduction surgery (10/2007); Hysterectomy; Endoscopy, colon, diagnostic; Colonoscopy; bladder suspension; Upper gastrointestinal endoscopy (N/A, 7/19/2019); Upper gastrointestinal endoscopy (7/19/2019); cervical fusion (2017); Lumbar spine surgery (10/17/2019); back surgery; lumbar fusion (N/A, 4/3/2020); and Carpal tunnel release.   Additional information:       ALLERGIES   Allergies as of 07/15/2022 - Fully Reviewed 07/15/2022   Allergen Reaction Noted    Codeine Rash 09/26/2011    Dilaudid [hydromorphone hcl] Rash 09/26/2011     Additional information:       MEDICATIONS   Aspirin  [x] 81 mg  [] 325 mg  [] None  Antiplatelet drug therapy use last 5 days  []No []Yes  Coumadin Use Last 5 Days []No []Yes  Other anticoagulant use last 5 days  []No []Yes    Current Facility-Administered Medications:     0.9 % sodium chloride infusion, , IntraVENous, Continuous, Maddie De La Fuente PA-C    sodium chloride flush 0.9 % injection 5-40 mL, 5-40 mL, IntraVENous, 2 times per day, Aureliano Castle PA-C    sodium chloride flush 0.9 % injection 5-40 mL, 5-40 mL, IntraVENous, PRN, Maddie De La Fuente PA-C    0.9 % sodium chloride infusion, , IntraVENous, PRN, Aureliano Castle PA-C    nitroGLYCERIN (NITROSTAT) SL tablet 0.4 mg, 0.4 mg, SubLINGual, Q5 Min PRN, Aureliano Castle PA-C  Prior to Admission medications    Medication Sig Start Date End Date Taking?  Authorizing Provider   ciprofloxacin (CIPRO) 500 MG tablet Take 500 mg by mouth once   Yes Historical Provider, MD   hydroCHLOROthiazide (MICROZIDE) 12.5 MG capsule Take 12.5 mg by mouth daily as needed   Yes Historical Provider, MD   cyclobenzaprine (FLEXERIL) 10 MG tablet Take 10 mg by mouth 3 times daily as needed for Muscle spasms   Yes Historical Provider, MD   metoprolol succinate (TOPROL XL) 50 MG extended release tablet Take 1 tablet by mouth daily 2/8/20   Ginger Jacob MD   pantoprazole (PROTONIX) 40 MG tablet Take 1 tablet by mouth every morning (before breakfast) 2/7/20   Ginger Jacob MD   Cholecalciferol (VITAMIN D3) 25 MCG (1000 UT) TABS TAKE ONE TABLET BY MOUTH EVERY DAY 11/22/19   Historical Provider, MD     Additional information:       VITAL SIGNS   Vitals:    07/15/22 1041   BP:    Pulse: 66   Resp: 18   Temp:    SpO2:        PHYSICAL:   General: No acute distress  HEENT:  Unremarkable for age  Neck: without increased JVD, carotid pulses 2+ bilaterally without bruits  Heart: RRR, S1 & S2 WNL, S4 gallop, without murmurs or rubs    Lungs: Clear to auscultation    Abdomen: BS present, without HSM, masses, or tenderness    Extremities: without C,C,E.  Pulses 2+ bilaterally  Mental Status: Alert & Oriented        PLANNED PROCEDURE   [x]Cath  []PCI                []Pacemaker/AICD  []NEW             []Cardioversion []Peripheral angiography/PTA  []Other:      SEDATION  Planned agent:[x]Midazolam []Meperidine [x]Sublimaze []Morphine  []Diazepam  []Other:     ASA Classification:  []1 [x]2 []3 []4 []5  Class 1: A normal healthy patient  Class 2: Pt with mild to moderate systemic disease  Class 3: Severe systemic disease or disturbance  Class 4: Severe systemic disorders that are already life threatening. Class 5: Moribund pt with little chances of survival, for more than 24 hours. Mallampati I Airway Classification:   []1 [x]2 []3 []4    [x]Pre-procedure diagnostic studies complete and results available. Comment:    [x]Previous sedation/anesthesia experiences assessed. Comment:    [x]The patient is an appropriate candidate to undergo the planned procedure sedation and anesthesia. (Refer to nursing sedation/analgesia documentation record)  [x]Formulation and discussion of sedation/procedure plan, risks, and expectations with patient and/or responsible adult completed. [x]Patient examined immediately prior to the procedure.  (Refer to nursing sedation/analgesia documentation record)    Sobeida Fischer MD MD [facility] [place]  Electronically signed 7/15/2022 at 11:44 AM

## 2022-07-15 NOTE — PLAN OF CARE
Problem: Discharge Planning  Goal: Discharge to home or other facility with appropriate resources  7/15/2022 1431 by Lolita Simmons RN  Outcome: Completed  7/15/2022 1330 by Lolita Simmons RN  Outcome: Progressing  Flowsheets  Taken 7/15/2022 1100  Discharge to home or other facility with appropriate resources:   Identify barriers to discharge with patient and caregiver   Arrange for needed discharge resources and transportation as appropriate  Taken 7/15/2022 1015  Discharge to home or other facility with appropriate resources: Identify barriers to discharge with patient and caregiver     Problem: Cardiovascular - Adult  Goal: Maintains optimal cardiac output and hemodynamic stability  7/15/2022 1431 by Lolita Simmons RN  Outcome: Completed  7/15/2022 1330 by Lolita Simmons RN  Outcome: Progressing     Problem: Skin/Tissue Integrity - Adult  Goal: Incisions, wounds, or drain sites healing without S/S of infection  7/15/2022 1431 by Lolita Simmons RN  Outcome: Completed  7/15/2022 1330 by Lolita Simmons RN  Outcome: Progressing     Problem: Musculoskeletal - Adult  Goal: Return ADL status to a safe level of function  7/15/2022 1431 by Lolita Simmons RN  Outcome: Completed  7/15/2022 1330 by Lolita Simmons RN  Outcome: Progressing     Problem: Gastrointestinal - Adult  Goal: Maintains adequate nutritional intake  7/15/2022 1431 by Lolita Simmons RN  Outcome: Completed  7/15/2022 1330 by Lolita Simmons RN  Outcome: Progressing     Problem: Genitourinary - Adult  Goal: Absence of urinary retention  7/15/2022 1431 by Lolita Simmons RN  Outcome: Completed  7/15/2022 1330 by Lolita Simmons RN  Outcome: Progressing     Problem: Infection - Adult  Goal: Absence of infection at discharge  7/15/2022 1431 by Lolita Simmons RN  Outcome: Completed  7/15/2022 1330 by Lolita Simmons RN  Outcome: Progressing     Problem: Metabolic/Fluid and Electrolytes - Adult  Goal: Hemodynamic stability and optimal renal function maintained  7/15/2022 1431 by Radha Brooks RN  Outcome: Completed  7/15/2022 1330 by Radha Brooks RN  Outcome: Progressing     Problem: Hematologic - Adult  Goal: Maintains hematologic stability  7/15/2022 1431 by Radha Brooks RN  Outcome: Completed  7/15/2022 1330 by Radha Brooks RN  Outcome: Progressing

## 2022-07-15 NOTE — PLAN OF CARE
Problem: Discharge Planning  Goal: Discharge to home or other facility with appropriate resources  Outcome: Progressing  Flowsheets  Taken 7/15/2022 1100  Discharge to home or other facility with appropriate resources:   Identify barriers to discharge with patient and caregiver   Arrange for needed discharge resources and transportation as appropriate  Taken 7/15/2022 1015  Discharge to home or other facility with appropriate resources: Identify barriers to discharge with patient and caregiver     Problem: Respiratory - Adult  Goal: Achieves optimal ventilation and oxygenation  Outcome: Progressing     Problem: Cardiovascular - Adult  Goal: Maintains optimal cardiac output and hemodynamic stability  Outcome: Progressing     Problem: Skin/Tissue Integrity - Adult  Goal: Incisions, wounds, or drain sites healing without S/S of infection  Outcome: Progressing     Problem: Musculoskeletal - Adult  Goal: Return ADL status to a safe level of function  Outcome: Progressing     Problem: Gastrointestinal - Adult  Goal: Maintains adequate nutritional intake  Outcome: Progressing     Problem: Genitourinary - Adult  Goal: Absence of urinary retention  Outcome: Progressing     Problem: Infection - Adult  Goal: Absence of infection at discharge  Outcome: Progressing     Problem: Metabolic/Fluid and Electrolytes - Adult  Goal: Hemodynamic stability and optimal renal function maintained  Outcome: Progressing     Problem: Hematologic - Adult  Goal: Maintains hematologic stability  Outcome: Progressing  . Mirtha Verde Care plan reviewed with patient and daughters. Patient and daughters verbalize understanding of the plan of care and contribute to goal setting.

## 2022-07-21 ENCOUNTER — HOSPITAL ENCOUNTER (OUTPATIENT)
Age: 52
Discharge: HOME | End: 2022-07-21
Payer: MEDICARE

## 2022-07-21 LAB — HCG,BETA SUBUNIT,QUAL,SERUM: 6.8 MIU/ML (ref 0–5)

## 2022-07-21 PROCEDURE — 36415 COLL VENOUS BLD VENIPUNCTURE: CPT

## 2022-07-21 PROCEDURE — 84702 CHORIONIC GONADOTROPIN TEST: CPT

## 2022-09-02 ENCOUNTER — INITIAL CONSULT (OUTPATIENT)
Dept: BARIATRICS/WEIGHT MGMT | Age: 52
End: 2022-09-02
Payer: MEDICARE

## 2022-09-02 VITALS
TEMPERATURE: 97.9 F | HEIGHT: 62 IN | BODY MASS INDEX: 43.91 KG/M2 | DIASTOLIC BLOOD PRESSURE: 84 MMHG | SYSTOLIC BLOOD PRESSURE: 118 MMHG | HEART RATE: 60 BPM | WEIGHT: 238.6 LBS

## 2022-09-02 DIAGNOSIS — G89.29 CHRONIC BILATERAL LOW BACK PAIN, UNSPECIFIED WHETHER SCIATICA PRESENT: ICD-10-CM

## 2022-09-02 DIAGNOSIS — I10 HYPERTENSION, UNSPECIFIED TYPE: ICD-10-CM

## 2022-09-02 DIAGNOSIS — F32.A DEPRESSION, UNSPECIFIED DEPRESSION TYPE: ICD-10-CM

## 2022-09-02 DIAGNOSIS — K21.9 GASTROESOPHAGEAL REFLUX DISEASE, UNSPECIFIED WHETHER ESOPHAGITIS PRESENT: ICD-10-CM

## 2022-09-02 DIAGNOSIS — K76.0 FATTY LIVER: ICD-10-CM

## 2022-09-02 DIAGNOSIS — E55.9 VITAMIN D DEFICIENCY: ICD-10-CM

## 2022-09-02 DIAGNOSIS — Z99.89 OSA ON CPAP: ICD-10-CM

## 2022-09-02 DIAGNOSIS — G47.33 OSA ON CPAP: ICD-10-CM

## 2022-09-02 DIAGNOSIS — F41.9 ANXIETY: ICD-10-CM

## 2022-09-02 DIAGNOSIS — E66.01 MORBID OBESITY (HCC): Primary | ICD-10-CM

## 2022-09-02 DIAGNOSIS — K58.9 IRRITABLE BOWEL SYNDROME, UNSPECIFIED TYPE: ICD-10-CM

## 2022-09-02 DIAGNOSIS — M54.50 CHRONIC BILATERAL LOW BACK PAIN, UNSPECIFIED WHETHER SCIATICA PRESENT: ICD-10-CM

## 2022-09-02 PROCEDURE — G8427 DOCREV CUR MEDS BY ELIG CLIN: HCPCS | Performed by: SURGERY

## 2022-09-02 PROCEDURE — 3017F COLORECTAL CA SCREEN DOC REV: CPT | Performed by: SURGERY

## 2022-09-02 PROCEDURE — 1036F TOBACCO NON-USER: CPT | Performed by: SURGERY

## 2022-09-02 PROCEDURE — 99214 OFFICE O/P EST MOD 30 MIN: CPT | Performed by: SURGERY

## 2022-09-02 PROCEDURE — G8417 CALC BMI ABV UP PARAM F/U: HCPCS | Performed by: SURGERY

## 2022-09-02 RX ORDER — DICYCLOMINE HYDROCHLORIDE 10 MG/1
CAPSULE ORAL
COMMUNITY
Start: 2022-09-01

## 2022-09-04 ASSESSMENT — ENCOUNTER SYMPTOMS
CHEST TIGHTNESS: 0
VOICE CHANGE: 0
VOMITING: 0
EYE PAIN: 0
RHINORRHEA: 0
SHORTNESS OF BREATH: 1
ANAL BLEEDING: 0
COUGH: 0
COLOR CHANGE: 0
BACK PAIN: 1
WHEEZING: 0
ABDOMINAL DISTENTION: 0
TROUBLE SWALLOWING: 0
SINUS PRESSURE: 0
ALLERGIC/IMMUNOLOGIC NEGATIVE: 1
APNEA: 0
ABDOMINAL PAIN: 0
BLOOD IN STOOL: 0
EYE REDNESS: 0
EYE DISCHARGE: 0
CONSTIPATION: 0
SORE THROAT: 0
CHOKING: 0
RECTAL PAIN: 0
STRIDOR: 0
DIARRHEA: 0
NAUSEA: 0
PHOTOPHOBIA: 0
FACIAL SWELLING: 0
EYE ITCHING: 0

## 2022-09-04 NOTE — PROGRESS NOTES
Virginia Wright (:  1970)     ASSESSMENT:  1.  Morbid obesity (BMI 43)  2. Sleep apnea  3. Anxiety  4. GERD  5. Fatty liver disease  6. Bell's palsy  7. Chronic lower back pain  8. Depression  9. Hypertension  10. Irritable bowel syndrome  11. Coronary artery disease    PLAN:  1. Long discussion about the pros and cons of weight loss surgery. The risks benefits and alternatives to laparoscopic adjustable band, gastric sleeve and gastric Boyd-en-Y bypass were discussed in detail. The pros and cons of robotic assisted, laparoscopic and open techniques were discussed. 2.  Behavior modification discussed in detail in regards to dietary habits. 3.  Nutritional education occurred during visit. Will set up a consultation/evaluation with dietitian for further evaluation. 4.  Options for medical management of morbid obesity discussed. 5.  Improvement in fitness/exercise discussed with patient and the need for this with/without surgery. 6.  Obtain medical necessity letter from PCP as needed. 7.  Follow-up in one month at weight management program at [facility]. 8.  Signs and symptoms reviewed with patient that would be concerning and need her to return to office for re-evaluation. Patient states she will call if she has questions or concerns. 9. Multivitamin  10. Psychology evaluation as needed. 11.  EGD has already been completed. Was H. pylori negative at that time. Following up with GI as needed. 12.  Encouraged support groups    Patient states that if she is not able to lose enough adequate excess body weight with medical management only then she would be like to proceed with surgical intervention such as sleeve gastrectomy/gastric bypass for further weight loss. More than 40 minutes spent with patient today. Greater than 50% of the time was involved counseling, educaton and coordinating care.       SUBJECTIVE/OBJECTIVE:    Chief Complaint   Patient presents with Weight Loss     Rejoining - 6 months - desires sleeve     HPI  Marciano Botello is a 36-year-old female who presents for initial evaluation at the weight management program secondary to her morbid obesity. BMI 43. Current weight 238 pounds. She was actually here in the program last year. She was about ready to proceed with surgical intervention for weight loss but then had to drop out at the end because her mother went on hospice and she felt she needed to care for her prior to any kind of surgical intervention for weight loss. She states she is now ready to focus on weight loss. Excited to resume the program.  Ready to work with the dietitian again. Still plans to proceed with surgical intervention for weight loss if she is not able to lose enough adequate excess body weight with medical management only. Since then she has had some coronary artery issues and is following with cardiology. Denies current chest pain or shortness of breath. No hematochezia or melena. No new urinary complaints. Review of Systems   Constitutional:  Positive for fatigue. Negative for activity change, appetite change, chills, diaphoresis, fever and unexpected weight change. HENT:  Negative for congestion, dental problem, drooling, ear discharge, ear pain, facial swelling, hearing loss, mouth sores, nosebleeds, postnasal drip, rhinorrhea, sinus pressure, sneezing, sore throat, tinnitus, trouble swallowing and voice change. Eyes:  Negative for photophobia, pain, discharge, redness, itching and visual disturbance. Respiratory:  Positive for shortness of breath. Negative for apnea, cough, choking, chest tightness, wheezing and stridor. Cardiovascular:  Positive for palpitations and leg swelling. Negative for chest pain. Gastrointestinal:  Negative for abdominal distention, abdominal pain, anal bleeding, blood in stool, constipation, diarrhea, nausea, rectal pain and vomiting. Endocrine: Negative.     Genitourinary: Negative for decreased urine volume, difficulty urinating, dyspareunia, dysuria, enuresis, flank pain, frequency, genital sores, hematuria, menstrual problem, pelvic pain, urgency, vaginal bleeding, vaginal discharge and vaginal pain. Musculoskeletal:  Positive for back pain. Negative for arthralgias, gait problem, joint swelling, myalgias, neck pain and neck stiffness. Skin:  Negative for color change, pallor, rash and wound. Allergic/Immunologic: Negative. Neurological:  Negative for dizziness, tremors, seizures, syncope, facial asymmetry, speech difficulty, weakness, light-headedness, numbness and headaches. Hematological:  Negative for adenopathy. Does not bruise/bleed easily. Psychiatric/Behavioral:  Negative for agitation, behavioral problems, confusion, decreased concentration, dysphoric mood, hallucinations, self-injury, sleep disturbance and suicidal ideas. The patient is not nervous/anxious and is not hyperactive.       Past Medical History:   Diagnosis Date    Anxiety     Arthritis     Bell's palsy     Body mass index 40.0-44.9, adult ([facility]) 05/06/2021    Esophagitis     Fatty liver     GERD (gastroesophageal reflux disease)     Hypertension     IBS (irritable bowel syndrome)     Morbid obesity with BMI of 40.0-44.9, adult ([facility])     MRSA (methicillin resistant staph aureus) culture positive     JESSE (obstructive sleep apnea) 05/06/2021    UTI (urinary tract infection)     V tach ([address]     Vertigo        Past Surgical History:   Procedure Laterality Date    BACK SURGERY      BLADDER SUSPENSION      BREAST REDUCTION SURGERY  10/2007    CARPAL TUNNEL RELEASE      CERVICAL FUSION  2017    ACDF C5-C7    CHOLECYSTECTOMY      COLONOSCOPY      DILATION AND CURETTAGE OF UTERUS      ENDOSCOPY, COLON, DIAGNOSTIC      HYSTERECTOMY (CERVIX STATUS UNKNOWN)      LUMBAR FUSION N/A 4/3/2020    L4-S1 DECOMPRESSION AND POSTERIOR FUSION; LUMBAR I & D WITH HARDWARE REMOVAL performed by Otilia Reese MD at [facility]    LUMBAR SPINE SURGERY  10/17/2019    L5-S1    UPPER GASTROINTESTINAL ENDOSCOPY N/A 7/19/2019    EGD BIOPSY performed by Clarice Miller MD at [address]  7/19/2019    EGD DILATION SAVORY performed by Clarice Miller MD at [facility]       Current Outpatient Medications   Medication Sig Dispense Refill    ciprofloxacin (CIPRO) 500 MG tablet Take 500 mg by mouth once      hydroCHLOROthiazide (MICROZIDE) 12.5 MG capsule Take 12.5 mg by mouth daily as needed      cyclobenzaprine (FLEXERIL) 10 MG tablet Take 10 mg by mouth 3 times daily as needed for Muscle spasms      metoprolol succinate (TOPROL XL) 50 MG extended release tablet Take 1 tablet by mouth daily 30 tablet 3    pantoprazole (PROTONIX) 40 MG tablet Take 1 tablet by mouth every morning (before breakfast) 30 tablet 0    Cholecalciferol (VITAMIN D3) 25 MCG (1000 UT) TABS TAKE ONE TABLET BY MOUTH EVERY DAY  1    dicyclomine (BENTYL) 10 MG capsule  (Patient not taking: Reported on 9/2/2022)       No current facility-administered medications for this visit.        Allergies   Allergen Reactions    Codeine Rash    Dilaudid [Hydromorphone Hcl] Rash       Family History   Problem Relation Age of Onset    Stroke Mother     Ulcerative Colitis Mother     Cancer Mother     Arthritis Mother     Stroke Father     Cancer Father         skin    High Blood Pressure Father     Diabetes Father     Heart Disease Father     Arthritis Father     Arthritis Sister     Obesity Sister     Heart Disease Brother     Cancer Brother     Arthritis Brother     Arthritis Maternal Grandmother     Obesity Maternal Grandmother     Stroke Maternal Grandmother     Heart Disease Maternal Grandmother     Arthritis Maternal Grandfather     Arthritis Paternal Grandmother     Stroke Paternal Grandfather     Cancer Paternal Grandfather     Diabetes Paternal Grandfather     Heart Disease Paternal Grandfather     Obesity Paternal Grandfather     Arthritis Paternal Grandfather        Social History     Socioeconomic History    Marital status:      Spouse name: Not on file    Number of children: 3    Years of education: 12    Highest education level: Not on file   Occupational History    Occupation: Home Health Aide   Tobacco Use    Smoking status: Former     Packs/day: 2.00     Years: 20.00     Pack years: 40.00     Types: Cigarettes     Quit date: 2017     Years since quittin.6    Smokeless tobacco: Never   Vaping Use    Vaping Use: Never used   Substance and Sexual Activity    Alcohol use: Yes     Comment: occ    Drug use: No    Sexual activity: Not Currently   Other Topics Concern    Not on file   Social History Narrative    Not on file     Social Determinants of Health     Financial Resource Strain: Not on file   Food Insecurity: Not on file   Transportation Needs: Not on file   Physical Activity: Not on file   Stress: Not on file   Social Connections: Not on file   Intimate Partner Violence: Not on file   Housing Stability: Not on file     Vitals:    22 1054   BP: 118/84   Site: Right Upper Arm   Position: Sitting   Cuff Size: Large Adult   Pulse: 60   Temp: 97.9 °F (36.6 °C)   TempSrc: Oral   Weight: 238 lb 9.6 oz (108.2 kg)   Height: 5' 2\" (1.575 m)     Body mass index is 43.64 kg/m². Wt Readings from Last 3 Encounters:   22 238 lb 9.6 oz (108.2 kg)   07/15/22 240 lb (108.9 kg)   22 242 lb (109.8 kg)     Physical Exam  Vitals reviewed. Constitutional:       General: She is not in acute distress. Appearance: She is well-developed. She is not diaphoretic. HENT:      Head: Normocephalic and atraumatic. Right Ear: External ear normal.      Left Ear: External ear normal.      Nose: Nose normal.   Eyes:      General: No scleral icterus. Right eye: No discharge. Left eye: No discharge. Conjunctiva/sclera: Conjunctivae normal.   Cardiovascular:      Rate and Rhythm: Normal rate and regular rhythm. Heart sounds: Normal heart sounds. Pulmonary:      Effort: Pulmonary effort is normal. No respiratory distress. Breath sounds: Normal breath sounds. No wheezing or rales. Chest:      Chest wall: No tenderness. Abdominal:      General: Bowel sounds are normal. There is no distension. Palpations: Abdomen is soft. There is no mass. Tenderness: There is no abdominal tenderness. There is no guarding or rebound. Musculoskeletal:         General: No tenderness. Normal range of motion. Cervical back: Normal range of motion and neck supple. Skin:     General: Skin is warm and dry. Coloration: Skin is not pale. Findings: No erythema or rash. Neurological:      Mental Status: She is alert and oriented to person, place, and time. Cranial Nerves: No cranial nerve deficit. Psychiatric:         Behavior: Behavior normal.         Thought Content:  Thought content normal.         Judgment: Judgment normal.     Lab Results   Component Value Date    WBC 6.0 07/15/2022    HGB 13.3 07/15/2022    HCT 41.6 07/15/2022    MCV 85.6 07/15/2022     07/15/2022     Lab Results   Component Value Date     07/15/2022    K 4.6 07/15/2022     07/15/2022    CO2 25 07/15/2022     Lab Results   Component Value Date    CREATININE 0.7 07/15/2022     Lab Results   Component Value Date    ALT 50 05/23/2022    AST 26 05/23/2022    ALKPHOS 99 05/23/2022    BILITOT 1.0 05/23/2022     Lab Results   Component Value Date    LIPASE 32.8 06/29/2015       Patient Active Problem List   Diagnosis    GERD (gastroesophageal reflux disease)    Anxiety    Clinical depression    History of lumbar fusion    Chronic left-sided low back pain with left-sided sciatica    Paresthesia of buttock    Paresthesia of left lower extremity    Neurologic gait dysfunction    Fusion of lumbosacral spine    Back pain, lumbosacral    Lumbar discitis    Spondylolisthesis, lumbosacral region    Spinal stenosis, lumbar region without neurogenic claudication    Radiculopathy, lumbar region    Obstructive sleep apnea (adult) (pediatric)    Family hx of ischem heart dis and oth dis of the circ sys    Family history of malignant neoplasm, unspecified    Family history of diabetes mellitus    Essential (primary) hypertension    Body mass index (BMI) 40.0-44.9, adult    Angina of effort [facility])     Media Information               An electronic signature was used to authenticate this note.     --Tiff Gooden MD

## 2022-09-14 NOTE — PROGRESS NOTES
Patient is a 47 y.o. female seen for   initial  MNT visit for  pre op bariatric surgery desires sleeve. vs bypass  Pt completed six months of medically supervised weight loss in program ~ 6816-3780 and returns to pursue surgery. Weight up ~ 18 lbs from initial time in office. BMI: Body mass index is 43.97 kg/m². Obesity Classification: Class III    Weight History: Wt Readings from Last 3 Encounters:   09/16/22 240 lb 6.4 oz (109 kg)   09/02/22 238 lb 9.6 oz (108.2 kg)   07/15/22 240 lb (108.9 kg)     Hx of EGD and dilation with Dr Lennox Hurl. Last EGD Feb 2021. Had Colonoscopy in the last month as well. Reports has been on meds for heartburn since age 26 and can not go a day without the medication or does not feel well. Will require further discussion on sleeve vs bypass with GI hx  Pt had heart cath 7/15/22 with  [facility]. Will require heart clearance  Pt is not in any counseling  Requires Pulmonary Clearance- does have a CPAP machine but is not wearing it    Patient has not had a mammogram with the past year. - Will place external referral for patient - goes to [facility]. Last done Feb 2021    Patient is taking a Multivitamin daily:  is not taking any vitamins. Taking Vitamin D3 daily- pt unsure how much- then later states hasn't been taking it the last couple weeks with staying at [facility]    Describe your current weight: \"my legs hurt\"-increased leg spasms. How does your weight affect your daily activities? concern over medical problems, lack of energy . Patient's lowest adult weight was 140 lbs at age 40. The lowest weight was achieved through low carbohydrate diet. Hysterectomy in 2014 and hasn't worked since. Patient's highest adult weight was ~350 lbs at age 45. Has reported went from 350 lbs to 140 lbs and took two years to do this. -   Over last ~ 5-7 years has put weight back on with multiple surgeries and back problems.     Patient has participated in the following weight loss programs: Atkins, Low Carbohydrate,. Patient has not participated in meal replacement/liquid diets. Patient has participated in weight loss medications. - Adipex in 2020    Patient is not lactose intolerant. Patient does not have Hinduism/cultural food concerns. Patient does not have food allergies. Patient dines out to a sit down restaurant 4-5 times per month. Patient has fast food or picks up carry out 1 time per month. Grocery Shopping in household done by: self  Cooking in household done by: self  Lives with ex- but currently at sisters house taking care of her daughters children. States limited on food choices right now d/t living situation  Goes to bed between ~ 11p-1am and up by 8:30am   24 hour recall/food frequency chart:  Breakfast: no. - states has nausea in morning  Snack:. no  Lunch: yes. ` 1pm- as of 13:30pm hasn't ate yet today- will make turkey burger at home, lunch meat sandwich, likes fried radishes  Snack: no.   Dinner: yes. 5p-5:30p or 6-7p when at home- pasta zero spaghetti- ground turkey, pasta sauce, yesterday made lasagna  Snack: yes- last several days has been eating chips, or salami and cheese. At home quest chips  Drinks throughout the day: bottled water ~ up to 8 bottles a day with sugar free flavor packets, no pop, Hot Tea one cup with milk with sugar substitiute    Do you drink alcohol? Pinky Angles \"I rarely drink\"  less than once a month  Reviewed with patient the importance of eliminating all alcohol for bariatric surgery    Patient does not meet the criteria for binge eating disorder. Patient does have grazing. Patient does not have night eating. Patient does have a history of emotional eating or eating out of boredom. - hasn't had emotional eating in several months    It does sometimes take an unusually large amount of food for the patient to feel comfortably full.   Patient describes self as slow eater      Patient describes level of activity as sedentary outside of taking care of grand kids. .   Patient will be sent via Email Link to view Fitness Orientation video if desires to use [employer]. Pt would like to start using the [employer] and plans to make appointment with     Assessment  Nutritional Needs: Mert Abarcaor = 1653 kcal x 1.2 (activity factor)= 1984 kcal - 500 calories/day  (for 1-2 lb weight loss/week)= ~1485 calories per day  Food recall reveals irregular meal times and often eating later in evening. PES Statement:  Overweight/Obesity related to lack of exercise, sedentary lifestyle, unhealthy eating habits, and unsuccessful diet attempts as evidenced by BMI. Body mass index is 43.97 kg/m². .    Surgery  Surgical procedure desired: sleeve vs bypass  Patient's greatest concern about having surgery is: No concerns. Patient describes the motivation for weight loss surgery to be: Improve overall health. The expectations following the surgery were reviewed in detail with patient today and patient made aware will require to eliminate carbonated beverages, aim for regular meal times, monitor portion sizes, and balanced meals. .   she does feel she can deal with the dietary restrictions. Patient states she does understand the consequences of not complying with post-op food guidelines. Patient states she understands the long term changes in food intake that will be necessary for all occasions after surgery for the rest of her life. Patient is deemed nutritionally appropriate to proceed if able to show progress towards nutrition behavior changes discussed today. Plan  Patient will begin working on recommendations from Pre-Weight Loss Surgery Behavior Change Goal Sheet that was reviewed today to assist with weight loss and establish a  long term healthy eating pattern. Individual goals set with patient to be reviewed at monthly office visits.     Weight loss goal of 3-5% from initial weight at first visit with Dr Silav Fernandes reviewed with patient to achieve over 6 month period per insurance requirements. Initial weight was: 238 lbs   3-5% Weight Loss goal will be minimum of: 7-12 lbs weight loss. Plan/Recommendations:    Educational handouts provided and reviewed with patient as follows: Online Support Groups, My Plate Picture Method, Portion Size Guide, Food Journal    -  Patient Instructions   Goals:  1. I will get the lab work done that is mailed to my mailing address before next office visit. You will need to fast 10-12 hours for this (no food or drink besides water). 2  I will aim for at least 64 oz of water each day (= 8 cups per day or four bottled ca)  3. I will use my food journal to record meal times, serving sizes and bring back to next dietitian visit. 4   I will increase my physical activity by using [employer] at least two-three times a week. Make appointment with [address] [place] [address] [facility] Coordinator  5. Initial weight loss goal of 3-5% is recommended over 6 month period. This is a minimum of: 7-12 lbs from your weight when initially met with physician of: 238 lbs. 6.  Make an appointment with Pulmonary Office  for a CPAP evaluation     -Followup visit: 4 weeks with dietitian.        Erin Maynard RD, LD   Dietitian- [facility] [address]

## 2022-09-16 ENCOUNTER — INITIAL CONSULT (OUTPATIENT)
Dept: BARIATRICS/WEIGHT MGMT | Age: 52
End: 2022-09-16

## 2022-09-16 VITALS — WEIGHT: 240.4 LBS | HEIGHT: 62 IN | BODY MASS INDEX: 44.24 KG/M2

## 2022-09-16 NOTE — PATIENT INSTRUCTIONS
Goals: 1. I will get the lab work done that is mailed to my mailing address before next office visit. You will need to fast 10-12 hours for this (no food or drink besides water). 2  I will aim for at least 64 oz of water each day (= 8 cups per day or four bottled ca)  3. I will use my food journal to record meal times, serving sizes and bring back to next dietitian visit. 4   I will increase my physical activity by using [facility] at least two-three times a week. Make appointment with [address] [place] [address] Coordinator  5. Initial weight loss goal of 3-5% is recommended over 6 month period. This is a minimum of: 7-12 lbs from your weight when initially met with physician of: 238 lbs.    6.  Make an appointment with Pulmonary Office  for a CPAP evaluation

## 2022-09-19 DIAGNOSIS — Z01.818 PRE-OP TESTING: ICD-10-CM

## 2022-09-19 DIAGNOSIS — E66.01 MORBID OBESITY WITH BMI OF 40.0-44.9, ADULT (HCC): Primary | ICD-10-CM

## 2022-09-19 DIAGNOSIS — Z13.21 MALNUTRITION SCREEN: ICD-10-CM

## 2022-10-12 ENCOUNTER — HOSPITAL ENCOUNTER (OUTPATIENT)
Age: 52
Discharge: HOME | End: 2022-10-12
Payer: MEDICARE

## 2022-10-12 DIAGNOSIS — Z13.21 MALNUTRITION SCREEN: ICD-10-CM

## 2022-10-12 DIAGNOSIS — E66.01 MORBID OBESITY WITH BMI OF 40.0-44.9, ADULT (HCC): ICD-10-CM

## 2022-10-12 DIAGNOSIS — Z01.818 PRE-OP TESTING: ICD-10-CM

## 2022-10-12 LAB
ALBUMIN SERPL-MCNC: 4.7 G/DL (ref 3.5–5.1)
ALP BLD-CCNC: 97 U/L (ref 38–126)
ALT SERPL-CCNC: 45 U/L (ref 11–66)
AMPHETAMINE+METHAMPHETAMINE URINE SCREEN: NEGATIVE
ANION GAP SERPL CALCULATED.3IONS-SCNC: 8 MEQ/L (ref 8–16)
AST SERPL-CCNC: 26 U/L (ref 5–40)
AVERAGE GLUCOSE: 114 MG/DL (ref 70–126)
BARBITURATE QUANTITATIVE URINE: NEGATIVE
BASOPHILS # BLD: 0.7 %
BASOPHILS ABSOLUTE: 0 THOU/MM3 (ref 0–0.1)
BENZODIAZEPINE QUANTITATIVE URINE: NEGATIVE
BILIRUB SERPL-MCNC: 0.9 MG/DL (ref 0.3–1.2)
BUN BLDV-MCNC: 18 MG/DL (ref 7–22)
CALCIUM SERPL-MCNC: 9.8 MG/DL (ref 8.5–10.5)
CANNABINOID QUANTITATIVE URINE: NEGATIVE
CHLORIDE BLD-SCNC: 102 MEQ/L (ref 98–111)
CHOLESTEROL, TOTAL: 211 MG/DL (ref 100–199)
CO2: 30 MEQ/L (ref 23–33)
COCAINE METABOLITE QUANTITATIVE URINE: NEGATIVE
COMPREHENSIVE DRUG PROMPT: NORMAL
CREAT SERPL-MCNC: 0.7 MG/DL (ref 0.4–1.2)
EOSINOPHIL # BLD: 1.9 %
EOSINOPHILS ABSOLUTE: 0.1 THOU/MM3 (ref 0–0.4)
ERYTHROCYTE [DISTWIDTH] IN BLOOD BY AUTOMATED COUNT: 13.6 % (ref 11.5–14.5)
ERYTHROCYTE [DISTWIDTH] IN BLOOD BY AUTOMATED COUNT: 41.8 FL (ref 35–45)
FERRITIN: 80 NG/ML (ref 10–291)
FOLATE: 10.6 NG/ML (ref 4.8–24.2)
GFR SERPL CREATININE-BSD FRML MDRD: 88 ML/MIN/1.73M2
GLUCOSE BLD-MCNC: 113 MG/DL (ref 70–108)
HBA1C MFR BLD: 5.8 % (ref 4.4–6.4)
HCT VFR BLD CALC: 44.5 % (ref 37–47)
HDLC SERPL-MCNC: 55 MG/DL
HEMOGLOBIN: 14.4 GM/DL (ref 12–16)
IMMATURE GRANS (ABS): 0.08 THOU/MM3 (ref 0–0.07)
IMMATURE GRANULOCYTES: 1.4 %
INR BLD: 0.94 (ref 0.85–1.13)
IRON: 80 UG/DL (ref 50–170)
LDL CHOLESTEROL CALCULATED: 130 MG/DL
LYMPHOCYTES # BLD: 27.8 %
LYMPHOCYTES ABSOLUTE: 1.6 THOU/MM3 (ref 1–4.8)
MCH RBC QN AUTO: 27.3 PG (ref 26–33)
MCHC RBC AUTO-ENTMCNC: 32.4 GM/DL (ref 32.2–35.5)
MCV RBC AUTO: 84.4 FL (ref 81–99)
MONOCYTES # BLD: 5.1 %
MONOCYTES ABSOLUTE: 0.3 THOU/MM3 (ref 0.4–1.3)
NUCLEATED RED BLOOD CELLS: 0 /100 WBC
OPIATES, URINE: NEGATIVE
OXYCODONE: NEGATIVE
PHENCYCLIDINE QUANTITATIVE URINE: NEGATIVE
PLATELET # BLD: 238 THOU/MM3 (ref 130–400)
PMV BLD AUTO: 9.8 FL (ref 9.4–12.4)
POTASSIUM SERPL-SCNC: 4.5 MEQ/L (ref 3.5–5.2)
PTH INTACT: 43 PG/ML (ref 15–65)
RBC # BLD: 5.27 MILL/MM3 (ref 4.2–5.4)
SEG NEUTROPHILS: 63.1 %
SEGMENTED NEUTROPHILS ABSOLUTE COUNT: 3.6 THOU/MM3 (ref 1.8–7.7)
SODIUM BLD-SCNC: 140 MEQ/L (ref 135–145)
TOTAL IRON BINDING CAPACITY: 386 UG/DL (ref 171–450)
TOTAL PROTEIN: 7.4 G/DL (ref 6.1–8)
TRIGL SERPL-MCNC: 130 MG/DL (ref 0–199)
TSH SERPL DL<=0.05 MIU/L-ACNC: 1.59 UIU/ML (ref 0.4–4.2)
VITAMIN B-12: 793 PG/ML (ref 211–911)
VITAMIN D 25-HYDROXY: 33 NG/ML (ref 30–100)
WBC # BLD: 5.7 THOU/MM3 (ref 4.8–10.8)

## 2022-10-12 PROCEDURE — 84443 ASSAY THYROID STIM HORMONE: CPT

## 2022-10-12 PROCEDURE — 84425 ASSAY OF VITAMIN B-1: CPT

## 2022-10-12 PROCEDURE — 83970 ASSAY OF PARATHORMONE: CPT

## 2022-10-12 PROCEDURE — 85025 COMPLETE CBC W/AUTO DIFF WBC: CPT

## 2022-10-12 PROCEDURE — G0480 DRUG TEST DEF 1-7 CLASSES: HCPCS

## 2022-10-12 PROCEDURE — 82306 VITAMIN D 25 HYDROXY: CPT

## 2022-10-12 PROCEDURE — 93005 ELECTROCARDIOGRAM TRACING: CPT | Performed by: PHYSICIAN ASSISTANT

## 2022-10-12 PROCEDURE — 36415 COLL VENOUS BLD VENIPUNCTURE: CPT

## 2022-10-12 PROCEDURE — 82728 ASSAY OF FERRITIN: CPT

## 2022-10-12 PROCEDURE — 80307 DRUG TEST PRSMV CHEM ANLYZR: CPT

## 2022-10-12 PROCEDURE — 85610 PROTHROMBIN TIME: CPT

## 2022-10-12 PROCEDURE — 83036 HEMOGLOBIN GLYCOSYLATED A1C: CPT

## 2022-10-12 PROCEDURE — 82607 VITAMIN B-12: CPT

## 2022-10-12 PROCEDURE — 82746 ASSAY OF FOLIC ACID SERUM: CPT

## 2022-10-12 PROCEDURE — 83540 ASSAY OF IRON: CPT

## 2022-10-12 PROCEDURE — 80061 LIPID PANEL: CPT

## 2022-10-12 PROCEDURE — 80053 COMPREHEN METABOLIC PANEL: CPT

## 2022-10-12 PROCEDURE — 83550 IRON BINDING TEST: CPT

## 2022-10-13 LAB
EKG ATRIAL RATE: 59 BPM
EKG P AXIS: 43 DEGREES
EKG P-R INTERVAL: 186 MS
EKG Q-T INTERVAL: 410 MS
EKG QRS DURATION: 90 MS
EKG QTC CALCULATION (BAZETT): 405 MS
EKG R AXIS: -18 DEGREES
EKG T AXIS: 9 DEGREES
EKG VENTRICULAR RATE: 59 BPM

## 2022-10-13 PROCEDURE — 93010 ELECTROCARDIOGRAM REPORT: CPT | Performed by: INTERNAL MEDICINE

## 2022-10-14 ENCOUNTER — TELEPHONE (OUTPATIENT)
Dept: BARIATRICS/WEIGHT MGMT | Age: 52
End: 2022-10-14

## 2022-10-14 NOTE — PROGRESS NOTES
Assessment: Patient is a 47 y.o. female seen for   month one  follow up MNT visit for  pre op bariatric surgery desires sleeve vs bypass    Vitals from current and previous visits:  Vitals 65/78/5883   SYSTOLIC 176   DIASTOLIC 76   Site Right Upper Arm   Position Sitting   Cuff Size Large Adult   Pulse 76   Temp 97.9   Resp    SpO2    Weight 240 lb 6.4 oz   Height 5' 2\"   Body mass index 43.96 kg/m2   Waist (Inches)    Neck circumference (Inches)        Initial weight at start of Weight Management Program was: 238 lbs     Isabel Mcdonald gained 2 lbs over one month  -Weight goal: lose weight.     -Nutritionally relevant labs: initial labs Oct 2022- Vit D-33m, Iron-80, Ferritn-80, glucose-113  Lab Results   Component Value Date/Time    LABA1C 5.8 10/12/2022 01:11 PM    LABA1C 5.9 05/23/2022 12:27 PM    LABA1C 5.8 01/06/2021 12:04 PM    GLUCOSE 113 (H) 10/12/2022 01:11 PM    GLUCOSE 113 (H) 07/15/2022 10:28 AM    CHOL 211 (H) 10/12/2022 01:11 PM    HDL 55 10/12/2022 01:11 PM    LDLCALC 130 10/12/2022 01:11 PM    TRIG 130 10/12/2022 01:11 PM                  Lab Results   Component Value Date/Time    VITD25 33 10/12/2022 01:11 PM       JESSE appt is 11/16/22  Dr Amber Grady 10/25/22  - Is patient taking daily Multivitamin:  Taking Vitamin D3 daily- pt unsure how much  States now back in the house with Ex.  and out of the house with kids - \"going to try to get back on track'   Goes to bed between ~ lays down a 8:00 and cant fall asleep until 1 am. Also brings food to bed. Today reports doesn't wake up until noon. States forgot food journal today - verbal recall taken: Ange Rizzos been eating like crap\"  -Food Recall:   Breakfast: noon: yogurt - carb master - if can get it in. Struggling with breakfast and has difficulty waking up in the morning. Lunch: noon (if not eating breakfast) - low carb tortilla, and fraga, honey ham, and 4 slices of swiss cheese.  Salami and cheese   Dinner: fettuccini with shrimp (miracle noodle - [employer] intern.   Note reviewed by Coni Martinez RD, LD,   Lazarus Osgood, RD, LD,   Dietitian- Weight Management [address]

## 2022-10-14 NOTE — TELEPHONE ENCOUNTER
----- Message from YUNIEL Lua sent at 10/13/2022  3:06 PM EDT -----  Borderline EKG- please advise patient that she will need cardiac clearance. Thank you!

## 2022-10-14 NOTE — TELEPHONE ENCOUNTER
Called and LVM for patient regarding borderline EKG. Asked patient to call back to let us know what, if any, specific cardiologist to refer her to. Left our phone #.

## 2022-10-16 LAB
COTININE: < 5 NG/ML
NICOTINE: < 5 NG/ML

## 2022-10-17 ENCOUNTER — TELEPHONE (OUTPATIENT)
Dept: CARDIOLOGY CLINIC | Age: 52
End: 2022-10-17

## 2022-10-17 ENCOUNTER — OFFICE VISIT (OUTPATIENT)
Dept: BARIATRICS/WEIGHT MGMT | Age: 52
End: 2022-10-17
Payer: MEDICARE

## 2022-10-17 ENCOUNTER — OFFICE VISIT (OUTPATIENT)
Dept: BARIATRICS/WEIGHT MGMT | Age: 52
End: 2022-10-17

## 2022-10-17 VITALS
SYSTOLIC BLOOD PRESSURE: 110 MMHG | HEIGHT: 62 IN | TEMPERATURE: 97.9 F | WEIGHT: 240.4 LBS | HEART RATE: 76 BPM | BODY MASS INDEX: 44.24 KG/M2 | DIASTOLIC BLOOD PRESSURE: 76 MMHG

## 2022-10-17 DIAGNOSIS — I10 HYPERTENSION, UNSPECIFIED TYPE: ICD-10-CM

## 2022-10-17 DIAGNOSIS — K58.9 IRRITABLE BOWEL SYNDROME, UNSPECIFIED TYPE: ICD-10-CM

## 2022-10-17 DIAGNOSIS — M54.50 CHRONIC BILATERAL LOW BACK PAIN, UNSPECIFIED WHETHER SCIATICA PRESENT: ICD-10-CM

## 2022-10-17 DIAGNOSIS — G89.29 CHRONIC BILATERAL LOW BACK PAIN, UNSPECIFIED WHETHER SCIATICA PRESENT: ICD-10-CM

## 2022-10-17 DIAGNOSIS — F32.A DEPRESSION, UNSPECIFIED DEPRESSION TYPE: ICD-10-CM

## 2022-10-17 DIAGNOSIS — G47.33 OSA ON CPAP: ICD-10-CM

## 2022-10-17 DIAGNOSIS — K21.9 GASTROESOPHAGEAL REFLUX DISEASE, UNSPECIFIED WHETHER ESOPHAGITIS PRESENT: ICD-10-CM

## 2022-10-17 DIAGNOSIS — K76.0 FATTY LIVER: ICD-10-CM

## 2022-10-17 DIAGNOSIS — F41.9 ANXIETY: ICD-10-CM

## 2022-10-17 DIAGNOSIS — Z99.89 OSA ON CPAP: ICD-10-CM

## 2022-10-17 PROCEDURE — G8427 DOCREV CUR MEDS BY ELIG CLIN: HCPCS | Performed by: PHYSICIAN ASSISTANT

## 2022-10-17 PROCEDURE — 3017F COLORECTAL CA SCREEN DOC REV: CPT | Performed by: PHYSICIAN ASSISTANT

## 2022-10-17 PROCEDURE — 1036F TOBACCO NON-USER: CPT | Performed by: PHYSICIAN ASSISTANT

## 2022-10-17 PROCEDURE — G8484 FLU IMMUNIZE NO ADMIN: HCPCS | Performed by: PHYSICIAN ASSISTANT

## 2022-10-17 PROCEDURE — G8417 CALC BMI ABV UP PARAM F/U: HCPCS | Performed by: PHYSICIAN ASSISTANT

## 2022-10-17 PROCEDURE — 99214 OFFICE O/P EST MOD 30 MIN: CPT | Performed by: PHYSICIAN ASSISTANT

## 2022-10-17 RX ORDER — OXYCODONE HYDROCHLORIDE AND ACETAMINOPHEN 5; 325 MG/1; MG/1
1 TABLET ORAL EVERY 4 HOURS PRN
COMMUNITY

## 2022-10-17 NOTE — TELEPHONE ENCOUNTER
Pre op Risk Assessment    Procedure Gastric Sleeve/Gastric Bypass  Physician Dr. Bright Salomon  Date of surgery/procedure TBD    Last OV 6-23-22  Last Stress   Last Echo 3-  Last Cath 7-15-22  Last Stent   Is patient on blood thinners   Hold Meds/how many days

## 2022-10-17 NOTE — PATIENT INSTRUCTIONS
Behavior modification discussed in detail in regards to dietary habits. Nutritional education occurred during visit. Continue following recommendations  per dietitian. Improvement in fitness/exercise discussed with patient and the need for this  with/without surgery. EKG abnormal- Cardiac Clearance needed prior to surgery- Dr. Seay Height- will send cardiac clearance letter. Has apt scheduled in December. Pulmonary Clearance needed prior to surgery- Dr. Allen Rios  Psychology evaluation with Dr. Almas Zhu 10/25 and 11/29  Apt with [facility]/ Dr. Jayden Carney in November   Physical Therapy referral  EGD prior to any surgical intervention  Encouraged to attend support groups. Goal to lose 3-5% TBW prior to surgery. Seca scale next month  Initial labs completed and reviewed- B1 pending. Drug screen completed and reviewed  Nicotine (-). Discussed risks of nicotine a/w bariatric surgery. Must be nicotine free at least 90 days prior to surgery. Avoid nicotine life long post-op. Denies current pregnancy. Advised not to get pregnant for at least 1 year post bariatric surgery as this can increase risk of malnourishment potentially leading to low birth weight or malformation. Patient agrees to avoid pregnancy for at least 1 year post-op. Discussed importance of appropriate contraception. Will need to be off work for 3-4 weeks post-bariatric surgery. No lifting/pushing/pulling over 20# for 4 weeks post-op  No NSAIDS ( ibuprofen) 10 days prior to bariatric surgery. Avoid NSAID use post-op  Discussed Lovenox post-op bariatric surgery  Awaiting LOMN   Will continue following with multi-disciplinary team in preparation for bariatric surgery with the expectation of lifelong follow-up post-operatively. Mammogram completed today- awaiting result.

## 2022-10-17 NOTE — PATIENT INSTRUCTIONS
Goals:  Nutrition Goal:  I will bring back food journal to next office visit. Record meal times, serving sizes.   Water Goal:  Great job with water intake - continue at least 64 oz or greater each day  Exercise Goal:  I will begin using [place] at least 2-3 days a week

## 2022-10-17 NOTE — PROGRESS NOTES
[address] [facility]  [address]  [place], [address] [facility]  [phone]      Visit Date:  10/17/2022  Weight Management Pre-Op Follow-up    HPI:    Medically Supervised follow-up- Month #1 of 6- desires sleeve v. Bypass. Panda Faulkner is here today for continued supervised weight management of morbid obesity. Rejoining program. Now feels ready to proceed with surgical intervention for weight loss. Mentally ready. Weight today 240#. Up 2# since last appointment. BMI 43. She reports that she is working on the behavior changes discussed at her initial appointment. Needs to work on nutrition. Admits that she has not yet started on nutrition but plans to this month. Needs to get on a better routine with eating. Craves sweet. Admits to late night snacking. Tends to stay up until 2-4am. Sleeps until noon. Working on getting on a better schedule. Drinks plenty of water. No juice/coffee. Rare sprite. Social alcohol. Does not smoke. Long discussion on importance of lifestyle changes, making better decisions with nutrition and increasing dedicated activity to be successful lifelong with weight loss with or without bariatric surgery. Comorbid conditions include GERD, hypertension, fatty liver, chronic back pain, depression, anxiety, IBS and sleep apnea. GERD well controlled with Protonix. Rare breakthrough heartburn, only with trigger foods. Discussed possibility of worsening GERD that may require additional medication or revision surgery. Apt coming up with Dr. Brendan Brar for chronic back pain. 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 Fritz Owusu. Initial labs completed and reviewed -B1 pending. Awaiting  LOMN. Mammogram completed earlier today- awaiting results. Completed support groups x 1. Discussed pre-op EGD- will schedule with Dr. Srinivas Reeves. EKG borderline. Completed heart cath July 2022.   Requires cardiac clearance- follows with Dr. Homero Kulkarni. Pulmonary clearance needed prior to surgery- 11/16/22. Psychological clearance with Dr. Osiel Valdez 10/25 and 11/29. If she does not lose enough weight with medical management she would like to proceed with bariatric surgery. Physical Activity:  no dedicated physical activity. Plans to make apt with [facility]. Current BMI: Body mass index is 43.97 kg/m².   Current Weight:   Wt Readings from Last 3 Encounters:   10/17/22 240 lb 6.4 oz (109 kg)   09/16/22 240 lb 6.4 oz (109 kg)   09/02/22 238 lb 9.6 oz (108.2 kg)     Initial Body OORQUM:956    Past Medical History:  Past Medical History:   Diagnosis Date    Anxiety     Arthritis     Bell's palsy     Body mass index 40.0-44.9, adult ([facility] [address] 05/06/2021    Esophagitis     Fatty liver     GERD (gastroesophageal reflux disease)     Hypertension     IBS (irritable bowel syndrome)     Morbid obesity with BMI of 40.0-44.9, adult ([facility])     MRSA (methicillin resistant staph aureus) culture positive     JESSE (obstructive sleep apnea) 05/06/2021    UTI (urinary tract infection)     V tach     Vertigo        Past Surgical History:  Past Surgical History:   Procedure Laterality Date    BACK SURGERY      BLADDER SUSPENSION      BREAST REDUCTION SURGERY  10/2007    CARPAL TUNNEL RELEASE      CERVICAL FUSION  2017    ACDF C5-C7    CHOLECYSTECTOMY      COLONOSCOPY      DILATION AND CURETTAGE OF UTERUS      ENDOSCOPY, COLON, DIAGNOSTIC      HYSTERECTOMY (CERVIX STATUS UNKNOWN)      LUMBAR FUSION N/A 4/3/2020    L4-S1 DECOMPRESSION AND POSTERIOR FUSION; LUMBAR I & D WITH HARDWARE REMOVAL performed by Hannah Acosta MD at [facility]  10/17/2019    L5-S1    UPPER GASTROINTESTINAL ENDOSCOPY N/A 7/19/2019    [facility]GD [facility]  7/19/2019    EGD DILATION SAVORY performed by Dewey Yoon MD at [address] [place], [place] [address]       Past Social History:  Social History Socioeconomic History    Marital status:      Spouse name: Not on file    Number of children: 3    Years of education: 15    Highest education level: Not on file   Occupational History    Occupation: Home Health Aide   Tobacco Use    Smoking status: Former     Packs/day: 2.00     Years: 20.00     Pack years: 40.00     Types: Cigarettes     Quit date: 2017     Years since quittin.7    Smokeless tobacco: Never   Vaping Use    Vaping Use: Never used   Substance and Sexual Activity    Alcohol use: Yes     Comment: occ    Drug use: No    Sexual activity: Not Currently   Other Topics Concern    Not on file   Social History Narrative    Not on file     Social Determinants of Health     Financial Resource Strain: Not on file   Food Insecurity: Not on file   Transportation Needs: Not on file   Physical Activity: Not on file   Stress: Not on file   Social Connections: Not on file   Intimate Partner Violence: Not on file   Housing Stability: Not on file        Medications:   Current Outpatient Medications   Medication Sig Dispense Refill    oxyCODONE-acetaminophen (PERCOCET) 5-325 MG per tablet Take 1 tablet by mouth every 4 hours as needed for Pain.       ciprofloxacin (CIPRO) 500 MG tablet Take 500 mg by mouth once      cyclobenzaprine (FLEXERIL) 10 MG tablet Take 10 mg by mouth 3 times daily as needed for Muscle spasms      metoprolol succinate (TOPROL XL) 50 MG extended release tablet Take 1 tablet by mouth daily 30 tablet 3    pantoprazole (PROTONIX) 40 MG tablet Take 1 tablet by mouth every morning (before breakfast) 30 tablet 0    dicyclomine (BENTYL) 10 MG capsule  (Patient not taking: No sig reported)      hydroCHLOROthiazide (MICROZIDE) 12.5 MG capsule Take 12.5 mg by mouth daily as needed (Patient not taking: Reported on 10/17/2022)      Cholecalciferol (VITAMIN D3) 25 MCG (1000 UT) TABS TAKE ONE TABLET BY MOUTH EVERY DAY (Patient not taking: Reported on 10/17/2022)  1     No current 3.6 10/12/2022 01:11 PM    LYMPHSABS 1.6 10/12/2022 01:11 PM    MONOSABS 0.3 10/12/2022 01:11 PM    EOSABS 0.1 10/12/2022 01:11 PM    BASOSABS 0.0 10/12/2022 01:11 PM        BMP/CMP   Lab Results   Component Value Date/Time    GLUCOSE 113 10/12/2022 01:11 PM    CREATININE 0.7 10/12/2022 01:11 PM    BUN 18 10/12/2022 01:11 PM     10/12/2022 01:11 PM    K 4.5 10/12/2022 01:11 PM    K 4.2 04/03/2020 04:57 AM     10/12/2022 01:11 PM    CO2 30 10/12/2022 01:11 PM    CALCIUM 9.8 10/12/2022 01:11 PM    AST 26 10/12/2022 01:11 PM    ALKPHOS 97 10/12/2022 01:11 PM    PROT 7.4 10/12/2022 01:11 PM    LABALBU 4.7 10/12/2022 01:11 PM    BILITOT 0.9 10/12/2022 01:11 PM    ALT 45 10/12/2022 01:11 PM        PREALBUMIN   Lab Results   Component Value Date/Time    PREALBUMIN 23.9 07/07/2017 09:21 AM        VITAMIN B12   Lab Results   Component Value Date/Time    TNDEMAXY39 793 10/12/2022 01:11 PM        VITAMIN D   Lab Results   Component Value Date/Time    VITD25 33 10/12/2022 01:11 PM        PTH  Lab Results   Component Value Date/Time    IPTH 43.0 10/12/2022 01:11 PM       VITAMIN B1/ THIAMINE   Lab Results   Component Value Date/Time    CEWL2QXSAOZ 108 01/06/2021 12:04 PM        LIPID SCREEN (FASTING)   Lab Results   Component Value Date/Time    CHOL 211 10/12/2022 01:11 PM    TRIG 130 10/12/2022 01:11 PM    HDL 55 10/12/2022 01:11 PM    LDLCALC 130 10/12/2022 01:11 PM   ,     HGA1C (T2DM ONLY)   Lab Results   Component Value Date/Time    LABA1C 5.8 10/12/2022 01:11 PM    AVGG 114 10/12/2022 01:11 PM        TSH   Lab Results   Component Value Date/Time    TSH 1.590 10/12/2022 01:11 PM        IRON   Lab Results   Component Value Date/Time    IRON 80 10/12/2022 01:11 PM        TIBC  Lab Results   Component Value Date/Time    TIBC 386 10/12/2022 01:11 PM       FERRITIN  Lab Results   Component Value Date/Time    FERRITIN 80 10/12/2022 01:11 PM       VITAMIN A  No results found for: RETINOL    NICOTINE  No results found for: NMET    UDS  Lab Results   Component Value Date/Time    UDP SEE BELOW 01/06/2021 11:56 AM       PSA  No results found for: LABPSA    GFR  Lab Results   Component Value Date/Time    LABGLOM 88 10/12/2022 01:11 PM       DEXA  No results found for this or any previous visit. Assessment:       Diagnosis Orders   1. BMI 40.0-44.9, adult ([address]        2. Gastroesophageal reflux disease, unspecified whether esophagitis present        3. Hypertension, unspecified type        4. Depression, unspecified depression type        5. Anxiety        6. Irritable bowel syndrome, unspecified type        7. Fatty liver        8. Chronic bilateral low back pain, unspecified whether sciatica present        9. JESSE on CPAP            Plan:    Behavior modification discussed in detail in regards to dietary habits. Nutritional education occurred during visit. Continue following recommendations  per dietitian. Improvement in fitness/exercise discussed with patient and the need for this  with/without surgery. EKG abnormal- Cardiac Clearance needed prior to surgery- Dr. Joe Richard- will send cardiac clearance letter. Has apt scheduled in December. Pulmonary Clearance needed prior to surgery- Dr. Kaitlyn Thompson  Psychology evaluation with Dr. Howard Rosario 10/25 and 11/29  Apt with [facility]/ Dr. Jacqueline Maier in November   Physical Therapy referral  EGD prior to any surgical intervention  Encouraged to attend support groups. Goal to lose 3-5% TBW prior to surgery. Seca scale next month  Initial labs completed and reviewed- B1 pending. Drug screen completed and reviewed  Nicotine (-). Discussed risks of nicotine a/w bariatric surgery. Must be nicotine free at least 90 days prior to surgery. Avoid nicotine life long post-op. Denies current pregnancy. Advised not to get pregnant for at least 1 year post bariatric surgery as this can increase risk of malnourishment potentially leading to low birth weight or malformation.  Patient agrees to avoid pregnancy for at least 1 year post-op. Discussed importance of appropriate contraception. Will need to be off work for 3-4 weeks post-bariatric surgery. No lifting/pushing/pulling over 20# for 4 weeks post-op  No NSAIDS ( ibuprofen) 10 days prior to bariatric surgery. Avoid NSAID use post-op  Discussed Lovenox post-op bariatric surgery  Awaiting LOMN   Will continue following with multi-disciplinary team in preparation for bariatric surgery with the expectation of lifelong follow-up post-operatively. Mammogram completed today- awaiting result. Return in about 1 month (around 11/17/2022) for Follow up. I spent over 30 minutes with the patient, with greater that 50% of that time spent on education, counseling and coordination of care.      Electronically signed by YUNIEL Corona on 10/17/2022 at 3:33 PM

## 2022-10-19 LAB — VITAMIN B1 WHOLE BLOOD: 108 NMOL/L (ref 70–180)

## 2022-10-25 ENCOUNTER — OFFICE VISIT (OUTPATIENT)
Dept: PSYCHOLOGY | Age: 52
End: 2022-10-25

## 2022-10-25 DIAGNOSIS — E66.01 MORBID OBESITY (HCC): ICD-10-CM

## 2022-10-25 DIAGNOSIS — F45.1 SOMATIC SYMPTOM DISORDER, MODERATE: Primary | ICD-10-CM

## 2022-10-26 NOTE — PROGRESS NOTES
Supervising Clinical Psychologist's Attestation Statement  I was present with the clinical psychology trainee during the history and exam. I discussed the findings and plans with the clinical psychology trainee and agree as documented in her note. Ester Raza, PhD  Clinical Staff Psychologist       ROUTINE PSYCHOLOGICAL EVALUATION FOR BARIATRIC SURGERY:   Ms. Josefina Anderson is a 47year-old, female with morbid obesity (BMI 43.97), referred for a routine psychiatric evaluation for bariatric surgery. WEIGHT HISTORY   Ms. Josefina Anderson has considered bariatric surgery for: 1.5 years   Overweight since: she was 17years old    Weight Loss Attempts include (self-directed/formal): Vi Latricia, low carb diet, Adipex x 2. Of note, patient was previously engaged in weight management in 2021 and completed appointments with Bariatric Psychology on 1/20/2021 and 2/19/2021. At that time, patient was cleared from a psychological perspective for weight loss surgery after gaining additional knowledge of bariatric expectations and rules in addition to having a plan in place to decrease skipping meals and portion control in the evening. Eating Behaviors endorsed by patient: Patient endorsed poor food choices, large portions and skipping meals. However, she notes improvements in food choices (decreasing carbs), and large portions (does not get 2nds). Caffeine and Carbonated beverages (last use/average use): Patient reports last carbonated beverage was a Sprite one month ago. Patient denies caffeine consumption. Exercise Habits: Patient denies current movement or exercise routine due chronic back pain. Binging/Purging/Restrictive Behaviors (including SIV, laxative/stimulant abuse/obsessive exercise): Patient denies binging, purging or restrictive behaviors. Pre-surgery Weight Loss Goal/Progress: Patient reports gaining 2 pounds since starting the program. Patient reports keeping a daily food log. KNOWLEDGE OF SURGERY/GOALS   Ms. Tierney Acosta understands the risks and benefits of bariatric surgery. Patient has realistic expectations and is motivated; has identified the following goals/reasons to lose weight:      1. Current medical co-morbidities: sleep apnea, high blood pressure, Gerd, degenerative joint disease   3. Activity goals: Ability to walk without limitations, keep up with the grandkids, better health. PSYCHOSOCIAL HISTORY   Marital status/lives with: , lives with ex-, adult daughter and her daughter's wife      Education Attainment: high school graduate, denies learning difficulties. Employment status (full-time/part-time, SSD, employment disability): SSDI as of 10/2020      Pentecostalism beliefs affecting medical care/transfusion/diet: denies       experience: denies    Relevant legal history/current issues: denies      Currently identified stressors include: back pain, chronic pain   Current coping strategies include: talking with her support system   Anticipated Challenges include: history of chronic back pain and 6 spine surgeries that include hardware in her spine. Patient reports follow up with spine surgeon the first week of November for recommendation related to [facility] and spinal hardware. SUPPORT SYSTEM FOR BARIATRIC SURGERY    Supports include: adult children, grandchildren, best friend, ex-   Patient has at least 1 individual to stay with her 24/7 after surgery, provide transportation, and assist with medications and emotional support following surgery. Advanced Directive: Patient identifies her adult daughter Maycol Barrios, as her primary surrogate decision maker.        MENTAL HEALTH TREATMENT HISTORY   Has seen a psychiatrist/psychologist/ in the past (summary of previous treatment): denies   Previous medication trials include: Prozac   Currently in outpatient treatment (e.g. psychotherapy, medication management): denies   Current time she consumed 6-8 Smirnoffs and a couple of shots in celebration of her son returning home from the [employer]. Patient reports drinking approximately 6 times per year with variable use of 1-8 drinks each time. Patient denies history of problematic alcohol use. Tobacco: Patient denies current tobacco use. Patient reports last cigarette was in 2017, at which time she reports smoking 4 packs per day from the ages of 24 - 52. Illicit Drugs: Denies current illicit drug use. Patient reports smoking marijuana a few times in high school. Patient understands and accepts abstinence from alcohol, tobacco, and illicit drugs. Addiction transfer was thoroughly discussed with the patient and she states understanding. SUBSTANCE TREATMENT HISTORY:  denies     MENTAL STATUS EXAM   General appearance:?dressed appropriately, obese   Attitude & Behavior:?pleasant and cooperative   Motor Activity & Musculoskeletal:?noted to change positions in chair multiple times, appeared uncomfortable with prolonged sitting or standing  Speech & Language:?normal rate, volume, and prosody   Gait & Station:?sitting   Mood:?calm to content   Affect:?congruent with mood, appropriate to setting   Thought content:?appropriate   Suicidal ideation:?denies   Homicidal ideation:?denies? Thought process & Associations:?logical, coherent, goal-directed   Perceptions:?appropriate   Orientation:?alert and oriented x 6   Attention & Concentration: appears intact   Recent & Remote Memory: Intact (5/5 words recalled at 5 minutes)   Executive function: Intact   Visual spatial: Intact   Fund of knowledge: adequate   Insight: adequate   Judgment: adequate      NEUROCOGNITIVE FUNCTIONING   Patient denies history of closed head injury, seizures or stroke. Patient reports Covid twice once in August 2021, the other time unknown. MEDICAL LITERACY:   REALM-R Score: 8/8   REALM-SF Score: 7/7   Score indicates a reading level of: > 9th grade level. MEDICAL NUMBERS   Will document in follow up progress note. Mukesh Cognitive Assessment:      No difficulty noted in the areas of:   Executive Functionin/5; alternating trail making ; clock drawing 3/3, copy cube . Attention / Concentration: /6; digits forward / backward 2/2; tapping A's ; serial 7's 3/3. Language: /6; animal names 3/3; sentence repeat 2; word fluency . Abstraction: similarities 2. Memory: delayed item recall: 5/5 words at 5 minutes. Arousal / Orientation: /6. Essentially cognitively intact on brief screening. DSM DIAGNOSTIC IMPRESSION   Somatic symptom disorder, with predominant pain, moderate   Morbid obesity? FORMULATION OF BARIATRIC ISSUES/PLANS:       PSYCHIATRIC ISSUES/plan: Patient denies current symptoms of mood. She reports some medical anxiety related to spinal needles, but denies other types of medical anxiety. She does have a significant history of trauma; however, she denies current symptoms of PTSD related to these situations. Denies current psychiatric medications. Reports a single psychiatric medication trial of Prozac. Denies history of psychiatric hospitalization. She does report a history of depression that was related to grief versus true depressive episodes that last occurred in . Denies current and past suicidal thoughts, plan, and intent. COGNITIVE FUNCTIONING/plan: Patient is cognitively intact on brief screener (, normal is 26/20). She appears to have adequate medical literacy and reads at a greater than 9th grade level. Patient obtained a high school diploma, and denies learning disability. Patient is on SSDI since 10/2020. Numerical literacy will be documented in follow up progress note.       SUBSTANCE ABUSE/DEPENDENCY ISSUES/PLAN: Patient reports last alcohol consumptions was last weekend at which time she consumed 6-8 Smirnoffs and a couple of shots in celebration of her son returning home from the [employer]. Patient reports average use of 1-8 drinks, 6 times per year. Patient denies history of perceived problematic alcohol use. Patient denies current tobacco use. Patient reports last cigarette was in 2017 at which time she reported smoking 4 packs per day from the time she was 24years old to when she quit in 2017. Denies current illicit drug use. Patient reports smoking marijuana a few times in high school. SUPPORT: Patient identified her adult children, grandchildren, best friend and ex- as her primary support people throughout the [facility] process. ADVANCED DIRECTIVE: Patient identifies her adult daughter, as her primary surrogate decision maker. EATING BEHAVIORS/plan: Patient endorsed poor food choices, large portions and skipping meals. However, she notes improvements in food choices and large portions. Patient reports last carbonated beverage was a Sprite one month ago. Patient denies current caffeine consumption. Patient denies binging, purging and restrictive behaviors. Denies currently planned exercise. Patient reports gaining 2 pounds since starting the program. Patient reports keeping a daily food log. OTHER RECOMMENDATIONS:    Encouraged to participate in the Bariatric Support Groups    Increase physical activity and muscle strengthening. Continue weight loss as required by surgeon. Psychology Extern:   DALLIN Traore         PSYCHOLOGICAL SCREENING RESULTS:       TESTING COMPLETED BY: DALLIN Traore   TIME SPENT WITH TESTIN HOURS      TESTS PERFORMED: Mukesh Cognitive Screening (MoCA), Medical Literacy (Realm-r, SF), Numerical Literacy (Medical Numbers), and MMPI-2. Results for the MoCA and Medical Literacy are provided above. Results for MMPI-2 and Numerical Literacy will be documented in the follow up progress note.

## 2022-11-07 NOTE — PROGRESS NOTES
Assessment: Patient is a 47 y.o. female seen for month two  follow up MNT visit for  pre op bariatric surgery desires sleeve vs bypass    Vitals from current and previous visits:    Vitals 99/6/0327   SYSTOLIC    DIASTOLIC    Site    Position    Cuff Size    Pulse    Temp    Resp    SpO2    Weight 241 lb   Height 5' 2\"   Body mass index 44.07 kg/m2   Waist (Inches)    Neck circumference (Inches)        Initial weight at start of Weight Management Program was: 238 lbs     Eusebia gained 1 bs over one month  -Weight goal: lose weight.     -Nutritionally relevant labs: initial labs Oct 2022- Vit D-33m, Iron-80, Ferritn-80, glucose-113  Lab Results   Component Value Date/Time    LABA1C 5.8 10/12/2022 01:11 PM    LABA1C 5.9 05/23/2022 12:27 PM    LABA1C 5.8 01/06/2021 12:04 PM    GLUCOSE 113 (H) 10/12/2022 01:11 PM    GLUCOSE 113 (H) 07/15/2022 10:28 AM    CHOL 211 (H) 10/12/2022 01:11 PM    HDL 55 10/12/2022 01:11 PM    LDLCALC 130 10/12/2022 01:11 PM    TRIG 130 10/12/2022 01:11 PM                  Lab Results   Component Value Date/Time    VITD25 33 10/12/2022 01:11 PM       JESSE appt is 11/16/22- states wearing CPAP machine now  Dr Almas Zhu f/u is 11/29/22    - Is patient taking daily Multivitamin:  Taking Vitamin D3 daily    Pt back in own house- lives with an ex-      \"It's a tough process for me but I'm trying\". States trying to stay away from seconds and only having one plate instead of two. Got own food to bring to daughters house next two days. Trying to plan more.   Had food journal in office  -Food Recall:   Breakfast: trying to have a banana or 1/2 quest bar or protein shake  Lunch: 12pm- three pieces cauliflower pizza with mushrooms or 5 sausage links and 1/2 cup scrambled eggs or 6oz sirloin, broccoli, 1/2 cup mashed potatoes or shrimp and scallops  Dinner: 7pm- 10 pieces of salami, swiss cheese, pickles or low car wrap  Snack: yes- in the evening, slim jims, pork rhinds, - states bringing snacks to bed as well. Drinks throughout the day: bottled water ~  8 bottles a day with sugar free flavor packets, no pop, Hot Tea one cup with milk with sugar substitiute    -Impression of Dietary Intake: low carb eating style t is still prominent in eating habits. Discussed nutrient density and incorporating more fruits and vegetables  Exercise:  Patient  sent via Email Link to view Fitness Orientation video if desires to use [employer]  -Physical Activity is: - Starting outpatient PT -recommended by Dr Todd Vo after appt yesterday for spine. States Dr Qasim Dodge was ok with patient having bariatric surgery  Did not start [address] yet- recommended pt start outpatient PT at [facility] and can coordinate a transition to [address]    Nutrition Diagnosis: Overweight/Obesity related to Food and nutrition-related knowledge deficit as evidenced by BMI of 44    Intervention:   Healthy behaviors: adequate water intake   Patient has not attended support group yet. Goals reviewed with patient and questions answered    Patient Instructions   Goals:  Nutrition Goal:  I will bring back food journal to next office visit. Record meal times, serving sizes. Continue to aim for regular meal times and no skip meals! Water Goal:  Great job with water intake - continue at least 64 oz or greater each day  Exercise Goal:  I will start outpatient Physical Therapy and plan to start using [address] visit: 4 weeks with dietitian.      Binh Collier, RD, LD,   Dietitian- Weight Management [address] [place] [address]

## 2022-11-08 ENCOUNTER — OFFICE VISIT (OUTPATIENT)
Dept: BARIATRICS/WEIGHT MGMT | Age: 52
End: 2022-11-08

## 2022-11-08 VITALS — HEIGHT: 62 IN | BODY MASS INDEX: 44.35 KG/M2 | WEIGHT: 241 LBS

## 2022-11-08 NOTE — PATIENT INSTRUCTIONS
Goals:  Nutrition Goal:  I will bring back food journal to next office visit. Record meal times, serving sizes. Continue to aim for regular meal times and no skip meals!   Water Goal:  Great job with water intake - continue at least 64 oz or greater each day  Exercise Goal:  I will start outpatient Physical Therapy and plan to start [address]

## 2022-11-16 ENCOUNTER — OFFICE VISIT (OUTPATIENT)
Dept: BARIATRICS/WEIGHT MGMT | Age: 52
End: 2022-11-16
Payer: MEDICARE

## 2022-11-16 ENCOUNTER — OFFICE VISIT (OUTPATIENT)
Dept: PULMONOLOGY | Age: 52
End: 2022-11-16
Payer: MEDICARE

## 2022-11-16 VITALS
OXYGEN SATURATION: 97 % | DIASTOLIC BLOOD PRESSURE: 68 MMHG | WEIGHT: 241 LBS | HEART RATE: 64 BPM | HEIGHT: 61 IN | BODY MASS INDEX: 45.5 KG/M2 | TEMPERATURE: 97.9 F | SYSTOLIC BLOOD PRESSURE: 118 MMHG

## 2022-11-16 VITALS
SYSTOLIC BLOOD PRESSURE: 112 MMHG | BODY MASS INDEX: 45.65 KG/M2 | DIASTOLIC BLOOD PRESSURE: 68 MMHG | HEIGHT: 61 IN | WEIGHT: 241.8 LBS | TEMPERATURE: 98 F | HEART RATE: 72 BPM

## 2022-11-16 DIAGNOSIS — I10 HYPERTENSION, UNSPECIFIED TYPE: ICD-10-CM

## 2022-11-16 DIAGNOSIS — G47.33 OSA ON CPAP: ICD-10-CM

## 2022-11-16 DIAGNOSIS — E66.01 MORBID OBESITY (HCC): ICD-10-CM

## 2022-11-16 DIAGNOSIS — Z99.89 OSA ON CPAP: ICD-10-CM

## 2022-11-16 DIAGNOSIS — K76.0 FATTY LIVER: ICD-10-CM

## 2022-11-16 DIAGNOSIS — M54.50 CHRONIC BILATERAL LOW BACK PAIN, UNSPECIFIED WHETHER SCIATICA PRESENT: ICD-10-CM

## 2022-11-16 DIAGNOSIS — G89.29 CHRONIC BILATERAL LOW BACK PAIN, UNSPECIFIED WHETHER SCIATICA PRESENT: ICD-10-CM

## 2022-11-16 DIAGNOSIS — K58.9 IRRITABLE BOWEL SYNDROME, UNSPECIFIED TYPE: ICD-10-CM

## 2022-11-16 DIAGNOSIS — F32.A DEPRESSION, UNSPECIFIED DEPRESSION TYPE: ICD-10-CM

## 2022-11-16 DIAGNOSIS — Z99.89 OSA ON CPAP: Primary | ICD-10-CM

## 2022-11-16 DIAGNOSIS — G47.33 OSA ON CPAP: Primary | ICD-10-CM

## 2022-11-16 DIAGNOSIS — K21.9 GASTROESOPHAGEAL REFLUX DISEASE, UNSPECIFIED WHETHER ESOPHAGITIS PRESENT: ICD-10-CM

## 2022-11-16 DIAGNOSIS — F41.9 ANXIETY: ICD-10-CM

## 2022-11-16 PROCEDURE — 1036F TOBACCO NON-USER: CPT | Performed by: PHYSICIAN ASSISTANT

## 2022-11-16 PROCEDURE — 1036F TOBACCO NON-USER: CPT | Performed by: NURSE PRACTITIONER

## 2022-11-16 PROCEDURE — 3017F COLORECTAL CA SCREEN DOC REV: CPT | Performed by: PHYSICIAN ASSISTANT

## 2022-11-16 PROCEDURE — 3017F COLORECTAL CA SCREEN DOC REV: CPT | Performed by: NURSE PRACTITIONER

## 2022-11-16 PROCEDURE — 3078F DIAST BP <80 MM HG: CPT | Performed by: PHYSICIAN ASSISTANT

## 2022-11-16 PROCEDURE — G8427 DOCREV CUR MEDS BY ELIG CLIN: HCPCS | Performed by: PHYSICIAN ASSISTANT

## 2022-11-16 PROCEDURE — G8427 DOCREV CUR MEDS BY ELIG CLIN: HCPCS | Performed by: NURSE PRACTITIONER

## 2022-11-16 PROCEDURE — 99213 OFFICE O/P EST LOW 20 MIN: CPT | Performed by: NURSE PRACTITIONER

## 2022-11-16 PROCEDURE — G8484 FLU IMMUNIZE NO ADMIN: HCPCS | Performed by: NURSE PRACTITIONER

## 2022-11-16 PROCEDURE — G8484 FLU IMMUNIZE NO ADMIN: HCPCS | Performed by: PHYSICIAN ASSISTANT

## 2022-11-16 PROCEDURE — 3074F SYST BP LT 130 MM HG: CPT | Performed by: NURSE PRACTITIONER

## 2022-11-16 PROCEDURE — G8417 CALC BMI ABV UP PARAM F/U: HCPCS | Performed by: PHYSICIAN ASSISTANT

## 2022-11-16 PROCEDURE — 99213 OFFICE O/P EST LOW 20 MIN: CPT | Performed by: PHYSICIAN ASSISTANT

## 2022-11-16 PROCEDURE — 3074F SYST BP LT 130 MM HG: CPT | Performed by: PHYSICIAN ASSISTANT

## 2022-11-16 PROCEDURE — 3078F DIAST BP <80 MM HG: CPT | Performed by: NURSE PRACTITIONER

## 2022-11-16 PROCEDURE — G8417 CALC BMI ABV UP PARAM F/U: HCPCS | Performed by: NURSE PRACTITIONER

## 2022-11-16 ASSESSMENT — ENCOUNTER SYMPTOMS
SHORTNESS OF BREATH: 0
WHEEZING: 0
GASTROINTESTINAL NEGATIVE: 1
RESPIRATORY NEGATIVE: 1
COUGH: 0
ALLERGIC/IMMUNOLOGIC NEGATIVE: 1
BACK PAIN: 1
EYES NEGATIVE: 1

## 2022-11-16 NOTE — PATIENT INSTRUCTIONS
Behavior modification discussed in detail in regards to dietary habits. Nutritional education occurred during visit. Continue following recommendations  per dietitian. Improvement in fitness/exercise discussed with patient and the need for this  with/without surgery. EKG abnormal- Cardiac Clearance received from Dr. Sue Mejia   Pulmonary Clearance needed prior to surgery- Dr. Selma Logan 11/16/22  Psychology evaluation with Dr. Mik Logan in process, next apt 11/29  Apt with [facility]/ Dr. Pedro Mendiola in November   Physical Therapy referral prn  EGD prior to any surgical intervention- will send referral to dr. Kye Douglas  Encouraged to attend support groups. Goal to lose 3-5% TBW prior to surgery. Seca scale completed and reviewed. Initial labs completed and reviewed  Drug screen completed and reviewed  Nicotine (-). Discussed risks of nicotine a/w bariatric surgery. Must be nicotine free at least 90 days prior to surgery. Avoid nicotine life long post-op. Denies current pregnancy. Advised not to get pregnant for at least 1 year post bariatric surgery as this can increase risk of malnourishment potentially leading to low birth weight or malformation. Patient agrees to avoid pregnancy for at least 1 year post-op. Discussed importance of appropriate contraception. Will need to be off work for 3-4 weeks post-bariatric surgery. No lifting/pushing/pulling over 20# for 4 weeks post-op  No NSAIDS ( ibuprofen) 10 days prior to bariatric surgery. Avoid NSAID use post-op  Discussed Lovenox post-op bariatric surgery  Awaiting LOMN - to make apt with PCP  Will continue following with multi-disciplinary team in preparation for bariatric surgery with the expectation of lifelong follow-up post-operatively. Mammogram completed and reviewed.

## 2022-11-16 NOTE — PROGRESS NOTES
[facility]  [address]  [place], [address]  [phone]      Visit Date:  11/16/2022  Weight Management Pre-Op Follow-up    HPI:    Medically Supervised follow-up- Month #2 of 6- Rejoining. desires sleeve v. Bypass. Susannah Moreno is here today for continued supervised weight management of morbid obesity. Weight today 241#. Up 1# since last month. Up 3# since starting with weight management. BMI 45. Working on making changes to nutrition as recommended. Has started eating breakfast-egg sandwich or protein shake. Has started eating 3 meals daily over the last week. Trying to make better choices overall. Mindful of what she is eating. Has started to cut back on portion sizes. No longer getting a second plate. No late night snacks over the month. Getting on a better schedule with sleeping/eating. Drinking plenty of water. Also drinking decaf tea. Has not been getting in dedicated activity. Plans to get rescheduled with Stephanie. Comorbid conditions include GERD, hypertension, fatty liver, chronic back pain, depression, anxiety, IBS and sleep apnea. GERD well controlled with Protonix. Rare breakthrough heartburn, only with trigger foods. Discussed possibility of worsening GERD that may require additional medication or revision surgery. Discussed bariatric surgery with Dr. Deniec Abdalla and supportive of moving forward with bariatric surgery. Will be start PT for chronic back pain and then planning on proceeding with MRI. 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 Margo Locke. Will discuss the frequency of use of Cipro with Dr. Margo Locke next month. Initial labs completed and reviewed. Awaiting LOMN. Mammogram completed  and reviewed- no concerning findings. Completed support groups x 1. Discussed pre-op EGD- will send referral to  Dr. Saman Espinoza. EKG borderline.  Completed heart cath July 2022. Cardiac clearance received from Dr. Lucian Garcia. Pulmonary clearance needed prior to surgery-  11/16/22. Psychological clearance with Dr. Meron Larson in process, next apt 11/29. If she does not lose enough weight with medical management she would like to proceed with sleeve gastrectomy. Physical Activity:  No dedicated physical activity. Plans to reschedule apt with [place]. Current BMI: Body mass index is 45.69 kg/m².   Current Weight:   Wt Readings from Last 3 Encounters:   11/16/22 241 lb (109.3 kg)   11/16/22 241 lb 12.8 oz (109.7 kg)   11/08/22 241 lb (109.3 kg)     Initial Body [id number]    Past Medical History:  Past Medical History:   Diagnosis Date    Anxiety     Arthritis     Bell's palsy     Body mass index 40.0-44.9, adult ([address] 05/06/2021    Esophagitis     Fatty liver     GERD (gastroesophageal reflux disease)     Hypertension     IBS (irritable bowel syndrome)     Morbid obesity with BMI of 40.0-44.9, adult ([facility])     MRSA (methicillin resistant staph aureus) culture positive     JESSE (obstructive sleep apnea) 05/06/2021    UTI (urinary tract infection)     V tach     Vertigo        Past Surgical History:  Past Surgical History:   Procedure Laterality Date    BACK SURGERY      BLADDER SUSPENSION      BREAST REDUCTION SURGERY  10/2007    CARPAL TUNNEL RELEASE      CERVICAL FUSION  2017    ACDF C5-C7    CHOLECYSTECTOMY      COLONOSCOPY      DILATION AND CURETTAGE OF UTERUS      ENDOSCOPY, COLON, DIAGNOSTIC      HYSTERECTOMY (CERVIX STATUS UNKNOWN)      LUMBAR FUSION N/A 4/3/2020    L4-S1 DECOMPRESSION AND POSTERIOR FUSION; LUMBAR I & D WITH HARDWARE REMOVAL performed by Corie Posada MD at [address] [place] [address]  10/17/2019    L5-S1    UPPER GASTROINTESTINAL ENDOSCOPY N/A 7/19/2019    EGD BIOPSY performed by Sara Pérez MD at [facility]   7/19/2019    EGD DILATION SAVORY performed by Sara Pérez MD at [address] [facility]       Past Social History:  Social History     Socioeconomic History    Marital status:      Spouse name: Not on file    Number of children: 3    Years of education: 12    Highest education level: Not on file   Occupational History    Occupation: Home Health Aide   Tobacco Use    Smoking status: Former     Packs/day: 2.00     Years: 20.00     Pack years: 40.00     Types: Cigarettes     Quit date: 2017     Years since quittin.8    Smokeless tobacco: Never   Vaping Use    Vaping Use: Never used   Substance and Sexual Activity    Alcohol use: Yes     Comment: occ    Drug use: No    Sexual activity: Not Currently   Other Topics Concern    Not on file   Social History Narrative    Not on file     Social Determinants of Health     Financial Resource Strain: Not on file   Food Insecurity: Not on file   Transportation Needs: Not on file   Physical Activity: Not on file   Stress: Not on file   Social Connections: Not on file   Intimate Partner Violence: Not on file   Housing Stability: Not on file        Medications:   Current Outpatient Medications   Medication Sig Dispense Refill    oxyCODONE-acetaminophen (PERCOCET) 5-325 MG per tablet Take 1 tablet by mouth every 4 hours as needed for Pain.      hydroCHLOROthiazide (MICROZIDE) 12.5 MG capsule Take 12.5 mg by mouth daily as needed      metoprolol succinate (TOPROL XL) 50 MG extended release tablet Take 1 tablet by mouth daily 30 tablet 3    pantoprazole (PROTONIX) 40 MG tablet Take 1 tablet by mouth every morning (before breakfast) 30 tablet 0    dicyclomine (BENTYL) 10 MG capsule       ciprofloxacin (CIPRO) 500 MG tablet Take 500 mg by mouth once      cyclobenzaprine (FLEXERIL) 10 MG tablet Take 10 mg by mouth 3 times daily as needed for Muscle spasms      Cholecalciferol (VITAMIN D3) 25 MCG (1000 UT) TABS TAKE ONE TABLET BY MOUTH EVERY DAY  1     No current facility-administered medications for this visit. Allergies:    Allergies   Allergen Reactions Codeine Rash    Dilaudid [Hydromorphone Hcl] Rash       Subjective:    Review of Systems:  Constitutional: Denies any fever, chills, fatigue. Wound: Denies any rash, skin color changes or wound problems. Resp: Denies any cough, shortness of breath. CV: Denies any chest pain, orthopnea or syncope. MS: Denies myalgias, (+)arthralgias. GI: Denies any nausea, vomiting,(+) diarrhea, constipation, melena, hematochezia. (+) reflux  : Denies any hematuria, hesitancy or dysuria. NEURO: Denies seizures, headache. Objective:    /68 (Site: Right Upper Arm, Position: Sitting, Cuff Size: Large Adult)   Pulse 72   Temp 98 °F (36.7 °C) (Oral)   Ht 5' 1\" (1.549 m)   Wt 241 lb 12.8 oz (109.7 kg)   LMP 10/27/2015 (Exact Date)   BMI 45.69 kg/m²   Physical Examination:   Constitutional: Alert and oriented to person, place and time. Well-developed, well- nourished. Head: Normocephalic and atraumatic  Neck: Supple. Eyes: EOMI b/l. Conjunctivae normal.  No scleral icterus. Respiratory: Effort normal. No respiratory distress. Abd: Benign  Ext:  Movement x 4. No edema  Skin; Warm and dry, no visible rashes, lesions or ulcers.    Neuro: Cranial Nerves Grossly Intact; nml coordination        CBC   Lab Results   Component Value Date/Time    WBC 5.7 10/12/2022 01:11 PM    RBC 5.27 10/12/2022 01:11 PM    HGB 14.4 10/12/2022 01:11 PM    HCT 44.5 10/12/2022 01:11 PM    MCV 84.4 10/12/2022 01:11 PM    MCH 27.3 10/12/2022 01:11 PM    MCHC 32.4 10/12/2022 01:11 PM    RDW 13.8 07/07/2017 09:21 AM     10/12/2022 01:11 PM    MPV 9.8 10/12/2022 01:11 PM    RBCMORP NORMAL 07/07/2017 09:21 AM    SEGSPCT 63.1 10/12/2022 01:11 PM    LABLYMP 27.8 10/12/2022 01:11 PM    MONOPCT 5.1 10/12/2022 01:11 PM    LABEOS 1.9 10/12/2022 01:11 PM    BASO 0.7 10/12/2022 01:11 PM    NRBC 0 10/12/2022 01:11 PM    ANISOCYTOSIS 1+ 06/29/2015 03:15 AM    SEGSABS 3.6 10/12/2022 01:11 PM    LYMPHSABS 1.6 10/12/2022 01:11 PM    MONOSABS 0.3 10/12/2022 01:11 PM    EOSABS 0.1 10/12/2022 01:11 PM    BASOSABS 0.0 10/12/2022 01:11 PM        BMP/CMP   Lab Results   Component Value Date/Time    GLUCOSE 113 10/12/2022 01:11 PM    CREATININE 0.7 10/12/2022 01:11 PM    BUN 18 10/12/2022 01:11 PM     10/12/2022 01:11 PM    K 4.5 10/12/2022 01:11 PM    K 4.2 04/03/2020 04:57 AM     10/12/2022 01:11 PM    CO2 30 10/12/2022 01:11 PM    CALCIUM 9.8 10/12/2022 01:11 PM    AST 26 10/12/2022 01:11 PM    ALKPHOS 97 10/12/2022 01:11 PM    PROT 7.4 10/12/2022 01:11 PM    LABALBU 4.7 10/12/2022 01:11 PM    BILITOT 0.9 10/12/2022 01:11 PM    ALT 45 10/12/2022 01:11 PM        PREALBUMIN   Lab Results   Component Value Date/Time    PREALBUMIN 23.9 07/07/2017 09:21 AM        VITAMIN B12   Lab Results   Component Value Date/Time    CNUYUYAV06 793 10/12/2022 01:11 PM        VITAMIN D   Lab Results   Component Value Date/Time    VITD25 33 10/12/2022 01:11 PM        PTH  Lab Results   Component Value Date/Time    IPTH 43.0 10/12/2022 01:11 PM       VITAMIN B1/ THIAMINE   Lab Results   Component Value Date/Time    KELZ5RVULQA 108 10/12/2022 01:11 PM        LIPID SCREEN (FASTING)   Lab Results   Component Value Date/Time    CHOL 211 10/12/2022 01:11 PM    TRIG 130 10/12/2022 01:11 PM    HDL 55 10/12/2022 01:11 PM    LDLCALC 130 10/12/2022 01:11 PM   ,     HGA1C (T2DM ONLY)   Lab Results   Component Value Date/Time    LABA1C 5.8 10/12/2022 01:11 PM    AVGG 114 10/12/2022 01:11 PM        TSH   Lab Results   Component Value Date/Time    TSH 1.590 10/12/2022 01:11 PM        IRON   Lab Results   Component Value Date/Time    IRON 80 10/12/2022 01:11 PM        TIBC  Lab Results   Component Value Date/Time    TIBC 386 10/12/2022 01:11 PM       FERRITIN  Lab Results   Component Value Date/Time    FERRITIN 80 10/12/2022 01:11 PM       VITAMIN A  No results found for: RETINOL    NICOTINE  No results found for: NMET    UDS  Lab Results   Component Value Date/Time    UDP SEE BELOW 01/06/2021 11:56 AM       PSA  No results found for: LABPSA    GFR  Lab Results   Component Value Date/Time    LABGLOM 88 10/12/2022 01:11 PM       DEXA  No results found for this or any previous visit. Assessment:       Diagnosis Orders   1. BMI 40.0-44.9, adult ([facility] [address]        2. Gastroesophageal reflux disease, unspecified whether esophagitis present        3. Hypertension, unspecified type        4. Depression, unspecified depression type        5. Anxiety        6. Irritable bowel syndrome, unspecified type        7. Fatty liver        8. Chronic bilateral low back pain, unspecified whether sciatica present        9. JESSE on CPAP              Plan:    Behavior modification discussed in detail in regards to dietary habits. Nutritional education occurred during visit. Continue following recommendations  per dietitian. Improvement in fitness/exercise discussed with patient and the need for this  with/without surgery. EKG abnormal- Cardiac Clearance received from Dr. Linda Shaver   Pulmonary Clearance needed prior to surgery- Dr. Kilo Hager 11/16/22  Psychology evaluation with Dr. Jennifer Rivera in process, next apt 11/29  Continue following with Dr. Vasquez Aleman as scheduled. Starting PT.   EGD prior to any surgical intervention- will send referral to Dr. Ronald Foy  Encouraged to attend support groups. Goal to lose 3-5% TBW prior to surgery. Seca scale completed and reviewed. Initial labs completed and reviewed  Drug screen completed and reviewed  Nicotine (-). Discussed risks of nicotine a/w bariatric surgery. Must be nicotine free at least 90 days prior to surgery. Avoid nicotine life long post-op. Denies current pregnancy. Advised not to get pregnant for at least 1 year post bariatric surgery as this can increase risk of malnourishment potentially leading to low birth weight or malformation. Patient agrees to avoid pregnancy for at least 1 year post-op. Discussed importance of appropriate contraception.    Will need to be off work for 3-4 weeks post-bariatric surgery. No lifting/pushing/pulling over 20# for 4 weeks post-op  No NSAIDS ( ibuprofen) 10 days prior to bariatric surgery. Avoid NSAID use post-op  Discussed Lovenox post-op bariatric surgery  Awaiting LOMN - to make apt with PCP  Will continue following with multi-disciplinary team in preparation for bariatric surgery with the expectation of lifelong follow-up post-operatively. Mammogram completed and reviewed. Return in about 1 month (around 12/16/2022) for Follow up. I spent over 20 minutes with the patient, with greater that 50% of that time spent on education, counseling and coordination of care.      Electronically signed by YUNIEL Pierre on 11/16/2022 at 1:53 PM

## 2022-11-16 NOTE — PROGRESS NOTES
[facility]      [facility]         [id number]  11/16/2022   Chief Complaint   Patient presents with    Follow-up     Sleep f/u last seen 3/2021 with Dr. Caterina Trinidad with [facility]. Pt of Dr. Caterina Trinidad    PAP Download:   Original or initial AHI: N/A     Date of initial study: N/A      Compliant  36.7%     Noncompliant 63.3 %     PAP Type CPAP Level  9.0   Avg Hrs/Day 5hrs 52mins 14secs  AHI: 2.9   Recorded compliance dates , 10/14/22  to 11/12/22   Machine/Mfg:   [] ResMed    [x] Respironics/Dreamstation   Interface:   [] Nasal    [] Nasal pillows   [x] FFM      Provider:      [] -NATALY     []Timothy     [x] Yovani    [] Vale Rueda    [] Schw[place]ans               [] [facility] [facility]      [] Adaptive    [] [address]      [] Other    Neck Size: 16  Mallampati 3  ESS:  7  SAQLI: 88    Here is a scan of the most recent download:            Presentation:   Romain presents for sleep medicine follow up for obstructive sleep apnea  Since the last visit, Romain has been semi compliant with her CPAP therapy. Currently in [facility] program   Patient has interrupted sleep due to back issues     Weight stable / unchanged    Equipment issues: The pressure is  acceptable, the mask is acceptable     Sleep issues:  Do you feel better? Yes  More rested? Sometimes   Better concentration? NA  Difficulty falling sleep? No  Difficulty staying asleep? No  Snoring? No    Progress History:   Since last visit any new medical issues? No  New ER or hospital visits? No  Any new or changes in medicines? No  Any new sleep medicines? No    Review of Systems -   Review of Systems   Constitutional: Negative. Negative for chills and fever. HENT: Negative. Negative for congestion. Eyes: Negative. Respiratory: Negative. Negative for cough, shortness of breath and wheezing. Cardiovascular: Negative. Negative for chest pain and leg swelling.    Gastrointestinal: Negative. Endocrine: Negative. Genitourinary: Negative. Musculoskeletal:  Positive for back pain. Allergic/Immunologic: Negative. Neurological: Negative. Hematological: Negative. Psychiatric/Behavioral: Negative. Negative for sleep disturbance. Physical Exam:    BMI:  Body mass index is 45.54 kg/m². Wt Readings from Last 3 Encounters:   11/16/22 241 lb (109.3 kg)   11/16/22 241 lb 12.8 oz (109.7 kg)   11/08/22 241 lb (109.3 kg)     Vitals: /68   Pulse 64   Temp 97.9 °F (36.6 °C)   Ht 5' 1\" (1.549 m)   Wt 241 lb (109.3 kg)   LMP 10/27/2015 (Exact Date)   SpO2 97%   BMI 45.54 kg/m²       Physical Exam  Vitals and nursing note reviewed. Constitutional:       Appearance: She is morbidly obese. HENT:      Head: Normocephalic and atraumatic. Eyes:      Conjunctiva/sclera: Conjunctivae normal.      Pupils: Pupils are equal, round, and reactive to light. Neck:      Vascular: No JVD. Cardiovascular:      Rate and Rhythm: Normal rate and regular rhythm. Heart sounds: Normal heart sounds. No murmur heard. No friction rub. No gallop. Pulmonary:      Effort: Pulmonary effort is normal. No respiratory distress. Breath sounds: Normal breath sounds. No wheezing or rales. Abdominal:      General: Bowel sounds are normal.      Palpations: Abdomen is soft. Musculoskeletal:         General: Normal range of motion. Cervical back: Normal range of motion and neck supple. Skin:     General: Skin is warm and dry. Capillary Refill: Capillary refill takes less than 2 seconds. Neurological:      Mental Status: She is alert and oriented to person, place, and time. Psychiatric:         Behavior: Behavior normal.         Thought Content: Thought content normal.         Judgment: Judgment normal.     ASSESSMENT/DIAGNOSIS     Diagnosis Orders   1. JESSE on CPAP  DME Order for CPAP as OP      2.  Morbid obesity ([facility] [address]             Plan   Do you need any equipment today? Yes   -Patient's symptoms and AHI are controlled with current settings of 9 cmH2O. -Download reviewed by me and with the patient today in the office   -Advised to continue current positive airway pressure therapy with above described pressure. -Advised to keep good compliance with current recommended pressure to get optimal results and clinical improvement  -Recommend 7-9 hours of sleep with PAP  -Instructed to call [employer] regarding supplies if needed.   -Patient to call office for earlier appointment if needed for worsening of sleep symptoms. -Advised patient not to drive if feeling sleepy  -Discussed weight loss  -Educated about my impression and plan. Patient verbalizes understanding. Will see Tammie Garcia back in: 6 months with download. Information added by my medical assistant/LPN was reviewed today.     Electronically signed by CALIXTO Kelly CNP on 11/16/2022 at 1:56 PM

## 2022-11-29 ENCOUNTER — OFFICE VISIT (OUTPATIENT)
Dept: PSYCHOLOGY | Age: 52
End: 2022-11-29
Payer: MEDICARE

## 2022-11-29 DIAGNOSIS — E66.01 MORBID OBESITY (HCC): ICD-10-CM

## 2022-11-29 DIAGNOSIS — F45.1 SOMATIC SYMPTOM DISORDER, MODERATE: Primary | ICD-10-CM

## 2022-11-29 PROCEDURE — 1036F TOBACCO NON-USER: CPT | Performed by: PSYCHOLOGIST

## 2022-11-29 PROCEDURE — 90837 PSYTX W PT 60 MINUTES: CPT | Performed by: PSYCHOLOGIST

## 2022-11-29 NOTE — PROGRESS NOTES
Supervising Clinical Psychologist's Attestation Statement  I was present with the clinical psychology trainee during the history and exam. I discussed the findings and plans with the clinical psychology trainee and agree as documented in her note.     Cabrera Dowd, PhD  Clinical Staff Psychologist

## 2022-11-29 NOTE — PATIENT INSTRUCTIONS
Bariatric Surgery Psychosocial Requirements: Letter to Patients    The following requirements were made to help ensure your long-term success with bariatric surgery:     Although not required, consider initiating treatment with a therapist to work on the following goals we discussed: Individual counseling for additional support identifying and implementing adaptive coping skills to eliminate problematic eating behaviors, manage mood and anxiety symptoms, increase and sustain behavior change, and facilitate healthy lifestyle changes. Completely eliminate:  Alcohol  Binge eating/overeating  Emotional eating   Skipping meals     Continue to abstain from:  Nicotine   Illicit drug use   Carbonation - none since past month  Caffeine     Increase exercise as medically tolerated, and continue weight loss as required by the surgeon. Demonstrate adherence with physical therapy     Complete an advanced directive for medical decision making    Ask a support person to review the bariatric surgery information binder and to attend all bariatric surgery appointments with you. Other:  Begin to monitor nutrition and exercise (e.g. Apps: MyFitnessPal, Baritastic, LoseIt!). Consider tracking your emotions in your food diary to begin to identify triggers. Follow up with our bariatric dietitian regarding specific nutritional questions. Identify and implement adaptive coping strategies. Obtain a scale / Begin weighing self regularly (once per week). Try to obtain 7-8 hours of sleep nightly. Continue taking psychiatric medications as prescribed by your provider and follow-up for medication management. Increase awareness of any mood or anxiety symptoms that may arise during the weight loss process as these could potentially interfere with your weight loss success.     To assist with your success throughout this process, you are invited to join:  FREE support groups that are currently being held online

## 2022-11-30 ENCOUNTER — HOSPITAL ENCOUNTER (OUTPATIENT)
Dept: PHYSICAL THERAPY | Age: 52
Setting detail: THERAPIES SERIES
Discharge: HOME | End: 2022-11-30
Payer: MEDICARE

## 2022-11-30 PROCEDURE — 97110 THERAPEUTIC EXERCISES: CPT

## 2022-11-30 PROCEDURE — 97161 PT EVAL LOW COMPLEX 20 MIN: CPT

## 2022-11-30 NOTE — PROGRESS NOTES
** PLEASE SIGN, DATE AND TIME CERTIFICATION BELOW AND RETURN TO [facility] OUTPATIENT REHABILITATION (FAX #: [phone]). ATTEST/CO-SIGN IF ACCESSING VIA [employer]. THANK YOU.**    I certify that I have examined the patient below and determined that Physical Medicine and Rehabilitation service is necessary and that I approve the established plan of care for up to 90 days or as specifically noted. Attestation, signature or co-signature of physician indicates approval of certification requirements.    ________________________ ____________ __________  Physician Signature   Date   Time  [address] [facility]  PHYSICAL THERAPY  [x] EVALUATION  [] DAILY NOTE (LAND) [] DAILY NOTE (AQUATIC ) [] PROGRESS NOTE [] DISCHARGE NOTE    [x] [address] [facility]   [] [place] [address]    [] [facility]   [] [facility]    Date: 2022  Patient Name:  Manpreet Márquez  : 1970  MRN: [id number]  CSN: [id number]    Referring Practitioner Lety Yoon MD   Diagnosis Low back pain, unspecified [M54.50]  Other cervical disc degeneration at C4-C5 level [M50.321]  Spinal stenosis, cervical region [M48.02]  Radiculopathy, cervical region [M54.12]    Treatment Diagnosis Chronic neck and back pain, decreased ROM, core, postural, shoulder, LE weakness   Date of Evaluation 22    Additional Pertinent History Recent MI, HTN, obesity, arthritis, SOB. Functional Outcome Measure Used NDI, Modified Oswestry   Functional Outcome Score 19, 22 (22)       Insurance: Primary: Payor: Saqib [place] /  /  / ,   Secondary:    Authorization Information: PRECERTIFICATION REQUIRED:  n/a  INSURANCE THERAPY BENEFIT:  Allowed 30 visits Physical Therapy /Occupational Therapy/Speech Therapy per calendar year. No visit limit for PT/OT/ST for patients age 8 and under. FCE-Covered, no precert required.   Benefit will not cover maintenance or preventative treatment. AQUATIC THERAPY COVERED:   Yes  MODALITIES COVERED:  Yes. Iontophoresis and Hot/Cold Packs are not covered. TELEHEALTH COVERED: Yes   Visit # 1, 1/10 for progress note   Visits Allowed: 30   Recertification Date: 9/33/71   Physician Follow-Up: As needed   Physician Orders:    History of Present Illness: Pt had steel plate put in neck in 2017 d/t pain in neck and arm. In 2019, pt developed spondylolithesis and had surgery, and hardware was placed in wrong location causing permanent nerve damage. Pt feels electric shocks up in legs and muscle cramps down her legs. Spacer was removed that caused all the damage. Pt developed osteomyelitis in hardware of back. Pt spend time in IPR, then went home with PICC line. Infection worsened so went to hospital and ended up having emergency surgery in 2020, removing hardware and fusing L4-S1. Pt has numbness in sacrum. Pt having pain in right buttock. Prolonged walking is painful, radiating into hips. Pt going through weight management to have bariatric surgery in March/April. Pt hasn't started working with  at gym. Pt very limited on activity d/t pain. Getting in drivers sick of car is difficult. Neck is constantly cracking and grinding. Pt unable to do dishes for more than a minute, as neck, mid back pain begins. Pt takes Percocet with intense pain, denies use of Tylenol or ibuprofen. Pt denies use of heat or ice d/t no relief. Vacuuming is difficult. Doctor planning MRI after therapy. SUBJECTIVE: see above    Social/Functional History and Current Status:  Medications and Allergies have been reviewed and are listed on Medical History Questionnaire. Albin Lorenzo lives  ex-  in a single story home with 1 stairs and no handrail to enter.     Task Previous Current   ADLs  Independent Independent   IADL's Independent Modified Independent- requires frequent rest breaks   Ambulation Independent Independent   Transfers Independent Independent   Recreation Independent- grand kids, drive and take pictures, travel [address]   Driving Active  Active    Work Unemployed  Unemployed     Objective:    OBSERVATION   Pain 4/10 low back and neck  Worsens as day progresses to 8-9/10   Palpation    Sensation Numb right sacral region, right inner thigh and buttock   Edema    Spinal Accessory Motion    Bed Mobility Mod I   Transfers independent   Ambulation Decreased pace, equal step length, no deviations   Stairs Reciprocating pattern with B handrail, slower and at diagonal when descending   Balance        POSTURE    No Deficit Deficit Comments   Forward Head x     Rounded Shoulders  x    Kyphosis x     Lordosis      Lateral Shift      Scoliosis      Iliac Crest      PSIS      ASIS      Leg Length Discrp      Slumped Sitting        NECK RANGE OF MOTION   Flexion WFL   Extension [facility] [place]   Rotation Right 35   Rotation Left 27   Sidebending Right 12   Sidebending Left 12 with cracking in neck   Retraction    Protraction    Neck Range of Motion is [place] [place]  []       UPPER EXTREMITY RANGE OF MOTION    Left Right COMMENTS   Shoulder Flexion      Shoulder Extension      Shoulder Abduction      Shoulder Adduction      Shoulder External Rotation      Shoulder Internal Rotation      Shoulder Range of Motion is [place] [place]  [x]      Elbow Flexion      Elbow Extension      Forearm Pronation      Forearm Supination      Elbow Range of Motion is [place] [place]  [x]      Wrist Flexion      Wrist Extension      Wrist Radial Deviation      Wrist Ulnar Deviation      Wrist Range of Motion is WFL  [x]   If no measurement is recorded, no formal assessment was completed for that motion.          TRUNK RANGE OF MOTION   Flexion: 75%   Extension: 50%   Lateral Flexion Left: 50%   Lateral Flexion Right: 50%   Rotation Left: 50%   Rotation Right: 50%   Trunk Range of Motion is [facility]  []     LOWER EXTREMITY RANGE OF MOTION    Left Right Comments   Hip Flexion knee to chest      Hip Extension      Hip ABDuction      Hip ADDuction      Hip Internal Rotation      Hip External Rotation      Hip Range of Motion is [facility] [place]  [x] Limited by adipose tissue      Knee Flexion      Knee Extension      Knee Range of Motion is [facility]  [x]         BUE STRENGTH    Left Right   Shoulder Flexion 4 4   Shoulder Extension     Shoulder Abduction 4+ 4+   Shoulder Adduction     Shoulder External Rotation 4+ 4+   Shoulder Internal Rotation 4 4   []  Shoulder Strength is grossly WFL. Elbow Flexion 5 5   Elbow Extension 4 4   Forearm Pronation     Forearm Supination     [] Elbow Strength is grossly WFL. Wrist Flexion     Wrist Extension 5 5   Wrist Radial Deviation     Wrist Ulnar Deviation     []  Wrist Strength is grossly WFL. If no ratings are recorded, no formal assessment was completed.        LOWER EXTREMITY STRENGTH    Left Right Comments   Hip Flexion 4 4    Hip Abduction 4- 4-    Hip Adduction      Hip Extension 4+ 4+    Hip External Rotation 5 5    Knee Flexion 5 5    Knee Extension 5 5    Ankle DF 5 5    Ankle PF      LE strength is WFL []      CORE STRENGTH   Poor core engagement         TREATMENT   Precautions:    Pain:     \"X in shaded column indicates activity completed today    *\" next to exercise/intervention indicates progression   Modalities Parameters/  Location  Notes                     Manual Therapy Time/Technique  Notes                     Exercise/Intervention   Notes   Seated piriformis IR/ER stretch   x Demo'd for HEP   Seated hamstring stretch   x \"   Upper trap stretch   x \"   Backward shoulder rolls   x \"                                                             Specific Interventions Next Treatment: alternate land and pool; cervical ROM, postural strengthening, hip stretches, core and LE strengthening, manual work to right lumbar/hip; poor tolerance to laying supine    Activity/Treatment Tolerance:  [x]  Patient tolerated treatment well  []  Patient limited by fatigue  []  Patient limited by pain   []  Patient limited by medical complications  []  Other:     Assessment: 47year old presents chronic cervical and lumbar pain with history of spinal fusions. Pt has permanent LLE nerve damage and shooting pains throughout legs. Pt very cautious with movement d/t fear of causing injury. Pt demos decreased ROM in neck, lumbar and hips contributing to overall weakness affecting mobility. Pt will benefit from skilled PT to address listed impairments to reduce pain and improve ROM, strength, and mobility. Body Structures/Functions/Activity Limitations: impaired ROM, impaired strength, pain, and abnormal posture  Prognosis: good      Goals    Patient Goal: Learn ways to manage pain to be able to do more activities    Short Term Goals: 4 weeks  Patient will improve AROM of cervical rotation to 40 degrees for ease turning head when driving. Patient will demonstrate good postural awareness with <3 cues during therapy session to decrease spinal stress during her day. Patient will have <50% lumbar AROM restriction with decreased right hip tightness for ease with LE dressing. Long Term Goals: 8 weeks  Patient will demonstrate good core engagement and postural awareness during therapy session without cues  to carry over to standing to do dishes and cook. Patient will improve bilateral shoulder and tricep strength to 5/5 for ease lifting and carrying functional items. Patient will improve bilateral hip flexion and ABD strength to 4+/5 for stabilization when cleaning and walking. Patient will be independent and compliant with HEP to achieve above goals. Patient Education:   [x]  HEP/Education Completed: Plan of Care, Goals, exercises listed above with handout provided  [employer] Access Code: [email or web address]  []  No new Education completed  []  Reviewed Prior HEP      [x]  Patient verbalized and/or demonstrated understanding of education provided.   []  Patient unable to verbalize and/or demonstrate understanding of education provided. Will continue education. []  Barriers to learning:     PLAN:  Treatment Recommendations: Strengthening, Range of Motion, Functional Mobility Training, Manual Therapy - Soft Tissue Mobilization, Pain Management, Home Exercise Program, Patient Education, Aquatics, and Modalities    [x]  Plan of care initiated. Plan to see patient 1-2 times per week for 8 weeks to address the treatment planned outlined above.   []  Continue with current plan of care  []  Modify plan of care as follows:    []  Hold pending physician visit  []  Discharge    Time In 1102   Time Out 1152   Timed Code Minutes: 8 min   Total Treatment Time: 50 min     Electronically Signed by: Leonardo Murphy PT

## 2022-12-02 NOTE — PROGRESS NOTES
Supervising Clinical Psychologist's Attestation Statement  The patient met the criteria for indirect supervision. I discussed the findings and plans with the Clinical Psychology Trainee and agree as documented in her note . Electronically signed by Isabella Thorpe, PhD     [facility] BARIATRIC SURGERY:    History of Presenting Illness:   Ms. Danielle Ervin was initially referred for a routine psychological evaluation for bariatric surgery on 10/25/2022. At this evaluation, the following requirements were given prior to psychological clearance for WLS:  Encouraged to participate in the Bariatric Support Groups    Increase physical activity and muscle strengthening. Continue weight loss as required by surgeon. Assessment:  Eating Behaviors: Patient reports improvements in food choices, portion sizes and skipping meal (eats 3 meals 70%). Patient reports some undereating and poor food choices while at her friend's mothers  and the activities leading up to this event over the past 2-3 days. Caffeine Use: Patient denies current caffeine or carbonation consumption. This is a decrease from 1 cups of cover per week and 1 Diet Coke every once in a while. Exercise: Patient reports she will begin 6 weeks of physical therapy to prepare for a future MRI of her spine. Current Weight: Patient reports weight has remained stable and denies any weight gain or loss. Sleep: Patient reports adequate sleep with average of 6-8 hours per night. Current Psychiatric Medications: Patient is not currently on psychiatric medications. MOOD    Depression/Anxiety/Starr: Patient reports mood is stable. Patient denies down or depressed mood and states she is a generally happy person. Patient denies feelings of anxiousness or worry. SUBSTANCE USE   (Including last use, average use, consequences related to use. )    Alcohol: Patient reports last alcohol use was this past Saturday, at which time she consumed 1 Smirnoff. Tobacco: Denies current or recent tobacco use. Illicit Drugs: Denies current or recent illicit drug use. Patient understands and accepts abstinence from alcohol, tobacco, and illicit drugs. PSYCHIATRIC EXAM  General appearance: appropriately dressed, obese  Attitude & Behavior: cooperative, mildly hostile  Motor Activity & Musculoskeletal: appropriate  Speech & Language: normal rate and volume, speech and verbiage appropriate  Gait & Station: sitting in upright chair   Mood: irritated  Affect: congruent with mood,   Thought content: appropriate  Suicidal ideation: denies  Homicidal ideation: denies  Thought process & Associations: logical and coherent  Perceptions: appropriate  Orientation: alert and oriented X 3  Attention & Concentration: appears intact  Fund of knowledge: adequate  Insight: adequate  Judgment: adequate    Numerical literacy: 4/4; patient able to complete basic math skills necessary for independent medication changes. Psychological Interpretation of the MMPI-2: The MMPI-2 clinical profile should be interpreted with slight caution given some elevation of the L scale indicating the possibility for unsophisticated deviant test-taking attitudes that include naïve denial. The clinical profile is considered to be a spike 1/3. Individuals with similar clinical profiles of 1/3 are preoccupied with rather specific somatic complaints mostly centered on the head, back, neck and legs. They may also complain about fatigue, dizziness, and sleep problems and possibly in response to stress they may develop physical symptoms. They tend to deny psychological problems. These individuals may present themselves as quite charming and very concerned about social approval. They may use manipulation to make other people take care of them. DSM DIAGNOSIS:  Somatic symptom disorder, with predominant pain, moderate   Morbid obesity?        PLAN:  Ms. James Zuniga is NOT cleared from a psychiatric perspective. She still needs to complete the following:    Complete a psychiatric follow-up appointment to be cleared from a psychiatric perspective. Patient MUST be completely abstinent from all alcohol consumption. Patient MUST participate in exercise plan. Patient MUST completely eliminate skipping any meals.     Psychology Extern:  [facility]     Start time: 02:00 PM  End time: 02:56 PM

## 2022-12-05 NOTE — PROGRESS NOTES
Assessment: Patient is a 47 y.o. female seen for month three  follow up MNT visit for  pre op bariatric surgery desires sleeve vs bypass    Vitals from current and previous visits:      Vitals 64/1/8106   SYSTOLIC 619   DIASTOLIC 82   Site Right Upper Arm   Position Sitting   Cuff Size Large Adult   Pulse 76   Temp 98.3   Resp    SpO2    Weight 240 lb 6.4 oz   Height 5' 0.25\"   Body mass index 46.56 kg/m2   Waist (Inches)    Neck circumference (Inches)        Initial weight at start of Weight Management Program was: 238 lbs     Eusebia lost 1 lb over one month  -Weight goal: lose weight.     -Nutritionally relevant labs: initial labs Oct 2022- Vit D-33, Iron-80, Ferritn-80, glucose-113  Lab Results   Component Value Date/Time    LABA1C 5.8 10/12/2022 01:11 PM    LABA1C 5.9 05/23/2022 12:27 PM    LABA1C 5.8 01/06/2021 12:04 PM    GLUCOSE 113 (H) 10/12/2022 01:11 PM    GLUCOSE 113 (H) 07/15/2022 10:28 AM    CHOL 211 (H) 10/12/2022 01:11 PM    HDL 55 10/12/2022 01:11 PM    LDLCALC 130 10/12/2022 01:11 PM    TRIG 130 10/12/2022 01:11 PM                  Lab Results   Component Value Date/Time    VITD25 33 10/12/2022 01:11 PM       JESSE appt completed  Cardiology appt is 12/9/22    Dr Mirna Angel f/u now 2/1/23    - Is patient taking daily Multivitamin:  Taking Vitamin D3 daily    Pt lives with ex      Trying to plan more with meals and eat at regular meal times.   Had food journal in office for review.    -Food Recall:   Breakfast: 8am-  banana or grapes  Lunch: 11a-2pm - salami and cheese or ham and cheese or two grilled chicken strips, mashed potatoes   Dinner: 5-7p- shrimp, pepper and onions and ham , turkey and cheese or steak and broccoli   Snack: decreased snacking- some pork rinds  Drinks throughout the day: bottled water ~  8 bottles a day with sugar free flavor packets, no pop, Hot Tea one cup with milk with sugar substitute ~ 3-4 times a week    -Impression of Dietary Intake:improvement with fruit and vegetables this month. Exercise:  Patient  sent via Email Link to view Fitness Orientation video if desires to use [employer]  -Physical Activity is: - Started  outpatient PT for neck and back twice a week and one day will be pool -recommended by Dr Tutu Moran. States Dr Irene Anand was ok with patient having bariatric surgery    Did not start using [employer] yet- recommended pt start outpatient PT at [facility] and can coordinate a transition to using [employer]    Nutrition Diagnosis: Overweight/Obesity related to Food and nutrition-related knowledge deficit as evidenced by BMI of 43.9    Intervention:   Healthy behaviors: adequate water intake   Patient has  attended support group via [employer] and plans to attend in person support group next week . Goals reviewed with patient and questions answered  Positive reinforcement given    Patient Instructions   Goals:  Nutrition Goal:  I will bring back food journal to next office visit. Record meal times, serving sizes. Continue to aim for regular meal times and not skip meals! Water Goal:  Great job with water intake - continue at least 64 oz or greater each day  Exercise Goal: outpatient Physical Therapy twice  and plan to start [employer] [address]   Attend support group Tuesday December 13 at 5:00pm  in Weight Management Office            -Followup visit: 4 weeks with dietitian.      Ellen Ling, RD, LD,   Dietitian- Weight Management [address]

## 2022-12-06 ENCOUNTER — HOSPITAL ENCOUNTER (OUTPATIENT)
Dept: PHYSICAL THERAPY | Age: 52
Setting detail: THERAPIES SERIES
Discharge: HOME | End: 2022-12-06
Payer: MEDICARE

## 2022-12-06 PROCEDURE — 97110 THERAPEUTIC EXERCISES: CPT

## 2022-12-06 NOTE — PROGRESS NOTES
[address] [facility]  PHYSICAL THERAPY  [] EVALUATION  [x] DAILY NOTE (LAND) [] DAILY NOTE (AQUATIC ) [] PROGRESS NOTE [] DISCHARGE NOTE    [x] OUTPATIENT REHABILITATION [facility]   [] [place] [address]    [] [facility]   [] [place]    Date: 2022  Patient Name:  Pedro Jiang  : 1970  MRN: [id number]  CSN: [id number]    Referring Practitioner Alfonso Jean MD   Diagnosis Low back pain, unspecified [M54.50]  Other cervical disc degeneration at C4-C5 level [M50.321]  Spinal stenosis, cervical region [M48.02]  Radiculopathy, cervical region [M54.12]    Treatment Diagnosis Chronic neck and back pain, decreased ROM, core, postural, shoulder, LE weakness   Date of Evaluation 22    Additional Pertinent History Recent MI, HTN, obesity, arthritis, SOB. Functional Outcome Measure Used NDI, Modified Oswestry   Functional Outcome Score 19, 22 (22)       Insurance: Primary: Payor: [employer] Av[employer]ño /  /  / ,   Secondary:    Authorization Information: PRECERTIFICATION REQUIRED:  n/a  INSURANCE THERAPY BENEFIT:  Allowed 30 visits Physical Therapy /Occupational Therapy/Speech Therapy per calendar year. No visit limit for PT/OT/ST for patients age 8 and under. FCE-Covered, no precert required. Benefit will not cover maintenance or preventative treatment. AQUATIC THERAPY COVERED:   Yes  MODALITIES COVERED:  Yes. Iontophoresis and Hot/Cold Packs are not covered. TELEHEALTH COVERED: Yes   Visit # 2, 2/10 for progress note   Visits Allowed: 30   Recertification Date:    Physician Follow-Up: As needed   Physician Orders:    History of Present Illness: Pt had steel plate put in neck in 2017 d/t pain in neck and arm. In 2019, pt developed spondylolithesis and had surgery, and hardware was placed in wrong location causing permanent nerve damage. Pt feels electric shocks up in legs and muscle cramps down her legs.  Spacer was removed that caused all the damage. Pt developed osteomyelitis in hardware of back. Pt spend time in [facility], then went home with PICC line. Infection worsened so went to hospital and ended up having emergency surgery in 2020, removing hardware and fusing L4-S1. Pt has numbness in sacrum. Pt having pain in right buttock. Prolonged walking is painful, radiating into hips. Pt going through weight management to have bariatric surgery in March/April. Pt hasn't started working with  at gym. Pt very limited on activity d/t pain. Getting in drivers sick of car is difficult. Neck is constantly cracking and grinding. Pt unable to do dishes for more than a minute, as neck, mid back pain begins. Pt takes Percocet with intense pain, denies use of Tylenol or ibuprofen. Pt denies use of heat or ice d/t no relief. Vacuuming is difficult. Doctor planning MRI after therapy. SUBJECTIVE: Pt reports just waking up and stated 4/10 pain. Last night pt lifted a case of water x2 off the ground into the cart then into a car. States pain shot up to 10/10 in L lateral thoracic region and resulted in her taking a percocet. Pt reports compliance of HEP.      TREATMENT   Precautions:    Pain: 4/10 R buttock and LB region    \"X in shaded column indicates activity completed today    *\" next to exercise/intervention indicates progression   Modalities Parameters/  Location  Notes                     Manual Therapy Time/Technique  Notes                     Exercise/Intervention   Notes   Seated piriformis IR/ER stretch    Demo'd for HEP   Seated hamstring stretch    \"   Upper trap stretch    \"   Backward shoulder rolls    \"          Supine w/ bolster under knees: GOOD tolerance       Lumbar:       SKTC w/towel * x2 20 sec x    Piriformis stretch w/towel* x2 20 sec x    TA activation* x10 5 sec x                  Cervical:       Cervical retraction* x10 2-3 sec x    Cervical rotation* x10 2-3 sec x           Nu step* 5 min L1 x Denies increase in pain                  Aquatic therapy guideline and agreement   x      Specific Interventions Next Treatment: alternate land and pool; cervical ROM, postural strengthening, hip stretches, core and LE strengthening, manual work to right lumbar/hip; poor tolerance to laying supine    Activity/Treatment Tolerance:  [x]  Patient tolerated treatment well  []  Patient limited by fatigue  []  Patient limited by pain   []  Patient limited by medical complications  []  Other:     Assessment: Initiated cervical and lumbar exercises as shown above to improve flexibility and strength. Pt tolerated supine exercises well with bolster positioned under bilateral knees. Pt noted \"it feels like a tennis ball is in my R buttock\" while completing stretches. Pt educated on aquatic therapy guideline and acknowledged understanding along with signing of agreement. . Will continue to progress as tolerated. Body Structures/Functions/Activity Limitations: impaired ROM, impaired strength, pain, and abnormal posture  Prognosis: good      Goals    Patient Goal: Learn ways to manage pain to be able to do more activities    Short Term Goals: 4 weeks  Patient will improve AROM of cervical rotation to 40 degrees for ease turning head when driving. Patient will demonstrate good postural awareness with <3 cues during therapy session to decrease spinal stress during her day. Patient will have <50% lumbar AROM restriction with decreased right hip tightness for ease with LE dressing. Long Term Goals: 8 weeks  Patient will demonstrate good core engagement and postural awareness during therapy session without cues  to carry over to standing to do dishes and cook. Patient will improve bilateral shoulder and tricep strength to 5/5 for ease lifting and carrying functional items. Patient will improve bilateral hip flexion and ABD strength to 4+/5 for stabilization when cleaning and walking.     Patient will be independent and compliant with HEP to achieve above goals. Patient Education:   []  HEP/Education Completed: Plan of Care, Goals, exercises listed above with handout provided  [employer] Access Code: [email or web address]  []  No new Education completed  [x]  Reviewed Prior HEP      [x]  Patient verbalized and/or demonstrated understanding of education provided. []  Patient unable to verbalize and/or demonstrate understanding of education provided. Will continue education. []  Barriers to learning:     PLAN:  Treatment Recommendations: Strengthening, Range of Motion, Functional Mobility Training, Manual Therapy - Soft Tissue Mobilization, Pain Management, Home Exercise Program, Patient Education, Aquatics, and Modalities    []  Plan of care initiated. Plan to see patient 1-2 times per week for 8 weeks to address the treatment planned outlined above.   [x]  Continue with current plan of care  []  Modify plan of care as follows:    []  Hold pending physician visit  []  Discharge    Time In 1146   Time Out 1215   Timed Code Minutes: 29   Total Treatment Time: 29     Electronically Signed by: Dede Morales PTA

## 2022-12-06 NOTE — PROGRESS NOTES
[address] [facility]  [address]  [facility], [address] [facility]  [phone]      Visit Date:  12/7/2022  Weight Management Pre-Op Follow-up    HPI:    Medically Supervised follow-up- Month #3 of 6- Rejoining. desires sleeve v. Bypass. Pedro Jiang is here today for continued supervised weight management of morbid obesity. Weight today 240#. Down 1# since last month. Up 2# since starting with weight management. BMI 45. Reports that she is doing better with nutrition. Slowly making recommended changes. Has been tracking food. Has been consistent with 3 meals daily. Has been more mindful of food choices. Has been adding fruit back in to diet. Continues to work on decreasing portions. Drinking plenty of water/ Decaf tea. Comorbid conditions include GERD, hypertension, fatty liver, chronic back pain, depression, anxiety, IBS and sleep apnea. GERD well controlled with Protonix. Rare breakthrough heartburn, only with trigger foods. Discussed possibility of worsening GERD that may require additional medication or revision surgery. EGD scheduled 12/13/22-  Dr. April Ha. Discussed bariatric surgery with Dr. Jayden Carney and supportive of moving forward with bariatric surgery. Started PT for chronic back pain 3 times weekly. Will need to complete PT before proceeding with MRI. Awaiting LOMN- has taken to PCP- will call and check on this. Mammogram completed  and reviewed- no concerning findings. Completed support groups x 1. Plans to attended another support group next week. EKG borderline. Completed heart cath July 2022. Cardiac clearance received from Dr. Dawood Saleh. Not currently compliant with CPAP- 36.7%. Will need to be compliant prior to surgery. Has started wearing CPAP Qhs for 6-8 per night. Pulmonary clearance needed prior to surgery. Advised to call for apt as not currently scheduled  until May. Psychological clearance with Dr. Almas Zhu in process, next apt 2/1/23.    If she does not lose enough weight with medical management she would like to proceed with bariatric surgery. Physical Activity:  PT 3 times per week. Current BMI: Body mass index is 46.56 kg/m².   Current Weight:   Wt Readings from Last 3 Encounters:   12/07/22 240 lb 6.4 oz (109 kg)   12/07/22 240 lb 6.4 oz (109 kg)   11/16/22 241 lb (109.3 kg)     Initial Body [id number]    Past Medical History:  Past Medical History:   Diagnosis Date    Anxiety     Arthritis     Bell's palsy     Body mass index 40.0-44.9, adult ([facility] [address] 05/06/2021    Esophagitis     Fatty liver     GERD (gastroesophageal reflux disease)     Hypertension     IBS (irritable bowel syndrome)     Morbid obesity with BMI of 40.0-44.9, adult ([facility])     MRSA (methicillin resistant staph aureus) culture positive     JESSE (obstructive sleep apnea) 05/06/2021    UTI (urinary tract infection)     V tach     Vertigo        Past Surgical History:  Past Surgical History:   Procedure Laterality Date    BACK SURGERY      BLADDER SUSPENSION      BREAST REDUCTION SURGERY  10/2007    CARPAL TUNNEL RELEASE      CERVICAL FUSION  2017    ACDF C5-C7    CHOLECYSTECTOMY      COLONOSCOPY      DILATION AND CURETTAGE OF UTERUS      ENDOSCOPY, COLON, DIAGNOSTIC      HYSTERECTOMY (CERVIX STATUS UNKNOWN)      LUMBAR FUSION N/A 4/3/2020    L4-S1 DECOMPRESSION AND POSTERIOR FUSION; LUMBAR I & D WITH HARDWARE REMOVAL performed by Jana Jose MD at [facility]  10/17/2019    L5-S1    UPPER GASTROINTESTINAL ENDOSCOPY N/A 7/19/2019    EGD BIOPSY performed by Pavan Sequeira MD at [address] [place] [address]  7/19/2019    EGD DILATION SAVORY performed by Pavan Sequeira MD at [facility]       Past Social History:  Social History     Socioeconomic History    Marital status:      Spouse name: Not on file    Number of children: 3    Years of education: 12    Highest education level: Not on file   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   Tobacco Use    Smoking status: Former     Packs/day: 2.00     Years: 20.00     Pack years: 40.00     Types: Cigarettes     Quit date: 2017     Years since quittin.8    Smokeless tobacco: Never   Vaping Use    Vaping Use: Never used   Substance and Sexual Activity    Alcohol use: Yes     Comment: occ    Drug use: No    Sexual activity: Not Currently   Other Topics Concern    Not on file   Social History Narrative    Not on file     Social Determinants of Health     Financial Resource Strain: Not on file   Food Insecurity: Not on file   Transportation Needs: Not on file   Physical Activity: Not on file   Stress: Not on file   Social Connections: Not on file   Intimate Partner Violence: Not on file   Housing Stability: Not on file        Medications:   Current Outpatient Medications   Medication Sig Dispense Refill    oxyCODONE-acetaminophen (PERCOCET) 5-325 MG per tablet Take 1 tablet by mouth every 4 hours as needed for Pain. dicyclomine (BENTYL) 10 MG capsule       hydroCHLOROthiazide (MICROZIDE) 12.5 MG capsule Take 12.5 mg by mouth daily as needed      cyclobenzaprine (FLEXERIL) 10 MG tablet Take 10 mg by mouth 3 times daily as needed for Muscle spasms      metoprolol succinate (TOPROL XL) 50 MG extended release tablet Take 1 tablet by mouth daily 30 tablet 3    pantoprazole (PROTONIX) 40 MG tablet Take 1 tablet by mouth every morning (before breakfast) 30 tablet 0    Cholecalciferol (VITAMIN D3) 25 MCG (1000 UT) TABS TAKE ONE TABLET BY MOUTH EVERY DAY  1     No current facility-administered medications for this visit. Allergies: Allergies   Allergen Reactions    Codeine Rash    Dilaudid [Hydromorphone Hcl] Rash       Subjective:    Review of Systems:  Constitutional: Denies any fever, chills, fatigue. Wound: Denies any rash, skin color changes or wound problems. Resp: Denies any cough, shortness of breath. CV: Denies any chest pain, orthopnea or syncope. MS: Denies myalgias, (+)arthralgias. GI: Denies any nausea, vomiting,(+) diarrhea, constipation, melena, hematochezia. (+) reflux  : Denies any hematuria, hesitancy or dysuria. NEURO: Denies seizures, headache. Objective:    /82 (Site: Right Upper Arm, Position: Sitting, Cuff Size: Large Adult)   Pulse 76   Temp 98.3 °F (36.8 °C) (Oral)   Ht 5' 0.25\" (1.53 m)   Wt 240 lb 6.4 oz (109 kg)   LMP 10/27/2015 (Exact Date)   BMI 46.56 kg/m²   Physical Examination:   Constitutional: Alert and oriented to person, place and time. Well-developed, well- nourished. Head: Normocephalic and atraumatic  Neck: Supple. Eyes: EOMI b/l. Conjunctivae normal.  No scleral icterus. Respiratory: Effort normal. No respiratory distress. Abd: Benign  Ext:  Movement x 4. No edema  Skin; Warm and dry, no visible rashes, lesions or ulcers.    Neuro: Cranial Nerves Grossly Intact; nml coordination      CBC   Lab Results   Component Value Date/Time    WBC 5.7 10/12/2022 01:11 PM    RBC 5.27 10/12/2022 01:11 PM    HGB 14.4 10/12/2022 01:11 PM    HCT 44.5 10/12/2022 01:11 PM    MCV 84.4 10/12/2022 01:11 PM    MCH 27.3 10/12/2022 01:11 PM    MCHC 32.4 10/12/2022 01:11 PM    RDW 13.8 07/07/2017 09:21 AM     10/12/2022 01:11 PM    MPV 9.8 10/12/2022 01:11 PM    RBCMORP NORMAL 07/07/2017 09:21 AM    SEGSPCT 63.1 10/12/2022 01:11 PM    LABLYMP 27.8 10/12/2022 01:11 PM    MONOPCT 5.1 10/12/2022 01:11 PM    LABEOS 1.9 10/12/2022 01:11 PM    BASO 0.7 10/12/2022 01:11 PM    NRBC 0 10/12/2022 01:11 PM    ANISOCYTOSIS 1+ 06/29/2015 03:15 AM    SEGSABS 3.6 10/12/2022 01:11 PM    LYMPHSABS 1.6 10/12/2022 01:11 PM    MONOSABS 0.3 10/12/2022 01:11 PM    EOSABS 0.1 10/12/2022 01:11 PM    BASOSABS 0.0 10/12/2022 01:11 PM        BMP/CMP   Lab Results   Component Value Date/Time    GLUCOSE 113 10/12/2022 01:11 PM    CREATININE 0.7 10/12/2022 01:11 PM    BUN 18 10/12/2022 01:11 PM     10/12/2022 01:11 PM    K 4.5 10/12/2022 01:11 PM    K 4.2 04/03/2020 04:57 AM     10/12/2022 01:11 PM    CO2 30 10/12/2022 01:11 PM    CALCIUM 9.8 10/12/2022 01:11 PM    AST 26 10/12/2022 01:11 PM    ALKPHOS 97 10/12/2022 01:11 PM    PROT 7.4 10/12/2022 01:11 PM    LABALBU 4.7 10/12/2022 01:11 PM    BILITOT 0.9 10/12/2022 01:11 PM    ALT 45 10/12/2022 01:11 PM        PREALBUMIN   Lab Results   Component Value Date/Time    PREALBUMIN 23.9 07/07/2017 09:21 AM        VITAMIN B12   Lab Results   Component Value Date/Time    BYVVOWLF73 793 10/12/2022 01:11 PM        VITAMIN D   Lab Results   Component Value Date/Time    VITD25 33 10/12/2022 01:11 PM        PTH  Lab Results   Component Value Date/Time    IPTH 43.0 10/12/2022 01:11 PM       VITAMIN B1/ THIAMINE   Lab Results   Component Value Date/Time    [id number] 108 10/12/2022 01:11 PM        LIPID SCREEN (FASTING)   Lab Results   Component Value Date/Time    CHOL 211 10/12/2022 01:11 PM    TRIG 130 10/12/2022 01:11 PM    HDL 55 10/12/2022 01:11 PM    LDLCALC 130 10/12/2022 01:11 PM   ,     HGA1C (T2DM ONLY)   Lab Results   Component Value Date/Time    LABA1C 5.8 10/12/2022 01:11 PM    AVGG 114 10/12/2022 01:11 PM        TSH   Lab Results   Component Value Date/Time    TSH 1.590 10/12/2022 01:11 PM        IRON   Lab Results   Component Value Date/Time    IRON 80 10/12/2022 01:11 PM        TIBC  Lab Results   Component Value Date/Time    TIBC 386 10/12/2022 01:11 PM       FERRITIN  Lab Results   Component Value Date/Time    FERRITIN 80 10/12/2022 01:11 PM       VITAMIN A  No results found for: RETINOL    NICOTINE  No results found for: NMET    UDS  Lab Results   Component Value Date/Time    UDP SEE BELOW 01/06/2021 11:56 AM       PSA  No results found for: LABPSA    GFR  Lab Results   Component Value Date/Time    LABGLOM 88 10/12/2022 01:11 PM       DEXA  No results found for this or any previous visit. Assessment:       Diagnosis Orders   1. BMI 40.0-44.9, adult ([facility] [address]        2.  Gastroesophageal reflux disease, unspecified whether esophagitis present        3. Hypertension, unspecified type        4. Depression, unspecified depression type        5. Anxiety        6. Irritable bowel syndrome, unspecified type        7. Fatty liver        8. Chronic bilateral low back pain, unspecified whether sciatica present        9. JESSE on CPAP                Plan:    Behavior modification discussed in detail in regards to dietary habits. Nutritional education occurred during visit. Continue following recommendations  per dietitian. Improvement in fitness/exercise discussed with patient and the need for this  with/without surgery. EKG abnormal- Cardiac Clearance received from Dr. Yuliana Warren   Pulmonary Clearance needed prior to surgery- Not currently compliant with CPAP-36.7%. Dr. Rina Salomon. Advised to make apt in February for download and clearance. Psychology evaluation with Dr. Dima Woodruff in process, next apt 2/1/23  Continue following with Dr. Drew Roper as scheduled. Continue PT as scheduled 3 x per week. EGD prior to any surgical intervention-12/13/22 with Dr. Bina Mirza  Encouraged to attend support groups. Goal to lose 3-5% TBW prior to surgery. Seca scale completed and reviewed. Initial labs completed and reviewed  Drug screen completed and reviewed  Nicotine (-). Discussed risks of nicotine a/w bariatric surgery. Must be nicotine free at least 90 days prior to surgery. Avoid nicotine life long post-op. Denies current pregnancy. Advised not to get pregnant for at least 1 year post bariatric surgery as this can increase risk of malnourishment potentially leading to low birth weight or malformation. Patient agrees to avoid pregnancy for at least 1 year post-op. Discussed importance of appropriate contraception. Will need to be off work for 3-4 weeks post-bariatric surgery. No lifting/pushing/pulling over 20# for 4 weeks post-op  No NSAIDS ( ibuprofen) 10 days prior to bariatric surgery.  Avoid NSAID use post-op  Discussed Lovenox post-op bariatric surgery  Awaiting LOMN - to call PCP and check on this. Will continue following with multi-disciplinary team in preparation for bariatric surgery with the expectation of lifelong follow-up post-operatively. Mammogram completed and reviewed. Return in about 1 month (around 1/7/2023) for Follow up. I spent over 20 minutes with the patient, with greater that 50% of that time spent on education, counseling and coordination of care.      Electronically signed by YUNIEL Moore on 12/7/2022 at 11:32 AM

## 2022-12-07 ENCOUNTER — OFFICE VISIT (OUTPATIENT)
Dept: BARIATRICS/WEIGHT MGMT | Age: 52
End: 2022-12-07
Payer: MEDICARE

## 2022-12-07 ENCOUNTER — OFFICE VISIT (OUTPATIENT)
Dept: BARIATRICS/WEIGHT MGMT | Age: 52
End: 2022-12-07

## 2022-12-07 VITALS — HEIGHT: 60 IN | WEIGHT: 240.4 LBS | BODY MASS INDEX: 47.2 KG/M2

## 2022-12-07 VITALS
HEIGHT: 60 IN | DIASTOLIC BLOOD PRESSURE: 82 MMHG | HEART RATE: 76 BPM | BODY MASS INDEX: 47.2 KG/M2 | TEMPERATURE: 98.3 F | WEIGHT: 240.4 LBS | SYSTOLIC BLOOD PRESSURE: 126 MMHG

## 2022-12-07 DIAGNOSIS — F41.9 ANXIETY: ICD-10-CM

## 2022-12-07 DIAGNOSIS — M54.50 CHRONIC BILATERAL LOW BACK PAIN, UNSPECIFIED WHETHER SCIATICA PRESENT: ICD-10-CM

## 2022-12-07 DIAGNOSIS — G47.33 OSA ON CPAP: ICD-10-CM

## 2022-12-07 DIAGNOSIS — K58.9 IRRITABLE BOWEL SYNDROME, UNSPECIFIED TYPE: ICD-10-CM

## 2022-12-07 DIAGNOSIS — G89.29 CHRONIC BILATERAL LOW BACK PAIN, UNSPECIFIED WHETHER SCIATICA PRESENT: ICD-10-CM

## 2022-12-07 DIAGNOSIS — K76.0 FATTY LIVER: ICD-10-CM

## 2022-12-07 DIAGNOSIS — Z99.89 OSA ON CPAP: ICD-10-CM

## 2022-12-07 DIAGNOSIS — K21.9 GASTROESOPHAGEAL REFLUX DISEASE, UNSPECIFIED WHETHER ESOPHAGITIS PRESENT: ICD-10-CM

## 2022-12-07 DIAGNOSIS — I10 HYPERTENSION, UNSPECIFIED TYPE: ICD-10-CM

## 2022-12-07 DIAGNOSIS — F32.A DEPRESSION, UNSPECIFIED DEPRESSION TYPE: ICD-10-CM

## 2022-12-07 PROCEDURE — 99213 OFFICE O/P EST LOW 20 MIN: CPT | Performed by: PHYSICIAN ASSISTANT

## 2022-12-07 PROCEDURE — G8427 DOCREV CUR MEDS BY ELIG CLIN: HCPCS | Performed by: PHYSICIAN ASSISTANT

## 2022-12-07 PROCEDURE — 3078F DIAST BP <80 MM HG: CPT | Performed by: PHYSICIAN ASSISTANT

## 2022-12-07 PROCEDURE — G8484 FLU IMMUNIZE NO ADMIN: HCPCS | Performed by: PHYSICIAN ASSISTANT

## 2022-12-07 PROCEDURE — 3017F COLORECTAL CA SCREEN DOC REV: CPT | Performed by: PHYSICIAN ASSISTANT

## 2022-12-07 PROCEDURE — G8417 CALC BMI ABV UP PARAM F/U: HCPCS | Performed by: PHYSICIAN ASSISTANT

## 2022-12-07 PROCEDURE — 3074F SYST BP LT 130 MM HG: CPT | Performed by: PHYSICIAN ASSISTANT

## 2022-12-07 PROCEDURE — 1036F TOBACCO NON-USER: CPT | Performed by: PHYSICIAN ASSISTANT

## 2022-12-07 NOTE — PATIENT INSTRUCTIONS
Behavior modification discussed in detail in regards to dietary habits. Nutritional education occurred during visit. Continue following recommendations  per dietitian. Improvement in fitness/exercise discussed with patient and the need for this  with/without surgery. EKG abnormal- Cardiac Clearance received from Dr. Kirit Marie   Pulmonary Clearance needed prior to surgery- Not currently compliant with CPAP-36.7%. Dr. Suzan Birmingham. Advised to make apt in February for download and clearance. Psychology evaluation with Dr. Vijay Phelps in process, next apt 2/1/23  Continue following with Dr. Carmen Cuevas as scheduled. Continue PT as scheduled 3 x per week. EGD prior to any surgical intervention-12/13/22 with Dr. Kimberlyn Tariq  Encouraged to attend support groups. Goal to lose 3-5% TBW prior to surgery. Seca scale completed and reviewed. Initial labs completed and reviewed  Drug screen completed and reviewed  Nicotine (-). Discussed risks of nicotine a/w bariatric surgery. Must be nicotine free at least 90 days prior to surgery. Avoid nicotine life long post-op. Denies current pregnancy. Advised not to get pregnant for at least 1 year post bariatric surgery as this can increase risk of malnourishment potentially leading to low birth weight or malformation. Patient agrees to avoid pregnancy for at least 1 year post-op. Discussed importance of appropriate contraception. Will need to be off work for 3-4 weeks post-bariatric surgery. No lifting/pushing/pulling over 20# for 4 weeks post-op  No NSAIDS ( ibuprofen) 10 days prior to bariatric surgery. Avoid NSAID use post-op  Discussed Lovenox post-op bariatric surgery  Awaiting LOMN - to call PCP and check on this. Will continue following with multi-disciplinary team in preparation for bariatric surgery with the expectation of lifelong follow-up post-operatively. Mammogram completed and reviewed.

## 2022-12-07 NOTE — PATIENT INSTRUCTIONS
Goals:  Nutrition Goal:  I will bring back food journal to next office visit. Record meal times, serving sizes. Continue to aim for regular meal times and not skip meals!   Water Goal:  Great job with water intake - continue at least 64 oz or greater each day  Exercise Goal: outpatient Physical Therapy twice  and plan to start [address]   Attend support group Tuesday December 13 at 5:00pm  in Weight Management Office

## 2022-12-09 ENCOUNTER — HOSPITAL ENCOUNTER (OUTPATIENT)
Dept: PHYSICAL THERAPY | Age: 52
Setting detail: THERAPIES SERIES
Discharge: HOME | End: 2022-12-09
Payer: MEDICARE

## 2022-12-09 ENCOUNTER — OFFICE VISIT (OUTPATIENT)
Dept: CARDIOLOGY CLINIC | Age: 52
End: 2022-12-09
Payer: MEDICARE

## 2022-12-09 VITALS
DIASTOLIC BLOOD PRESSURE: 96 MMHG | SYSTOLIC BLOOD PRESSURE: 147 MMHG | BODY MASS INDEX: 45.99 KG/M2 | HEIGHT: 61 IN | WEIGHT: 243.6 LBS | HEART RATE: 69 BPM

## 2022-12-09 DIAGNOSIS — I10 PRIMARY HYPERTENSION: Primary | ICD-10-CM

## 2022-12-09 PROCEDURE — 3078F DIAST BP <80 MM HG: CPT | Performed by: NUCLEAR MEDICINE

## 2022-12-09 PROCEDURE — G8417 CALC BMI ABV UP PARAM F/U: HCPCS | Performed by: NUCLEAR MEDICINE

## 2022-12-09 PROCEDURE — 97113 AQUATIC THERAPY/EXERCISES: CPT

## 2022-12-09 PROCEDURE — G8427 DOCREV CUR MEDS BY ELIG CLIN: HCPCS | Performed by: NUCLEAR MEDICINE

## 2022-12-09 PROCEDURE — 1036F TOBACCO NON-USER: CPT | Performed by: NUCLEAR MEDICINE

## 2022-12-09 PROCEDURE — 99213 OFFICE O/P EST LOW 20 MIN: CPT | Performed by: NUCLEAR MEDICINE

## 2022-12-09 PROCEDURE — G8484 FLU IMMUNIZE NO ADMIN: HCPCS | Performed by: NUCLEAR MEDICINE

## 2022-12-09 PROCEDURE — 3017F COLORECTAL CA SCREEN DOC REV: CPT | Performed by: NUCLEAR MEDICINE

## 2022-12-09 PROCEDURE — 3074F SYST BP LT 130 MM HG: CPT | Performed by: NUCLEAR MEDICINE

## 2022-12-09 NOTE — PROGRESS NOTES
Liz  PHYSICAL THERAPY  [] EVALUATION  [] DAILY NOTE (LAND) [x] DAILY NOTE (AQUATIC ) [] PROGRESS NOTE [] DISCHARGE NOTE    [x] OUTPATIENT REHABILITATION [facility]   [] [place] [address]    [] [facility]   [] Henrik Barrett    Date: 2022  Patient Name:  Susannah Moreno  : 1970  MRN: [id number]  CSN: [id number]    Referring Practitioner Yohannes Rios MD   Diagnosis Low back pain, unspecified [M54.50]  Other cervical disc degeneration at C4-C5 level [M50.321]  Spinal stenosis, cervical region [M48.02]  Radiculopathy, cervical region [M54.12]    Treatment Diagnosis Chronic neck and back pain, decreased ROM, core, postural, shoulder, LE weakness   Date of Evaluation 22    Additional Pertinent History Recent MI, HTN, obesity, arthritis, SOB. Functional Outcome Measure Used NDI, Modified Oswestry   Functional Outcome Score 19, 22 (22)       Insurance: Primary: Payor: Juno Macario /  /  / ,   Secondary:    Authorization Information: PRECERTIFICATION REQUIRED:  n/a  INSURANCE THERAPY BENEFIT:  Allowed 30 visits Physical Therapy /Occupational Therapy/Speech Therapy per calendar year. No visit limit for PT/OT/ST for patients age 8 and under. FCE-Covered, no precert required. Benefit will not cover maintenance or preventative treatment. AQUATIC THERAPY COVERED:   Yes  MODALITIES COVERED:  Yes. Iontophoresis and Hot/Cold Packs are not covered. TELEHEALTH COVERED: Yes   Visit # 3, 3/10 for progress note   Visits Allowed: 30   Recertification Date:    Physician Follow-Up: As needed   Physician Orders:    History of Present Illness: Pt had steel plate put in neck in 2017 d/t pain in neck and arm. In 2019, pt developed spondylolithesis and had surgery, and hardware was placed in wrong location causing permanent nerve damage. Pt feels electric shocks up in legs and muscle cramps down her legs.  Spacer was removed that caused all the damage. Pt developed osteomyelitis in hardware of back. Pt spend time in IPR, then went home with PICC line. Infection worsened so went to hospital and ended up having emergency surgery in 2020, removing hardware and fusing L4-S1. Pt has numbness in sacrum. Pt having pain in right buttock. Prolonged walking is painful, radiating into hips. Pt going through weight management to have bariatric surgery in March/April. Pt hasn't started working with  at gym. Pt very limited on activity d/t pain. Getting in drivers sick of car is difficult. Neck is constantly cracking and grinding. Pt unable to do dishes for more than a minute, as neck, mid back pain begins. Pt takes Percocet with intense pain, denies use of Tylenol or ibuprofen. Pt denies use of heat or ice d/t no relief. Vacuuming is difficult. Doctor planning MRI after therapy. SUBJECTIVE: Pt reports she hasn't felt too bad the past few days. Pt c/o right lateral hip bothering her today. Pt has been having severe foot spasms the past couple days causing feet to turn inward uncontrollably.      OBJECTIVE:    AQUATICS TREATMENT   Precautions:    Pain: 6/10 R lateral hip, 4/10 neck    X in shaded column indicates activity completed today   Exercise/Intervention Sets/Sec  Notes   Walk Forward 3 laps  x    Walk Backward 3 laps  x    Walk Sideways 3 laps  x           Lower Extremity Exercises:       Heel/Toe Raises 10  x    Marches 10  x    Squats 10  x    3 Way Hip 10  x    Hamstring Curls 10  x    Lunges       Step-Ups              Lower Extremity Stretches:       Calf stretch at step 3x20 sec  x    Hamstring stretch at step 3x20 sec  x                  Seated Exercises:              Upper Extremity Exercises:       Shoulder Flexion       Shoulder ABD/ADD       Shoulder Horizontal ABD/ADD       Shoulder IR/ER       Shoulder Circles       Shoulder Shrugs       Rows       Bicep Curls              Upper Extremity Stretches: Upper trap and levator 3x20 sec  x                         Balance:              Dynamic Gait:              Deep Water:       Hang 5 min  x 1 noodle in front at 4'10\" rail   Bicycle       Hip ABD/ADD       Hip Flex/Ext           Specific Interventions Next Treatment: alternate land and pool; cervical ROM, postural strengthening, hip stretches, core and LE strengthening, manual work to right lumbar/hip; poor tolerance to laying supine    Activity/Treatment Tolerance:  [x]  Patient tolerated treatment well  []  Patient limited by fatigue  []  Patient limited by pain   []  Patient limited by medical complications  []  Other:     Assessment: Initiated aquatic therapy. Attempted hip flexor stretch and posterior shoulder capsule stretch but no tightness noted so held. Pt felt stretch all the way to neck during hamstring stretch. Abduction exercises irritated right hip/back. Pt felt pain in thoracic spine during deep water hang. Pt reports slightly less pain at 5/10 at end of session. Goals    Patient Goal: Learn ways to manage pain to be able to do more activities    Short Term Goals: 4 weeks  Patient will improve AROM of cervical rotation to 40 degrees for ease turning head when driving. Patient will demonstrate good postural awareness with <3 cues during therapy session to decrease spinal stress during her day. Patient will have <50% lumbar AROM restriction with decreased right hip tightness for ease with LE dressing. Long Term Goals: 8 weeks  Patient will demonstrate good core engagement and postural awareness during therapy session without cues  to carry over to standing to do dishes and cook. Patient will improve bilateral shoulder and tricep strength to 5/5 for ease lifting and carrying functional items. Patient will improve bilateral hip flexion and ABD strength to 4+/5 for stabilization when cleaning and walking. Patient will be independent and compliant with HEP to achieve above goals. Patient Education:   [x]  HEP/Education Completed: monitor response to [employer] Access Code: [email or web address]  []  No new Education completed  [x]  Reviewed Prior HEP      [x]  Patient verbalized and/or demonstrated understanding of education provided. []  Patient unable to verbalize and/or demonstrate understanding of education provided. Will continue education. []  Barriers to learning:     PLAN:  Treatment Recommendations: Strengthening, Range of Motion, Functional Mobility Training, Manual Therapy - Soft Tissue Mobilization, Pain Management, Home Exercise Program, Patient Education, Aquatics, and Modalities    []  Plan of care initiated. Plan to see patient 1-2 times per week for 8 weeks to address the treatment planned outlined above.   [x]  Continue with current plan of care  []  Modify plan of care as follows:    []  Hold pending physician visit  []  Discharge    Time In 1330   Time Out 1415   Timed Code Minutes: 45   Total Treatment Time: 45     Electronically Signed by: Haja Garrison PT

## 2022-12-09 NOTE — PROGRESS NOTES
[id number] [address]  [address]  [facility] [address]  Dept: [phone]  Dept Fax: [phone]  Loc: [phone]    Visit Date: 12/9/2022    Ashli Boggs is a 47 y.o. female who presents todayfor:  Chief Complaint   Patient presents with    6 Month Follow-Up    Hypertension    Check-Up     Cath done  No major CAD  Going for beriatric surgery   BP is better at home  No cath complication  No chest pain  Some palpitations  No other issues      HPI:  HPI  Past Medical History:   Diagnosis Date    Anxiety     Arthritis     Bell's palsy     Body mass index 40.0-44.9, adult ([address] 05/06/2021    Esophagitis     Fatty liver     GERD (gastroesophageal reflux disease)     Hypertension     IBS (irritable bowel syndrome)     Morbid obesity with BMI of 40.0-44.9, adult ([facility])     MRSA (methicillin resistant staph aureus) culture positive     JESSE (obstructive sleep apnea) 05/06/2021    UTI (urinary tract infection)     V tach     Vertigo       Past Surgical History:   Procedure Laterality Date    BACK SURGERY      BLADDER SUSPENSION      BREAST REDUCTION SURGERY  10/2007    CARPAL TUNNEL RELEASE      CERVICAL FUSION  2017    ACDF C5-C7    CHOLECYSTECTOMY      COLONOSCOPY      DILATION AND CURETTAGE OF UTERUS      ENDOSCOPY, COLON, DIAGNOSTIC      HYSTERECTOMY (CERVIX STATUS UNKNOWN)      LUMBAR FUSION N/A 4/3/2020    L4-S1 DECOMPRESSION AND POSTERIOR FUSION; LUMBAR I & D WITH HARDWARE REMOVAL performed by Corie Posada MD at [address] [place] [address]  10/17/2019    L5-S1    UPPER GASTROINTESTINAL ENDOSCOPY N/A 7/19/2019    EGD BIOPSY performed by Sara Pérez MD at [place] [address]  7/19/2019    EGD DILATION SAVORY performed by Sara Pérez MD at [facility]     Family History   Problem Relation Age of Onset    Stroke Mother     Ulcerative Colitis Mother     Cancer Mother     Arthritis Mother     Stroke Father     Cancer Father         skin    High Blood Pressure Father     Diabetes Father     Heart Disease Father     Arthritis Father     Arthritis Sister     Obesity Sister     Heart Disease Brother     Cancer Brother     Arthritis Brother     Arthritis Maternal Grandmother     Obesity Maternal Grandmother     Stroke Maternal Grandmother     Heart Disease Maternal Grandmother     Arthritis Maternal Grandfather     Arthritis Paternal Grandmother     Stroke Paternal Grandfather     Cancer Paternal Grandfather     Diabetes Paternal Grandfather     Heart Disease Paternal Grandfather     Obesity Paternal Grandfather     Arthritis Paternal Grandfather      Social History     Tobacco Use    Smoking status: Former     Packs/day: 2.00     Years: 20.00     Pack years: 40.00     Types: Cigarettes     Quit date: 2017     Years since quittin.8    Smokeless tobacco: Never   Substance Use Topics    Alcohol use: Yes     Comment: occ      Current Outpatient Medications   Medication Sig Dispense Refill    oxyCODONE-acetaminophen (PERCOCET) 5-325 MG per tablet Take 1 tablet by mouth every 4 hours as needed for Pain. dicyclomine (BENTYL) 10 MG capsule       hydroCHLOROthiazide (MICROZIDE) 12.5 MG capsule Take 12.5 mg by mouth daily as needed      cyclobenzaprine (FLEXERIL) 10 MG tablet Take 10 mg by mouth 3 times daily as needed for Muscle spasms      metoprolol succinate (TOPROL XL) 50 MG extended release tablet Take 1 tablet by mouth daily 30 tablet 3    pantoprazole (PROTONIX) 40 MG tablet Take 1 tablet by mouth every morning (before breakfast) 30 tablet 0    Cholecalciferol (VITAMIN D3) 25 MCG (1000 UT) TABS TAKE ONE TABLET BY MOUTH EVERY DAY  1     No current facility-administered medications for this visit.      Allergies   Allergen Reactions    Codeine Rash    Dilaudid [Hydromorphone Hcl] Rash     Health Maintenance   Topic Date Due    COVID-19 Vaccine (1) Never done    Depression Monitoring  Never done    HIV screen  Never done    Hepatitis C screen  Never done    DTaP/Tdap/Td vaccine (1 - Tdap) Never done    Colorectal Cancer Screen  Never done    Shingles vaccine (1 of 2) Never done    Low dose CT lung screening  Never done    Flu vaccine (1) Never done    A1C test (Diabetic or Prediabetic)  10/12/2023    Breast cancer screen  10/14/2024    Lipids  10/12/2027    Hepatitis A vaccine  Aged Out    Hib vaccine  Aged Out    Meningococcal (ACWY) vaccine  Aged Out    Pneumococcal 0-64 years Vaccine  Aged Out       Subjective:  Review of Systems  General:   No fever, no chills, No fatigue or weight loss  Pulmonary:    No dyspnea, no wheezing  Cardiac:    Denies recent chest pain,   GI:     No nausea or vomiting, no abdominal pain  Neuro:    No dizziness or light headedness,   Musculoskeletal:  No recent active issues  Extremities:   No edema, no obvious claudication     Objective:  Physical Exam  BP (!) 147/96   Pulse 69   Ht 5' 0.5\" (1.537 m)   Wt 243 lb 9.6 oz (110.5 kg)   LMP 10/27/2015 (Exact Date)   BMI 46.79 kg/m²   General:   Well developed, well nourished  Lungs:   Clear to auscultation  Heart:    Normal S1 S2, Slight murmur. no rubs, no gallops  Abdomen:   Soft, non tender, no organomegalies, positive bowel sounds  Extremities:   No edema, no cyanosis, good peripheral pulses  Neurological:   Awake, alert, oriented. No obvious focal deficits  Musculoskelatal:  No obvious deformities    Assessment:      Diagnosis Orders   1. Primary hypertension        As above  Cardiac fair for now   Risk for CAD     Plan:  No follow-ups on file. As above  Continue risk factor modification and medical management  Thank you for allowing me to participate in the care of your patient. Please don't hesitate to contact me regarding any further issues related to the patient care    Orders Placed:  No orders of the defined types were placed in this encounter.       Medications Prescribed:  No orders of the defined types were placed in

## 2022-12-12 ENCOUNTER — HOSPITAL ENCOUNTER (OUTPATIENT)
Dept: PHYSICAL THERAPY | Age: 52
Setting detail: THERAPIES SERIES
Discharge: HOME | End: 2022-12-12
Payer: MEDICARE

## 2022-12-12 PROCEDURE — 97110 THERAPEUTIC EXERCISES: CPT

## 2022-12-12 NOTE — PROGRESS NOTES
[address] [facility]  PHYSICAL THERAPY  [] EVALUATION  [x] DAILY NOTE (LAND) [] DAILY NOTE (AQUATIC ) [] PROGRESS NOTE [] DISCHARGE NOTE    [x] OUTPATIENT REHABILITATION [facility]   [] [place] [address]    [] [facility]   [] Shamar Persaud    Date: 2022  Patient Name:  Danny Kilpatrick  : 1970  MRN: [id number]  CSN: [id number]    Referring Practitioner Tor Bills MD   Diagnosis Low back pain, unspecified [M54.50]  Other cervical disc degeneration at C4-C5 level [M50.321]  Spinal stenosis, cervical region [M48.02]  Radiculopathy, cervical region [M54.12]    Treatment Diagnosis Chronic neck and back pain, decreased ROM, core, postural, shoulder, LE weakness   Date of Evaluation 22    Additional Pertinent History Recent MI, HTN, obesity, arthritis, SOB. Functional Outcome Measure Used NDI, Modified Oswestry   Functional Outcome Score 19, 22 (22)       Insurance: Primary: Payor: Johann  /  /  / ,   Secondary:    Authorization Information: PRECERTIFICATION REQUIRED:  n/a  INSURANCE THERAPY BENEFIT:  Allowed 30 visits Physical Therapy /Occupational Therapy/Speech Therapy per calendar year. No visit limit for PT/OT/ST for patients age 8 and under. FCE-Covered, no precert required. Benefit will not cover maintenance or preventative treatment. AQUATIC THERAPY COVERED:   Yes  MODALITIES COVERED:  Yes. Iontophoresis and Hot/Cold Packs are not covered. TELEHEALTH COVERED: Yes   Visit # 4, 4/10 for progress note   Visits Allowed: 30   Recertification Date:    Physician Follow-Up: As needed   Physician Orders:    History of Present Illness: Pt had steel plate put in neck in 2017 d/t pain in neck and arm. In 2019, pt developed spondylolithesis and had surgery, and hardware was placed in wrong location causing permanent nerve damage. Pt feels electric shocks up in legs and muscle cramps down her legs.  Spacer was removed that caused all the damage. Pt developed osteomyelitis in hardware of back. Pt spend time in IPR, then went home with PICC line. Infection worsened so went to hospital and ended up having emergency surgery in 2020, removing hardware and fusing L4-S1. Pt has numbness in sacrum. Pt having pain in right buttock. Prolonged walking is painful, radiating into hips. Pt going through weight management to have bariatric surgery in March/April. Pt hasn't started working with  at gym. Pt very limited on activity d/t pain. Getting in drivers sick of car is difficult. Neck is constantly cracking and grinding. Pt unable to do dishes for more than a minute, as neck, mid back pain begins. Pt takes Percocet with intense pain, denies use of Tylenol or ibuprofen. Pt denies use of heat or ice d/t no relief. Vacuuming is difficult. Doctor planning MRI after therapy. SUBJECTIVE: Pt states 6/10 LBP. Reports almost going to hospital last night d/t pain in R anterior groin and states radiating symptoms to R lateral abdominal region, states \"way over a 10\" for pain rating. Notes was unable to take a percocet last night d/t having an endoscopy tomorrow. Pt states pain along R side has not improved since last night, denies need for ED. Notes taking tylenol last night, but had minimal effect on her pain.      TREATMENT   Precautions:    Pain: 6/10 R buttock and LB region    \"X in shaded column indicates activity completed today    *\" next to exercise/intervention indicates progression   Modalities Parameters/  Location  Notes                     Manual Therapy Time/Technique  Notes                     Exercise/Intervention   Notes   Seated piriformis IR/ER stretch    Demo'd for HEP   Seated hamstring stretch    \"   Upper trap stretch    \"   Backward shoulder rolls    \"          Supine w/ bolster under knees: GOOD tolerance       Lumbar:       SKTC w/towel  x2 20 sec     Piriformis stretch w/towel x2 20 sec x    TA activation x10 5 sec x    R hip flexor stretch off edge of table* x2 15 sec x Pt notes \"pressure\" in R buttock d/t rods in back                         Cervical:       Cervical retraction x10 2-3 sec x    Cervical rotation x10 2-3 sec x           Nu step 5 min L1 x Denies increase in pain                  Aquatic therapy guideline and agreement         Specific Interventions Next Treatment: alternate land and pool; cervical ROM, postural strengthening, hip stretches, core and LE strengthening, manual work to right lumbar/hip; poor tolerance to laying supine    Activity/Treatment Tolerance:  [x]  Patient tolerated treatment well  []  Patient limited by fatigue  []  Patient limited by pain   []  Patient limited by medical complications  []  Other:     Assessment: Pt tolerated treatment poorly this date secondary to exacerbated symptoms of R lateral side and anterior groin. Limited progressions as noted above. Pt stated \"I feel like I'm about to throw up\" upon supine>sit. Cueing for pursed lip breathing needed. Shorten treatment session d/t increase in symptoms and poor tolerance to treatment. Pt noted \"I just feel like something is wrong\" however continued to deny need for ED. Advised if symptoms continue to seek medical services as necessary. Body Structures/Functions/Activity Limitations: impaired ROM, impaired strength, pain, and abnormal posture  Prognosis: good      Goals    Patient Goal: Learn ways to manage pain to be able to do more activities    Short Term Goals: 4 weeks  Patient will improve AROM of cervical rotation to 40 degrees for ease turning head when driving. Patient will demonstrate good postural awareness with <3 cues during therapy session to decrease spinal stress during her day. Patient will have <50% lumbar AROM restriction with decreased right hip tightness for ease with LE dressing.      Long Term Goals: 8 weeks  Patient will demonstrate good core engagement and postural awareness during therapy session without cues  to carry over to standing to do dishes and cook. Patient will improve bilateral shoulder and tricep strength to 5/5 for ease lifting and carrying functional items. Patient will improve bilateral hip flexion and ABD strength to 4+/5 for stabilization when cleaning and walking. Patient will be independent and compliant with HEP to achieve above goals. Patient Education:   []  HEP/Education Completed: Plan of Care, Goals, exercises listed above with handout provided  [employer] Access Code: [email or web address]  []  No new Education completed  [x]  Reviewed Prior HEP      [x]  Patient verbalized and/or demonstrated understanding of education provided. []  Patient unable to verbalize and/or demonstrate understanding of education provided. Will continue education. []  Barriers to learning:     PLAN:  Treatment Recommendations: Strengthening, Range of Motion, Functional Mobility Training, Manual Therapy - Soft Tissue Mobilization, Pain Management, Home Exercise Program, Patient Education, Aquatics, and Modalities    []  Plan of care initiated. Plan to see patient 1-2 times per week for 8 weeks to address the treatment planned outlined above.   [x]  Continue with current plan of care  []  Modify plan of care as follows:    []  Hold pending physician visit  []  Discharge    Time In 1315   Time Out 1345   Timed Code Minutes: 30   Total Treatment Time: 30     Electronically Signed by: Bert Love PTA

## 2022-12-13 ENCOUNTER — APPOINTMENT (OUTPATIENT)
Dept: PHYSICAL THERAPY | Age: 52
End: 2022-12-13
Payer: MEDICARE

## 2022-12-16 ENCOUNTER — APPOINTMENT (OUTPATIENT)
Dept: PHYSICAL THERAPY | Age: 52
End: 2022-12-16
Payer: MEDICARE

## 2022-12-19 ENCOUNTER — APPOINTMENT (OUTPATIENT)
Dept: PHYSICAL THERAPY | Age: 52
End: 2022-12-19
Payer: MEDICARE

## 2022-12-20 ENCOUNTER — APPOINTMENT (OUTPATIENT)
Dept: PHYSICAL THERAPY | Age: 52
End: 2022-12-20
Payer: MEDICARE

## 2022-12-23 ENCOUNTER — APPOINTMENT (OUTPATIENT)
Dept: PHYSICAL THERAPY | Age: 52
End: 2022-12-23
Payer: MEDICARE

## 2022-12-27 ENCOUNTER — HOSPITAL ENCOUNTER (OUTPATIENT)
Dept: PHYSICAL THERAPY | Age: 52
Setting detail: THERAPIES SERIES
Discharge: HOME | End: 2022-12-27
Payer: MEDICARE

## 2022-12-27 PROCEDURE — 97110 THERAPEUTIC EXERCISES: CPT

## 2022-12-27 NOTE — PROGRESS NOTES
[address] [facility]  PHYSICAL THERAPY  [] EVALUATION  [x] DAILY NOTE (LAND) [] DAILY NOTE (AQUATIC ) [] PROGRESS NOTE [] DISCHARGE NOTE    [x] OUTPATIENT REHABILITATION [facility]   [] [place] [address]    [] [facility]   [] Bhanu Godwin    Date: 2022  Patient Name:  Twyla Craven  : 1970  MRN: [id number]  CSN: [id number]    Referring Practitioner Isidro Chung MD   Diagnosis Low back pain, unspecified [M54.50]  Other cervical disc degeneration at C4-C5 level [M50.321]  Spinal stenosis, cervical region [M48.02]  Radiculopathy, cervical region [M54.12]    Treatment Diagnosis Chronic neck and back pain, decreased ROM, core, postural, shoulder, LE weakness   Date of Evaluation 22    Additional Pertinent History Recent MI, HTN, obesity, arthritis, SOB. Functional Outcome Measure Used NDI, Modified Oswestry   Functional Outcome Score 19, 22 (22)       Insurance: Primary: Payor: Asuncion Hammer /  /  / ,   Secondary:    Authorization Information: PRECERTIFICATION REQUIRED:  n/a  INSURANCE THERAPY BENEFIT:  Allowed 30 visits Physical Therapy /Occupational Therapy/Speech Therapy per calendar year. No visit limit for PT/OT/ST for patients age 8 and under. FCE-Covered, no precert required. Benefit will not cover maintenance or preventative treatment. AQUATIC THERAPY COVERED:   Yes  MODALITIES COVERED:  Yes. Iontophoresis and Hot/Cold Packs are not covered. TELEHEALTH COVERED: Yes   Visit # 5, 5/10 for progress note   Visits Allowed: 30   Recertification Date:    Physician Follow-Up: As needed   Physician Orders:    History of Present Illness: Pt had steel plate put in neck in 2017 d/t pain in neck and arm. In 2019, pt developed spondylolithesis and had surgery, and hardware was placed in wrong location causing permanent nerve damage. Pt feels electric shocks up in legs and muscle cramps down her legs.  Spacer was removed that caused all the damage. Pt developed osteomyelitis in hardware of back. Pt spend time in IPR, then went home with PICC line. Infection worsened so went to hospital and ended up having emergency surgery in 2020, removing hardware and fusing L4-S1. Pt has numbness in sacrum. Pt having pain in right buttock. Prolonged walking is painful, radiating into hips. Pt going through weight management to have bariatric surgery in March/April. Pt hasn't started working with  at gym. Pt very limited on activity d/t pain. Getting in drivers sick of car is difficult. Neck is constantly cracking and grinding. Pt unable to do dishes for more than a minute, as neck, mid back pain begins. Pt takes Percocet with intense pain, denies use of Tylenol or ibuprofen. Pt denies use of heat or ice d/t no relief. Vacuuming is difficult. Doctor planning MRI after therapy. SUBJECTIVE: Pt reports being miserable for about a week following her previous session. States continued anterior R thigh pain and mild increase in cervical pain. Notes she wonders if during her EDG if they placed a magnet on her neck even though she has plates there.  States the percocet \"didn't even touch my level of pain\"    TREATMENT   Precautions:    Pain: 5-6/10 R buttock and LB region    \"X in shaded column indicates activity completed today    *\" next to exercise/intervention indicates progression   Modalities Parameters/  Location  Notes                     Manual Therapy Time/Technique  Notes                     Exercise/Intervention   Notes   Seated piriformis IR/ER stretch    Demo'd for HEP   Seated hamstring stretch    \"   Upper trap stretch    \"   Backward shoulder rolls    \"          Supine w/ bolster under knees: GOOD tolerance       Lumbar:       SKTC w/towel  x2 20 sec x    Piriformis stretch w/towel x2 20 sec x    TA activation X12* 5 sec x    R hip flexor stretch off edge of table x2 15 sec  Pt notes \"pressure\" in R buttock d/t rods in back    TA +marches* x10  x                  Cervical:       Cervical retraction x10 2-3 sec x    Cervical rotation x10 2-3 sec x           Nu step 5 min L2* x Denies increase in pain           Seated:       HS stretch* x3 30 sec x    Piriformis stretch* x3 30 sec x    TA activation* x10 5 sec x    PPT* x10 2-3 sec x           Aquatic therapy guideline and agreement         Specific Interventions Next Treatment: alternate land and pool; cervical ROM, postural strengthening, hip stretches, core and LE strengthening, manual work to right lumbar/hip; poor tolerance to laying supine    Activity/Treatment Tolerance:  [x]  Patient tolerated treatment well  []  Patient limited by fatigue  []  Patient limited by pain   []  Patient limited by medical complications  []  Other:     Assessment: Continued therex as shown above. Pt noted mid way through appt sharp pain in R buttock/glut region. Cueing for proper technique and to stay within pain free range. Will continue to progress as tolerated. Body Structures/Functions/Activity Limitations: impaired ROM, impaired strength, pain, and abnormal posture  Prognosis: good      Goals    Patient Goal: Learn ways to manage pain to be able to do more activities    Short Term Goals: 4 weeks  Patient will improve AROM of cervical rotation to 40 degrees for ease turning head when driving. Patient will demonstrate good postural awareness with <3 cues during therapy session to decrease spinal stress during her day. Patient will have <50% lumbar AROM restriction with decreased right hip tightness for ease with LE dressing. Long Term Goals: 8 weeks  Patient will demonstrate good core engagement and postural awareness during therapy session without cues  to carry over to standing to do dishes and cook. Patient will improve bilateral shoulder and tricep strength to 5/5 for ease lifting and carrying functional items.   Patient will improve bilateral hip flexion and ABD strength to 4+/5 for stabilization when cleaning and walking. Patient will be independent and compliant with HEP to achieve above goals. Patient Education:   []  HEP/Education Completed: Plan of Care, Goals, exercises listed above with handout provided  [employer] Access Code: [email or web address]  []  No new Education completed  [x]  Reviewed Prior HEP      [x]  Patient verbalized and/or demonstrated understanding of education provided. []  Patient unable to verbalize and/or demonstrate understanding of education provided. Will continue education. []  Barriers to learning:     PLAN:  Treatment Recommendations: Strengthening, Range of Motion, Functional Mobility Training, Manual Therapy - Soft Tissue Mobilization, Pain Management, Home Exercise Program, Patient Education, Aquatics, and Modalities    []  Plan of care initiated. Plan to see patient 1-2 times per week for 8 weeks to address the treatment planned outlined above.   [x]  Continue with current plan of care  []  Modify plan of care as follows:    []  Hold pending physician visit  []  Discharge    Time In 1151   Time Out 1230   Timed Code Minutes: 39   Total Treatment Time: 39     Electronically Signed by: Kam Riggins PTA

## 2022-12-30 ENCOUNTER — HOSPITAL ENCOUNTER (OUTPATIENT)
Dept: PHYSICAL THERAPY | Age: 52
Setting detail: THERAPIES SERIES
Discharge: HOME | End: 2022-12-30
Payer: MEDICARE

## 2022-12-30 PROCEDURE — 97113 AQUATIC THERAPY/EXERCISES: CPT

## 2022-12-30 NOTE — PROGRESS NOTES
[address] [facility]  PHYSICAL THERAPY  [] EVALUATION  [] DAILY NOTE (LAND) [x] DAILY NOTE (AQUATIC ) [] PROGRESS NOTE [] DISCHARGE NOTE    [x] OUTPATIENT REHABILITATION [facility]   [] [place] [address]    [] [facility]   [] Ashley Todd    Date: 2022  Patient Name:  Jessica Guzmán  : 1970  MRN: [id number]  CSN: [id number]    Referring Practitioner Kris Jurado MD   Diagnosis Low back pain, unspecified [M54.50]  Other cervical disc degeneration at C4-C5 level [M50.321]  Spinal stenosis, cervical region [M48.02]  Radiculopathy, cervical region [M54.12]    Treatment Diagnosis Chronic neck and back pain, decreased ROM, core, postural, shoulder, LE weakness   Date of Evaluation 22    Additional Pertinent History Recent MI, HTN, obesity, arthritis, SOB. Functional Outcome Measure Used NDI, Modified Oswestry   Functional Outcome Score 19, 22 (22)       Insurance: Primary: Payor: Maynor Hernandez /  /  / ,   Secondary:    Authorization Information: PRECERTIFICATION REQUIRED:  n/a  INSURANCE THERAPY BENEFIT:  Allowed 30 visits Physical Therapy /Occupational Therapy/Speech Therapy per calendar year. No visit limit for PT/OT/ST for patients age 8 and under. FCE-Covered, no precert required. Benefit will not cover maintenance or preventative treatment. AQUATIC THERAPY COVERED:   Yes  MODALITIES COVERED:  Yes. Iontophoresis and Hot/Cold Packs are not covered. TELEHEALTH COVERED: Yes   Visit # 6, 10 for progress note   Visits Allowed: 30   Recertification Date:    Physician Follow-Up: As needed   Physician Orders:    History of Present Illness: Pt had steel plate put in neck in 2017 d/t pain in neck and arm. In 2019, pt developed spondylolithesis and had surgery, and hardware was placed in wrong location causing permanent nerve damage. Pt feels electric shocks up in legs and muscle cramps down her legs.  Spacer was removed that caused all the damage. Pt developed osteomyelitis in hardware of back. Pt spend time in IPR, then went home with PICC line. Infection worsened so went to hospital and ended up having emergency surgery in 2020, removing hardware and fusing L4-S1. Pt has numbness in sacrum. Pt having pain in right buttock. Prolonged walking is painful, radiating into hips. Pt going through weight management to have bariatric surgery in March/April. Pt hasn't started working with  at gym. Pt very limited on activity d/t pain. Getting in drivers sick of car is difficult. Neck is constantly cracking and grinding. Pt unable to do dishes for more than a minute, as neck, mid back pain begins. Pt takes Percocet with intense pain, denies use of Tylenol or ibuprofen. Pt denies use of heat or ice d/t no relief. Vacuuming is difficult. Doctor planning MRI after therapy. SUBJECTIVE: Pt reports that last night was a pretty bad night and states that the right hip pain is about a 7/10 today and 5/10 in the neck.        AQUATICS TREATMENT   Precautions:    Pain: 6/10 R lateral hip, 4/10 neck    X in shaded column indicates activity completed today   Exercise/Intervention Sets/Sec  Notes   Walk Forward 3 laps  x    Walk Backward 3 laps  x    Walk Sideways 3 laps  x           Lower Extremity Exercises:       Heel/Toe Raises 10  x    Marches 10  x    Squats 10  x    3 Way Hip 10  x    Hamstring Curls 10  x    Lunges       Step-Ups              Lower Extremity Stretches:       Calf stretch at step 3x20 sec  x    Hamstring stretch at step 3x20 sec  x                  Seated Exercises:              Upper Extremity Exercises:       Shoulder Flexion       Shoulder ABD/ADD       Shoulder Horizontal ABD/ADD       Shoulder IR/ER       Shoulder Circles       Shoulder Shrugs       Rows       Bicep Curls              Upper Extremity Stretches:       Upper trap and levator 3x20 sec                           Balance:              Dynamic Gait:              Deep Water:       Hang 5 min  x 1 noodle in front at 4'10\" rail   Bicycle       Hip ABD/ADD       Hip Flex/Ext           Specific Interventions Next Treatment: alternate land and pool; cervical ROM, postural strengthening, hip stretches, core and LE strengthening, manual work to right lumbar/hip; poor tolerance to laying supine    Activity/Treatment Tolerance:  [x]  Patient tolerated treatment well  []  Patient limited by fatigue  []  Patient limited by pain   []  Patient limited by medical complications  []  Other:     Assessment: Patient tolerated aquatic therapy session well today despite high pain levels at initiation of session. Patient completed all exercises as noted above without complaints. Patient reporting less pull with hamstring stretch this date and no irritation with side stepping or hip abduction. Continue to progress patient to improve core strength and lower extremity flexibility as tolerated. Goals    Patient Goal: Learn ways to manage pain to be able to do more activities    Short Term Goals: 4 weeks  Patient will improve AROM of cervical rotation to 40 degrees for ease turning head when driving. Patient will demonstrate good postural awareness with <3 cues during therapy session to decrease spinal stress during her day. Patient will have <50% lumbar AROM restriction with decreased right hip tightness for ease with LE dressing. Long Term Goals: 8 weeks  Patient will demonstrate good core engagement and postural awareness during therapy session without cues  to carry over to standing to do dishes and cook. Patient will improve bilateral shoulder and tricep strength to 5/5 for ease lifting and carrying functional items. Patient will improve bilateral hip flexion and ABD strength to 4+/5 for stabilization when cleaning and walking. Patient will be independent and compliant with HEP to achieve above goals.        Patient Education:   [x]  HEP/Education Completed: monitor response to [employer] Access Code: [email or web address]  []  No new Education completed  [x]  Reviewed Prior HEP      [x]  Patient verbalized and/or demonstrated understanding of education provided. []  Patient unable to verbalize and/or demonstrate understanding of education provided. Will continue education. []  Barriers to learning:     PLAN:  Treatment Recommendations: Strengthening, Range of Motion, Functional Mobility Training, Manual Therapy - Soft Tissue Mobilization, Pain Management, Home Exercise Program, Patient Education, Aquatics, and Modalities    []  Plan of care initiated. Plan to see patient 1-2 times per week for 8 weeks to address the treatment planned outlined above.   [x]  Continue with current plan of care  []  Modify plan of care as follows:    []  Hold pending physician visit  []  Discharge    Time In 1130   Time Out 1215   Timed Code Minutes: 45   Total Treatment Time: 45     Electronically Signed by: Keisha Martinez PT

## 2023-01-03 ENCOUNTER — HOSPITAL ENCOUNTER (OUTPATIENT)
Dept: PHYSICAL THERAPY | Age: 53
Setting detail: THERAPIES SERIES
Discharge: HOME | End: 2023-01-03
Payer: MEDICARE

## 2023-01-03 PROCEDURE — 97110 THERAPEUTIC EXERCISES: CPT

## 2023-01-03 NOTE — DISCHARGE SUMMARY
[address] [facility]  PHYSICAL THERAPY  [] EVALUATION  [] DAILY NOTE (LAND) [] DAILY NOTE (AQUATIC ) [] PROGRESS NOTE [x] DISCHARGE NOTE    [x] OUTPATIENT REHABILITATION [facility]   [] [place] [address]    [] [facility]   [] Dakotahdriss Calabrese    Date: 1/3/2023  Patient Name:  Tyler Brooks  : 1970  MRN: [id number]  CSN: [id number]    Referring Practitioner Tal Miles MD   Diagnosis Low back pain, unspecified [M54.50]  Other cervical disc degeneration at C4-C5 level [M50.321]  Spinal stenosis, cervical region [M48.02]  Radiculopathy, cervical region [M54.12]    Treatment Diagnosis Chronic neck and back pain, decreased ROM, core, postural, shoulder, LE weakness   Date of Evaluation 22    Additional Pertinent History Recent MI, HTN, obesity, arthritis, SOB. Functional Outcome Measure Used NDI, Modified Oswestry   Functional Outcome Score 19, 22 (22)       Insurance: Primary: Payor: Britt Alcazar /  /  / ,   Secondary:    Authorization Information: PRECERTIFICATION REQUIRED:  n/a  INSURANCE THERAPY BENEFIT:  Allowed 30 visits Physical Therapy /Occupational Therapy/Speech Therapy per calendar year. No visit limit for PT/OT/ST for patients age 8 and under. FCE-Covered, no precert required. Benefit will not cover maintenance or preventative treatment. AQUATIC THERAPY COVERED:   Yes  MODALITIES COVERED:  Yes. Iontophoresis and Hot/Cold Packs are not covered. TELEHEALTH COVERED: Yes   Visit # 7, /10 for progress note   Visits Allowed: 30   Recertification Date:    Physician Follow-Up: As needed   Physician Orders:    History of Present Illness: Pt had steel plate put in neck in 2017 d/t pain in neck and arm. In 2019, pt developed spondylolithesis and had surgery, and hardware was placed in wrong location causing permanent nerve damage. Pt feels electric shocks up in legs and muscle cramps down her legs.  Spacer was removed that caused all the damage. Pt developed osteomyelitis in hardware of back. Pt spend time in IPR, then went home with PICC line. Infection worsened so went to hospital and ended up having emergency surgery in 2020, removing hardware and fusing L4-S1. Pt has numbness in sacrum. Pt having pain in right buttock. Prolonged walking is painful, radiating into hips. Pt going through weight management to have bariatric surgery in March/April. Pt hasn't started working with  at gym. Pt very limited on activity d/t pain. Getting in drivers sick of car is difficult. Neck is constantly cracking and grinding. Pt unable to do dishes for more than a minute, as neck, mid back pain begins. Pt takes Percocet with intense pain, denies use of Tylenol or ibuprofen. Pt denies use of heat or ice d/t no relief. Vacuuming is difficult. Doctor planning MRI after therapy. SUBJECTIVE: Pt reports she woke up with pain in low back, hip and groin this morning. Pain was stronger when she started therapy but pain is now spreading. Pt has difficult time sitting. Neck pain is less since starting therapy, but worsens as the day goes on.      OBJECTIVE:  TREATMENT   Precautions:    Pain: 6/10 R lumbar, hip, groin; 4/10 neck    \"X in shaded column indicates activity completed today    *\" next to exercise/intervention indicates progression   Modalities Parameters/  Location  Notes                     Manual Therapy Time/Technique  Notes                     Exercise/Intervention   Notes   Seated piriformis IR/ER stretch    Demo'd for HEP   Seated hamstring stretch    \"   Upper trap stretch    \"   Backward shoulder rolls    \"          Supine w/ bolster under knees: GOOD tolerance       Lumbar:       SKTC w/towel  x2 20 sec x    Piriformis stretch w/towel x2 20 sec x    TA activation X12 5 sec     R hip flexor stretch off edge of table x2 15 sec  Pt notes \"pressure\" in R buttock d/t rods in back    TA +marches x10 Cervical:       Cervical retraction x10 2-3 sec     Cervical rotation x10 2-3 sec            Nu step 5 min L2  Denies increase in pain           Seated:       HS stretch 2 20 sec x    Piriformis stretch x3 30 sec     TA activation x10 5 sec     PPT x10 2-3 sec            Aquatic therapy guideline and agreement         Specific Interventions Next Treatment: alternate land and pool; cervical ROM, postural strengthening, hip stretches, core and LE strengthening, manual work to right lumbar/hip; poor tolerance to laying supine    Activity/Treatment Tolerance:  [x]  Patient tolerated treatment well  []  Patient limited by fatigue  []  Patient limited by pain   []  Patient limited by medical complications  []  Other:     Assessment: Pt continues to have neck and right buttock pain; however, pain is now radiating around hip and into groin. Pt continues to have decreased neck rotation ROM, left worse than right. Lumbar ROM limited by fusion. Pt demos weakness in shoulders, triceps and hips. Pt has decreased core engagement. Therapy sessions have be limited by pain. Pt would benefit from aquacise 2x/wk for an hour to help manage symptoms. Pt has completed 6 weeks of PT and may benefit from further testing. Pt planning for bariatric surgery in a few months and hopes that will be as well. Goals    Patient Goal: Learn ways to manage pain to be able to do more activities    Short Term Goals: 4 weeks  Patient will improve AROM of cervical rotation to 40 degrees for ease turning head when driving. GOAL NOT MET: neck rotation right 39 deg, left 31 deg, feels tighter when turning to left. Discontinue Goal  Patient will demonstrate good postural awareness with <3 cues during therapy session to decrease spinal stress during her day. GOAL MET:  Pt able to sit and converse with good posture. Discontinue Goal  Patient will have <50% lumbar AROM restriction with decreased right hip tightness for ease with LE dressing.  GOAL NOT MET: lumbar flexion 80%, extension 25%, rotation 75%, lateral flexion 50%. Discontinue Goal    Long Term Goals: 8 weeks  Patient will demonstrate good core engagement and postural awareness during therapy session without cues  to carry over to standing to do dishes and cook. GOAL NOT MET: Pt requires cues for core engagement during therapy session and poor engagement when walking. Discontinue Goal  Patient will improve bilateral shoulder and tricep strength to 5/5 for ease lifting and carrying functional items. GOAL NOT MET: B shoulder flexion, ABD, ER 4+/5, IR 4/5, tricep 4-/5. Discontinue Goal  Patient will improve bilateral hip flexion and ABD strength to 4+/5 for stabilization when cleaning and walking. GOAL NOT MET: B hip flexion 4/5, hip abduction right 4/5, left 4-/5. Discontinue Goal  Patient will be independent and compliant with HEP to achieve above goals. GOAL MET:  Pt compliant with HEP daily. Discontinue Goal      Patient Education:   []  HEP/Education Completed: Plan of Care, Goals, exercises listed above with handout provided  [employer] Access Code: [email or web address]  []  No new Education completed  [x]  Reviewed Prior HEP      [x]  Patient verbalized and/or demonstrated understanding of education provided. []  Patient unable to verbalize and/or demonstrate understanding of education provided. Will continue education. []  Barriers to learning:     PLAN:  Treatment Recommendations: Strengthening, Range of Motion, Functional Mobility Training, Manual Therapy - Soft Tissue Mobilization, Pain Management, Home Exercise Program, Patient Education, Aquatics, and Modalities    []  Plan of care initiated. Plan to see patient 1-2 times per week for 8 weeks to address the treatment planned outlined above.   []  Continue with current plan of care  []  Modify plan of care as follows:    []  Hold pending physician visit  [x]  Discharge    Time In 1128   Time Out 1148   Timed Code Minutes: 20   Total Treatment Time: 20     Electronically Signed by: Lois Dawson, PT

## 2023-01-09 NOTE — PROGRESS NOTES
Assessment: Patient is a 47 y.o. female seen for month four  follow up MNT visit for  pre op bariatric surgery desires sleeve vs bypass    Vitals from current and previous visits:  Vitals 3/30/4577   SYSTOLIC 656   DIASTOLIC 82   Site Right Upper Arm   Position Sitting   Cuff Size Large Adult   Pulse 68   Temp 97.5   Resp    SpO2    Weight 236 lb   Height 5' 0.25\"   Body mass index 45.7 kg/m2   Waist (Inches)    Neck circumference (Inches)        Initial weight at start of Weight Management Program was: 238 lbs     Eusebia lost 4 lbs over one month  -Weight goal: lose weight.     -Nutritionally relevant labs: initial labs Oct 2022- Vit D-33, Iron-80, Ferritn-80, glucose-113  Lab Results   Component Value Date/Time    LABA1C 5.8 10/12/2022 01:11 PM    LABA1C 5.9 05/23/2022 12:27 PM    LABA1C 5.8 01/06/2021 12:04 PM    GLUCOSE 113 (H) 10/12/2022 01:11 PM    GLUCOSE 113 (H) 07/15/2022 10:28 AM    CHOL 211 (H) 10/12/2022 01:11 PM    HDL 55 10/12/2022 01:11 PM    LDLCALC 130 10/12/2022 01:11 PM    TRIG 130 10/12/2022 01:11 PM                  Lab Results   Component Value Date/Time    TWJZ50 33 10/12/2022 01:11 PM       JESSE appt completed f/u is 2/6/23. Wearing CPAP better since last visit  Cardiology clearance obtained  EGD completed     Dr Madeline Rivera f/u now 2/1/23    - Is patient taking daily Multivitamin:  Taking Vitamin D3 daily    Pt lives with ex     Had food journal in office for review. \"Every week I'm getting more focused\"  -Food Recall:    Had food journal in office for review  Breakfast: 8am-  protein shakes or egg with turkey sausage or banana  Lunch: 11a-2pm -low carb tortilla with swiss, cheese, ham and turkey  Dinner: 5-7p- quest cheese pizza, hamburger [place], fried cabbage, potatoes, carrots, broccoli, pickles, cottage cheese or steak, shrimp, asparagus  Snack: decreased snacking- some pork rinds  Drinks throughout the day: bottled water ~  8 bottles a day with sugar free flavor packets, no pop, Hot Tea one cup with milk with sugar substitute ~ 3-4 times a week    -Impression of Dietary Intake:improvement with fruit and vegetables this month. Exercise:  Patient  sent via Email Link to view Fitness Orientation video if desires to use [employer]  -Physical Activity is: - Completed outpatient PT for neck and back twice a week and one day will be pool . Will get another MRI now  Plans to transition to [place] now    Nutrition Diagnosis: Overweight/Obesity related to Food and nutrition-related knowledge deficit as evidenced by BMI of 45.7    Intervention:   Healthy behaviors: adequate water intake   Patient has  attended support group via [employer] and attended Dec Support Group . Goals reviewed with patient and questions answered  Positive reinforcement given, is engaged in program    Patient Instructions   Goals:  Nutrition Goal:   Continue to aim for regular meal times and not skip meals! Good job with this  Water Goal:  Great job with water intake - continue at least 64 oz or greater each day  Exercise Goal: begin using [place] twice a week  Take 20-30 minutes to eat your meal to be mindful. Put fork down between bites  [email or web address] >go to bottom of home page and click on \"[employer]ist\"         -Followup visit: 4 weeks with dietitian.      Eevrette Graf, RD, LD,   Dietitian- [facility] [address]

## 2023-01-10 ENCOUNTER — OFFICE VISIT (OUTPATIENT)
Dept: BARIATRICS/WEIGHT MGMT | Age: 53
End: 2023-01-10

## 2023-01-10 ENCOUNTER — OFFICE VISIT (OUTPATIENT)
Dept: BARIATRICS/WEIGHT MGMT | Age: 53
End: 2023-01-10
Payer: MEDICARE

## 2023-01-10 VITALS
DIASTOLIC BLOOD PRESSURE: 82 MMHG | HEIGHT: 60 IN | TEMPERATURE: 97.5 F | HEART RATE: 68 BPM | BODY MASS INDEX: 46.33 KG/M2 | SYSTOLIC BLOOD PRESSURE: 124 MMHG | WEIGHT: 236 LBS

## 2023-01-10 DIAGNOSIS — K21.9 GASTROESOPHAGEAL REFLUX DISEASE, UNSPECIFIED WHETHER ESOPHAGITIS PRESENT: ICD-10-CM

## 2023-01-10 DIAGNOSIS — Z99.89 OSA ON CPAP: ICD-10-CM

## 2023-01-10 DIAGNOSIS — F41.9 ANXIETY: ICD-10-CM

## 2023-01-10 DIAGNOSIS — M54.50 CHRONIC BILATERAL LOW BACK PAIN, UNSPECIFIED WHETHER SCIATICA PRESENT: ICD-10-CM

## 2023-01-10 DIAGNOSIS — E66.01 MORBID OBESITY WITH BMI OF 40.0-44.9, ADULT (HCC): Primary | ICD-10-CM

## 2023-01-10 DIAGNOSIS — K76.0 FATTY LIVER: ICD-10-CM

## 2023-01-10 DIAGNOSIS — G89.29 CHRONIC BILATERAL LOW BACK PAIN, UNSPECIFIED WHETHER SCIATICA PRESENT: ICD-10-CM

## 2023-01-10 DIAGNOSIS — K58.9 IRRITABLE BOWEL SYNDROME, UNSPECIFIED TYPE: ICD-10-CM

## 2023-01-10 DIAGNOSIS — F32.A DEPRESSION, UNSPECIFIED DEPRESSION TYPE: ICD-10-CM

## 2023-01-10 DIAGNOSIS — I10 HYPERTENSION, UNSPECIFIED TYPE: ICD-10-CM

## 2023-01-10 DIAGNOSIS — G47.33 OSA ON CPAP: ICD-10-CM

## 2023-01-10 PROCEDURE — 1036F TOBACCO NON-USER: CPT | Performed by: PHYSICIAN ASSISTANT

## 2023-01-10 PROCEDURE — 3017F COLORECTAL CA SCREEN DOC REV: CPT | Performed by: PHYSICIAN ASSISTANT

## 2023-01-10 PROCEDURE — 99213 OFFICE O/P EST LOW 20 MIN: CPT | Performed by: PHYSICIAN ASSISTANT

## 2023-01-10 PROCEDURE — 3074F SYST BP LT 130 MM HG: CPT | Performed by: PHYSICIAN ASSISTANT

## 2023-01-10 PROCEDURE — G8427 DOCREV CUR MEDS BY ELIG CLIN: HCPCS | Performed by: PHYSICIAN ASSISTANT

## 2023-01-10 PROCEDURE — G8484 FLU IMMUNIZE NO ADMIN: HCPCS | Performed by: PHYSICIAN ASSISTANT

## 2023-01-10 PROCEDURE — 3079F DIAST BP 80-89 MM HG: CPT | Performed by: PHYSICIAN ASSISTANT

## 2023-01-10 PROCEDURE — G8417 CALC BMI ABV UP PARAM F/U: HCPCS | Performed by: PHYSICIAN ASSISTANT

## 2023-01-10 NOTE — PATIENT INSTRUCTIONS
Goals:  Nutrition Goal:   Continue to aim for regular meal times and not skip meals! Good job with this  Water Goal:  Great job with water intake - continue at least 64 oz or greater each day  Exercise Goal: begin using [place] twice a week  Take 20-30 minutes to eat your meal to be mindful. Put fork down between bites  [email or web address] >go to bottom of home page and click on \"Celebrate Assist\"

## 2023-01-10 NOTE — PATIENT INSTRUCTIONS
Behavior modification discussed in detail in regards to dietary habits. Nutritional education occurred during visit. Continue following recommendations  per dietitian. Improvement in fitness/exercise discussed with patient and the need for this  with/without surgery. EKG abnormal- Cardiac Clearance received from Dr. Cherie French   Pulmonary Clearance needed prior to surgery- Not currently compliant with CPAP-36.7%. Dr. Destiny Benoit. Follow up scheduled 2/6/23  Psychology evaluation with Dr. Tonja Cogan in process, next apt 2/1/23  Continue following with Dr. Liss Dhillon as scheduled. Completed PT- awaiting MRI   EGD completed with Dr. Martina Bañuelos- results reviewed. H. Pylori (-)  Encouraged to attend support groups. Completed x 2. Goal to lose 3-5% TBW prior to surgery. Down 2#. Seca scale completed and reviewed. Initial labs completed and reviewed  Drug screen completed and reviewed  Nicotine (-). Discussed risks of nicotine a/w bariatric surgery. Must be nicotine free at least 90 days prior to surgery. Avoid nicotine life long post-op. Denies current pregnancy. Advised not to get pregnant for at least 1 year post bariatric surgery as this can increase risk of malnourishment potentially leading to low birth weight or malformation. Patient agrees to avoid pregnancy for at least 1 year post-op. Discussed importance of appropriate contraception. Will need to be off work for 3-4 weeks post-bariatric surgery. No lifting/pushing/pulling over 20# for 4 weeks post-op  No NSAIDS ( ibuprofen) 10 days prior to bariatric surgery. Avoid NSAID use post-op  Discussed Lovenox post-op bariatric surgery  Awaiting LOMN - to call PCP and check on this. Will continue following with multi-disciplinary team in preparation for bariatric surgery with the expectation of lifelong follow-up post-operatively. Mammogram completed and reviewed.

## 2023-01-10 NOTE — PROGRESS NOTES
[address] [facility]  [address]  [facility], [address] [facility]  [phone]      Visit Date:  1/10/2023  Weight Management Pre-Op Follow-up    HPI:    Medically Supervised follow-up- Month #4 of 6- Rejoining. desires sleeve v. Bypass. Esther Aponte is here today for continued supervised weight management of morbid obesity. Weight today 236#. Down 4# since last month. Down 2# since starting with weight management. BMI 45. Admits that she has been more focused on nutrition over the month. Has been more mindful of food choices. Tracking food intake. Eating 3 meals, most days. Needs to work on portion control. Drinking plenty of water/decaf unsweet tea. Completed PT last week. Awaiting MRI. Discussed bariatric surgery with Dr. Analilia Mendez and supportive of moving forward with bariatric surgery. Comorbid conditions include GERD, hypertension, fatty liver, chronic back pain, depression, anxiety, IBS and sleep apnea. GERD well controlled with Protonix. Has not had any breakthrough heartburn over the month. Discussed possibility of worsening GERD that may require additional medication or revision surgery. EGD completed 12/13/22 with Dr. Rekha Botello- results reviewed. H. Pylori (-). Awaiting LOMN- has taken to PCP- will call and check on this. Mammogram completed  and reviewed- no concerning findings. Completed support groups x 2. EKG borderline. Completed heart cath July 2022. Cardiac clearance received from Dr. Georgiana Gerber. Not currently compliant with CPAP- 36.7%. Will need to be compliant prior to surgery. Has been wearing CPAP Qhs consistently for 6-8 per night. Pulmonary clearance needed prior to surgery- 2/6/23. Psychological clearance with Dr. Theron Adan in process, next apt 2/1/23. If she does not lose enough weight with medical management she would like to proceed with sleeve gastrectomy. Physical Activity:  recently completed PT. Plans to make apt with Stephanie. Current BMI: Body mass index is 45.71 kg/m².   Current Weight:   Wt Readings from Last 3 Encounters:   01/10/23 236 lb (107 kg)   12/09/22 243 lb 9.6 oz (110.5 kg)   12/07/22 240 lb 6.4 oz (109 kg)     Initial Body [id number]    Past Medical History:  Past Medical History:   Diagnosis Date    Anxiety     Arthritis     Bell's palsy     Body mass index 40.0-44.9, adult ([address] 05/06/2021    Esophagitis     Fatty liver     GERD (gastroesophageal reflux disease)     Hypertension     IBS (irritable bowel syndrome)     Morbid obesity with BMI of 40.0-44.9, adult ([facility])     MRSA (methicillin resistant staph aureus) culture positive     JESSE (obstructive sleep apnea) 05/06/2021    UTI (urinary tract infection)     V tach     Vertigo        Past Surgical History:  Past Surgical History:   Procedure Laterality Date    BACK SURGERY      BLADDER SUSPENSION      BREAST REDUCTION SURGERY  10/2007    CARPAL TUNNEL RELEASE      CERVICAL FUSION  2017    ACDF C5-C7    CHOLECYSTECTOMY      COLONOSCOPY      DILATION AND CURETTAGE OF UTERUS      ENDOSCOPY, COLON, DIAGNOSTIC      HYSTERECTOMY (CERVIX STATUS UNKNOWN)      LUMBAR FUSION N/A 4/3/2020    L4-S1 DECOMPRESSION AND POSTERIOR FUSION; LUMBAR I & D WITH HARDWARE REMOVAL performed by Faizan Hall MD at [address]  10/17/2019    L5-S1    UPPER GASTROINTESTINAL ENDOSCOPY N/A 7/19/2019    EGD BIOPSY performed by Waldemar Bah MD at [facility]  7/19/2019    EGD DILATION SAVORY performed by Waldemar Bah MD at [facility]       Past Social History:  Social History     Socioeconomic History    Marital status:      Spouse name: Not on file    Number of children: 3    Years of education: 12    Highest education level: Not on file   Occupational History    Occupation: Home Health Aide   Tobacco Use    Smoking status: Former     Packs/day: 2.00     Years: 20.00     Pack years: 40.00     Types: Cigarettes Quit date: 2017     Years since quittin.9    Smokeless tobacco: Never   Vaping Use    Vaping Use: Never used   Substance and Sexual Activity    Alcohol use: Yes     Comment: occ    Drug use: No    Sexual activity: Not Currently   Other Topics Concern    Not on file   Social History Narrative    Not on file     Social Determinants of Health     Financial Resource Strain: Not on file   Food Insecurity: Not on file   Transportation Needs: Not on file   Physical Activity: Not on file   Stress: Not on file   Social Connections: Not on file   Intimate Partner Violence: Not on file   Housing Stability: Not on file        Medications:   Current Outpatient Medications   Medication Sig Dispense Refill    oxyCODONE-acetaminophen (PERCOCET) 5-325 MG per tablet Take 1 tablet by mouth every 4 hours as needed for Pain. dicyclomine (BENTYL) 10 MG capsule       hydroCHLOROthiazide (MICROZIDE) 12.5 MG capsule Take 12.5 mg by mouth daily as needed      cyclobenzaprine (FLEXERIL) 10 MG tablet Take 10 mg by mouth 3 times daily as needed for Muscle spasms      metoprolol succinate (TOPROL XL) 50 MG extended release tablet Take 1 tablet by mouth daily 30 tablet 3    pantoprazole (PROTONIX) 40 MG tablet Take 1 tablet by mouth every morning (before breakfast) 30 tablet 0    Cholecalciferol (VITAMIN D3) 25 MCG (1000 UT) TABS TAKE ONE TABLET BY MOUTH EVERY DAY  1     No current facility-administered medications for this visit. Allergies: Allergies   Allergen Reactions    Codeine Rash    Dilaudid [Hydromorphone Hcl] Rash       Subjective:    Review of Systems:  Constitutional: Denies any fever, chills, fatigue. Wound: Denies any rash, skin color changes or wound problems. Resp: Denies any cough, shortness of breath. CV: Denies any chest pain, orthopnea or syncope. MS: Denies myalgias, (+)arthralgias-back/right hip  GI: Denies any nausea, vomiting,(+) diarrhea, constipation, melena, hematochezia.  (+) reflux  : Denies any hematuria, hesitancy or dysuria. NEURO: Denies seizures, headache. Objective:    /82 (Site: Right Upper Arm, Position: Sitting, Cuff Size: Large Adult)   Pulse 68   Temp 97.5 °F (36.4 °C) (Infrared)   Ht 5' 0.25\" (1.53 m)   Wt 236 lb (107 kg)   LMP 10/27/2015 (Exact Date)   BMI 45.71 kg/m²   Physical Examination:   Constitutional: Alert and oriented to person, place and time. Well-developed, well- nourished. Head: Normocephalic and atraumatic  Neck: Supple. Eyes: EOMI b/l. Conjunctivae normal.  No scleral icterus. Respiratory: Effort normal. No respiratory distress. Abd: Benign  Ext:  Movement x 4. No edema  Skin; Warm and dry, no visible rashes, lesions or ulcers.    Neuro: Cranial Nerves Grossly Intact; nml coordination        CBC   Lab Results   Component Value Date/Time    WBC 5.7 10/12/2022 01:11 PM    RBC 5.27 10/12/2022 01:11 PM    HGB 14.4 10/12/2022 01:11 PM    HCT 44.5 10/12/2022 01:11 PM    MCV 84.4 10/12/2022 01:11 PM    MCH 27.3 10/12/2022 01:11 PM    MCHC 32.4 10/12/2022 01:11 PM    RDW 13.8 07/07/2017 09:21 AM     10/12/2022 01:11 PM    MPV 9.8 10/12/2022 01:11 PM    RBCMORP NORMAL 07/07/2017 09:21 AM    SEGSPCT 63.1 10/12/2022 01:11 PM    LABLYMP 27.8 10/12/2022 01:11 PM    MONOPCT 5.1 10/12/2022 01:11 PM    LABEOS 1.9 10/12/2022 01:11 PM    BASO 0.7 10/12/2022 01:11 PM    NRBC 0 10/12/2022 01:11 PM    ANISOCYTOSIS 1+ 06/29/2015 03:15 AM    SEGSABS 3.6 10/12/2022 01:11 PM    LYMPHSABS 1.6 10/12/2022 01:11 PM    MONOSABS 0.3 10/12/2022 01:11 PM    EOSABS 0.1 10/12/2022 01:11 PM    BASOSABS 0.0 10/12/2022 01:11 PM        BMP/CMP   Lab Results   Component Value Date/Time    GLUCOSE 113 10/12/2022 01:11 PM    CREATININE 0.7 10/12/2022 01:11 PM    BUN 18 10/12/2022 01:11 PM     10/12/2022 01:11 PM    K 4.5 10/12/2022 01:11 PM    K 4.2 04/03/2020 04:57 AM     10/12/2022 01:11 PM    CO2 30 10/12/2022 01:11 PM    CALCIUM 9.8 10/12/2022 01:11 PM    AST 26 10/12/2022 01:11 PM    ALKPHOS 97 10/12/2022 01:11 PM    PROT 7.4 10/12/2022 01:11 PM    LABALBU 4.7 10/12/2022 01:11 PM    BILITOT 0.9 10/12/2022 01:11 PM    ALT 45 10/12/2022 01:11 PM        PREALBUMIN   Lab Results   Component Value Date/Time    PREALBUMIN 23.9 07/07/2017 09:21 AM        VITAMIN B12   Lab Results   Component Value Date/Time    SLXWVFJN01 793 10/12/2022 01:11 PM        VITAMIN D   Lab Results   Component Value Date/Time    VITD25 33 10/12/2022 01:11 PM        PTH  Lab Results   Component Value Date/Time    IPTH 43.0 10/12/2022 01:11 PM       VITAMIN B1/ THIAMINE   Lab Results   Component Value Date/Time    [id number] 108 10/12/2022 01:11 PM        LIPID SCREEN (FASTING)   Lab Results   Component Value Date/Time    CHOL 211 10/12/2022 01:11 PM    TRIG 130 10/12/2022 01:11 PM    HDL 55 10/12/2022 01:11 PM    LDLCALC 130 10/12/2022 01:11 PM   ,     HGA1C (T2DM ONLY)   Lab Results   Component Value Date/Time    LABA1C 5.8 10/12/2022 01:11 PM    AVGG 114 10/12/2022 01:11 PM        TSH   Lab Results   Component Value Date/Time    TSH 1.590 10/12/2022 01:11 PM        IRON   Lab Results   Component Value Date/Time    IRON 80 10/12/2022 01:11 PM        TIBC  Lab Results   Component Value Date/Time    TIBC 386 10/12/2022 01:11 PM       FERRITIN  Lab Results   Component Value Date/Time    FERRITIN 80 10/12/2022 01:11 PM       VITAMIN A  No results found for: RETINOL    NICOTINE  No results found for: NMET    UDS  Lab Results   Component Value Date/Time    UDP SEE BELOW 01/06/2021 11:56 AM       PSA  No results found for: LABPSA    GFR  Lab Results   Component Value Date/Time    LABGLOM 88 10/12/2022 01:11 PM       DEXA  No results found for this or any previous visit. Assessment:       Diagnosis Orders   1. BMI 45.0-49.9, adult ([facility] [address]        2. Gastroesophageal reflux disease, unspecified whether esophagitis present        3. Hypertension, unspecified type        4.  Depression, unspecified depression type        5. Anxiety        6. Irritable bowel syndrome, unspecified type        7. Fatty liver        8. Chronic bilateral low back pain, unspecified whether sciatica present        9. JESSE on CPAP                  Plan:    Behavior modification discussed in detail in regards to dietary habits. Nutritional education occurred during visit. Continue following recommendations  per dietitian. Improvement in fitness/exercise discussed with patient and the need for this  with/without surgery. EKG abnormal- Cardiac Clearance received from Dr. José Bennett   Pulmonary Clearance needed prior to surgery- Not currently compliant with CPAP-36.7%. Dr. Jennifer Mott. Follow up scheduled 2/6/23  Psychology evaluation with Dr. Chris Stanton in process, next apt 2/1/23  Continue following with Dr. Cheng Flores as scheduled. Completed PT- awaiting MRI   EGD completed with Dr. Katiuska Pal- results reviewed. H. Pylori (-)  Encouraged to attend support groups. Completed x 2. Goal to lose 3-5% TBW prior to surgery. Down 2#. Seca scale completed and reviewed. Initial labs completed and reviewed  Drug screen completed and reviewed  Nicotine (-). Discussed risks of nicotine a/w bariatric surgery. Must be nicotine free at least 90 days prior to surgery. Avoid nicotine life long post-op. Denies current pregnancy. Advised not to get pregnant for at least 1 year post bariatric surgery as this can increase risk of malnourishment potentially leading to low birth weight or malformation. Patient agrees to avoid pregnancy for at least 1 year post-op. Discussed importance of appropriate contraception. Will need to be off work for 3-4 weeks post-bariatric surgery. No lifting/pushing/pulling over 20# for 4 weeks post-op  No NSAIDS ( ibuprofen) 10 days prior to bariatric surgery. Avoid NSAID use post-op  Discussed Lovenox post-op bariatric surgery  Awaiting LOMN - to call PCP and check on this.    Will continue following with multi-disciplinary team in preparation for bariatric surgery with the expectation of lifelong follow-up post-operatively. Mammogram completed and reviewed. Return in about 1 month (around 2/10/2023) for Follow up. I spent over 20 minutes with the patient, with greater that 50% of that time spent on education, counseling and coordination of care.      Electronically signed by YUNIEL Johnson on 1/10/2023 at 10:57 AM

## 2023-02-01 ENCOUNTER — OFFICE VISIT (OUTPATIENT)
Dept: PSYCHOLOGY | Age: 53
End: 2023-02-01
Payer: MEDICARE

## 2023-02-01 DIAGNOSIS — F45.1 SOMATIC SYMPTOM DISORDER, MODERATE: Primary | ICD-10-CM

## 2023-02-01 DIAGNOSIS — E66.01 MORBID OBESITY (HCC): ICD-10-CM

## 2023-02-01 PROCEDURE — 90832 PSYTX W PT 30 MINUTES: CPT | Performed by: PSYCHOLOGIST

## 2023-02-01 PROCEDURE — 1036F TOBACCO NON-USER: CPT | Performed by: PSYCHOLOGIST

## 2023-02-01 NOTE — PROGRESS NOTES
PSYCHOLOGICAL FOLLOW-UP FOR BARIATRIC SURGERY:    History of Presenting Illness:   Ms. Michael Munoz was initially referred for a routine psychological evaluation for bariatric surgery on 10/25/2022. At this evaluation, the following requirements were given prior to psychological clearance for WLS:  Complete a psychiatric follow-up appointment to reassess psychological clearance    Assessment:  Eating behaviors: She reports she has been eating 3 meals per day and this has been consistent. She reports she has been working on not eating and drinking at the same time. She is also trying to chew her food more than what she has before. Reports improvements in portion sizes. She reports she is continued to work on snacking. She reports she has increased her fruit consumption and is even making her own ice cream from frozen fruit. Exercise: she reports she plans to see Stephanie after today's appointment. She reports she was engaging in PT but was unable to do both PT and work with Tyron and Deboarh. She reports PT recently ended within the past month. She reports she is waiting to hear back regarding MRI and she plans to contact insurance company to figure out what is going on. Alcohol: She reports she had one drink on her birthday, 1/14/2022. Denies any other alcohol use. Weight loss: she has lost 4 pounds since last weight management appointment. She is logging her food intake regularly. Mood: she reports her mood has been stable and feels that she is in a \"good place. \" She reports feeling confident and feeling that she is doing things the \"right way. \"     Denies any perceived barriers to long term weight loss and maintenance. She acknowledges that weight loss is necessary for back pain relief. She reports she plans to follow up with pulmonology in regards to CPAP mask rubbing/chapping her face.      PSYCHIATRIC EXAM  General appearance: appropriately dressed, obese  Attitude & Behavior: cooperative, mildly hostile  Motor Activity & Musculoskeletal: appropriate  Speech & Language: normal rate and volume, speech and verbiage appropriate  Gait & Station: sitting in upright chair   Mood: irritated  Affect: congruent with mood,   Thought content: appropriate  Suicidal ideation: denies  Homicidal ideation: denies  Thought process & Associations: logical and coherent  Perceptions: appropriate  Orientation: alert and oriented X 3  Attention & Concentration: appears intact  Fund of knowledge: adequate  Insight: adequate  Judgment: adequate    DSM DIAGNOSIS:  Somatic symptom disorder, with predominant pain, moderate   Morbid obesity? PLAN:  Timothy Cast has completed minimum requirements and is now cleared from a psychological/substance perspective for bariatric surgery. Patient has demonstrated that they have the ability to understand the surgical procedure and to make a responsible decision. Patient has demonstrated that they are mentally stable enough to understand the risks and benefits of weight loss surgery; follow through with the extensive aftercare plan; withstand the rigors of surgery; and there is no evidence at this time that there is likelihood of patient being suicidal or likelihood to significantly decompensate if the procedure is not successful in helping to lose weight.        Psychology Clinician:  Reina Velazquez, PhD     Start time: 1:00pm  End time: 1:17 pm      Electronically signed by Gila Cheema, PhD on 2/1/23 at 1:12 PM EST

## 2023-02-06 ENCOUNTER — OFFICE VISIT (OUTPATIENT)
Dept: PULMONOLOGY | Age: 53
End: 2023-02-06
Payer: MEDICAID

## 2023-02-06 VITALS
OXYGEN SATURATION: 95 % | BODY MASS INDEX: 47.43 KG/M2 | HEART RATE: 71 BPM | DIASTOLIC BLOOD PRESSURE: 78 MMHG | HEIGHT: 60 IN | WEIGHT: 241.6 LBS | SYSTOLIC BLOOD PRESSURE: 126 MMHG

## 2023-02-06 DIAGNOSIS — Z99.89 OSA ON CPAP: Primary | ICD-10-CM

## 2023-02-06 DIAGNOSIS — G47.33 OSA ON CPAP: Primary | ICD-10-CM

## 2023-02-06 DIAGNOSIS — Z01.818 PREOPERATIVE CLEARANCE: ICD-10-CM

## 2023-02-06 DIAGNOSIS — E66.01 MORBID OBESITY (HCC): ICD-10-CM

## 2023-02-06 PROCEDURE — 3074F SYST BP LT 130 MM HG: CPT | Performed by: NURSE PRACTITIONER

## 2023-02-06 PROCEDURE — 3078F DIAST BP <80 MM HG: CPT | Performed by: NURSE PRACTITIONER

## 2023-02-06 PROCEDURE — 99213 OFFICE O/P EST LOW 20 MIN: CPT | Performed by: NURSE PRACTITIONER

## 2023-02-06 ASSESSMENT — ENCOUNTER SYMPTOMS
RESPIRATORY NEGATIVE: 1
GASTROINTESTINAL NEGATIVE: 1
COUGH: 0
ALLERGIC/IMMUNOLOGIC NEGATIVE: 1
EYES NEGATIVE: 1
WHEEZING: 0
SHORTNESS OF BREATH: 0

## 2023-02-06 NOTE — PROGRESS NOTES
[facility]      Amirah Urias         [id number]  2/6/2023   Chief Complaint   Patient presents with    Follow-up     Bariatric sleep clearance        Pt of Dr. Monica Pyle    PAP Download:   Original or initial AHI: 0.9     Date of initial study: 11/28/17      Compliant  78.9%     Noncompliant 21.1 %     PAP Type CPAP Level  9   Avg Hrs/Day 6  AHI: 2.4   Recorded compliance dates 11/4/22-2/1/23  Machine/Mfg:   [] ResMed    [x] Respironics/Dreamstation   Interface:   [] Nasal    [] Nasal pillows   [x] FFM      Provider:      [] [facility]     [][employer]     [x] [employer]    [] Sofie Ast    [] [facility]               [] [facility]      [] [facility]    [] [address]      [] Other    Neck Size: 16  Mallampati 3  ESS:  2  SAQLI: 98    Here is a scan of the most recent download:        Presentation:   Giselle Almanza presents for sleep medicine follow up for obstructive sleep apnea  Since the last visit, Giselle Almanza has been very compliant with her PAP therapy and sees benefit from its use  Awake and alert today  Patient here also today for pre operative clearance for upcoming bariatric surgery   MO BMI 47  No sleep medication use    Equipment issues: The pressure is  acceptable, the mask is acceptable     Sleep issues:  Do you feel better? Yes  More rested? Yes   Better concentration? yes    Progress History:   Since last visit any new medical issues? No  New ER or hospital visits? No  Any new or changes in medicines? No  Any new sleep medicines? No    Review of Systems -   Review of Systems   Constitutional: Negative. Negative for chills and fever. HENT: Negative. Negative for congestion. Eyes: Negative. Respiratory: Negative. Negative for cough, shortness of breath and wheezing. Cardiovascular: Negative. Negative for chest pain and leg swelling. Gastrointestinal: Negative. Endocrine: Negative. Genitourinary: Negative. Musculoskeletal: Negative. Allergic/Immunologic: Negative. Neurological: Negative. Hematological: Negative. Psychiatric/Behavioral: Negative. Negative for sleep disturbance. Physical Exam:    BMI:  Body mass index is 47.18 kg/m². Wt Readings from Last 3 Encounters:   02/06/23 241 lb 9.6 oz (109.6 kg)   01/10/23 236 lb (107 kg)   12/09/22 243 lb 9.6 oz (110.5 kg)     Weight stable / unchanged  Vitals: /78 (Site: Right Lower Arm, Position: Sitting, Cuff Size: Medium Adult)   Pulse 71   Ht 5' (1.524 m)   Wt 241 lb 9.6 oz (109.6 kg)   LMP 10/27/2015 (Exact Date)   SpO2 95%   BMI 47.18 kg/m²       Physical Exam  Vitals and nursing note reviewed. Constitutional:       Appearance: She is morbidly obese. HENT:      Head: Normocephalic and atraumatic. Eyes:      Conjunctiva/sclera: Conjunctivae normal.      Pupils: Pupils are equal, round, and reactive to light. Neck:      Vascular: No JVD. Cardiovascular:      Rate and Rhythm: Normal rate and regular rhythm. Heart sounds: Normal heart sounds. No murmur heard. No friction rub. No gallop. Pulmonary:      Effort: Pulmonary effort is normal. No respiratory distress. Breath sounds: Normal breath sounds. No wheezing or rales. Abdominal:      General: Bowel sounds are normal.      Palpations: Abdomen is soft. Musculoskeletal:         General: Normal range of motion. Cervical back: Normal range of motion and neck supple. Skin:     General: Skin is warm and dry. Capillary Refill: Capillary refill takes less than 2 seconds. Neurological:      Mental Status: She is alert and oriented to person, place, and time. Psychiatric:         Behavior: Behavior normal.         Thought Content: Thought content normal.         Judgment: Judgment normal.     ASSESSMENT/DIAGNOSIS     Diagnosis Orders   1. JESSE on CPAP        2. Morbid obesity ([address]        3. Preoperative clearance             Plan   Do you need any equipment today?  No    - Download reviewed and discussed with Vincent Richardson and myself today in the office   - Patient is cleared from and sleep medicine standpoint for upcoming bariatric  surgery patient has shown great compliance with current PAP therapy   - She  was advised to continue current positive airway pressure therapy with above described pressure. - She  advised to keep good compliance with current recommended pressure to get optimal results and clinical improvement  - Recommend 7-9 hours of sleep with PAP  - She was advised to call [employer] regarding supplies if needed.   -She call my office for earlier appointment if needed for worsening of sleep symptoms.   - She was instructed on weight loss  - Clotilde Nelson was educated about my impression and plan. Patient verbalizesunderstanding.   We will see Belinda Grier back in: 4 months with download    Information added by my medical assistant/LPN was reviewed today     Electronically signed by CALIXTO Bhagat CNP on 2/6/2023 at 1:39 PM

## 2023-02-07 NOTE — PROGRESS NOTES
Assessment: Patient is a 47 y.o. female seen for month five  follow up MNT visit for  pre op bariatric surgery desires sleeve    Vitals from current and previous visits:    Vitals 8/5/3593   SYSTOLIC 016   DIASTOLIC 80   Site Right Upper Arm   Position Sitting   Cuff Size Large Adult   Pulse 76   Temp 97.9   Resp    SpO2    Weight 236 lb 6.4 oz   Height 5' 0.25\"   Body mass index 45.78 kg/m2   Waist (Inches)    Neck circumference (Inches)        Initial weight at start of Weight Management Program was: 238 lbs     Eusebia weight is stable from last month  -Weight goal: lose weight.     -Nutritionally relevant labs: initial labs Oct 2022- Vit D-33, Iron-80, Ferritn-80, glucose-113  Lab Results   Component Value Date/Time    LABA1C 5.8 10/12/2022 01:11 PM    LABA1C 5.9 05/23/2022 12:27 PM    LABA1C 5.8 01/06/2021 12:04 PM    GLUCOSE 113 (H) 10/12/2022 01:11 PM    GLUCOSE 113 (H) 07/15/2022 10:28 AM    CHOL 211 (H) 10/12/2022 01:11 PM    HDL 55 10/12/2022 01:11 PM    LDLCALC 130 10/12/2022 01:11 PM    TRIG 130 10/12/2022 01:11 PM                  Lab Results   Component Value Date/Time    VITD25 33 10/12/2022 01:11 PM     CPAP clearance obtained  Cardiology clearance obtained  EGD completed     Dr Gonzalez Bras clearance obtained    - Is patient taking daily Multivitamin:  Taking Vitamin D3 daily    Pt lives with ex /daughter/daughter in law  Had food journal in office for review.    -Food Recall:  Breakfast: before 9a- eggs, meat (turkey bocon/sausage) protein bars, fruit   Lunch: 11a-2pm - wraps (light fraga, cheese, turkey, ham), salad (cheese, cucumbers, guevara bits, ham cubes, g huse dressing)  Dinner: 5-6p- meat, veggies  Snack: decreased snacking evenings- (pt reports eating less of these) pork rinds, fruit, protein bar  Drinks throughout the day: bottled water ~ 8+ bottles a day with sugar free flavor packets, no pop, Hot Tea one cup with milk with sugar substitute ~ daily     -Impression of Dietary Intake:improvement with fruit and vegetables this month. Exercise:  -Physical Activity is: - Completed outpatient PT for neck and back twice a week and one day will be pool . Waiting on MRI now  Plans to transition to [place] now 2x a week and at friends home 2x a week-     Nutrition Diagnosis: Overweight/Obesity related to Food and nutrition-related knowledge deficit as evidenced by BMI of 45.7    Intervention:   Healthy behaviors: adequate water intake   Patient has  attended support group via [employer] and attended Dec Support Group, attended support group 2/7/23 . Goals reviewed with patient and questions answered  Positive reinforcement given, is engaged in program    Patient Instructions   Goals:  Nutrition Goal:   Continue to aim for regular meal times and not skip meals! Good job with this  Water Goal:  Great job with water intake - continue at least 64 oz or greater each day-- Good job! Exercise Goal: begin using [place] twice a week and friends gym two days a week        -Followup visit: 4 weeks with dietitian. Dr. Jimy Oneal, RD, LD,   Dietitian- [facility]  [facility]  Note was created by [facility], dietetic intern.   Note reviewed by Golden Rubin RD, LD,

## 2023-02-08 ENCOUNTER — OFFICE VISIT (OUTPATIENT)
Dept: BARIATRICS/WEIGHT MGMT | Age: 53
End: 2023-02-08

## 2023-02-08 ENCOUNTER — OFFICE VISIT (OUTPATIENT)
Dept: BARIATRICS/WEIGHT MGMT | Age: 53
End: 2023-02-08
Payer: MEDICAID

## 2023-02-08 VITALS
TEMPERATURE: 97.9 F | HEART RATE: 76 BPM | DIASTOLIC BLOOD PRESSURE: 80 MMHG | BODY MASS INDEX: 46.41 KG/M2 | WEIGHT: 236.4 LBS | SYSTOLIC BLOOD PRESSURE: 118 MMHG | HEIGHT: 60 IN

## 2023-02-08 DIAGNOSIS — G89.29 CHRONIC BILATERAL LOW BACK PAIN, UNSPECIFIED WHETHER SCIATICA PRESENT: ICD-10-CM

## 2023-02-08 DIAGNOSIS — K21.9 GASTROESOPHAGEAL REFLUX DISEASE, UNSPECIFIED WHETHER ESOPHAGITIS PRESENT: ICD-10-CM

## 2023-02-08 DIAGNOSIS — F32.A DEPRESSION, UNSPECIFIED DEPRESSION TYPE: ICD-10-CM

## 2023-02-08 DIAGNOSIS — M54.50 CHRONIC BILATERAL LOW BACK PAIN, UNSPECIFIED WHETHER SCIATICA PRESENT: ICD-10-CM

## 2023-02-08 DIAGNOSIS — I10 HYPERTENSION, UNSPECIFIED TYPE: ICD-10-CM

## 2023-02-08 DIAGNOSIS — G47.33 OSA ON CPAP: ICD-10-CM

## 2023-02-08 DIAGNOSIS — E66.01 MORBID OBESITY WITH BMI OF 45.0-49.9, ADULT (HCC): Primary | ICD-10-CM

## 2023-02-08 DIAGNOSIS — K76.0 FATTY LIVER: ICD-10-CM

## 2023-02-08 DIAGNOSIS — Z99.89 OSA ON CPAP: ICD-10-CM

## 2023-02-08 DIAGNOSIS — K58.9 IRRITABLE BOWEL SYNDROME, UNSPECIFIED TYPE: ICD-10-CM

## 2023-02-08 DIAGNOSIS — F41.9 ANXIETY: ICD-10-CM

## 2023-02-08 PROCEDURE — 99214 OFFICE O/P EST MOD 30 MIN: CPT | Performed by: PHYSICIAN ASSISTANT

## 2023-02-08 PROCEDURE — 3079F DIAST BP 80-89 MM HG: CPT | Performed by: PHYSICIAN ASSISTANT

## 2023-02-08 PROCEDURE — 3074F SYST BP LT 130 MM HG: CPT | Performed by: PHYSICIAN ASSISTANT

## 2023-02-08 NOTE — PATIENT INSTRUCTIONS
Goals:  Nutrition Goal:   Continue to aim for regular meal times and not skip meals! Good job with this  Water Goal:  Great job with water intake - continue at least 64 oz or greater each day-- Good job!   Exercise Goal: begin using [place] twice a week and friends gym two days a week

## 2023-02-08 NOTE — PATIENT INSTRUCTIONS
Behavior modification discussed in detail in regards to dietary habits. Nutritional education occurred during visit. Continue following recommendations  per dietitian. Improvement in fitness/exercise discussed with patient and the need for this  with/without surgery. EKG abnormal- Cardiac Clearance received from Dr. Bobby Ashley   Sleep apnea clearance received. Compliant with CPAP. Psychology evaluation with Dr. Danay Dangelo completed and reviewed. Continue following with Dr. Lowell Young as scheduled. Completed PT- awaiting MRI   EGD completed with Dr. Brown Pollard- results reviewed. H. Pylori (-)  Encouraged to attend support groups. Completed x 2. Goal to lose 3-5% TBW prior to surgery. Down 2#. Seca scale completed and reviewed. Initial labs completed and reviewed  Drug screen completed and reviewed  Nicotine (-). Discussed risks of nicotine a/w bariatric surgery. Must be nicotine free at least 90 days prior to surgery. Avoid nicotine life long post-op. Denies current pregnancy. Advised not to get pregnant for at least 1 year post bariatric surgery as this can increase risk of malnourishment potentially leading to low birth weight or malformation. Patient agrees to avoid pregnancy for at least 1 year post-op. Discussed importance of appropriate contraception. Will need to be off work for 3-4 weeks post-bariatric surgery. No lifting/pushing/pulling over 20# for 4 weeks post-op  No NSAIDS ( ibuprofen) 10 days prior to bariatric surgery. Avoid NSAID use post-op  Discussed Lovenox post-op bariatric surgery  LOMN received. Will continue following with multi-disciplinary team in preparation for bariatric surgery with the expectation of lifelong follow-up post-operatively. Mammogram completed and reviewed.

## 2023-02-22 DIAGNOSIS — Z01.818 PREOP EXAMINATION: Primary | ICD-10-CM

## 2023-03-01 NOTE — PROGRESS NOTES
Assessment: Patient is a 47 y.o. female seen for month six  follow up MNT visit for  pre op bariatric surgery desires sleeve    Vitals from current and previous visits:    Vitals 9/7/2800   SYSTOLIC    DIASTOLIC    Site    Position    Cuff Size    Pulse    Temp    Resp    SpO2    Weight 233 lb 9.6 oz   Height 5' 0.25\"   Body mass index 45.24 kg/m2   Waist (Inches)    Neck circumference (Inches)        Initial weight at start of Weight Management Program was: 238 lbs     Eusebia lost 3 lbs from last month  -Weight goal: lose weight.     -Nutritionally relevant labs: initial labs Oct 2022- Vit D-33, Iron-80, Ferritn-80, glucose-113  Lab Results   Component Value Date/Time    LABA1C 5.8 10/12/2022 01:11 PM    LABA1C 5.9 05/23/2022 12:27 PM    LABA1C 5.8 01/06/2021 12:04 PM    GLUCOSE 113 (H) 10/12/2022 01:11 PM    GLUCOSE 113 (H) 07/15/2022 10:28 AM    CHOL 211 (H) 10/12/2022 01:11 PM    HDL 55 10/12/2022 01:11 PM    LDLCALC 130 10/12/2022 01:11 PM    TRIG 130 10/12/2022 01:11 PM                  Lab Results   Component Value Date/Time    VITD25 33 10/12/2022 01:11 PM     CPAP clearance obtained  Cardiology clearance obtained  EGD completed     Dr Ronald Pak clearance obtained    - Is patient taking daily Multivitamin:  Taking Vitamin D3 - hasn't taken it last couple of days    Pt lives with ex /daughter/daughter in law     -Food Recall:  Has been out of town taking care of grandkids and brother.   States able to eat three times a day and mindful of food choices as much as possible    Drinks throughout the day: bottled water ~ 8+ bottles a day with sugar free flavor packets, no pop, Hot Tea one cup with milk with sugar substitute ~ daily     -Impression of Dietary Intake: mindful with food choices  Exercise:  -Physical Activity is: - Had been using treadmill at home when at home  Up and down stairs at daughters house      Nutrition Diagnosis: Overweight/Obesity related to Food and nutrition-related knowledge deficit as evidenced by BMI of 45.2    Intervention:   Healthy behaviors: adequate water intake   Patient has  attended support group via [employer] and attended Dec Support Group, attended support group 2/7/23 . Pt has completed six months of medically supervised weight loss. Pt ready from nutrition stand point to proceed with bariatric surgery  Reviewed out of pocket cost for bariatric vitamins and protein powder. Patient Instructions   Goals:  1. Remember you will need to separate you liquids from solids after surgery. No liquids 30 minutes before your meals and no liquids 30 minutes after your meals. 2. You will need to take your time with meals after surgery and begin practicing this now - chew foods really well and take 20-30 minutes at each meal.  3.  Aim for consistent and set meal times- choose lean protein foods first, followed by vegetables and fruit, then starch food last if still hungry. Consistency is key following bariatric surgery.   4.  Continue regular physical activity- recommend stay as active as you can leading up to surgery and after surgery this will remain important for your weight loss efforts     -Followup visit: pre op teaching class prior to bariatric surgery    Thai Maher RD, LD,   Dietitian- [facility] [address] [place] [address]

## 2023-03-03 ENCOUNTER — OFFICE VISIT (OUTPATIENT)
Dept: BARIATRICS/WEIGHT MGMT | Age: 53
End: 2023-03-03

## 2023-03-03 VITALS — HEIGHT: 60 IN | BODY MASS INDEX: 45.86 KG/M2 | WEIGHT: 233.6 LBS

## 2023-03-03 VITALS
WEIGHT: 233.6 LBS | TEMPERATURE: 97.7 F | HEART RATE: 72 BPM | BODY MASS INDEX: 45.86 KG/M2 | SYSTOLIC BLOOD PRESSURE: 116 MMHG | HEIGHT: 60 IN | DIASTOLIC BLOOD PRESSURE: 82 MMHG

## 2023-03-03 DIAGNOSIS — K21.9 GASTROESOPHAGEAL REFLUX DISEASE, UNSPECIFIED WHETHER ESOPHAGITIS PRESENT: ICD-10-CM

## 2023-03-03 DIAGNOSIS — Z99.89 OSA ON CPAP: ICD-10-CM

## 2023-03-03 DIAGNOSIS — G89.29 CHRONIC BILATERAL LOW BACK PAIN, UNSPECIFIED WHETHER SCIATICA PRESENT: ICD-10-CM

## 2023-03-03 DIAGNOSIS — E55.9 VITAMIN D DEFICIENCY: ICD-10-CM

## 2023-03-03 DIAGNOSIS — F41.9 ANXIETY: ICD-10-CM

## 2023-03-03 DIAGNOSIS — K58.9 IRRITABLE BOWEL SYNDROME, UNSPECIFIED TYPE: ICD-10-CM

## 2023-03-03 DIAGNOSIS — M54.50 CHRONIC BILATERAL LOW BACK PAIN, UNSPECIFIED WHETHER SCIATICA PRESENT: ICD-10-CM

## 2023-03-03 DIAGNOSIS — K76.0 FATTY LIVER: ICD-10-CM

## 2023-03-03 DIAGNOSIS — I10 HYPERTENSION, UNSPECIFIED TYPE: ICD-10-CM

## 2023-03-03 DIAGNOSIS — E66.01 MORBID OBESITY WITH BMI OF 45.0-49.9, ADULT (HCC): Primary | ICD-10-CM

## 2023-03-03 DIAGNOSIS — G47.33 OSA ON CPAP: ICD-10-CM

## 2023-03-03 DIAGNOSIS — F32.A DEPRESSION, UNSPECIFIED DEPRESSION TYPE: ICD-10-CM

## 2023-03-05 ASSESSMENT — ENCOUNTER SYMPTOMS
TROUBLE SWALLOWING: 0
PHOTOPHOBIA: 0
FACIAL SWELLING: 0
CHOKING: 0
BACK PAIN: 1
APNEA: 0
ABDOMINAL DISTENTION: 0
VOMITING: 0
RHINORRHEA: 0
ALLERGIC/IMMUNOLOGIC NEGATIVE: 1
NAUSEA: 0
ABDOMINAL PAIN: 0
EYE REDNESS: 1
COLOR CHANGE: 0
EYE DISCHARGE: 0
CONSTIPATION: 0
EYE ITCHING: 0
VOICE CHANGE: 0
SHORTNESS OF BREATH: 0
DIARRHEA: 1
COUGH: 0
EYE PAIN: 0
WHEEZING: 0
STRIDOR: 0
SINUS PRESSURE: 0
ANAL BLEEDING: 0
RECTAL PAIN: 0
CHEST TIGHTNESS: 0
SORE THROAT: 0
BLOOD IN STOOL: 0

## 2023-03-05 NOTE — PROGRESS NOTES
Shira Cartwright (:  1970)     ASSESSMENT:  1.  Morbid obesity (BMI 45)  2. Sleep apnea  3. Anxiety  4. GERD  5. Fatty liver disease  6. Bell's palsy  7. Chronic lower back pain  8. Depression  9. Hypertension  10. Irritable bowel syndrome  11. Coronary artery disease    PLAN:  1. Discussion again about the pros and cons of weight loss surgery. The risks benefits and alternatives to laparoscopic adjustable band, gastric sleeve and gastric Boyd-en-Y bypass were discussed in detail. The pros and cons of robotic assisted, laparoscopic and open techniques were discussed. 2.  Behavior modification again discussed in detail in regards to dietary habits. 3.  Nutritional education occurred during visit. Follow-up with dietitian. Continue to follow recommendations as directed. 4.  Options for medical management of morbid obesity again discussed. 5.  Improvement in fitness/exercise discussed with patient and the need for this with/without surgery. 6.  Medical necessity letter from PCP. 7.  Follow-up in one month at weight management program at [facility]. 8.  Signs and symptoms reviewed with patient that would be concerning and need her to return to office for re-evaluation. Patient states she will call if she has questions or concerns. 9. Multivitamin  10. Psychology evaluation completed. Follow-up as needed. 11.  EGD has been completed. H. pylori negative with no significant hiatal hernia. Following up with GI as needed. 12.  Encouraged support groups  13. Send LOMN     Patient states that she has not been able to lose enough adequate excess body weight with medical management only would like to proceed with a robotic sleeve gastrectomy for further weight loss. More than 40 minutes spent with patient today. Greater than 50% of the time was involved counseling, educaton and coordinating care.     SUBJECTIVE/OBJECTIVE:    Chief Complaint   Patient presents with Weight Loss     Month 6 of 6      HPI  Sandy Valdez is a 60-year-old female who presents for follow-up at the weight management program secondary to her morbid obesity. BMI 45. Current weight 233 pounds. She has not been able to lose enough adequate excess body weight with medical management only and is wishing to proceed with a sleeve gastrectomy. Completed upper endoscopy. H. pylori negative. No significant hiatal hernia. Hemoglobin A1c 5.8. Completed psychology evaluation. Cardiac and pulmonary clearance completed. Wearing CPAP machine. Working with the dietitian. Continuing to work on portion control and food selection. Taking multivitamin. Replacing vitamin D3. Trying to become more active but difficult given her chronic back pain. Has been using the treadmill at home when possible. Denies current chest or abdominal pain. No hematochezia or melena. No new urinary complaints. She admits she is ready to proceed with surgery so as to continue to work towards her weight loss goals. Review of Systems   Constitutional:  Positive for activity change and appetite change. Negative for chills, diaphoresis, fatigue, fever and unexpected weight change. HENT:  Negative for congestion, dental problem, drooling, ear discharge, ear pain, facial swelling, hearing loss, mouth sores, nosebleeds, postnasal drip, rhinorrhea, sinus pressure, sneezing, sore throat, tinnitus, trouble swallowing and voice change. Eyes:  Positive for redness. Negative for photophobia, pain, discharge, itching and visual disturbance. Respiratory:  Negative for apnea, cough, choking, chest tightness, shortness of breath, wheezing and stridor. Cardiovascular:  Negative for chest pain, palpitations and leg swelling. Gastrointestinal:  Positive for diarrhea. Negative for abdominal distention, abdominal pain, anal bleeding, blood in stool, constipation, nausea, rectal pain and vomiting. Endocrine: Negative.     Genitourinary: Positive for pelvic pain. Negative for decreased urine volume, difficulty urinating, dyspareunia, dysuria, enuresis, flank pain, frequency, genital sores, hematuria, menstrual problem, urgency, vaginal bleeding, vaginal discharge and vaginal pain. Musculoskeletal:  Positive for back pain and neck pain. Negative for arthralgias, gait problem, joint swelling, myalgias and neck stiffness. Skin:  Negative for color change, pallor, rash and wound. Allergic/Immunologic: Negative. Neurological:  Positive for numbness. Negative for dizziness, tremors, seizures, syncope, facial asymmetry, speech difficulty, weakness, light-headedness and headaches. Hematological:  Negative for adenopathy. Does not bruise/bleed easily. Psychiatric/Behavioral:  Negative for agitation, behavioral problems, confusion, decreased concentration, dysphoric mood, hallucinations, self-injury, sleep disturbance and suicidal ideas. The patient is not nervous/anxious and is not hyperactive.       Past Medical History:   Diagnosis Date    Anxiety     Arthritis     Bell's palsy     Body mass index 40.0-44.9, adult ([facility]) 05/06/2021    Esophagitis     Fatty liver     GERD (gastroesophageal reflux disease)     Hypertension     IBS (irritable bowel syndrome)     Morbid obesity with BMI of 40.0-44.9, adult ([facility])     MRSA (methicillin resistant staph aureus) culture positive     JESSE (obstructive sleep apnea) 05/06/2021    UTI (urinary tract infection)     V tach     Vertigo        Past Surgical History:   Procedure Laterality Date    BACK SURGERY      BLADDER SUSPENSION      BREAST REDUCTION SURGERY  10/2007    CARPAL TUNNEL RELEASE      CERVICAL FUSION  2017    ACDF C5-C7    CHOLECYSTECTOMY      COLONOSCOPY      DILATION AND CURETTAGE OF UTERUS      ENDOSCOPY, COLON, DIAGNOSTIC      HYSTERECTOMY (CERVIX STATUS UNKNOWN)      LUMBAR FUSION N/A 4/3/2020    L4-S1 DECOMPRESSION AND POSTERIOR FUSION; LUMBAR I & D WITH HARDWARE REMOVAL performed by [employer] Mason Montanez MD at [facility]  10/17/2019    L5-S1    UPPER GASTROINTESTINAL ENDOSCOPY N/A 7/19/2019    EGD BIOPSY performed by Idania Fulton MD at [place] [address]  7/19/2019    EGD DILATION SAVORY performed by Idania Fulton MD at [facility]       Current Outpatient Medications   Medication Sig Dispense Refill    oxyCODONE-acetaminophen (PERCOCET) 5-325 MG per tablet Take 1 tablet by mouth every 4 hours as needed for Pain. dicyclomine (BENTYL) 10 MG capsule       hydroCHLOROthiazide (MICROZIDE) 12.5 MG capsule Take 12.5 mg by mouth daily as needed      cyclobenzaprine (FLEXERIL) 10 MG tablet Take 10 mg by mouth 3 times daily as needed for Muscle spasms      metoprolol succinate (TOPROL XL) 50 MG extended release tablet Take 1 tablet by mouth daily 30 tablet 3    pantoprazole (PROTONIX) 40 MG tablet Take 1 tablet by mouth every morning (before breakfast) 30 tablet 0    Cholecalciferol (VITAMIN D3) 25 MCG (1000 UT) TABS TAKE ONE TABLET BY MOUTH EVERY DAY (Patient not taking: Reported on 3/3/2023)  1     No current facility-administered medications for this visit.        Allergies   Allergen Reactions    Codeine Rash    Dilaudid [Hydromorphone Hcl] Rash       Family History   Problem Relation Age of Onset    Stroke Mother     Ulcerative Colitis Mother     Cancer Mother     Arthritis Mother     Stroke Father     Cancer Father         skin    High Blood Pressure Father     Diabetes Father     Heart Disease Father     Arthritis Father     Arthritis Sister     Obesity Sister     Heart Disease Brother     Cancer Brother     Arthritis Brother     Arthritis Maternal Grandmother     Obesity Maternal Grandmother     Stroke Maternal Grandmother     Heart Disease Maternal Grandmother     Arthritis Maternal Grandfather     Arthritis Paternal Grandmother     Stroke Paternal Grandfather     Cancer Paternal Grandfather     Diabetes Paternal Grandfather     Heart Disease Paternal Grandfather     Obesity Paternal Grandfather     Arthritis Paternal Grandfather        Social History     Socioeconomic History    Marital status:      Spouse name: Not on file    Number of children: 3    Years of education: 12    Highest education level: Not on file   Occupational History    Occupation: Home Health Aide   Tobacco Use    Smoking status: Former     Packs/day: 2.00     Years: 20.00     Pack years: 40.00     Types: Cigarettes     Quit date: 2017     Years since quittin.0    Smokeless tobacco: Never   Vaping Use    Vaping Use: Never used   Substance and Sexual Activity    Alcohol use: Yes     Comment: occ    Drug use: No    Sexual activity: Not Currently   Other Topics Concern    Not on file   Social History Narrative    Not on file     Social Determinants of Health     Financial Resource Strain: Not on file   Food Insecurity: Not on file   Transportation Needs: Not on file   Physical Activity: Not on file   Stress: Not on file   Social Connections: Not on file   Intimate Partner Violence: Not on file   Housing Stability: Not on file     Vitals:    23 0851   BP: 116/82   Site: Right Upper Arm   Position: Sitting   Cuff Size: Large Adult   Pulse: 72   Temp: 97.7 °F (36.5 °C)   TempSrc: Infrared   Weight: 233 lb 9.6 oz (106 kg)   Height: 5' 0.25\" (1.53 m)     Body mass index is 45.24 kg/m².    Wt Readings from Last 3 Encounters:   23 233 lb 9.6 oz (106 kg)   23 233 lb 9.6 oz (106 kg)   23 236 lb 6.4 oz (107.2 kg)     Physical Exam  Vitals reviewed.   Constitutional:       General: She is not in acute distress.     Appearance: She is well-developed. She is not diaphoretic.   HENT:      Head: Normocephalic and atraumatic.      Right Ear: External ear normal.      Left Ear: External ear normal.      Nose: Nose normal.   Eyes:      General: No scleral icterus.        Right eye: No discharge.         Left eye: No discharge.      Conjunctiva/sclera:  Conjunctivae normal.   Cardiovascular:      Rate and Rhythm: Normal rate and regular rhythm. Heart sounds: Normal heart sounds. Pulmonary:      Effort: Pulmonary effort is normal. No respiratory distress. Breath sounds: Normal breath sounds. No wheezing or rales. Chest:      Chest wall: No tenderness. Abdominal:      General: Bowel sounds are normal. There is no distension. Palpations: Abdomen is soft. There is no mass. Tenderness: There is no abdominal tenderness. There is no guarding or rebound. Musculoskeletal:         General: No tenderness. Normal range of motion. Cervical back: Normal range of motion and neck supple. Skin:     General: Skin is warm and dry. Coloration: Skin is not pale. Findings: No erythema or rash. Neurological:      Mental Status: She is alert and oriented to person, place, and time. Cranial Nerves: No cranial nerve deficit. Psychiatric:         Behavior: Behavior normal.         Thought Content:  Thought content normal.         Judgment: Judgment normal.   CBC   Lab Results   Component Value Date/Time    WBC 5.7 10/12/2022 01:11 PM    RBC 5.27 10/12/2022 01:11 PM    HGB 14.4 10/12/2022 01:11 PM    HCT 44.5 10/12/2022 01:11 PM    MCV 84.4 10/12/2022 01:11 PM    MCH 27.3 10/12/2022 01:11 PM    MCHC 32.4 10/12/2022 01:11 PM    RDW 13.8 07/07/2017 09:21 AM     10/12/2022 01:11 PM    MPV 9.8 10/12/2022 01:11 PM    RBCMORP NORMAL 07/07/2017 09:21 AM    SEGSPCT 63.1 10/12/2022 01:11 PM    LABLYMP 27.8 10/12/2022 01:11 PM    MONOPCT 5.1 10/12/2022 01:11 PM    LABEOS 1.9 10/12/2022 01:11 PM    BASO 0.7 10/12/2022 01:11 PM    NRBC 0 10/12/2022 01:11 PM    ANISOCYTOSIS 1+ 06/29/2015 03:15 AM    SEGSABS 3.6 10/12/2022 01:11 PM    LYMPHSABS 1.6 10/12/2022 01:11 PM    MONOSABS 0.3 10/12/2022 01:11 PM    EOSABS 0.1 10/12/2022 01:11 PM    BASOSABS 0.0 10/12/2022 01:11 PM      BMP/CMP   Lab Results   Component Value Date/Time    GLUCOSE 113 10/12/2022 01:11 PM    CREATININE 0.7 10/12/2022 01:11 PM    BUN 18 10/12/2022 01:11 PM     10/12/2022 01:11 PM    K 4.5 10/12/2022 01:11 PM    K 4.2 04/03/2020 04:57 AM     10/12/2022 01:11 PM    CO2 30 10/12/2022 01:11 PM    CALCIUM 9.8 10/12/2022 01:11 PM    AST 26 10/12/2022 01:11 PM    ALKPHOS 97 10/12/2022 01:11 PM    PROT 7.4 10/12/2022 01:11 PM    LABALBU 4.7 10/12/2022 01:11 PM    BILITOT 0.9 10/12/2022 01:11 PM    ALT 45 10/12/2022 01:11 PM      PREALBUMIN   Lab Results   Component Value Date/Time    PREALBUMIN 23.9 07/07/2017 09:21 AM      VITAMIN B12   Lab Results   Component Value Date/Time    DIXSGIUA21 793 10/12/2022 01:11 PM      VITAMIN D   Lab Results   Component Value Date/Time    VITD25 33 10/12/2022 01:11 PM      PTH  Lab Results   Component Value Date/Time    IPTH 43.0 10/12/2022 01:11 PM     VITAMIN B1/ THIAMINE   Lab Results   Component Value Date/Time    AYRC5YKJIZA 108 10/12/2022 01:11 PM      LIPID SCREEN (FASTING)   Lab Results   Component Value Date/Time    CHOL 211 10/12/2022 01:11 PM    TRIG 130 10/12/2022 01:11 PM    HDL 55 10/12/2022 01:11 PM    LDLCALC 130 10/12/2022 01:11 PM   ,   HGA1C (T2DM ONLY)   Lab Results   Component Value Date/Time    LABA1C 5.8 10/12/2022 01:11 PM    AVGG 114 10/12/2022 01:11 PM      TSH   Lab Results   Component Value Date/Time    TSH 1.590 10/12/2022 01:11 PM      IRON   Lab Results   Component Value Date/Time    IRON 80 10/12/2022 01:11 PM      TIBC  Lab Results   Component Value Date/Time    TIBC 386 10/12/2022 01:11 PM     FERRITIN  Lab Results   Component Value Date/Time    FERRITIN 80 10/12/2022 01:11 PM     VITAMIN A  No results found for: RETINOL  NICOTINE  No results found for: NMET  UDS  Lab Results   Component Value Date/Time    UDP SEE BELOW 01/06/2021 11:56 AM     PSA  No results found for: LABPSA  GFR  Lab Results   Component Value Date/Time    LABGLOM 88 10/12/2022 01:11 PM     DEXA  No results found for this or any previous visit. Patient Active Problem List   Diagnosis    GERD (gastroesophageal reflux disease)    Anxiety    Clinical depression    History of lumbar fusion    Chronic left-sided low back pain with left-sided sciatica    Paresthesia of buttock    Paresthesia of left lower extremity    Neurologic gait dysfunction    Fusion of lumbosacral spine    Back pain, lumbosacral    Lumbar discitis    Spondylolisthesis, lumbosacral region    Spinal stenosis, lumbar region without neurogenic claudication    Radiculopathy, lumbar region    Obstructive sleep apnea (adult) (pediatric)    Family hx of ischem heart dis and oth dis of the circ sys    Family history of malignant neoplasm, unspecified    Family history of diabetes mellitus    Essential (primary) hypertension    Body mass index (BMI) 40.0-44.9, adult    Angina of effort [facility])   Media Information                       An electronic signature was used to authenticate this note.     --Donna Santos MD

## 2023-03-14 ENCOUNTER — TELEPHONE (OUTPATIENT)
Dept: BARIATRICS/WEIGHT MGMT | Age: 53
End: 2023-03-14

## 2023-03-14 NOTE — TELEPHONE ENCOUNTER
[facility] got an approval notification from [facility] however they do not have an actual letter. The information listed here is what we received from them, therefore we are using this as the approval for surgery:    Shaji Shen, [id number] approved. for the state of [place] there is Tamica Bai for 90 days up until May 2023, There is not letter on file with [place] but it is showing in the system a complete. Terry Esparza ref# [id number].

## 2023-03-20 ENCOUNTER — OFFICE VISIT (OUTPATIENT)
Dept: BARIATRICS/WEIGHT MGMT | Age: 53
End: 2023-03-20

## 2023-03-20 VITALS — WEIGHT: 238 LBS | BODY MASS INDEX: 46.72 KG/M2 | HEIGHT: 60 IN

## 2023-03-20 DIAGNOSIS — E66.01 MORBID OBESITY WITH BMI OF 45.0-49.9, ADULT (HCC): Primary | ICD-10-CM

## 2023-03-20 RX ORDER — ASPIRIN 81 MG
100 TABLET, DELAYED RELEASE (ENTERIC COATED) ORAL 2 TIMES DAILY
Qty: 30 TABLET | Refills: 1 | Status: SHIPPED | OUTPATIENT
Start: 2023-04-03

## 2023-03-20 RX ORDER — METOCLOPRAMIDE 10 MG/1
10 TABLET ORAL EVERY 6 HOURS PRN
Qty: 30 TABLET | Refills: 0 | Status: SHIPPED | OUTPATIENT
Start: 2023-04-03 | End: 2023-04-17

## 2023-03-20 RX ORDER — ONDANSETRON 4 MG/1
4 TABLET, FILM COATED ORAL EVERY 4 HOURS PRN
Qty: 30 TABLET | Refills: 0 | Status: SHIPPED | OUTPATIENT
Start: 2023-04-03 | End: 2023-04-17

## 2023-03-20 RX ORDER — ENOXAPARIN SODIUM 100 MG/ML
40 INJECTION SUBCUTANEOUS DAILY
Qty: 14 EACH | Refills: 0 | Status: SHIPPED | OUTPATIENT
Start: 2023-04-03

## 2023-03-20 NOTE — PROGRESS NOTES
Fawn Graham was seen today for pre-operative teaching class. She   was educated today on surgery procedure, possible complications, Lovenox self administration (teaching guide given to patient as well as Lovenox DVD viewed today), use of incentive spirometer for 2 weeks prior to surgery date and instructed to remain NPO after midnight the night before surgery, or risk cancellation of surgical procedure. Importance of recognizing signs and symptoms of wound infection were discussed as well as when to contact the physician. We discussed the flow of events on the day of surgery from admit to SDS, OR, PACU, and 7K admit. I reinforced the need to walk post operative on 7K and she voiced understanding of this. Pre-operative measurements were taken and scanned into chart as well as measurement for PO day 1 gastrograffin swallow test. SECA scale completed today. Educated on post-op pain medication and how to dispose of any unused pain medications. Advised not to get pregnant for at least 18-24 months post bariatric surgery as this can increase risk of malnourishment potentially leading to low birth weight or malformation.

## 2023-03-20 NOTE — PROGRESS NOTES
Karis Tijerina wt is stable / unchanged since joining Weight Management Program.  After nutrition evaluation and education, patient is considered to be a good surgical candidate from a MNT perspective. Patient's body composition for pre-op teaching class  was measured using the [facility] [place] Scale. Results were saved and reviewed with the patient. Patient was educated on 2- week pre-operative nutrition protocol and post test completed. Pre-operative and post-operative diet guidelines were discussed and written information was provided. Nectar protein supplements were purchased. Vitamin regimen with [employer] vitamins was explained, and patient is receiving free bariatric vitamins through [employer]. . Importance of vitamin compliance was discussed and potential of vitamin deficiencies was reviewed. Encouraged continued physical activity. Patient signed agreement stating they are committed to lifelong follow up, smoking and alcohol cessation, vitamin compliance, and 2 week pre-operative dietary protocol.

## 2023-03-22 ENCOUNTER — HOSPITAL ENCOUNTER (OUTPATIENT)
Dept: GENERAL RADIOLOGY | Age: 53
Discharge: HOME | End: 2023-03-22
Payer: MEDICAID

## 2023-03-22 ENCOUNTER — HOSPITAL ENCOUNTER (OUTPATIENT)
Age: 53
Discharge: HOME | End: 2023-03-22
Payer: MEDICAID

## 2023-03-22 DIAGNOSIS — Z01.818 PREOP EXAMINATION: ICD-10-CM

## 2023-03-22 LAB
ANION GAP SERPL CALC-SCNC: 11 MEQ/L (ref 8–16)
BASOPHILS ABSOLUTE: 0 THOU/MM3 (ref 0–0.1)
BASOPHILS NFR BLD AUTO: 0.7 %
BUN SERPL-MCNC: 28 MG/DL (ref 7–22)
CALCIUM SERPL-MCNC: 9.4 MG/DL (ref 8.5–10.5)
CHLORIDE SERPL-SCNC: 100 MEQ/L (ref 98–111)
CO2 SERPL-SCNC: 29 MEQ/L (ref 23–33)
CREAT SERPL-MCNC: 0.7 MG/DL (ref 0.4–1.2)
DEPRECATED RDW RBC AUTO: 44.6 FL (ref 35–45)
EOSINOPHIL NFR BLD AUTO: 2.3 %
EOSINOPHILS ABSOLUTE: 0.1 THOU/MM3 (ref 0–0.4)
ERYTHROCYTE [DISTWIDTH] IN BLOOD BY AUTOMATED COUNT: 14.6 % (ref 11.5–14.5)
GFR SERPL CREATININE-BSD FRML MDRD: > 60 ML/MIN/1.73M2
GLUCOSE SERPL-MCNC: 131 MG/DL (ref 70–108)
HCT VFR BLD AUTO: 45.9 % (ref 37–47)
HGB BLD-MCNC: 14.6 GM/DL (ref 12–16)
IMM GRANULOCYTES # BLD AUTO: 0.04 THOU/MM3 (ref 0–0.07)
IMM GRANULOCYTES NFR BLD AUTO: 0.7 %
LYMPHOCYTES ABSOLUTE: 2 THOU/MM3 (ref 1–4.8)
LYMPHOCYTES NFR BLD AUTO: 32.7 %
MCH RBC QN AUTO: 26.8 PG (ref 26–33)
MCHC RBC AUTO-ENTMCNC: 31.8 GM/DL (ref 32.2–35.5)
MCV RBC AUTO: 84.4 FL (ref 81–99)
MONOCYTES ABSOLUTE: 0.4 THOU/MM3 (ref 0.4–1.3)
MONOCYTES NFR BLD AUTO: 7 %
NEUTROPHILS NFR BLD AUTO: 56.6 %
NRBC BLD AUTO-RTO: 0 /100 WBC
PLATELET # BLD AUTO: 270 THOU/MM3 (ref 130–400)
PMV BLD AUTO: 10.3 FL (ref 9.4–12.4)
POTASSIUM SERPL-SCNC: 4.8 MEQ/L (ref 3.5–5.2)
RBC # BLD AUTO: 5.44 MILL/MM3 (ref 4.2–5.4)
SEGMENTED NEUTROPHILS ABSOLUTE COUNT: 3.4 THOU/MM3 (ref 1.8–7.7)
SODIUM SERPL-SCNC: 140 MEQ/L (ref 135–145)
WBC # BLD AUTO: 6 THOU/MM3 (ref 4.8–10.8)

## 2023-03-22 PROCEDURE — 71046 X-RAY EXAM CHEST 2 VIEWS: CPT

## 2023-03-22 PROCEDURE — 85025 COMPLETE CBC W/AUTO DIFF WBC: CPT

## 2023-03-22 PROCEDURE — 80048 BASIC METABOLIC PNL TOTAL CA: CPT

## 2023-03-22 PROCEDURE — 36415 COLL VENOUS BLD VENIPUNCTURE: CPT

## 2023-03-27 ENCOUNTER — HOSPITAL ENCOUNTER (OUTPATIENT)
Age: 53
Discharge: HOME | End: 2023-03-27
Payer: MEDICAID

## 2023-03-27 LAB
EKG ATRIAL RATE: 63 BPM
EKG P AXIS: 32 DEGREES
EKG P-R INTERVAL: 180 MS
EKG Q-T INTERVAL: 406 MS
EKG QRS DURATION: 90 MS
EKG QTC CALCULATION (BAZETT): 415 MS
EKG R AXIS: -16 DEGREES
EKG T AXIS: 11 DEGREES
EKG VENTRICULAR RATE: 63 BPM

## 2023-03-27 PROCEDURE — 93010 ELECTROCARDIOGRAM REPORT: CPT | Performed by: INTERNAL MEDICINE

## 2023-03-27 PROCEDURE — 93005 ELECTROCARDIOGRAM TRACING: CPT | Performed by: NURSE PRACTITIONER

## 2023-03-28 ENCOUNTER — TELEPHONE (OUTPATIENT)
Dept: CARDIOLOGY CLINIC | Age: 53
End: 2023-03-28

## 2023-03-28 NOTE — TELEPHONE ENCOUNTER
Spoke with patient and informed her of clearance. Spoke with Eladio Albrecht at [employer]'s office notifying them pt is still clear from cardiac stand point; also faxed this encounter and updated pre op clearance to PCP.

## 2023-03-28 NOTE — TELEPHONE ENCOUNTER
Pt called stating PCP ordered EKG yesterday for PAT prior to Gastric Sleeve procedure with Dr. Jose Singletary on Monday 4-3-23. Pt stated PCP told her EKG was abnormal and is requesting Dr. Ritu Davis review EKG from 3-27-23 done at [facility] to see if pt can still be clear for surgery on Monday. Dr. Ritu Davis, can pt still be cleared?

## 2023-03-30 ENCOUNTER — PREP FOR PROCEDURE (OUTPATIENT)
Dept: BARIATRICS/WEIGHT MGMT | Age: 53
End: 2023-03-30

## 2023-03-30 RX ORDER — FAMOTIDINE 10 MG/ML
20 INJECTION, SOLUTION INTRAVENOUS ONCE
Status: CANCELLED | OUTPATIENT
Start: 2023-03-30 | End: 2023-03-30

## 2023-03-30 RX ORDER — DEXAMETHASONE SODIUM PHOSPHATE 10 MG/ML
10 INJECTION, SOLUTION INTRAMUSCULAR; INTRAVENOUS ONCE
Status: CANCELLED | OUTPATIENT
Start: 2023-03-30 | End: 2023-03-30

## 2023-03-30 RX ORDER — SODIUM CHLORIDE 0.9 % (FLUSH) 0.9 %
5-40 SYRINGE (ML) INJECTION EVERY 12 HOURS SCHEDULED
Status: CANCELLED | OUTPATIENT
Start: 2023-03-30

## 2023-03-30 RX ORDER — SODIUM CHLORIDE 0.9 % (FLUSH) 0.9 %
5-40 SYRINGE (ML) INJECTION PRN
Status: CANCELLED | OUTPATIENT
Start: 2023-03-30

## 2023-03-30 RX ORDER — SCOLOPAMINE TRANSDERMAL SYSTEM 1 MG/1
1 PATCH, EXTENDED RELEASE TRANSDERMAL
Status: CANCELLED | OUTPATIENT
Start: 2023-03-30 | End: 2023-04-02

## 2023-03-30 RX ORDER — SODIUM CHLORIDE 9 MG/ML
INJECTION, SOLUTION INTRAVENOUS PRN
Status: CANCELLED | OUTPATIENT
Start: 2023-03-30

## 2023-03-30 RX ORDER — ONDANSETRON 2 MG/ML
4 INJECTION INTRAMUSCULAR; INTRAVENOUS ONCE
Status: CANCELLED | OUTPATIENT
Start: 2023-03-30 | End: 2023-03-30

## 2023-04-03 ENCOUNTER — ANESTHESIA EVENT (OUTPATIENT)
Dept: OPERATING ROOM | Age: 53
DRG: 403 | End: 2023-04-03
Payer: MEDICAID

## 2023-04-03 ENCOUNTER — ANESTHESIA (OUTPATIENT)
Dept: OPERATING ROOM | Age: 53
DRG: 403 | End: 2023-04-03
Payer: MEDICAID

## 2023-04-03 ENCOUNTER — HOSPITAL ENCOUNTER (INPATIENT)
Age: 53
LOS: 1 days | Discharge: HOME HEALTH | DRG: 403 | End: 2023-04-04
Attending: SURGERY | Admitting: SURGERY
Payer: MEDICAID

## 2023-04-03 DIAGNOSIS — Z98.84 S/P LAPAROSCOPIC SLEEVE GASTRECTOMY: Primary | ICD-10-CM

## 2023-04-03 DIAGNOSIS — E66.01 MORBID OBESITY (HCC): ICD-10-CM

## 2023-04-03 LAB — POTASSIUM SERPL-SCNC: 4.2 MEQ/L (ref 3.5–5.2)

## 2023-04-03 PROCEDURE — 3600000019 HC SURGERY ROBOT ADDTL 15MIN: Performed by: SURGERY

## 2023-04-03 PROCEDURE — 2709999900 HC NON-CHARGEABLE SUPPLY: Performed by: SURGERY

## 2023-04-03 PROCEDURE — 84132 ASSAY OF SERUM POTASSIUM: CPT

## 2023-04-03 PROCEDURE — 6360000002 HC RX W HCPCS: Performed by: SURGERY

## 2023-04-03 PROCEDURE — 6360000002 HC RX W HCPCS: Performed by: ANESTHESIOLOGY

## 2023-04-03 PROCEDURE — 2500000003 HC RX 250 WO HCPCS

## 2023-04-03 PROCEDURE — S2900 ROBOTIC SURGICAL SYSTEM: HCPCS | Performed by: SURGERY

## 2023-04-03 PROCEDURE — 88307 TISSUE EXAM BY PATHOLOGIST: CPT

## 2023-04-03 PROCEDURE — 7100000000 HC PACU RECOVERY - FIRST 15 MIN: Performed by: SURGERY

## 2023-04-03 PROCEDURE — 3700000001 HC ADD 15 MINUTES (ANESTHESIA): Performed by: SURGERY

## 2023-04-03 PROCEDURE — 1200000000 HC SEMI PRIVATE

## 2023-04-03 PROCEDURE — 3700000000 HC ANESTHESIA ATTENDED CARE: Performed by: SURGERY

## 2023-04-03 PROCEDURE — 3600000009 HC SURGERY ROBOT BASE: Performed by: SURGERY

## 2023-04-03 PROCEDURE — 36415 COLL VENOUS BLD VENIPUNCTURE: CPT

## 2023-04-03 PROCEDURE — 6360000002 HC RX W HCPCS

## 2023-04-03 PROCEDURE — 6370000000 HC RX 637 (ALT 250 FOR IP): Performed by: SURGERY

## 2023-04-03 PROCEDURE — 8E0W4CZ ROBOTIC ASSISTED PROCEDURE OF TRUNK REGION, PERCUTANEOUS ENDOSCOPIC APPROACH: ICD-10-PCS | Performed by: SURGERY

## 2023-04-03 PROCEDURE — 7100000001 HC PACU RECOVERY - ADDTL 15 MIN: Performed by: SURGERY

## 2023-04-03 PROCEDURE — 2500000003 HC RX 250 WO HCPCS: Performed by: SURGERY

## 2023-04-03 PROCEDURE — A4216 STERILE WATER/SALINE, 10 ML: HCPCS | Performed by: SURGERY

## 2023-04-03 PROCEDURE — 0DB64Z3 EXCISION OF STOMACH, PERCUTANEOUS ENDOSCOPIC APPROACH, VERTICAL: ICD-10-PCS | Performed by: SURGERY

## 2023-04-03 PROCEDURE — 3E0T3BZ INTRODUCTION OF ANESTHETIC AGENT INTO PERIPHERAL NERVES AND PLEXI, PERCUTANEOUS APPROACH: ICD-10-PCS | Performed by: SURGERY

## 2023-04-03 PROCEDURE — 2720000010 HC SURG SUPPLY STERILE: Performed by: SURGERY

## 2023-04-03 PROCEDURE — 2580000003 HC RX 258: Performed by: SURGERY

## 2023-04-03 PROCEDURE — 2580000003 HC RX 258

## 2023-04-03 RX ORDER — HYOSCYAMINE SULFATE 0.125 MG
125 TABLET,DISINTEGRATING ORAL EVERY 4 HOURS PRN
Status: DISCONTINUED | OUTPATIENT
Start: 2023-04-03 | End: 2023-04-04 | Stop reason: HOSPADM

## 2023-04-03 RX ORDER — FENTANYL CITRATE 50 UG/ML
50 INJECTION, SOLUTION INTRAMUSCULAR; INTRAVENOUS EVERY 5 MIN PRN
Status: DISCONTINUED | OUTPATIENT
Start: 2023-04-03 | End: 2023-04-03 | Stop reason: HOSPADM

## 2023-04-03 RX ORDER — ONDANSETRON 2 MG/ML
4 INJECTION INTRAMUSCULAR; INTRAVENOUS ONCE
Status: COMPLETED | OUTPATIENT
Start: 2023-04-03 | End: 2023-04-03

## 2023-04-03 RX ORDER — ROCURONIUM BROMIDE 10 MG/ML
INJECTION, SOLUTION INTRAVENOUS PRN
Status: DISCONTINUED | OUTPATIENT
Start: 2023-04-03 | End: 2023-04-03 | Stop reason: SDUPTHER

## 2023-04-03 RX ORDER — SCOLOPAMINE TRANSDERMAL SYSTEM 1 MG/1
1 PATCH, EXTENDED RELEASE TRANSDERMAL
Status: DISCONTINUED | OUTPATIENT
Start: 2023-04-03 | End: 2023-04-03

## 2023-04-03 RX ORDER — PROMETHAZINE HYDROCHLORIDE 25 MG/1
25 SUPPOSITORY RECTAL EVERY 6 HOURS PRN
Status: DISCONTINUED | OUTPATIENT
Start: 2023-04-03 | End: 2023-04-04 | Stop reason: HOSPADM

## 2023-04-03 RX ORDER — SODIUM CHLORIDE 0.9 % (FLUSH) 0.9 %
5-40 SYRINGE (ML) INJECTION PRN
Status: DISCONTINUED | OUTPATIENT
Start: 2023-04-03 | End: 2023-04-03 | Stop reason: HOSPADM

## 2023-04-03 RX ORDER — SODIUM CHLORIDE 9 MG/ML
INJECTION INTRAVENOUS PRN
Status: DISCONTINUED | OUTPATIENT
Start: 2023-04-03 | End: 2023-04-03 | Stop reason: HOSPADM

## 2023-04-03 RX ORDER — MORPHINE SULFATE 2 MG/ML
2 INJECTION, SOLUTION INTRAMUSCULAR; INTRAVENOUS
Status: DISCONTINUED | OUTPATIENT
Start: 2023-04-03 | End: 2023-04-04 | Stop reason: HOSPADM

## 2023-04-03 RX ORDER — SCOLOPAMINE TRANSDERMAL SYSTEM 1 MG/1
1 PATCH, EXTENDED RELEASE TRANSDERMAL
Status: DISCONTINUED | OUTPATIENT
Start: 2023-04-06 | End: 2023-04-04 | Stop reason: HOSPADM

## 2023-04-03 RX ORDER — SODIUM CHLORIDE 0.9 % (FLUSH) 0.9 %
5-40 SYRINGE (ML) INJECTION EVERY 12 HOURS SCHEDULED
Status: DISCONTINUED | OUTPATIENT
Start: 2023-04-03 | End: 2023-04-04 | Stop reason: HOSPADM

## 2023-04-03 RX ORDER — DROPERIDOL 2.5 MG/ML
0.62 INJECTION, SOLUTION INTRAMUSCULAR; INTRAVENOUS ONCE
Status: COMPLETED | OUTPATIENT
Start: 2023-04-03 | End: 2023-04-03

## 2023-04-03 RX ORDER — FENTANYL CITRATE 50 UG/ML
25 INJECTION, SOLUTION INTRAMUSCULAR; INTRAVENOUS EVERY 5 MIN PRN
Status: DISCONTINUED | OUTPATIENT
Start: 2023-04-03 | End: 2023-04-03 | Stop reason: HOSPADM

## 2023-04-03 RX ORDER — ONDANSETRON 2 MG/ML
INJECTION INTRAMUSCULAR; INTRAVENOUS PRN
Status: DISCONTINUED | OUTPATIENT
Start: 2023-04-03 | End: 2023-04-03

## 2023-04-03 RX ORDER — KETOROLAC TROMETHAMINE 30 MG/ML
INJECTION, SOLUTION INTRAMUSCULAR; INTRAVENOUS
Status: COMPLETED
Start: 2023-04-03 | End: 2023-04-03

## 2023-04-03 RX ORDER — MIDAZOLAM HYDROCHLORIDE 1 MG/ML
INJECTION INTRAMUSCULAR; INTRAVENOUS PRN
Status: DISCONTINUED | OUTPATIENT
Start: 2023-04-03 | End: 2023-04-03 | Stop reason: SDUPTHER

## 2023-04-03 RX ORDER — SODIUM CHLORIDE 9 MG/ML
INJECTION, SOLUTION INTRAVENOUS PRN
Status: DISCONTINUED | OUTPATIENT
Start: 2023-04-03 | End: 2023-04-03 | Stop reason: HOSPADM

## 2023-04-03 RX ORDER — BUPIVACAINE HYDROCHLORIDE AND EPINEPHRINE 5; 5 MG/ML; UG/ML
INJECTION, SOLUTION EPIDURAL; INTRACAUDAL; PERINEURAL PRN
Status: DISCONTINUED | OUTPATIENT
Start: 2023-04-03 | End: 2023-04-03 | Stop reason: HOSPADM

## 2023-04-03 RX ORDER — SUCCINYLCHOLINE/SOD CL,ISO/PF 200MG/10ML
SYRINGE (ML) INTRAVENOUS PRN
Status: DISCONTINUED | OUTPATIENT
Start: 2023-04-03 | End: 2023-04-03 | Stop reason: SDUPTHER

## 2023-04-03 RX ORDER — DEXAMETHASONE SODIUM PHOSPHATE 10 MG/ML
INJECTION, EMULSION INTRAMUSCULAR; INTRAVENOUS PRN
Status: DISCONTINUED | OUTPATIENT
Start: 2023-04-03 | End: 2023-04-03 | Stop reason: SDUPTHER

## 2023-04-03 RX ORDER — ENOXAPARIN SODIUM 100 MG/ML
30 INJECTION SUBCUTANEOUS EVERY 12 HOURS SCHEDULED
Status: DISCONTINUED | OUTPATIENT
Start: 2023-04-04 | End: 2023-04-04 | Stop reason: HOSPADM

## 2023-04-03 RX ORDER — GLYCOPYRROLATE 0.2 MG/ML
INJECTION INTRAMUSCULAR; INTRAVENOUS PRN
Status: DISCONTINUED | OUTPATIENT
Start: 2023-04-03 | End: 2023-04-03 | Stop reason: SDUPTHER

## 2023-04-03 RX ORDER — MORPHINE SULFATE 4 MG/ML
4 INJECTION, SOLUTION INTRAMUSCULAR; INTRAVENOUS
Status: DISCONTINUED | OUTPATIENT
Start: 2023-04-03 | End: 2023-04-04 | Stop reason: HOSPADM

## 2023-04-03 RX ORDER — DEXAMETHASONE SODIUM PHOSPHATE 10 MG/ML
10 INJECTION, EMULSION INTRAMUSCULAR; INTRAVENOUS ONCE
Status: DISCONTINUED | OUTPATIENT
Start: 2023-04-03 | End: 2023-04-03 | Stop reason: HOSPADM

## 2023-04-03 RX ORDER — SODIUM CHLORIDE 0.9 % (FLUSH) 0.9 %
5-40 SYRINGE (ML) INJECTION EVERY 12 HOURS SCHEDULED
Status: DISCONTINUED | OUTPATIENT
Start: 2023-04-03 | End: 2023-04-03 | Stop reason: HOSPADM

## 2023-04-03 RX ORDER — SODIUM CHLORIDE 9 MG/ML
INJECTION, SOLUTION INTRAVENOUS CONTINUOUS PRN
Status: DISCONTINUED | OUTPATIENT
Start: 2023-04-03 | End: 2023-04-03 | Stop reason: SDUPTHER

## 2023-04-03 RX ORDER — KETOROLAC TROMETHAMINE 30 MG/ML
15 INJECTION, SOLUTION INTRAMUSCULAR; INTRAVENOUS EVERY 6 HOURS
Status: DISCONTINUED | OUTPATIENT
Start: 2023-04-03 | End: 2023-04-04 | Stop reason: HOSPADM

## 2023-04-03 RX ORDER — FENTANYL CITRATE 50 UG/ML
INJECTION, SOLUTION INTRAMUSCULAR; INTRAVENOUS PRN
Status: DISCONTINUED | OUTPATIENT
Start: 2023-04-03 | End: 2023-04-03 | Stop reason: SDUPTHER

## 2023-04-03 RX ORDER — SODIUM CHLORIDE 0.9 % (FLUSH) 0.9 %
5-40 SYRINGE (ML) INJECTION PRN
Status: DISCONTINUED | OUTPATIENT
Start: 2023-04-03 | End: 2023-04-04 | Stop reason: HOSPADM

## 2023-04-03 RX ORDER — METOCLOPRAMIDE HYDROCHLORIDE 5 MG/ML
10 INJECTION INTRAMUSCULAR; INTRAVENOUS EVERY 6 HOURS PRN
Status: DISCONTINUED | OUTPATIENT
Start: 2023-04-03 | End: 2023-04-04 | Stop reason: HOSPADM

## 2023-04-03 RX ORDER — LIDOCAINE HYDROCHLORIDE 20 MG/ML
INJECTION, SOLUTION INTRAVENOUS PRN
Status: DISCONTINUED | OUTPATIENT
Start: 2023-04-03 | End: 2023-04-03 | Stop reason: SDUPTHER

## 2023-04-03 RX ORDER — KETAMINE HCL IN NACL, ISO-OSM 100MG/10ML
SYRINGE (ML) INJECTION PRN
Status: DISCONTINUED | OUTPATIENT
Start: 2023-04-03 | End: 2023-04-03 | Stop reason: SDUPTHER

## 2023-04-03 RX ORDER — ONDANSETRON 2 MG/ML
4 INJECTION INTRAMUSCULAR; INTRAVENOUS EVERY 6 HOURS
Status: DISCONTINUED | OUTPATIENT
Start: 2023-04-03 | End: 2023-04-04 | Stop reason: HOSPADM

## 2023-04-03 RX ORDER — EPHEDRINE SULFATE/0.9% NACL/PF 50 MG/5 ML
SYRINGE (ML) INTRAVENOUS PRN
Status: DISCONTINUED | OUTPATIENT
Start: 2023-04-03 | End: 2023-04-03 | Stop reason: SDUPTHER

## 2023-04-03 RX ORDER — PROPOFOL 10 MG/ML
INJECTION, EMULSION INTRAVENOUS PRN
Status: DISCONTINUED | OUTPATIENT
Start: 2023-04-03 | End: 2023-04-03 | Stop reason: SDUPTHER

## 2023-04-03 RX ORDER — MEPERIDINE HYDROCHLORIDE 25 MG/ML
12.5 INJECTION INTRAMUSCULAR; INTRAVENOUS; SUBCUTANEOUS EVERY 5 MIN PRN
Status: DISCONTINUED | OUTPATIENT
Start: 2023-04-03 | End: 2023-04-03 | Stop reason: HOSPADM

## 2023-04-03 RX ORDER — ONDANSETRON 2 MG/ML
4 INJECTION INTRAMUSCULAR; INTRAVENOUS
Status: DISCONTINUED | OUTPATIENT
Start: 2023-04-03 | End: 2023-04-03 | Stop reason: HOSPADM

## 2023-04-03 RX ORDER — SODIUM CHLORIDE 9 MG/ML
INJECTION, SOLUTION INTRAVENOUS CONTINUOUS
Status: DISCONTINUED | OUTPATIENT
Start: 2023-04-03 | End: 2023-04-04

## 2023-04-03 RX ORDER — SODIUM CHLORIDE 9 MG/ML
INJECTION, SOLUTION INTRAVENOUS PRN
Status: DISCONTINUED | OUTPATIENT
Start: 2023-04-03 | End: 2023-04-04 | Stop reason: HOSPADM

## 2023-04-03 RX ORDER — METOPROLOL SUCCINATE 50 MG/1
50 TABLET, EXTENDED RELEASE ORAL DAILY
Status: DISCONTINUED | OUTPATIENT
Start: 2023-04-04 | End: 2023-04-04 | Stop reason: HOSPADM

## 2023-04-03 RX ORDER — ONDANSETRON 2 MG/ML
INJECTION INTRAMUSCULAR; INTRAVENOUS PRN
Status: DISCONTINUED | OUTPATIENT
Start: 2023-04-03 | End: 2023-04-03 | Stop reason: SDUPTHER

## 2023-04-03 RX ADMIN — GLYCOPYRROLATE 0.2 MG: 0.2 INJECTION, SOLUTION INTRAMUSCULAR; INTRAVENOUS at 09:47

## 2023-04-03 RX ADMIN — SODIUM CHLORIDE: 9 INJECTION, SOLUTION INTRAVENOUS at 07:47

## 2023-04-03 RX ADMIN — PROPOFOL 160 MG: 10 INJECTION, EMULSION INTRAVENOUS at 09:34

## 2023-04-03 RX ADMIN — KETOROLAC TROMETHAMINE 15 MG: 30 INJECTION, SOLUTION INTRAMUSCULAR at 22:19

## 2023-04-03 RX ADMIN — ONDANSETRON 4 MG: 2 INJECTION INTRAMUSCULAR; INTRAVENOUS at 20:29

## 2023-04-03 RX ADMIN — ROCURONIUM BROMIDE 30 MG: 10 INJECTION INTRAVENOUS at 09:51

## 2023-04-03 RX ADMIN — SODIUM CHLORIDE: 9 INJECTION, SOLUTION INTRAVENOUS at 17:23

## 2023-04-03 RX ADMIN — SODIUM CHLORIDE: 9 INJECTION, SOLUTION INTRAVENOUS at 13:39

## 2023-04-03 RX ADMIN — DROPERIDOL 0.62 MG: 2.5 INJECTION, SOLUTION INTRAMUSCULAR; INTRAVENOUS at 11:25

## 2023-04-03 RX ADMIN — SODIUM CHLORIDE: 9 INJECTION, SOLUTION INTRAVENOUS at 09:28

## 2023-04-03 RX ADMIN — FAMOTIDINE 20 MG: 10 INJECTION, SOLUTION INTRAVENOUS at 07:51

## 2023-04-03 RX ADMIN — LIDOCAINE HYDROCHLORIDE 60 MG: 20 INJECTION INTRAVENOUS at 09:34

## 2023-04-03 RX ADMIN — Medication 2000 MG: at 09:37

## 2023-04-03 RX ADMIN — SUGAMMADEX 200 MG: 100 INJECTION, SOLUTION INTRAVENOUS at 10:30

## 2023-04-03 RX ADMIN — ONDANSETRON 4 MG: 2 INJECTION INTRAMUSCULAR; INTRAVENOUS at 10:24

## 2023-04-03 RX ADMIN — Medication 10 MG: at 10:03

## 2023-04-03 RX ADMIN — KETOROLAC TROMETHAMINE 15 MG: 30 INJECTION, SOLUTION INTRAMUSCULAR at 11:27

## 2023-04-03 RX ADMIN — FENTANYL CITRATE 100 MCG: 50 INJECTION, SOLUTION INTRAMUSCULAR; INTRAVENOUS at 09:34

## 2023-04-03 RX ADMIN — HYOSCYAMINE SULFATE 125 MCG: 0.12 TABLET, ORALLY DISINTEGRATING ORAL at 11:30

## 2023-04-03 RX ADMIN — ROCURONIUM BROMIDE 5 MG: 10 INJECTION INTRAVENOUS at 09:34

## 2023-04-03 RX ADMIN — ONDANSETRON 4 MG: 2 INJECTION INTRAMUSCULAR; INTRAVENOUS at 13:41

## 2023-04-03 RX ADMIN — Medication 20 MG: at 10:03

## 2023-04-03 RX ADMIN — DEXAMETHASONE SODIUM PHOSPHATE 10 MG: 10 INJECTION, EMULSION INTRAMUSCULAR; INTRAVENOUS at 09:52

## 2023-04-03 RX ADMIN — MIDAZOLAM 2 MG: 1 INJECTION INTRAMUSCULAR; INTRAVENOUS at 09:28

## 2023-04-03 RX ADMIN — KETOROLAC TROMETHAMINE 15 MG: 30 INJECTION, SOLUTION INTRAMUSCULAR at 16:02

## 2023-04-03 RX ADMIN — SODIUM CHLORIDE 50 ML/HR: 9 INJECTION, SOLUTION INTRAVENOUS at 07:51

## 2023-04-03 RX ADMIN — ONDANSETRON 4 MG: 2 INJECTION INTRAMUSCULAR; INTRAVENOUS at 07:51

## 2023-04-03 RX ADMIN — KETOROLAC TROMETHAMINE 15 MG: 30 INJECTION, SOLUTION INTRAMUSCULAR; INTRAVENOUS at 11:27

## 2023-04-03 RX ADMIN — FENTANYL CITRATE 50 MCG: 50 INJECTION, SOLUTION INTRAMUSCULAR; INTRAVENOUS at 11:32

## 2023-04-03 RX ADMIN — Medication 140 MG: at 09:34

## 2023-04-03 RX ADMIN — Medication 30 MG: at 09:43

## 2023-04-03 ASSESSMENT — PAIN DESCRIPTION - LOCATION
LOCATION: ABDOMEN

## 2023-04-03 ASSESSMENT — PAIN DESCRIPTION - DESCRIPTORS
DESCRIPTORS: SPASM;SHARP
DESCRIPTORS: SPASM;SHARP
DESCRIPTORS: ACHING;CRAMPING
DESCRIPTORS: SPASM;SHARP

## 2023-04-03 ASSESSMENT — PAIN - FUNCTIONAL ASSESSMENT
PAIN_FUNCTIONAL_ASSESSMENT: NONE - DENIES PAIN
PAIN_FUNCTIONAL_ASSESSMENT: ACTIVITIES ARE NOT PREVENTED

## 2023-04-03 ASSESSMENT — PAIN DESCRIPTION - ORIENTATION: ORIENTATION: MID

## 2023-04-03 ASSESSMENT — PAIN SCALES - GENERAL
PAINLEVEL_OUTOF10: 7
PAINLEVEL_OUTOF10: 4
PAINLEVEL_OUTOF10: 7
PAINLEVEL_OUTOF10: 7
PAINLEVEL_OUTOF10: 0
PAINLEVEL_OUTOF10: 5
PAINLEVEL_OUTOF10: 4

## 2023-04-03 NOTE — BRIEF OP NOTE
Brief Postoperative Note      Patient: Garrick Kunz  YOB: 1970  MRN: [id number]    Date of Procedure: 4/3/2023    Pre-Op Diagnosis:   1. Morbid obesity (BMI 45)  2. Sleep apnea  3.   Hypertension    Post-Op Diagnosis: Same       Procedure(s):  GASTRECTOMY SLEEVE LAPAROSCOPIC ROBOTIC  Bilateral abdominal wall TAP Block    Surgeon(s):  Fabby Giron MD    Assistant:  First Assistant: Aristeo Montague RN    Anesthesia: General/local    Estimated Blood Loss (mL): 95YY    Complications: None    Specimens:   ID Type Source Tests Collected by Time Destination   A : GASTRIC REMNANT Tissue Stomach SURGICAL PATHOLOGY Fabby Giron MD 4/3/2023 1005        Implants:  * No implants in log *      Drains: * No LDAs found *    Findings: as above - see op note for details    Electronically signed by Fabby Giron MD on 4/3/2023 at 10:32 AM

## 2023-04-03 NOTE — ANESTHESIA POSTPROCEDURE EVALUATION
Department of Anesthesiology  Postprocedure Note    Patient: Tabatha Wadsworth  MRN: [id number]  YOB: 1970  Date of evaluation: 4/3/2023      Procedure Summary     Date: 04/03/23 Room / Location: [facility] 08 / [facility]    Anesthesia Start: 9659 Anesthesia Stop: 0136    Procedure: GASTRECTOMY SLEEVE LAPAROSCOPIC ROBOTIC (Abdomen) Diagnosis:       Morbid obesity ([address]      (Morbid obesity ([address] [E66.01])    Surgeons: Mary Duque MD Responsible Provider: Sulma Rodriguez DO    Anesthesia Type: general ASA Status: 3          Anesthesia Type: No value filed.     Jennifer Phase I: Jennifer Score: 6    Jennifer Phase II:        Anesthesia Post Evaluation    Patient location during evaluation: PACU  Patient participation: complete - patient participated  Level of consciousness: awake and alert  Pain score: 3  Airway patency: patent  Nausea & Vomiting: no nausea and no vomiting  Complications: no  Cardiovascular status: hemodynamically stable and blood pressure returned to baseline  Respiratory status: spontaneous ventilation, room air and acceptable  Hydration status: stable

## 2023-04-03 NOTE — INTERVAL H&P NOTE
Update History & Physical    The patient's History and Physical was reviewed with the patient and I examined the patient. There was no change. The surgical site was confirmed by the patient and me. Plan: The risks, benefits, expected outcome, and alternative to the recommended procedure have been discussed with the patient. Patient understands and wants to proceed with the procedure.      Electronically signed by Kailey West MD on 4/3/2023 at 7:20 AM

## 2023-04-03 NOTE — OP NOTE
Steri-Strips applied. Marcaine  0.5% with epinephrine was used for local anesthetic. The patient tolerated  the injection of local anesthetic without difficulty. Sponge, needle and  instrumentation counts were correct at the end of the procedure. The  patient tolerated the procedure well with no apparent complications and  only about 10 mL of blood loss. She was later brought out of  general anesthesia and then transferred to postanesthesia care unit in  stable condition. Suman Caban M.D.  FACS  Electronically signed 4/3/2023 at 10:33 AM

## 2023-04-03 NOTE — ANESTHESIA PRE PROCEDURE
Department of Anesthesiology  Preprocedure Note       Name:  Esther Aponte   Age:  47 y.o.  :  1970                                          MRN:  [id number]         Date:  4/3/2023      Surgeon: Sindhu Plaza):  Randy Rodríguez MD    Procedure: Procedure(s):  GASTRECTOMY SLEEVE LAPAROSCOPIC ROBOTIC    Medications prior to admission:   Prior to Admission medications    Medication Sig Start Date End Date Taking? Authorizing Provider   enoxaparin (LOVENOX) 40 MG/0.4ML Inject 0.4 mLs into the skin daily 4/3/23   YUNIEL Lopez   Docusate Sodium 100 MG TABS Take 100 mg by mouth 2 times daily Take as needed to prevent constipation 4/3/23   YUNIEL Lopez   metoclopramide (REGLAN) 10 MG tablet Take 1 tablet by mouth every 6 hours as needed (nausea/vomiting) 4/3/23 4/17/23  YUNIEL Lopez   ondansetron (ZOFRAN) 4 MG tablet Take 1 tablet by mouth every 4 hours as needed for Nausea or Vomiting 4/3/23 4/17/23  YUNIEL Lopez   oxyCODONE-acetaminophen (PERCOCET) 5-325 MG per tablet Take 1 tablet by mouth every 4 hours as needed for Pain.     Historical Provider, MD   dicyclomine (BENTYL) 10 MG capsule as needed 22   Historical Provider, MD   hydroCHLOROthiazide (MICROZIDE) 12.5 MG capsule Take 12.5 mg by mouth daily as needed    Historical Provider, MD   cyclobenzaprine (FLEXERIL) 10 MG tablet Take 10 mg by mouth 3 times daily as needed for Muscle spasms    Historical Provider, MD   metoprolol succinate (TOPROL XL) 50 MG extended release tablet Take 1 tablet by mouth daily 20   Lexx Bullock MD   pantoprazole (PROTONIX) 40 MG tablet Take 1 tablet by mouth every morning (before breakfast) 20   Lexx Bullock MD   Cholecalciferol (VITAMIN D3) 25 MCG (1000 UT) TABS TAKE ONE TABLET BY MOUTH EVERY DAY  Patient not taking: No sig reported 19   Historical Provider, MD       Current medications:    Current Facility-Administered Medications   Medication Dose Route Frequency

## 2023-04-03 NOTE — H&P
N/A 4/3/2020     L4-S1 DECOMPRESSION AND POSTERIOR FUSION; LUMBAR I & D WITH HARDWARE REMOVAL performed by Bibi French MD at [facility]   10/17/2019     L5-S1    UPPER GASTROINTESTINAL ENDOSCOPY N/A 7/19/2019     EGD BIOPSY performed by Margo Luna MD at [place] [address]   7/19/2019     EGD DILATION SAVORY performed by Margo Luna MD at [facility]            Current Facility-Administered Medications          Current Outpatient Medications   Medication Sig Dispense Refill    oxyCODONE-acetaminophen (PERCOCET) 5-325 MG per tablet Take 1 tablet by mouth every 4 hours as needed for Pain. dicyclomine (BENTYL) 10 MG capsule          hydroCHLOROthiazide (MICROZIDE) 12.5 MG capsule Take 12.5 mg by mouth daily as needed        cyclobenzaprine (FLEXERIL) 10 MG tablet Take 10 mg by mouth 3 times daily as needed for Muscle spasms        metoprolol succinate (TOPROL XL) 50 MG extended release tablet Take 1 tablet by mouth daily 30 tablet 3    pantoprazole (PROTONIX) 40 MG tablet Take 1 tablet by mouth every morning (before breakfast) 30 tablet 0    Cholecalciferol (VITAMIN D3) 25 MCG (1000 UT) TABS TAKE ONE TABLET BY MOUTH EVERY DAY (Patient not taking: Reported on 3/3/2023)   1      No current facility-administered medications for this visit.                  Allergies   Allergen Reactions    Codeine Rash    Dilaudid [Hydromorphone Hcl] Rash         Family History         Family History   Problem Relation Age of Onset    Stroke Mother      Ulcerative Colitis Mother      Cancer Mother      Arthritis Mother      Stroke Father      Cancer Father           skin    High Blood Pressure Father      Diabetes Father      Heart Disease Father      Arthritis Father      Arthritis Sister      Obesity Sister      Heart Disease Brother      Cancer Brother      Arthritis Brother      Arthritis Maternal Grandmother      Obesity Maternal Grandmother      Stroke

## 2023-04-04 ENCOUNTER — APPOINTMENT (OUTPATIENT)
Dept: GENERAL RADIOLOGY | Age: 53
DRG: 403 | End: 2023-04-04
Attending: SURGERY
Payer: MEDICAID

## 2023-04-04 VITALS
TEMPERATURE: 98 F | HEART RATE: 62 BPM | DIASTOLIC BLOOD PRESSURE: 74 MMHG | HEIGHT: 60 IN | RESPIRATION RATE: 16 BRPM | SYSTOLIC BLOOD PRESSURE: 128 MMHG | BODY MASS INDEX: 45.55 KG/M2 | WEIGHT: 232 LBS | OXYGEN SATURATION: 96 %

## 2023-04-04 LAB
ANION GAP SERPL CALC-SCNC: 9 MEQ/L (ref 8–16)
BUN SERPL-MCNC: 16 MG/DL (ref 7–22)
CALCIUM SERPL-MCNC: 8.2 MG/DL (ref 8.5–10.5)
CHLORIDE SERPL-SCNC: 110 MEQ/L (ref 98–111)
CO2 SERPL-SCNC: 23 MEQ/L (ref 23–33)
CREAT SERPL-MCNC: 0.7 MG/DL (ref 0.4–1.2)
GFR SERPL CREATININE-BSD FRML MDRD: > 60 ML/MIN/1.73M2
GLUCOSE SERPL-MCNC: 114 MG/DL (ref 70–108)
HCT VFR BLD AUTO: 36.2 % (ref 37–47)
HCT VFR BLD AUTO: 36.4 % (ref 37–47)
HGB BLD-MCNC: 11.1 GM/DL (ref 12–16)
HGB BLD-MCNC: 11.6 GM/DL (ref 12–16)
POTASSIUM SERPL-SCNC: 4.6 MEQ/L (ref 3.5–5.2)
SODIUM SERPL-SCNC: 142 MEQ/L (ref 135–145)

## 2023-04-04 PROCEDURE — 6370000000 HC RX 637 (ALT 250 FOR IP): Performed by: SURGERY

## 2023-04-04 PROCEDURE — 6360000004 HC RX CONTRAST MEDICATION: Performed by: SURGERY

## 2023-04-04 PROCEDURE — 99024 POSTOP FOLLOW-UP VISIT: CPT | Performed by: NURSE PRACTITIONER

## 2023-04-04 PROCEDURE — 2580000003 HC RX 258: Performed by: SURGERY

## 2023-04-04 PROCEDURE — 85018 HEMOGLOBIN: CPT

## 2023-04-04 PROCEDURE — 6370000000 HC RX 637 (ALT 250 FOR IP): Performed by: NURSE PRACTITIONER

## 2023-04-04 PROCEDURE — 36415 COLL VENOUS BLD VENIPUNCTURE: CPT

## 2023-04-04 PROCEDURE — A4641 RADIOPHARM DX AGENT NOC: HCPCS | Performed by: SURGERY

## 2023-04-04 PROCEDURE — 80048 BASIC METABOLIC PNL TOTAL CA: CPT

## 2023-04-04 PROCEDURE — 6360000002 HC RX W HCPCS: Performed by: SURGERY

## 2023-04-04 PROCEDURE — 85014 HEMATOCRIT: CPT

## 2023-04-04 PROCEDURE — 74240 X-RAY XM UPR GI TRC 1CNTRST: CPT

## 2023-04-04 RX ORDER — SIMETHICONE 80 MG
80 TABLET,CHEWABLE ORAL 4 TIMES DAILY PRN
Status: DISCONTINUED | OUTPATIENT
Start: 2023-04-04 | End: 2023-04-04 | Stop reason: HOSPADM

## 2023-04-04 RX ORDER — OXYCODONE HYDROCHLORIDE 5 MG/1
5 TABLET ORAL EVERY 4 HOURS PRN
Status: DISCONTINUED | OUTPATIENT
Start: 2023-04-04 | End: 2023-04-04 | Stop reason: HOSPADM

## 2023-04-04 RX ORDER — OXYCODONE HYDROCHLORIDE 5 MG/1
5 TABLET ORAL EVERY 6 HOURS PRN
Qty: 20 TABLET | Refills: 0 | Status: SHIPPED | OUTPATIENT
Start: 2023-04-04 | End: 2023-04-09

## 2023-04-04 RX ORDER — KETOROLAC TROMETHAMINE 10 MG/1
10 TABLET, FILM COATED ORAL EVERY 6 HOURS PRN
Qty: 20 TABLET | Refills: 0 | Status: SHIPPED | OUTPATIENT
Start: 2023-04-04

## 2023-04-04 RX ORDER — PANTOPRAZOLE SODIUM 40 MG/1
40 TABLET, DELAYED RELEASE ORAL
Status: DISCONTINUED | OUTPATIENT
Start: 2023-04-04 | End: 2023-04-04 | Stop reason: HOSPADM

## 2023-04-04 RX ORDER — CALCIUM CARBONATE 200(500)MG
500 TABLET,CHEWABLE ORAL 3 TIMES DAILY PRN
Status: DISCONTINUED | OUTPATIENT
Start: 2023-04-04 | End: 2023-04-04 | Stop reason: HOSPADM

## 2023-04-04 RX ORDER — HYOSCYAMINE SULFATE 0.125 MG
125 TABLET,DISINTEGRATING ORAL EVERY 4 HOURS PRN
Qty: 20 TABLET | Refills: 0 | Status: SHIPPED | OUTPATIENT
Start: 2023-04-04

## 2023-04-04 RX ORDER — DOCUSATE SODIUM 100 MG/1
100 CAPSULE, LIQUID FILLED ORAL 2 TIMES DAILY PRN
Status: DISCONTINUED | OUTPATIENT
Start: 2023-04-04 | End: 2023-04-04 | Stop reason: HOSPADM

## 2023-04-04 RX ADMIN — PANTOPRAZOLE SODIUM 40 MG: 40 TABLET, DELAYED RELEASE ORAL at 10:33

## 2023-04-04 RX ADMIN — ONDANSETRON 4 MG: 2 INJECTION INTRAMUSCULAR; INTRAVENOUS at 01:54

## 2023-04-04 RX ADMIN — IOHEXOL 50 ML: 240 INJECTION, SOLUTION INTRATHECAL; INTRAVASCULAR; INTRAVENOUS; ORAL at 08:05

## 2023-04-04 RX ADMIN — KETOROLAC TROMETHAMINE 15 MG: 30 INJECTION, SOLUTION INTRAMUSCULAR at 03:59

## 2023-04-04 RX ADMIN — ENOXAPARIN SODIUM 30 MG: 100 INJECTION SUBCUTANEOUS at 09:04

## 2023-04-04 RX ADMIN — SODIUM CHLORIDE: 9 INJECTION, SOLUTION INTRAVENOUS at 01:55

## 2023-04-04 RX ADMIN — BARIUM SULFATE 20 ML: 0.6 SUSPENSION ORAL at 08:05

## 2023-04-04 RX ADMIN — HYOSCYAMINE SULFATE 125 MCG: 0.12 TABLET, ORALLY DISINTEGRATING ORAL at 14:45

## 2023-04-04 RX ADMIN — METOPROLOL SUCCINATE 50 MG: 50 TABLET, EXTENDED RELEASE ORAL at 09:04

## 2023-04-04 RX ADMIN — ONDANSETRON 4 MG: 2 INJECTION INTRAMUSCULAR; INTRAVENOUS at 09:04

## 2023-04-04 RX ADMIN — ANTACID TABLETS 500 MG: 500 TABLET, CHEWABLE ORAL at 10:33

## 2023-04-04 RX ADMIN — OXYCODONE HYDROCHLORIDE 5 MG: 5 TABLET ORAL at 14:44

## 2023-04-04 ASSESSMENT — PAIN SCALES - GENERAL
PAINLEVEL_OUTOF10: 3
PAINLEVEL_OUTOF10: 7

## 2023-04-04 ASSESSMENT — PAIN - FUNCTIONAL ASSESSMENT: PAIN_FUNCTIONAL_ASSESSMENT: ACTIVITIES ARE NOT PREVENTED

## 2023-04-04 ASSESSMENT — PAIN DESCRIPTION - ORIENTATION: ORIENTATION: MID

## 2023-04-04 ASSESSMENT — PAIN DESCRIPTION - DESCRIPTORS: DESCRIPTORS: ACHING;CRAMPING

## 2023-04-04 ASSESSMENT — PAIN DESCRIPTION - LOCATION: LOCATION: ABDOMEN

## 2023-04-04 NOTE — DISCHARGE INSTRUCTIONS
INSTRUCTIONS:  Always ensure you and your care giver clean hands before and after caring for the wound. [] Keep dressing clean and dry for 48 hours. Change when soiled or wet. [x] Allow steri-strips to fall off on their own. [x] Ice operative site for 20 minutes 4 times a day as needed. [x] May wash over incision in shower, but do not soak in a bath.  [] Take sitz bath for 20 minutes twice daily and after bowel movements. [x] Keep the abdominal binder in place during the day. May remove to shower and at night. ABDOMINAL/LAPAROSCOPIC SURGERY  [x]You are encouraged to get up and move around as this helps with circulation, prevents blood clots from forming and speeds up the healing process. Call the office if you develop pain or swelling in your legs. Do not massage sore muscles in the legs. [x]Breath deeply and cough from time to time. This helps to clear your lungs and helps prevent pneumonia. [x]Supporting your incision with a pillow or your hand helps to minimize discomfort and pain. [x]Laparoscopic patients may develop shoulder pain in the first 48 hours from the gas used during the procedure a heating pad may help alleviate this discomfort. FOLLOW-UP CARE. SPECIFICALLY WATCH FOR:   Fever over 101 degrees by mouth   Increased redness, warmth, hardness at operative site. Blood soaked dressing (small amounts of oozing may be normal.)   Increased or progressive drainage from the surgical area   Inability to urinate or blood in the urine   Pain not relieved by the medications ordered   Persistent nausea and/or vomiting, unable to retain fluids. Pain or swelling in your legs. Shortness of breath. Call the office if you develop any of the above symptoms. FOLLOW-UP APPOINTMENT   [x]1 week: in weight management   []2 weeks:    []Other    Call my office if you have any problem that concerns you [phone]. After hours, you can reach the answering service via the office phone number.  IF

## 2023-04-04 NOTE — PLAN OF CARE
Problem: Discharge Planning  Goal: Discharge to home or other facility with appropriate resources  Outcome: Progressing  Flowsheets (Taken 4/4/2023 0110)  Discharge to home or other facility with appropriate resources:   Identify barriers to discharge with patient and caregiver   Identify discharge learning needs (meds, wound care, etc)   Refer to discharge planning if patient needs post-hospital services based on physician order or complex needs related to functional status, cognitive ability or social support system     Problem: Pain  Goal: Verbalizes/displays adequate comfort level or baseline comfort level  Outcome: Progressing  Flowsheets (Taken 4/4/2023 0110)  Verbalizes/displays adequate comfort level or baseline comfort level:   Assess pain using appropriate pain scale   Administer analgesics based on type and severity of pain and evaluate response     Problem: Skin/Tissue Integrity - Adult  Goal: Incisions, wounds, or drain sites healing without S/S of infection  Outcome: Progressing  Flowsheets (Taken 4/4/2023 0110)  Incisions, Wounds, or Drain Sites Healing Without Sign and Symptoms of Infection: TWICE DAILY: Assess and document dressing/incision, wound bed, drain sites and surrounding tissue     Problem: Gastrointestinal - Adult  Goal: Minimal or absence of nausea and vomiting  Outcome: Progressing  Flowsheets (Taken 4/4/2023 0110)  Minimal or absence of nausea and vomiting:   Administer IV fluids as ordered to ensure adequate hydration   Administer ordered antiemetic medications as needed     Problem: Metabolic/Fluid and Electrolytes - Adult  Goal: Electrolytes maintained within normal limits  Outcome: Progressing  Flowsheets (Taken 4/4/2023 0110)  Electrolytes maintained within normal limits: Monitor labs and assess patient for signs and symptoms of electrolyte imbalances   Care plan reviewed with patient. Patient verbalizes understanding of the plan of care and contributes to goal setting.

## 2023-04-04 NOTE — CARE COORDINATION
4/4/23, 8:28 AM EDT    DISCHARGE PLANNING EVALUATION    Spoke with [facility], confirmed agency will follow at discharge.
DME:    Patient expects to discharge to: House  Plan for transportation at discharge: Family    Financial    Payor: Cira / Plan: Cira / Product Type: *No Product type* /     Does insurance require precert for SNF: Yes    Potential assistance Purchasing Medications:    Meds-to-Beds request: Yes      [id number] [place], [place] - [address] [phone]  [place]  Phone: [phone] Fax: [phone]      Notes:    Factors facilitating achievement of predicted outcomes: Family support, Motivated, Cooperative, Pleasant, and Sense of humor    Barriers to discharge: Medication managment and tolerating advancing diet    Additional Case Management Notes: Pt admitted for post op care. UGI pending today-advance diet if able, PRN nausea/pain meds,   Procedure: 4/3: GASTRECTOMY SLEEVE LAPAROSCOPIC ROBOTIC  4/4: UGI    The Plan for Transition of Care is related to the following treatment goals of Morbid obesity ([facility] [address] [E66.01]    Patient Goals/Plan/Treatment Preferences: Spoke with pt. She plans to return home alone. Has supportive family and friends to assist. Independent with ADL's. Has CPAP. Pt agreeable to recommendation for [facility] for nursing to evaluate post op. Verified PCP and insurance. Will continue to follow. ( order already in)    Transportation/Food Security/Housekeeping Addressed: No issues identified.      Alex Garcia RN  Case Management Department

## 2023-04-04 NOTE — PROGRESS NOTES
1050  pt. Responds slightly to name on arrival to pacu. Respers shallow. O2 sat < 90%. O2 per nasal cannula initiated at 4 liters. Abdominal sites x 6 with steristrips and bandaids intact and dry. 1100  pt. Awake and oriented. Pt. Complains of pain 7 out of 10.  1105  pt. Complains of nausea. Dr. Urmila Hollingsworth notified. 1125  pt. Medicated for nausea. 1127  pt. Medicated for pain. 1130  pt. Medicated for abdominal spasms. 1132  pt. Medicated for pain 7 out of 10. O2 decreased to 2 liters. 1140  pt. Resting soundly with eyes closed. O2 sat < 90%. O2 increased to 4 liters. 1155  pt. Continues to rest quietly. O2 decreased to 2 liters. 1215  pt. Responds easily to name. Pt. Denies nausea. Pt. States pain is 4 out of 10. Pt. Dozes easily. Report called to [address] [place] [address]. 1244  pacu criteria met. Pt. Stable. Transfer to [address] [place] [address].
PAT Call Date: 3/23   Surgery Date: 4/3    Surgeon: Radha   Surgery: gastrectomy    Is patient from a nursing home? No   Any Isolation Precautions? No   Any Pacemaker or ICD? No If YES, has it been checked recently and where? Has the rep been notified? No     On Snapboard?  No     Hard Copy on Chart  In EPIC Pending/Notes   Consent -   Within 30 days; signed, dated & timed by patient and physician   Signed in media  [] On Arrival     [] Blood    Additional Consent Needs:     H&P - Within 30 days    [] Physician To Do     [] H&P Update - If H&P is older then 24 hours    Clearance -  Medical, Cardiac, Pulmonary, etc.     12/9 HEIDI Akhtar-low   Orders - Signed and Dated    Copy Sent to Pharm []    [] Physician To Do    Labs - Within 3 months   3/22  [x] CBC -ok   [x] BMP-BUN 28   [x] GFR-ok   [] INR    [] PTT    [] Urine    [] Liver Enzymes    [] Kidney Function    [] MRSA Nasal   [] MSSA      Others:    Radiology Studies-   Within 1 year  3/22  [x] Chest X-Ray-ok   [] MRI    [] CT    EKG -   Within 1 year, unless hx of HTN  10/12 No change   Cardiac Workup -   Stress Test, Echo, Cath within 18 months  7/18/22  [x] Cath                                [] Stress Test                      [] Echo    [] Holter Monitor    [] NEW
Up to bathroom. Voided without difficulty.
Went over discharge instructions with Alea Reyes. Talked about all new prescriptions and follow up appointments. Lynne verbalized understanding of instructions. Discharged per wheelchair to car with AVS and belongings.
documentation.
hours.    Invalid input(s): PT  No results for input(s): AST, ALT, BILITOT, BILIDIR, AMYLASE, LIPASE, LDH, LACTA in the last 72 hours. No results for input(s): TROPONINT in the last 72 hours. RADIOLOGY     PROCEDURE: FL UGI       CLINICAL INFORMATION: Obesity, status post sleeve gastrectomy       TECHNIQUE: A preliminary abdominal radiograph was obtained. Following the oral administration of Omnipaque 240 and thin barium, the anatomy of the distal esophagus, stomach and duodenum were evaluated in a limited upper GI examination. Reference air    kerma 143.1 mGy. COMPARISON: None       FINDINGS: Preliminary radiograph demonstrates a nonobstructive bowel gas pattern. There is surgical suture material in the left upper abdomen. Surgical clips are present in the right upper abdomen. Osseous structures are unremarkable. There is no stenosis or large hiatal hernia in the distal esophagus. There are surgical changes in the stomach of prior sleeve gastrectomy. There is normal transit of contrast through the stomach into the duodenum. There is no extravasation of contrast    to suggest a leak. The duodenal sweep is normal.           Impression       No evidence of obstruction or leak following sleeve gastrectomy.        Final report electronically signed by Dr. Dorene Cueot on 4/4/2023 8:32 AM       Electronically signed by CALIXTO Whitman CNP on 4/4/2023 at 10:58 AM

## 2023-04-06 NOTE — DISCHARGE SUMMARY
completed. Follow-up as needed. 11.  EGD has been completed. H. pylori negative with no significant hiatal hernia. Following up with GI as needed. 12.  Encouraged support groups  13. Send LOMN      Patient states that she has not been able to lose enough adequate excess body weight with medical management only would like to proceed with a robotic sleeve gastrectomy for further weight loss. More than 40 minutes spent with patient today. Greater than 50% of the time was involved counseling, educaton and coordinating care. SUBJECTIVE/OBJECTIVE:             Chief Complaint   Patient presents with    Weight Loss       Month 6 of 6       HPI  Lamin Real is a 52-year-old female who presents for follow-up at the weight management program secondary to her morbid obesity. BMI 45. Current weight 233 pounds. She has not been able to lose enough adequate excess body weight with medical management only and is wishing to proceed with a sleeve gastrectomy. Completed upper endoscopy. H. pylori negative. No significant hiatal hernia. Hemoglobin A1c 5.8. Completed psychology evaluation. Cardiac and pulmonary clearance completed. Wearing CPAP machine. Working with the dietitian. Continuing to work on portion control and food selection. Taking multivitamin. Replacing vitamin D3. Trying to become more active but difficult given her chronic back pain. Has been using the treadmill at home when possible. Denies current chest or abdominal pain. No hematochezia or melena. No new urinary complaints. She admits she is ready to proceed with surgery so as to continue to work towards her weight loss goals. Review of Systems   Constitutional:  Positive for activity change and appetite change. Negative for chills, diaphoresis, fatigue, fever and unexpected weight change.    HENT:  Negative for congestion, dental problem, drooling, ear discharge, ear pain, facial swelling, hearing loss, mouth sores, nosebleeds,

## 2023-04-13 ENCOUNTER — TELEPHONE (OUTPATIENT)
Dept: PULMONOLOGY | Age: 53
End: 2023-04-13

## 2023-04-18 ENCOUNTER — OFFICE VISIT (OUTPATIENT)
Dept: BARIATRICS/WEIGHT MGMT | Age: 53
End: 2023-04-18

## 2023-04-18 VITALS
HEIGHT: 60 IN | DIASTOLIC BLOOD PRESSURE: 72 MMHG | SYSTOLIC BLOOD PRESSURE: 112 MMHG | HEART RATE: 72 BPM | TEMPERATURE: 97.7 F | BODY MASS INDEX: 43.39 KG/M2 | WEIGHT: 221 LBS

## 2023-04-18 DIAGNOSIS — F41.9 ANXIETY: ICD-10-CM

## 2023-04-18 DIAGNOSIS — G47.33 OSA ON CPAP: ICD-10-CM

## 2023-04-18 DIAGNOSIS — K91.2 POSTSURGICAL MALABSORPTION: ICD-10-CM

## 2023-04-18 DIAGNOSIS — Z13.21 SCREENING FOR MALNUTRITION: ICD-10-CM

## 2023-04-18 DIAGNOSIS — Z99.89 OSA ON CPAP: ICD-10-CM

## 2023-04-18 DIAGNOSIS — Z98.84 STATUS POST BARIATRIC SURGERY: Primary | ICD-10-CM

## 2023-04-18 DIAGNOSIS — K58.9 IRRITABLE BOWEL SYNDROME, UNSPECIFIED TYPE: ICD-10-CM

## 2023-04-18 DIAGNOSIS — G89.29 CHRONIC BILATERAL LOW BACK PAIN, UNSPECIFIED WHETHER SCIATICA PRESENT: ICD-10-CM

## 2023-04-18 DIAGNOSIS — Z90.3 S/P GASTRIC SLEEVE PROCEDURE: Primary | ICD-10-CM

## 2023-04-18 DIAGNOSIS — K21.9 GASTROESOPHAGEAL REFLUX DISEASE, UNSPECIFIED WHETHER ESOPHAGITIS PRESENT: ICD-10-CM

## 2023-04-18 DIAGNOSIS — K76.0 FATTY LIVER: ICD-10-CM

## 2023-04-18 DIAGNOSIS — I10 HYPERTENSION, UNSPECIFIED TYPE: ICD-10-CM

## 2023-04-18 DIAGNOSIS — M54.50 CHRONIC BILATERAL LOW BACK PAIN, UNSPECIFIED WHETHER SCIATICA PRESENT: ICD-10-CM

## 2023-04-18 DIAGNOSIS — F32.A DEPRESSION, UNSPECIFIED DEPRESSION TYPE: ICD-10-CM

## 2023-04-18 PROCEDURE — 99024 POSTOP FOLLOW-UP VISIT: CPT | Performed by: PHYSICIAN ASSISTANT

## 2023-04-18 NOTE — PATIENT INSTRUCTIONS
Stay well hydrated. Drink a minimum of 64 oz of non-carbonated, non-caffeinated fluids daily. Nutritional education occurred during visit. Tolerating diet. Continue following with dietitian and follow their recommendations as directed. Continue  60-80  grams of protein each day. Signs and symptoms reviewed with patient that would be concerning and need her to return to office for re-evaluation. Patient will call if any questions or concerns arrise. No lifting, pushing, pulling over 20#  No abdominal exercises  Wear abdominal binder prn  Importance of physical activity discussed with patient. Increase physical activity as tolerated  Continue taking Multivitamin, Calcium and Iron  Continue  Protonix for at least 3 months post-op. Encouraged to attend support groups  Progress diet as tolerated per dietitian recommendations. 6 week labs ordered- to be drawn one week prior to next apt. Continue to monitor BP- adjust medication with PCP as needed. Continue CPAP Qhs- adjust pressure with pulmonary as needed.

## 2023-05-10 ENCOUNTER — HOSPITAL ENCOUNTER (OUTPATIENT)
Age: 53
Discharge: HOME | End: 2023-05-10
Payer: MEDICAID

## 2023-05-10 DIAGNOSIS — K91.2 POSTSURGICAL MALABSORPTION: ICD-10-CM

## 2023-05-10 DIAGNOSIS — Z90.3 S/P GASTRIC SLEEVE PROCEDURE: ICD-10-CM

## 2023-05-10 DIAGNOSIS — Z13.21 SCREENING FOR MALNUTRITION: ICD-10-CM

## 2023-05-10 LAB
ALBUMIN SERPL BCG-MCNC: 4.4 G/DL (ref 3.5–5.1)
ALP SERPL-CCNC: 95 U/L (ref 38–126)
ALT SERPL W/O P-5'-P-CCNC: 65 U/L (ref 11–66)
ANION GAP SERPL CALC-SCNC: 11 MEQ/L (ref 8–16)
AST SERPL-CCNC: 33 U/L (ref 5–40)
BASOPHILS ABSOLUTE: 0 THOU/MM3 (ref 0–0.1)
BASOPHILS NFR BLD AUTO: 0.5 %
BILIRUB SERPL-MCNC: 1.3 MG/DL (ref 0.3–1.2)
BUN SERPL-MCNC: 20 MG/DL (ref 7–22)
CALCIUM SERPL-MCNC: 9.5 MG/DL (ref 8.5–10.5)
CHLORIDE SERPL-SCNC: 105 MEQ/L (ref 98–111)
CO2 SERPL-SCNC: 28 MEQ/L (ref 23–33)
CREAT SERPL-MCNC: 0.6 MG/DL (ref 0.4–1.2)
DEPRECATED RDW RBC AUTO: 49.7 FL (ref 35–45)
EOSINOPHIL NFR BLD AUTO: 3 %
EOSINOPHILS ABSOLUTE: 0.1 THOU/MM3 (ref 0–0.4)
ERYTHROCYTE [DISTWIDTH] IN BLOOD BY AUTOMATED COUNT: 15.6 % (ref 11.5–14.5)
GFR SERPL CREATININE-BSD FRML MDRD: > 60 ML/MIN/1.73M2
GLUCOSE SERPL-MCNC: 92 MG/DL (ref 70–108)
HCT VFR BLD AUTO: 42.8 % (ref 37–47)
HGB BLD-MCNC: 13.2 GM/DL (ref 12–16)
IMM GRANULOCYTES # BLD AUTO: 0.02 THOU/MM3 (ref 0–0.07)
IMM GRANULOCYTES NFR BLD AUTO: 0.5 %
LYMPHOCYTES ABSOLUTE: 1.7 THOU/MM3 (ref 1–4.8)
LYMPHOCYTES NFR BLD AUTO: 38.8 %
MCH RBC QN AUTO: 27 PG (ref 26–33)
MCHC RBC AUTO-ENTMCNC: 30.8 GM/DL (ref 32.2–35.5)
MCV RBC AUTO: 87.5 FL (ref 81–99)
MONOCYTES ABSOLUTE: 0.3 THOU/MM3 (ref 0.4–1.3)
MONOCYTES NFR BLD AUTO: 7.1 %
NEUTROPHILS NFR BLD AUTO: 50.1 %
NRBC BLD AUTO-RTO: 0 /100 WBC
PLATELET # BLD AUTO: 190 THOU/MM3 (ref 130–400)
PMV BLD AUTO: 11.2 FL (ref 9.4–12.4)
POTASSIUM SERPL-SCNC: 4.2 MEQ/L (ref 3.5–5.2)
PREALB SERPL-MCNC: 16 MG/DL (ref 20–40)
PROT SERPL-MCNC: 6.4 G/DL (ref 6.1–8)
RBC # BLD AUTO: 4.89 MILL/MM3 (ref 4.2–5.4)
SEGMENTED NEUTROPHILS ABSOLUTE COUNT: 2.2 THOU/MM3 (ref 1.8–7.7)
SODIUM SERPL-SCNC: 144 MEQ/L (ref 135–145)
WBC # BLD AUTO: 4.4 THOU/MM3 (ref 4.8–10.8)

## 2023-05-10 PROCEDURE — 85025 COMPLETE CBC W/AUTO DIFF WBC: CPT

## 2023-05-10 PROCEDURE — 84134 ASSAY OF PREALBUMIN: CPT

## 2023-05-10 PROCEDURE — 80053 COMPREHEN METABOLIC PANEL: CPT

## 2023-05-10 PROCEDURE — 36415 COLL VENOUS BLD VENIPUNCTURE: CPT

## 2023-05-11 ENCOUNTER — TELEPHONE (OUTPATIENT)
Dept: PULMONOLOGY | Age: 53
End: 2023-05-11

## 2023-05-11 DIAGNOSIS — Z99.89 OSA ON CPAP: Primary | ICD-10-CM

## 2023-05-11 DIAGNOSIS — G47.33 OSA ON CPAP: Primary | ICD-10-CM

## 2023-05-11 NOTE — PROGRESS NOTES
Download reviewed from patients CPAP. Controlled AHI. Patient is requesting lower pressure due to feeling like she can't breath on her machine. Feels this is too much pressure. Will take pressure back down to 9 cm H2O. Will fax order to DME today with download in 2 weeks. Will have office notify patient of change in pressure.      Electronically signed by CALIXTO Norman CNP on 5/11/2023 at 4:43 PM

## 2023-05-15 ENCOUNTER — OFFICE VISIT (OUTPATIENT)
Dept: PULMONOLOGY | Age: 53
End: 2023-05-15
Payer: MEDICAID

## 2023-05-15 VITALS
SYSTOLIC BLOOD PRESSURE: 122 MMHG | HEART RATE: 55 BPM | TEMPERATURE: 98.6 F | HEIGHT: 60 IN | OXYGEN SATURATION: 94 % | BODY MASS INDEX: 43.16 KG/M2 | DIASTOLIC BLOOD PRESSURE: 84 MMHG

## 2023-05-15 DIAGNOSIS — G47.33 OSA ON CPAP: Primary | ICD-10-CM

## 2023-05-15 DIAGNOSIS — E66.01 MORBID OBESITY (HCC): ICD-10-CM

## 2023-05-15 DIAGNOSIS — Z99.89 OSA ON CPAP: Primary | ICD-10-CM

## 2023-05-15 PROCEDURE — 99213 OFFICE O/P EST LOW 20 MIN: CPT | Performed by: NURSE PRACTITIONER

## 2023-05-15 PROCEDURE — 3074F SYST BP LT 130 MM HG: CPT | Performed by: NURSE PRACTITIONER

## 2023-05-15 PROCEDURE — 3079F DIAST BP 80-89 MM HG: CPT | Performed by: NURSE PRACTITIONER

## 2023-05-15 ASSESSMENT — ENCOUNTER SYMPTOMS
GASTROINTESTINAL NEGATIVE: 1
WHEEZING: 0
EYES NEGATIVE: 1
COUGH: 0
ALLERGIC/IMMUNOLOGIC NEGATIVE: 1
SHORTNESS OF BREATH: 0
RESPIRATORY NEGATIVE: 1

## 2023-05-15 NOTE — PROGRESS NOTES
[facility]      Olegario Guzmán         [id number]  5/15/2023   Chief Complaint   Patient presents with    Follow-up     6 month ethan        Pt of Dr. Jarod Eaton    PAP Download:   Original or initial AHI: 0.9     Date of initial study: 11.28.17      Compliant  20%     Noncompliant 13 %     PAP Type cpap Level  11   Avg Hrs/Day 4hr 33min  AHI: 1.7   Recorded compliance dates , 4.15.23-5.14.23    Machine/Mfg:   [] ResMed    [x] Respironics/Dreamstation   Interface:   [] Nasal    [] Nasal pillows   [x] FFM      Provider:      [] [facility]     [][facility]     [x] [employer]    [] [place] Lips    [] [place]               [] [facility]      [] Adaptive    [] [address]      [] Other    Neck Size: 16  Mallampati 3  ESS:  2  SAQLI: 98    Here is a scan of the most recent download:        Presentation:   Elayne Bernstein presents for sleep medicine follow up for obstructive sleep apnea  Since the last visit, Elayne Bernstein has been semi compliant but her pressure was too high. Requesting to be taken down to 7 cm H2O today. Mask is irritating her face- has tried cushion covers with no help   Down 23# since weight loss surgery on 4/3/23  Will order mask refit today per [employer]- due to issues with irritation  MO BMI 43     Weight stable / unchanged    Equipment issues: The pressure is somewhat  acceptable, the mask is acceptable     Sleep issues:  Do you feel better? Yes  More rested? Sometimes   Better concentration? NA  Difficulty falling sleep? No  Difficulty staying asleep? No  Snoring? No    Progress History:   Since last visit any new medical issues? No  New ER or hospital visits? No  Any new or changes in medicines? No  Any new sleep medicines? No    Review of Systems -   Review of Systems   Constitutional: Negative. Negative for chills and fever. HENT: Negative. Negative for congestion. Eyes: Negative. Respiratory: Negative. Negative for cough, shortness of breath and wheezing.

## 2023-05-15 NOTE — PROGRESS NOTES
Patient is a 47 y.o. female seen for follow up MNT visit for six week post op. Vitals from current and previous visits:  Vitals 8/93/5471   SYSTOLIC    DIASTOLIC    Site    Position    Cuff Size    Pulse    Temp    Resp    SpO2    Weight 211 lb 3.2 oz   Height 5' 0.25\"   Body Mass Index 40.91 kg/m2   Pain Level    Waist (Inches)    Neck circumference (Inches)         Recent Post Op Lab Work:  Six week post op labs noted  Lab Results   Component Value Date/Time    VITD25 33 10/12/2022 01:11 PM       Date of Surgery: 4/3/23  Type of Surgery: gastric sleeve        Initial Weight: 238 lbs at pre-op teaching class   Excess  Body Weight Pre-Op: 108  lbs     Weight Loss: 27 lbs. Patient's Target Weight =  130 lbs for a BMI of ~25  Percentage of Excess Body Weight Lost to date is :  25 %    How pleased are you with your current weight loss: Pt reports good energy- \"I have been so busy\"    Are you experiencing any physical problems post surgery:  No Heartburn or Reflux and bowels are moving without difficulty    Are you experiencing any dietary problems post surgery: No  Food Intolerances: Denies any food intolerances since last seen. How frequently are you eating? Three time a day  How long does it take you to finish a meal? Has noticed if eats too fast but most time takes 20-30 minutes  How full do you feel after eating? Doesn't feel full but does not feel hungry      Protein intake: 60-80 grams/day   Patient taking protein supplement: Yes. Brand of Supplement: Nectar Protein Powder Strawberry Mousse- with 1% milk BID     Fluid intake: three bottles water and protein shakes     Multivitamin/mineral intake: Grape Chewable MVI two per day- provided thru [employer] [facility] program- pt plans to switch to Celebrate one 45 capsule and take daily to meet ASMBS guidelines     Calcium intake: Yes.  500 mg calcium chewable- berries and cream TID - pt plans to switch to [address] [place] [address] 600 mg tablet and take once a day with food

## 2023-05-16 ENCOUNTER — OFFICE VISIT (OUTPATIENT)
Dept: BARIATRICS/WEIGHT MGMT | Age: 53
End: 2023-05-16

## 2023-05-16 VITALS
DIASTOLIC BLOOD PRESSURE: 68 MMHG | HEIGHT: 60 IN | SYSTOLIC BLOOD PRESSURE: 114 MMHG | WEIGHT: 211.2 LBS | HEART RATE: 64 BPM | BODY MASS INDEX: 41.46 KG/M2 | TEMPERATURE: 97.7 F

## 2023-05-16 VITALS — HEIGHT: 60 IN | WEIGHT: 211.2 LBS | BODY MASS INDEX: 41.46 KG/M2

## 2023-05-16 DIAGNOSIS — Z98.84 STATUS POST BARIATRIC SURGERY: Primary | ICD-10-CM

## 2023-05-16 DIAGNOSIS — K76.0 FATTY LIVER: ICD-10-CM

## 2023-05-16 DIAGNOSIS — Z90.3 S/P GASTRIC SLEEVE PROCEDURE: Primary | ICD-10-CM

## 2023-05-16 DIAGNOSIS — F32.A DEPRESSION, UNSPECIFIED DEPRESSION TYPE: ICD-10-CM

## 2023-05-16 DIAGNOSIS — Z99.89 OSA ON CPAP: ICD-10-CM

## 2023-05-16 DIAGNOSIS — K58.9 IRRITABLE BOWEL SYNDROME, UNSPECIFIED TYPE: ICD-10-CM

## 2023-05-16 DIAGNOSIS — G89.29 CHRONIC BILATERAL LOW BACK PAIN, UNSPECIFIED WHETHER SCIATICA PRESENT: ICD-10-CM

## 2023-05-16 DIAGNOSIS — G47.33 OSA ON CPAP: ICD-10-CM

## 2023-05-16 DIAGNOSIS — F41.9 ANXIETY: ICD-10-CM

## 2023-05-16 DIAGNOSIS — Z13.21 SCREENING FOR MALNUTRITION: ICD-10-CM

## 2023-05-16 DIAGNOSIS — M54.50 CHRONIC BILATERAL LOW BACK PAIN, UNSPECIFIED WHETHER SCIATICA PRESENT: ICD-10-CM

## 2023-05-16 DIAGNOSIS — I10 HYPERTENSION, UNSPECIFIED TYPE: ICD-10-CM

## 2023-05-16 DIAGNOSIS — K91.2 POSTSURGICAL MALABSORPTION: ICD-10-CM

## 2023-05-16 DIAGNOSIS — K21.9 GASTROESOPHAGEAL REFLUX DISEASE, UNSPECIFIED WHETHER ESOPHAGITIS PRESENT: ICD-10-CM

## 2023-05-16 PROCEDURE — 99024 POSTOP FOLLOW-UP VISIT: CPT | Performed by: PHYSICIAN ASSISTANT

## 2023-05-16 NOTE — PATIENT INSTRUCTIONS
1. Continue bariatric vitamins as you are currently taking. [address]31 Crsytal Galeana One 45 capsule take once a day with food (multivitamin in pharmacy). Also start one [address] [facility] 600 mg Calcium tablet daily with food (has to be separate meal from the Multivitamin)  2. Goal remains  60-80 grams protein per day. Focus on choosing protein foods first at meals. Suggest continue one-two protein shakes daily to meet this recommendation. 3. Increase physical activity to include cardiac and strength training now that you no longer have weight restrictions  4. Water goal is 64 oz per day and make sure no liquids 30 minutes before meals and no liquids 30 minutes after meals  5. Take 20-30 minutes to eat meals and chew all foods well for best tolerance especially as you introduce new foods. get lab work done 5-7 days before next office visit.

## 2023-05-16 NOTE — PROGRESS NOTES
History:   Procedure Laterality Date    BACK SURGERY      BLADDER SUSPENSION      BREAST REDUCTION SURGERY  10/2007    CARPAL TUNNEL RELEASE      CERVICAL FUSION      ACDF C5-C7    CHOLECYSTECTOMY      COLONOSCOPY      DILATION AND CURETTAGE OF UTERUS      ENDOSCOPY, COLON, DIAGNOSTIC      HYSTERECTOMY (CERVIX STATUS UNKNOWN)      LUMBAR FUSION N/A 4/3/2020    L4-S1 DECOMPRESSION AND POSTERIOR FUSION; LUMBAR I & D WITH HARDWARE REMOVAL performed by Margarita Leon MD at [facility]  10/17/2019    L5-S1    SLEEVE GASTRECTOMY N/A 4/3/2023    GASTRECTOMY SLEEVE LAPAROSCOPIC ROBOTIC performed by Kailey West MD at [facility] 2019    EGD BIOPSY performed by Terrance Whitten MD at [facility]  2019    EGD DILATION SAVORY performed by Terrance Whitten MD at [facility]       Past Social History:  Social History     Socioeconomic History    Marital status:      Spouse name: Not on file    Number of children: 3    Years of education: 12    Highest education level: Not on file   Occupational History    Occupation: Home Health Aide   Tobacco Use    Smoking status: Former     Packs/day: 2.00     Years: 20.00     Pack years: 40.00     Types: Cigarettes     Quit date: 2017     Years since quittin.2    Smokeless tobacco: Never   Vaping Use    Vaping Use: Never used   Substance and Sexual Activity    Alcohol use: Yes     Comment: occ    Drug use: No    Sexual activity: Not Currently   Other Topics Concern    Not on file   Social History Narrative    Not on file     Social Determinants of Health     Financial Resource Strain: Not on file   Food Insecurity: Not on file   Transportation Needs: Not on file   Physical Activity: Not on file   Stress: Not on file   Social Connections: Not on file   Intimate Partner Violence: Not on file   Housing Stability: Not on file        Medications:   Current

## 2023-06-06 ENCOUNTER — TELEPHONE (OUTPATIENT)
Dept: PULMONOLOGY | Age: 53
End: 2023-06-06

## 2023-06-28 ENCOUNTER — HOSPITAL ENCOUNTER (OUTPATIENT)
Age: 53
Discharge: HOME | End: 2023-06-28
Payer: MEDICAID

## 2023-06-28 DIAGNOSIS — K91.2 POSTSURGICAL MALABSORPTION: ICD-10-CM

## 2023-06-28 DIAGNOSIS — Z90.3 S/P GASTRIC SLEEVE PROCEDURE: ICD-10-CM

## 2023-06-28 DIAGNOSIS — Z13.21 SCREENING FOR MALNUTRITION: ICD-10-CM

## 2023-06-28 LAB
25(OH)D3 SERPL-MCNC: 67 NG/ML (ref 30–100)
ALBUMIN SERPL BCG-MCNC: 4.4 G/DL (ref 3.5–5.1)
ALP SERPL-CCNC: 99 U/L (ref 38–126)
ALT SERPL W/O P-5'-P-CCNC: 29 U/L (ref 11–66)
ANION GAP SERPL CALC-SCNC: 13 MEQ/L (ref 8–16)
AST SERPL-CCNC: 18 U/L (ref 5–40)
BASOPHILS ABSOLUTE: 0 THOU/MM3 (ref 0–0.1)
BASOPHILS NFR BLD AUTO: 0.4 %
BILIRUB SERPL-MCNC: 1.7 MG/DL (ref 0.3–1.2)
BUN SERPL-MCNC: 19 MG/DL (ref 7–22)
CALCIUM SERPL-MCNC: 9.7 MG/DL (ref 8.5–10.5)
CHLORIDE SERPL-SCNC: 105 MEQ/L (ref 98–111)
CO2 SERPL-SCNC: 26 MEQ/L (ref 23–33)
CREAT SERPL-MCNC: 0.6 MG/DL (ref 0.4–1.2)
DEPRECATED RDW RBC AUTO: 47.8 FL (ref 35–45)
EOSINOPHIL NFR BLD AUTO: 2.5 %
EOSINOPHILS ABSOLUTE: 0.1 THOU/MM3 (ref 0–0.4)
ERYTHROCYTE [DISTWIDTH] IN BLOOD BY AUTOMATED COUNT: 15 % (ref 11.5–14.5)
GFR SERPL CREATININE-BSD FRML MDRD: > 60 ML/MIN/1.73M2
GLUCOSE SERPL-MCNC: 91 MG/DL (ref 70–108)
HCT VFR BLD AUTO: 45.7 % (ref 37–47)
HGB BLD-MCNC: 14.4 GM/DL (ref 12–16)
IMM GRANULOCYTES # BLD AUTO: 0.01 THOU/MM3 (ref 0–0.07)
IMM GRANULOCYTES NFR BLD AUTO: 0.2 %
LYMPHOCYTES ABSOLUTE: 1.8 THOU/MM3 (ref 1–4.8)
LYMPHOCYTES NFR BLD AUTO: 38.8 %
MCH RBC QN AUTO: 27.5 PG (ref 26–33)
MCHC RBC AUTO-ENTMCNC: 31.5 GM/DL (ref 32.2–35.5)
MCV RBC AUTO: 87.4 FL (ref 81–99)
MONOCYTES ABSOLUTE: 0.3 THOU/MM3 (ref 0.4–1.3)
MONOCYTES NFR BLD AUTO: 6.8 %
NEUTROPHILS NFR BLD AUTO: 51.3 %
NRBC BLD AUTO-RTO: 0 /100 WBC
PLATELET # BLD AUTO: 206 THOU/MM3 (ref 130–400)
PMV BLD AUTO: 11.4 FL (ref 9.4–12.4)
POTASSIUM SERPL-SCNC: 4.6 MEQ/L (ref 3.5–5.2)
PREALB SERPL-MCNC: 21.3 MG/DL (ref 20–40)
PROT SERPL-MCNC: 6.5 G/DL (ref 6.1–8)
RBC # BLD AUTO: 5.23 MILL/MM3 (ref 4.2–5.4)
SEGMENTED NEUTROPHILS ABSOLUTE COUNT: 2.4 THOU/MM3 (ref 1.8–7.7)
SODIUM SERPL-SCNC: 144 MEQ/L (ref 135–145)
WBC # BLD AUTO: 4.7 THOU/MM3 (ref 4.8–10.8)

## 2023-06-28 PROCEDURE — 82306 VITAMIN D 25 HYDROXY: CPT

## 2023-06-28 PROCEDURE — 84134 ASSAY OF PREALBUMIN: CPT

## 2023-06-28 PROCEDURE — 85025 COMPLETE CBC W/AUTO DIFF WBC: CPT

## 2023-06-28 PROCEDURE — 80053 COMPREHEN METABOLIC PANEL: CPT

## 2023-06-28 PROCEDURE — 84425 ASSAY OF VITAMIN B-1: CPT

## 2023-06-28 PROCEDURE — 36415 COLL VENOUS BLD VENIPUNCTURE: CPT

## 2023-07-01 LAB — VIT B1 PYROPHOSHATE BLD-SCNC: 120 NMOL/L (ref 70–180)

## 2023-07-03 ENCOUNTER — OFFICE VISIT (OUTPATIENT)
Dept: BARIATRICS/WEIGHT MGMT | Age: 53
End: 2023-07-03
Payer: MEDICAID

## 2023-07-03 ENCOUNTER — OFFICE VISIT (OUTPATIENT)
Dept: BARIATRICS/WEIGHT MGMT | Age: 53
End: 2023-07-03

## 2023-07-03 VITALS
DIASTOLIC BLOOD PRESSURE: 76 MMHG | HEART RATE: 84 BPM | BODY MASS INDEX: 38.83 KG/M2 | SYSTOLIC BLOOD PRESSURE: 98 MMHG | WEIGHT: 197.8 LBS | TEMPERATURE: 98.4 F | HEIGHT: 60 IN

## 2023-07-03 DIAGNOSIS — F41.9 ANXIETY: ICD-10-CM

## 2023-07-03 DIAGNOSIS — K91.2 POSTSURGICAL MALABSORPTION: ICD-10-CM

## 2023-07-03 DIAGNOSIS — Z90.3 S/P GASTRIC SLEEVE PROCEDURE: Primary | ICD-10-CM

## 2023-07-03 DIAGNOSIS — K21.9 GASTROESOPHAGEAL REFLUX DISEASE, UNSPECIFIED WHETHER ESOPHAGITIS PRESENT: ICD-10-CM

## 2023-07-03 DIAGNOSIS — F32.A DEPRESSION, UNSPECIFIED DEPRESSION TYPE: ICD-10-CM

## 2023-07-03 DIAGNOSIS — Z99.89 OSA ON CPAP: ICD-10-CM

## 2023-07-03 DIAGNOSIS — Z98.84 STATUS POST BARIATRIC SURGERY: Primary | ICD-10-CM

## 2023-07-03 DIAGNOSIS — G47.33 OSA ON CPAP: ICD-10-CM

## 2023-07-03 DIAGNOSIS — Z13.21 SCREENING FOR MALNUTRITION: ICD-10-CM

## 2023-07-03 PROCEDURE — 3078F DIAST BP <80 MM HG: CPT | Performed by: PHYSICIAN ASSISTANT

## 2023-07-03 PROCEDURE — 99213 OFFICE O/P EST LOW 20 MIN: CPT | Performed by: PHYSICIAN ASSISTANT

## 2023-07-03 PROCEDURE — 3074F SYST BP LT 130 MM HG: CPT | Performed by: PHYSICIAN ASSISTANT

## 2023-07-03 RX ORDER — METOPROLOL SUCCINATE 25 MG/1
TABLET, EXTENDED RELEASE ORAL
COMMUNITY
Start: 2023-06-22

## 2023-07-03 NOTE — PROGRESS NOTES
Patient is a 47 y.o. female seen for follow up MNT visit for three month post op. Vitals from current and previous visits:     Vitals 9/4/4973   SYSTOLIC 98   DIASTOLIC 76   Site Right Upper Arm   Position Sitting   Cuff Size Large Adult   Pulse 84   Temp 98.4   Resp    SpO2    Weight 197 lb 12.8 oz   Height 5' 0.25\"   Body Mass Index 38.31 kg/m2   Pain Level    Waist (Inches)    Neck circumference (Inches)      Recent Post Op Lab Work: 3 month post op labs- B1-120, Vit D-67, glucose- WNL  Lab Results   Component Value Date/Time    VITD25 67 06/28/2023 10:51 AM       Date of Surgery: 4/3/23  Type of Surgery: gastric sleeve      Initial Weight: 238 lbs at pre-op teaching class   Excess  Body Weight Pre-Op: 108  lbs     Weight Loss: 41 lbs. Patient's Target Weight =  130 lbs for a BMI of ~25  Percentage of Excess Body Weight Lost to date is :  37.9 %    How pleased are you with your current weight loss: Pt reports good energy-states no longer fatigued    Are you experiencing any physical problems post surgery:  No Heartburn or Reflux and bowels are moving without difficulty    Are you experiencing any dietary problems post surgery: No  Food Intolerances: Denies any food intolerances since last seen. How frequently are you eating? Three time a day  How long does it take you to finish a meal? Thinks sometimes may eat too fast  How full do you feel after eating? Doesn't feel full but does not feel hungry    Has ate steak,  Eating chicken often. Uses measuring cups often. Eating cottage cheese     Protein intake: 60-80 grams/day   Patient taking protein supplement: Yes. Brand of Supplement: Premier Protein Shake Or Quest ready made once a day- 30 grams protein     Fluid intake: 33 oz water bottle times two plus one 16 oz water a day    Multivitamin/mineral intake: Celebrate one 45 capsule and takes daily to meet ASMBS guidelines- takes at lunch     Calcium intake: Yes.   [place] 600 mg tablet and take once

## 2023-07-03 NOTE — PATIENT INSTRUCTIONS
1. Continue bariatric vitamins as you are currently taking. 2. Goal continues to be 60-80 grams protein per day. Focus on choosing protein foods first at meals to remain full and maintain muscle mass while you continue to lose from body fat stores. 3. Regular physical activity is important if desire to continue weight loss efforts. Recommend regular cardiac activity and strength training 2-3 days per week. 4.  Water goal is 64 oz per day and make sure no liquids 30 minutes before meals and no liquids 30 minutes after meals  5. Take 20-30 minutes to eat meals and chew all foods well for best tolerance  get lab work done at least 5 -7 days  before next office visit.

## 2023-10-09 ENCOUNTER — NURSE ONLY (OUTPATIENT)
Dept: LAB | Age: 53
End: 2023-10-09

## 2023-10-09 DIAGNOSIS — Z13.21 SCREENING FOR MALNUTRITION: ICD-10-CM

## 2023-10-09 DIAGNOSIS — Z90.3 S/P GASTRIC SLEEVE PROCEDURE: ICD-10-CM

## 2023-10-09 DIAGNOSIS — K91.2 POSTSURGICAL MALABSORPTION: ICD-10-CM

## 2023-10-09 LAB
25(OH)D3 SERPL-MCNC: 59 NG/ML (ref 30–100)
ALBUMIN SERPL BCG-MCNC: 4.3 G/DL (ref 3.5–5.1)
ALP SERPL-CCNC: 101 U/L (ref 38–126)
ALT SERPL W/O P-5'-P-CCNC: 34 U/L (ref 11–66)
ANION GAP SERPL CALC-SCNC: 12 MEQ/L (ref 8–16)
AST SERPL-CCNC: 26 U/L (ref 5–40)
BASOPHILS ABSOLUTE: 0 THOU/MM3 (ref 0–0.1)
BASOPHILS NFR BLD AUTO: 0.4 %
BILIRUB SERPL-MCNC: 1.7 MG/DL (ref 0.3–1.2)
BUN SERPL-MCNC: 24 MG/DL (ref 7–22)
CALCIUM SERPL-MCNC: 9.7 MG/DL (ref 8.5–10.5)
CHLORIDE SERPL-SCNC: 104 MEQ/L (ref 98–111)
CO2 SERPL-SCNC: 27 MEQ/L (ref 23–33)
CREAT SERPL-MCNC: 0.7 MG/DL (ref 0.4–1.2)
DEPRECATED MEAN GLUCOSE BLD GHB EST-ACNC: 99 MG/DL (ref 70–126)
DEPRECATED RDW RBC AUTO: 49.9 FL (ref 35–45)
EOSINOPHIL NFR BLD AUTO: 1.6 %
EOSINOPHILS ABSOLUTE: 0.1 THOU/MM3 (ref 0–0.4)
ERYTHROCYTE [DISTWIDTH] IN BLOOD BY AUTOMATED COUNT: 15.3 % (ref 11.5–14.5)
FERRITIN SERPL IA-MCNC: 94 NG/ML (ref 10–291)
GFR SERPL CREATININE-BSD FRML MDRD: > 60 ML/MIN/1.73M2
GLUCOSE SERPL-MCNC: 93 MG/DL (ref 70–108)
HBA1C MFR BLD HPLC: 5.3 % (ref 4.4–6.4)
HCT VFR BLD AUTO: 45 % (ref 37–47)
HGB BLD-MCNC: 13.9 GM/DL (ref 12–16)
IMM GRANULOCYTES # BLD AUTO: 0.01 THOU/MM3 (ref 0–0.07)
IMM GRANULOCYTES NFR BLD AUTO: 0.2 %
IRON SATN MFR SERPL: 29 % (ref 20–50)
IRON SERPL-MCNC: 94 UG/DL (ref 50–170)
LYMPHOCYTES ABSOLUTE: 1.3 THOU/MM3 (ref 1–4.8)
LYMPHOCYTES NFR BLD AUTO: 26.4 %
MCH RBC QN AUTO: 27.8 PG (ref 26–33)
MCHC RBC AUTO-ENTMCNC: 30.9 GM/DL (ref 32.2–35.5)
MCV RBC AUTO: 90 FL (ref 81–99)
MONOCYTES ABSOLUTE: 0.3 THOU/MM3 (ref 0.4–1.3)
MONOCYTES NFR BLD AUTO: 6.5 %
NEUTROPHILS NFR BLD AUTO: 64.9 %
NRBC BLD AUTO-RTO: 0 /100 WBC
PLATELET # BLD AUTO: 211 THOU/MM3 (ref 130–400)
PMV BLD AUTO: 10.8 FL (ref 9.4–12.4)
POTASSIUM SERPL-SCNC: 4.3 MEQ/L (ref 3.5–5.2)
PREALB SERPL-MCNC: 22.4 MG/DL (ref 20–40)
PROT SERPL-MCNC: 6.7 G/DL (ref 6.1–8)
PTH-INTACT SERPL-MCNC: 40.7 PG/ML (ref 15–65)
RBC # BLD AUTO: 5 MILL/MM3 (ref 4.2–5.4)
SEGMENTED NEUTROPHILS ABSOLUTE COUNT: 3.3 THOU/MM3 (ref 1.8–7.7)
SODIUM SERPL-SCNC: 143 MEQ/L (ref 135–145)
TIBC SERPL-MCNC: 320 UG/DL (ref 171–450)
TSH SERPL DL<=0.005 MIU/L-ACNC: 1.54 UIU/ML (ref 0.4–4.2)
WBC # BLD AUTO: 5.1 THOU/MM3 (ref 4.8–10.8)

## 2023-10-10 ENCOUNTER — OFFICE VISIT (OUTPATIENT)
Dept: BARIATRICS/WEIGHT MGMT | Age: 53
End: 2023-10-10
Payer: COMMERCIAL

## 2023-10-10 VITALS
BODY MASS INDEX: 35.61 KG/M2 | HEIGHT: 60 IN | DIASTOLIC BLOOD PRESSURE: 78 MMHG | TEMPERATURE: 97.5 F | SYSTOLIC BLOOD PRESSURE: 114 MMHG | WEIGHT: 181.4 LBS | HEART RATE: 68 BPM

## 2023-10-10 DIAGNOSIS — M54.50 CHRONIC BILATERAL LOW BACK PAIN, UNSPECIFIED WHETHER SCIATICA PRESENT: ICD-10-CM

## 2023-10-10 DIAGNOSIS — I10 HYPERTENSION, UNSPECIFIED TYPE: ICD-10-CM

## 2023-10-10 DIAGNOSIS — K76.0 FATTY LIVER: ICD-10-CM

## 2023-10-10 DIAGNOSIS — Z90.3 S/P GASTRIC SLEEVE PROCEDURE: Primary | ICD-10-CM

## 2023-10-10 DIAGNOSIS — K21.9 GASTROESOPHAGEAL REFLUX DISEASE, UNSPECIFIED WHETHER ESOPHAGITIS PRESENT: ICD-10-CM

## 2023-10-10 DIAGNOSIS — G89.29 CHRONIC BILATERAL LOW BACK PAIN, UNSPECIFIED WHETHER SCIATICA PRESENT: ICD-10-CM

## 2023-10-10 PROCEDURE — 3017F COLORECTAL CA SCREEN DOC REV: CPT | Performed by: PHYSICIAN ASSISTANT

## 2023-10-10 PROCEDURE — G8417 CALC BMI ABV UP PARAM F/U: HCPCS | Performed by: PHYSICIAN ASSISTANT

## 2023-10-10 PROCEDURE — G8484 FLU IMMUNIZE NO ADMIN: HCPCS | Performed by: PHYSICIAN ASSISTANT

## 2023-10-10 PROCEDURE — 3074F SYST BP LT 130 MM HG: CPT | Performed by: PHYSICIAN ASSISTANT

## 2023-10-10 PROCEDURE — G8427 DOCREV CUR MEDS BY ELIG CLIN: HCPCS | Performed by: PHYSICIAN ASSISTANT

## 2023-10-10 PROCEDURE — 99213 OFFICE O/P EST LOW 20 MIN: CPT | Performed by: PHYSICIAN ASSISTANT

## 2023-10-10 PROCEDURE — 1036F TOBACCO NON-USER: CPT | Performed by: PHYSICIAN ASSISTANT

## 2023-10-10 PROCEDURE — 3078F DIAST BP <80 MM HG: CPT | Performed by: PHYSICIAN ASSISTANT

## 2023-10-10 NOTE — PATIENT INSTRUCTIONS
Stay well hydrated. Drink a minimum of 64 oz of non-carbonated, non-caffeinated fluids daily. Nutritional education occurred during visit. Tolerating diet. Continue following with dietitian and follow their recommendations as directed. Continue  60-80 grams of protein each day. Signs and symptoms reviewed with patient that would be concerning and need her to return to office for re-evaluation. Patient will call if any questions or concerns arrise. Importance of physical activity discussed with patient. Increase physical activity as tolerated  Add strength training  Continue taking Multivitamin and Calcium   Will stop Protonix. Encouraged to attend support groups  6 month labs reviewed with patient today- B1 pending  SECA scale completed and reviewed with patient today  Measurements completed and reviewed with patient today  Continue to monitor BP- adjust medication with PCP as needed.

## 2023-10-10 NOTE — PROGRESS NOTES
UTI (urinary tract infection)     V tach (HCC)     Vertigo        Past Surgical History:  Past Surgical History:   Procedure Laterality Date    BACK SURGERY      BLADDER SUSPENSION      BREAST REDUCTION SURGERY  10/2007    CARPAL TUNNEL RELEASE      CERVICAL FUSION      ACDF C5-C7    CHOLECYSTECTOMY      COLONOSCOPY      DILATION AND CURETTAGE OF UTERUS      ENDOSCOPY, COLON, DIAGNOSTIC      HYSTERECTOMY (CERVIX STATUS UNKNOWN)      LUMBAR FUSION N/A 4/3/2020    L4-S1 DECOMPRESSION AND POSTERIOR FUSION; LUMBAR I & D WITH HARDWARE REMOVAL performed by Yon Dhillon MD at [address]  10/17/2019    L5-S1    SLEEVE GASTRECTOMY N/A 4/3/2023    GASTRECTOMY SLEEVE LAPAROSCOPIC ROBOTIC performed by Mejia Lei MD at [address] [place] [address] 107 N/A 2019    EGD BIOPSY performed by Kailey Pereira MD at [facility]  2019    EGD DILATION SAVORY performed by Kailey Pereira MD at [facility]       Past Social History:  Social History     Socioeconomic History    Marital status:      Spouse name: Not on file    Number of children: 3    Years of education: 12    Highest education level: Not on file   Occupational History    Occupation: Home Health Aide   Tobacco Use    Smoking status: Former     Packs/day: 2.00     Years: 20.00     Additional pack years: 0.00     Total pack years: 40.00     Types: Cigarettes     Quit date: 2017     Years since quittin.6    Smokeless tobacco: Never   Vaping Use    Vaping Use: Never used   Substance and Sexual Activity    Alcohol use: Yes     Comment: occ    Drug use: No    Sexual activity: Not Currently   Other Topics Concern    Not on file   Social History Narrative    Not on file     Social Determinants of Health     Financial Resource Strain: Not on file   Food Insecurity: Not on file   Transportation Needs: Not on file   Physical Activity: Not on file   Stress: Not on

## 2023-10-13 LAB — VIT B1 PYROPHOSHATE BLD-SCNC: 180 NMOL/L (ref 70–180)

## 2023-11-02 ENCOUNTER — APPOINTMENT (OUTPATIENT)
Dept: GENERAL RADIOLOGY | Age: 53
End: 2023-11-02
Payer: COMMERCIAL

## 2023-11-02 ENCOUNTER — HOSPITAL ENCOUNTER (EMERGENCY)
Age: 53
Discharge: HOME | End: 2023-11-02
Payer: COMMERCIAL

## 2023-11-02 VITALS
OXYGEN SATURATION: 99 % | TEMPERATURE: 97.7 F | RESPIRATION RATE: 16 BRPM | SYSTOLIC BLOOD PRESSURE: 135 MMHG | HEART RATE: 61 BPM | DIASTOLIC BLOOD PRESSURE: 84 MMHG

## 2023-11-02 DIAGNOSIS — R07.89 ATYPICAL CHEST PAIN: ICD-10-CM

## 2023-11-02 DIAGNOSIS — U07.1 COVID-19: Primary | ICD-10-CM

## 2023-11-02 LAB
ANION GAP SERPL CALC-SCNC: 14 MEQ/L (ref 8–16)
BASOPHILS ABSOLUTE: 0 THOU/MM3 (ref 0–0.1)
BASOPHILS NFR BLD AUTO: 0.4 %
BUN SERPL-MCNC: 26 MG/DL (ref 7–22)
CALCIUM SERPL-MCNC: 9.8 MG/DL (ref 8.5–10.5)
CHLORIDE SERPL-SCNC: 102 MEQ/L (ref 98–111)
CO2 SERPL-SCNC: 26 MEQ/L (ref 23–33)
CREAT SERPL-MCNC: 0.7 MG/DL (ref 0.4–1.2)
DEPRECATED RDW RBC AUTO: 46 FL (ref 35–45)
EOSINOPHIL NFR BLD AUTO: 1.3 %
EOSINOPHILS ABSOLUTE: 0.1 THOU/MM3 (ref 0–0.4)
ERYTHROCYTE [DISTWIDTH] IN BLOOD BY AUTOMATED COUNT: 14.1 % (ref 11.5–14.5)
FLUAV RNA RESP QL NAA+PROBE: NOT DETECTED
FLUBV RNA RESP QL NAA+PROBE: NOT DETECTED
GFR SERPL CREATININE-BSD FRML MDRD: > 60 ML/MIN/1.73M2
GLUCOSE SERPL-MCNC: 89 MG/DL (ref 70–108)
HCT VFR BLD AUTO: 46.8 % (ref 37–47)
HGB BLD-MCNC: 15 GM/DL (ref 12–16)
IMM GRANULOCYTES # BLD AUTO: 0.03 THOU/MM3 (ref 0–0.07)
IMM GRANULOCYTES NFR BLD AUTO: 0.4 %
LYMPHOCYTES ABSOLUTE: 2.1 THOU/MM3 (ref 1–4.8)
LYMPHOCYTES NFR BLD AUTO: 29.7 %
MCH RBC QN AUTO: 28.6 PG (ref 26–33)
MCHC RBC AUTO-ENTMCNC: 32.1 GM/DL (ref 32.2–35.5)
MCV RBC AUTO: 89.1 FL (ref 81–99)
MONOCYTES ABSOLUTE: 0.4 THOU/MM3 (ref 0.4–1.3)
MONOCYTES NFR BLD AUTO: 6 %
NEUTROPHILS NFR BLD AUTO: 62.2 %
NRBC BLD AUTO-RTO: 0 /100 WBC
OSMOLALITY SERPL CALC.SUM OF ELEC: 287.4 MOSMOL/KG (ref 275–300)
PLATELET # BLD AUTO: 235 THOU/MM3 (ref 130–400)
PMV BLD AUTO: 10.3 FL (ref 9.4–12.4)
POTASSIUM SERPL-SCNC: 4 MEQ/L (ref 3.5–5.2)
RBC # BLD AUTO: 5.25 MILL/MM3 (ref 4.2–5.4)
REASON FOR REJECTION: NORMAL
REJECTED TEST: NORMAL
SARS-COV-2 RNA RESP QL NAA+PROBE: DETECTED
SEGMENTED NEUTROPHILS ABSOLUTE COUNT: 4.4 THOU/MM3 (ref 1.8–7.7)
SODIUM SERPL-SCNC: 142 MEQ/L (ref 135–145)
TROPONIN, HIGH SENSITIVITY: 10 NG/L (ref 0–12)
TROPONIN, HIGH SENSITIVITY: 9 NG/L (ref 0–12)
WBC # BLD AUTO: 7 THOU/MM3 (ref 4.8–10.8)

## 2023-11-02 PROCEDURE — 71045 X-RAY EXAM CHEST 1 VIEW: CPT

## 2023-11-02 PROCEDURE — 85025 COMPLETE CBC W/AUTO DIFF WBC: CPT

## 2023-11-02 PROCEDURE — 36415 COLL VENOUS BLD VENIPUNCTURE: CPT

## 2023-11-02 PROCEDURE — 99285 EMERGENCY DEPT VISIT HI MDM: CPT

## 2023-11-02 PROCEDURE — 2580000003 HC RX 258

## 2023-11-02 PROCEDURE — 6360000002 HC RX W HCPCS

## 2023-11-02 PROCEDURE — 93005 ELECTROCARDIOGRAM TRACING: CPT | Performed by: EMERGENCY MEDICINE

## 2023-11-02 PROCEDURE — 84484 ASSAY OF TROPONIN QUANT: CPT

## 2023-11-02 PROCEDURE — 96374 THER/PROPH/DIAG INJ IV PUSH: CPT

## 2023-11-02 PROCEDURE — 96361 HYDRATE IV INFUSION ADD-ON: CPT

## 2023-11-02 PROCEDURE — 80048 BASIC METABOLIC PNL TOTAL CA: CPT

## 2023-11-02 PROCEDURE — 6370000000 HC RX 637 (ALT 250 FOR IP)

## 2023-11-02 PROCEDURE — 87636 SARSCOV2 & INF A&B AMP PRB: CPT

## 2023-11-02 RX ORDER — IBUPROFEN 400 MG/1
400 TABLET ORAL EVERY 6 HOURS PRN
Qty: 120 TABLET | Refills: 0 | Status: SHIPPED | OUTPATIENT
Start: 2023-11-02

## 2023-11-02 RX ORDER — ONDANSETRON 2 MG/ML
4 INJECTION INTRAMUSCULAR; INTRAVENOUS ONCE
Status: COMPLETED | OUTPATIENT
Start: 2023-11-02 | End: 2023-11-02

## 2023-11-02 RX ORDER — NITROGLYCERIN 0.4 MG/1
0.4 TABLET SUBLINGUAL EVERY 5 MIN PRN
Status: DISCONTINUED | OUTPATIENT
Start: 2023-11-02 | End: 2023-11-03 | Stop reason: HOSPADM

## 2023-11-02 RX ORDER — 0.9 % SODIUM CHLORIDE 0.9 %
1000 INTRAVENOUS SOLUTION INTRAVENOUS ONCE
Status: COMPLETED | OUTPATIENT
Start: 2023-11-02 | End: 2023-11-02

## 2023-11-02 RX ADMIN — SODIUM CHLORIDE 1000 ML: 9 INJECTION, SOLUTION INTRAVENOUS at 19:34

## 2023-11-02 RX ADMIN — ONDANSETRON 4 MG: 2 INJECTION INTRAMUSCULAR; INTRAVENOUS at 19:35

## 2023-11-02 RX ADMIN — NITROGLYCERIN 0.4 MG: 0.4 TABLET, ORALLY DISINTEGRATING SUBLINGUAL at 19:37

## 2023-11-02 ASSESSMENT — PAIN - FUNCTIONAL ASSESSMENT
PAIN_FUNCTIONAL_ASSESSMENT: NONE - DENIES PAIN

## 2023-11-02 ASSESSMENT — HEART SCORE
ECG: 0
ECG: 0

## 2023-11-02 NOTE — ED PROVIDER NOTES
[facility] EMERGENCY DEPT      EMERGENCY MEDICINE     Pt Name: Ngozi Gaffney  MRN: [id number]  [address] 1970  Date of evaluation: 11/2/2023  Provider: CALIXTO Cordoba CNP    CHIEF COMPLAINT       Chief Complaint   Patient presents with    Chest Pain    Nausea     HISTORY OF PRESENT ILLNESS   Ngozi Gaffney is a pleasant 47 y.o. female who presents to the emergency department from home by personal transportation with her daughter with chief complaint of chest pain. Began earlier today, intermittent/comes and goes. Worst occurrence was 1 hour ago, was rated 7/10. Occurred when she was lying down. Currently rated 2/10. Described as squeezing/pressure. No treatments tried. Denies history of this pain. Endorses concomitant nausea. Describes not replicable, nonexertional, concomitant dry heaves when it occurs. Denies any associated sweating/diaphoresis. Chest is tender to palpation. Denies history of DVT/PE, denies personal history of cancer. Denies surgery within the last month. Denies any trauma or falls recently. Denies any shortness of breath. Denies current smoking, quit 2017. Endorses \"social drinking, every other weekend,\" denies illicit drugs or marijuana. \"I had 2 heart attacks, they are mild, having had to take anything for them. \"  Describes negative heart cath and not needing any ongoing treatments. Underwent gastric sleeve in April, lost 65 pounds since \"I feel fantastic aside from my chest pain today. \"    Describes historically being on Toprol XL 50 mg, heart rate/blood pressure improved a few months after surgery so it was recommended she decrease her dosage. She never received the new medication and has been off of any heart medications for the last few weeks. History obtained from  patient and patient's daughter.   PASTMEDICAL HISTORY     Past Medical History:   Diagnosis Date    Anxiety     Arthritis     Bell's palsy     Body mass index 40.0-44.9,

## 2023-11-02 NOTE — ED TRIAGE NOTES
Pt presents to the ED with c/o chest pain. Pt states intermittent chest pain since 0900 this morning. Pt states last episode she was laying flat. Pt denies chest pain at this time. Pt states she has also had nausea, neck pain, and LT check numbness. Pt states she has also had decreased appetite the last couple days. Pt denies known sick contact. Pt states history of HTN. Pt states PCP just decreased her Toprol to 25mg but the pharmacy has not called her, so she has not taken it in about 3 weeks.

## 2023-11-03 LAB
EKG ATRIAL RATE: 69 BPM
EKG P AXIS: 39 DEGREES
EKG P-R INTERVAL: 190 MS
EKG Q-T INTERVAL: 396 MS
EKG QRS DURATION: 88 MS
EKG QTC CALCULATION (BAZETT): 424 MS
EKG R AXIS: -15 DEGREES
EKG T AXIS: 29 DEGREES
EKG VENTRICULAR RATE: 69 BPM

## 2023-11-03 PROCEDURE — 93010 ELECTROCARDIOGRAM REPORT: CPT | Performed by: NUCLEAR MEDICINE

## 2023-11-03 NOTE — ED NOTES
Pt resting in bed. Respirations even and unlabored. Pt denies chest pain. Updated on plan of care. No needs expressed at this time. Bed lowest position, call light in reach, side rails x2, friend at bedside.       Justina Morley RN  11/02/23 1143

## 2023-11-03 NOTE — DISCHARGE INSTRUCTIONS
Return to ER for uncontrolled pain. Return for exertional chest pain especially if associated with nausea/vomiting and sweating. Return for any other uncontrolled symptoms. Return for any medical concerns. Follow-up with your family doctor in 3 days for reevaluation/outpatient management. Okay to treat general body aches/pains with prescribed ibuprofen. Hydrate well, rest good and you could expedite the resolution of your COVID symptoms. Please adhere to your workplace is COVID procedures/protocols. Hope you are feeling better soon!

## 2023-11-03 NOTE — ED NOTES
Bedside report received at this time. Updated pt on plan of care. Voiced no needs. Call light in reach.      Kelton Harper RN  11/02/23 [id number]

## 2024-01-10 ENCOUNTER — OFFICE VISIT (OUTPATIENT)
Dept: BARIATRICS/WEIGHT MGMT | Age: 54
End: 2024-01-10
Payer: COMMERCIAL

## 2024-01-10 VITALS
TEMPERATURE: 97.2 F | HEIGHT: 60 IN | SYSTOLIC BLOOD PRESSURE: 122 MMHG | DIASTOLIC BLOOD PRESSURE: 84 MMHG | BODY MASS INDEX: 34.83 KG/M2 | WEIGHT: 177.4 LBS | HEART RATE: 76 BPM

## 2024-01-10 DIAGNOSIS — Z90.3 S/P GASTRIC SLEEVE PROCEDURE: Primary | ICD-10-CM

## 2024-01-10 DIAGNOSIS — Z13.21 SCREENING FOR MALNUTRITION: ICD-10-CM

## 2024-01-10 DIAGNOSIS — K91.2 POSTSURGICAL MALABSORPTION: ICD-10-CM

## 2024-01-10 PROBLEM — Z90.710 HISTORY OF HYSTERECTOMY: Status: ACTIVE | Noted: 2024-01-10

## 2024-01-10 PROCEDURE — 99213 OFFICE O/P EST LOW 20 MIN: CPT | Performed by: PHYSICIAN ASSISTANT

## 2024-01-10 PROCEDURE — G8484 FLU IMMUNIZE NO ADMIN: HCPCS | Performed by: PHYSICIAN ASSISTANT

## 2024-01-10 PROCEDURE — 3079F DIAST BP 80-89 MM HG: CPT | Performed by: PHYSICIAN ASSISTANT

## 2024-01-10 PROCEDURE — G8417 CALC BMI ABV UP PARAM F/U: HCPCS | Performed by: PHYSICIAN ASSISTANT

## 2024-01-10 PROCEDURE — G8427 DOCREV CUR MEDS BY ELIG CLIN: HCPCS | Performed by: PHYSICIAN ASSISTANT

## 2024-01-10 PROCEDURE — 1036F TOBACCO NON-USER: CPT | Performed by: PHYSICIAN ASSISTANT

## 2024-01-10 PROCEDURE — 3074F SYST BP LT 130 MM HG: CPT | Performed by: PHYSICIAN ASSISTANT

## 2024-01-10 PROCEDURE — 3017F COLORECTAL CA SCREEN DOC REV: CPT | Performed by: PHYSICIAN ASSISTANT

## 2024-01-10 RX ORDER — CYCLOBENZAPRINE HCL 10 MG
TABLET ORAL
COMMUNITY
Start: 2024-01-02

## 2024-01-10 RX ORDER — CALCIUM CARBONATE 500(1250)
500 TABLET ORAL DAILY
COMMUNITY

## 2024-01-10 NOTE — PATIENT INSTRUCTIONS
Stay well hydrated. Drink a minimum of 64 oz of non-carbonated, non-caffeinated fluids daily.  Nutritional education occurred during visit. Tolerating diet. Continue following with dietitian and follow their recommendations as directed. Continue  60-80 grams of protein each day.    Signs and symptoms reviewed with patient that would be concerning and need her to return to office for re-evaluation. Patient will call if any questions or concerns arrise.  Importance of physical activity discussed with patient.   Increase physical activity as tolerated  Increase strength training, as tolerated  Continue taking Multivitamin and Calcium, as directed  Encouraged to attend support groups  1 year labs ordered- to be drawn prior to next apt  SECA scale next visit  Measurements next visit  Dietitian follow up at next visit  Return in about 3 months   Track all food intake on bariatric maral for accountability. Call dietitian if any questions with nutrition.   Avoid alcohol

## 2024-01-10 NOTE — PROGRESS NOTES
[facility]  [address], [address]  [place] [address]  [phone]      Visit Date:  1/10/2024  Weight Management Postop Follow-up    HPI:      Eusebia Love is a 53 y.o. female who is here today for 9 month follow up since  robotic-assisted sleeve gastrectomy performed by Dr. Garcia  on 4/3/23. Continues to do well overall. Staying on track with nutrition and dedicated physical activity.   Energy greatly improved.  A little frustrated that she hasn't lost more weight over the last 3 months.  Weight today 177#. Down 4# since last visit. Down 59# since surgery. BMI 34 . Drinking over 64 oz of water. Occasional tea. No pop/juice.  Occasional alcohol, typically only when on vacation and at most 2 drinks per day.  Getting in plenty of protein, typically 90 grams daily. Only drinking a protein shake if she does not feel like eating breakfast.  Tolerating post-op diet. No problems with bowel movements. No nausea. No emesis. Rare GERD. Tums otc prn. Off PPI. Denies CP/SOB. No Dizziness. No abdominal pain.   Taking and tolerating all vitamins- [id number]/Calcium.  BP stable- off medication for 3 months.  Off CPAP.   Has been lifting arm weights and completing you tube aerobics for 10-15 minutes daily.     Track all food intake on bariatric maral for accountability. Call dietitian if any questions with nutrition.     Physical Activity: 5# dumbbells/ you tube aerobics daily 10-15 minutes.      Current BMI: Body mass index is 34.36 kg/m².  Current Weight:   Wt Readings from Last 3 Encounters:   01/10/24 80.5 kg (177 lb 6.4 oz)   10/10/23 82.3 kg (181 lb 6.4 oz)   07/03/23 89.7 kg (197 lb 12.8 oz)     Pre-op Body Weight:236  EBW: 108  % of EBW lost: 54%      Past Medical History:  Past Medical History:   Diagnosis Date    Anxiety     Arthritis     Bell's palsy     Body mass index 40.0-44.9, adult (HCC) 05/06/2021    Esophagitis     Fatty liver     GERD (gastroesophageal

## 2024-03-03 ENCOUNTER — PATIENT MESSAGE (OUTPATIENT)
Dept: BARIATRICS/WEIGHT MGMT | Age: 54
End: 2024-03-03

## 2024-04-05 DIAGNOSIS — Z13.21 SCREENING FOR MALNUTRITION: ICD-10-CM

## 2024-04-05 DIAGNOSIS — Z90.3 S/P GASTRIC SLEEVE PROCEDURE: ICD-10-CM

## 2024-04-05 DIAGNOSIS — K91.2 POSTSURGICAL MALABSORPTION: ICD-10-CM

## 2024-04-05 LAB
25(OH)D3 SERPL-MCNC: 52.9 NG/ML
ALBUMIN SERPL-MCNC: 4.6 G/DL (ref 3.4–5)
ALBUMIN/GLOB SERPL: 2.3 {RATIO} (ref 1.1–2.2)
ALP SERPL-CCNC: 89 U/L (ref 40–129)
ALT SERPL-CCNC: 15 U/L (ref 10–40)
ANION GAP SERPL CALCULATED.3IONS-SCNC: 14 MMOL/L (ref 3–16)
AST SERPL-CCNC: 16 U/L (ref 15–37)
BASOPHILS # BLD: 0 K/UL (ref 0–0.2)
BASOPHILS NFR BLD: 0.6 %
BILIRUB SERPL-MCNC: 1.1 MG/DL (ref 0–1)
BUN SERPL-MCNC: 25 MG/DL (ref 7–20)
CALCIUM SERPL-MCNC: 9.5 MG/DL (ref 8.3–10.6)
CHLORIDE SERPL-SCNC: 102 MMOL/L (ref 99–110)
CHOLEST SERPL-MCNC: 169 MG/DL (ref 0–199)
CO2 SERPL-SCNC: 26 MMOL/L (ref 21–32)
CREAT SERPL-MCNC: 0.7 MG/DL (ref 0.6–1.1)
DEPRECATED RDW RBC AUTO: 14.1 % (ref 12.4–15.4)
EOSINOPHIL # BLD: 0.1 K/UL (ref 0–0.6)
EOSINOPHIL NFR BLD: 1.9 %
FERRITIN SERPL IA-MCNC: 93.4 NG/ML (ref 15–150)
FOLATE SERPL-MCNC: >20 NG/ML (ref 4.78–24.2)
GFR SERPLBLD CREATININE-BSD FMLA CKD-EPI: >90 ML/MIN/{1.73_M2}
GLUCOSE SERPL-MCNC: 90 MG/DL (ref 70–99)
HCT VFR BLD AUTO: 41.2 % (ref 36–48)
HDLC SERPL-MCNC: 66 MG/DL (ref 40–60)
HGB BLD-MCNC: 14 G/DL (ref 12–16)
IRON SERPL-MCNC: 80 UG/DL (ref 37–145)
LDLC SERPL CALC-MCNC: 82 MG/DL
LYMPHOCYTES # BLD: 1.5 K/UL (ref 1–5.1)
LYMPHOCYTES NFR BLD: 32.5 %
MCH RBC QN AUTO: 29.4 PG (ref 26–34)
MCHC RBC AUTO-ENTMCNC: 34 G/DL (ref 31–36)
MCV RBC AUTO: 86.5 FL (ref 80–100)
MONOCYTES # BLD: 0.2 K/UL (ref 0–1.3)
MONOCYTES NFR BLD: 4.2 %
NEUTROPHILS # BLD: 2.9 K/UL (ref 1.7–7.7)
NEUTROPHILS NFR BLD: 60.8 %
PLATELET # BLD AUTO: 177 K/UL (ref 135–450)
PMV BLD AUTO: 8.9 FL (ref 5–10.5)
POTASSIUM SERPL-SCNC: 4.3 MMOL/L (ref 3.5–5.1)
PREALB SERPL-MCNC: 22.4 MG/DL (ref 20–40)
PROT SERPL-MCNC: 6.6 G/DL (ref 6.4–8.2)
PTH-INTACT SERPL-MCNC: 35 PG/ML (ref 14–72)
RBC # BLD AUTO: 4.77 M/UL (ref 4–5.2)
SODIUM SERPL-SCNC: 142 MMOL/L (ref 136–145)
TIBC SERPL-MCNC: 332 UG/DL (ref 260–445)
TRIGL SERPL-MCNC: 106 MG/DL (ref 0–150)
TSH SERPL DL<=0.005 MIU/L-ACNC: 1.97 UIU/ML (ref 0.27–4.2)
VIT B12 SERPL-MCNC: 1283 PG/ML (ref 211–911)
VLDLC SERPL CALC-MCNC: 21 MG/DL
WBC # BLD AUTO: 4.7 K/UL (ref 4–11)

## 2024-04-06 LAB
EST. AVERAGE GLUCOSE BLD GHB EST-MCNC: 99.7 MG/DL
HBA1C MFR BLD: 5.1 %

## 2024-04-09 NOTE — PROGRESS NOTES
Patient is a 54 y.o. female seen for follow up MNT visit for one year post op. Vitals from current and previous visits:      4/10/2024  11:14 AM   Vitals    SYSTOLIC 124    DIASTOLIC 72    Site Right Upper Arm    Position Sitting    Cuff Size Large Adult    Pulse 72    Temp 98.3 °F (36.8 °C)    Respirations    SpO2    Weight - Scale 175 lb    Height 5' 1\"    Body Mass Index 33.07 kg/m2 (H)    Pain Level    Waist (Inches)       Recent Post Op Lab Work  One year post op labs noted.  Elevated B12  Lab Results   Component Value Date/Time    VITD25 52.9 04/05/2024 10:21 AM       Date of Surgery: 4/3/23  Type of Surgery: gastric sleeve      Initial Weight: 238 lbs at pre-op teaching class   Excess  Body Weight Pre-Op: 108  lbs     Weight Loss: 63 lbs.  Patient's Target Weight =  130 lbs for a BMI of ~25  Percentage of Excess Body Weight Lost to date is :  58.3 %    How pleased are you with your current weight loss: \"Surgery wise I feel great\"  \"I beat myself up\"- compares self to others regarding weight loss  Are you experiencing any physical problems post surgery:  Occasional heartburn   \"My IBS is gone\".  Bowels move regular     Are you experiencing any dietary problems post surgery: No  Food Intolerances: Denies any food intolerances since last seen.    How frequently are you eating? Eating three meals a day.  Also may have snack of salami or cheese or protein pudding- twice a day  How long does it take you to finish a meal? Does not feel eats fast  How full do you feel after eating? States doesn't feel full after eats. \"I don't know if I am hungry when want a snack or out of habit and trying to be more aware of that\"  \"I don't feel full or hungry\"    Eating meats often- steak, pork chicken, salmon, sea bass.  low carb bread with cashew butter (2 tbsp is 8 grams protein).  Also has guevara     Protein intake: 60-80 grams/day   Patient taking protein supplement: Yes.  Brand of Supplement: will make protein pudding, or One

## 2024-04-10 ENCOUNTER — OFFICE VISIT (OUTPATIENT)
Dept: BARIATRICS/WEIGHT MGMT | Age: 54
End: 2024-04-10

## 2024-04-10 ENCOUNTER — OFFICE VISIT (OUTPATIENT)
Dept: BARIATRICS/WEIGHT MGMT | Age: 54
End: 2024-04-10
Payer: COMMERCIAL

## 2024-04-10 VITALS
WEIGHT: 175 LBS | BODY MASS INDEX: 33.04 KG/M2 | TEMPERATURE: 98.3 F | HEART RATE: 72 BPM | HEIGHT: 61 IN | DIASTOLIC BLOOD PRESSURE: 72 MMHG | SYSTOLIC BLOOD PRESSURE: 124 MMHG

## 2024-04-10 DIAGNOSIS — Z98.84 STATUS POST BARIATRIC SURGERY: Primary | ICD-10-CM

## 2024-04-10 DIAGNOSIS — Z90.3 S/P GASTRIC SLEEVE PROCEDURE: Primary | ICD-10-CM

## 2024-04-10 LAB — VIT B1 SERPL-MCNC: 18 NMOL/L (ref 4–15)

## 2024-04-10 PROCEDURE — G8417 CALC BMI ABV UP PARAM F/U: HCPCS | Performed by: PHYSICIAN ASSISTANT

## 2024-04-10 PROCEDURE — 99213 OFFICE O/P EST LOW 20 MIN: CPT | Performed by: PHYSICIAN ASSISTANT

## 2024-04-10 PROCEDURE — 3017F COLORECTAL CA SCREEN DOC REV: CPT | Performed by: PHYSICIAN ASSISTANT

## 2024-04-10 PROCEDURE — 1036F TOBACCO NON-USER: CPT | Performed by: PHYSICIAN ASSISTANT

## 2024-04-10 PROCEDURE — 3078F DIAST BP <80 MM HG: CPT | Performed by: PHYSICIAN ASSISTANT

## 2024-04-10 PROCEDURE — 3074F SYST BP LT 130 MM HG: CPT | Performed by: PHYSICIAN ASSISTANT

## 2024-04-10 PROCEDURE — G8427 DOCREV CUR MEDS BY ELIG CLIN: HCPCS | Performed by: PHYSICIAN ASSISTANT

## 2024-04-10 NOTE — PATIENT INSTRUCTIONS
Stay well hydrated. Drink a minimum of 64 oz of non-carbonated, non-caffeinated fluids daily.  Nutritional education occurred during visit. Tolerating diet. Continue following with dietitian and follow their recommendations as directed. Continue  60-80 grams of protein each day.    Signs and symptoms reviewed with patient that would be concerning and need her to return to office for re-evaluation. Patient will call if any questions or concerns arrise.  Continue dedicated physical activity and strength training  Continue taking Multivitamin and Calcium   Encouraged to attend support groups  Annual labs reviewed with patient today- B1 pending  SECA scale completed and reviewed with patient today  Measurements completed and reviewed with patient today  Avoid alcohol  To follow up with PCP for UTI sx.

## 2024-04-10 NOTE — PATIENT INSTRUCTIONS
Goals:  1.  Protein goal is 60-80 grams protein per day.  Remember to choose protein foods first at meals to meet this goal, followed by vegetables, then fruit, and starch food last if still hungry.  2.  Water goal is 64 oz per day.  Separate liquids from solids.  No liquids 30 minutes before meals and no liquids 30 minutes after your meals.   Reduce sugar free flavor packets in water to help with kidneys  3.  Take 20-30 minutes to eat - this helps with mindful eating as well.  Do not skip meals and stick to consistent meal times schedule as much as possible.   4.  Physical activity remains important to maintain weight loss efforts  5.  Routine vitamin intake is important to avoid deficiencies.  Continue the followin.  Celebrate One 45 Multivitamin once a day with lunch.  2.   600 mg calcium once a day with dinner.

## 2024-04-10 NOTE — PROGRESS NOTES
Component Value Date/Time    VITD25 52.9 04/05/2024 10:21 AM        VITAMIN B1/ THIAMINE   Lab Results   Component Value Date/Time    VLBW9SDOFWM 180 10/09/2023 12:27 PM        RBC FOLATE   Lab Results   Component Value Date/Time    FOLATE >20.00 04/05/2024 10:21 AM        LIPID SCREEN (FASTING)   Lab Results   Component Value Date/Time    CHOL 169 04/05/2024 10:21 AM    TRIG 106 04/05/2024 10:21 AM    HDL 66 04/05/2024 10:21 AM    LDLCALC 82 04/05/2024 10:21 AM   ,     HGA1C (T2DM ONLY)   Lab Results   Component Value Date/Time    LABA1C 5.1 04/05/2024 10:21 AM        TSH   Lab Results   Component Value Date/Time    TSH 1.540 10/09/2023 12:26 PM        IRON   Lab Results   Component Value Date/Time    IRON 80 04/05/2024 10:21 AM        IONIZED CALCIUM   No results found for: \"IONCA\", \"CAION\"      Assessment:       Diagnosis Orders   1. S/P gastric sleeve procedure        2. BMI 33.0-33.9,adult            Plan:    Stay well hydrated. Drink a minimum of 64 oz of non-carbonated, non-caffeinated fluids daily.  Nutritional education occurred during visit. Tolerating diet. Continue following with dietitian and follow their recommendations as directed. Continue  60-80 grams of protein each day.    Signs and symptoms reviewed with patient that would be concerning and need her to return to office for re-evaluation. Patient will call if any questions or concerns arrise.  Continue dedicated physical activity and strength training  Continue taking Multivitamin and Calcium   Encouraged to attend support groups  Annual labs reviewed with patient today- B1 pending  SECA scale completed and reviewed with patient today  Measurements completed and reviewed with patient today  Avoid alcohol  To follow up with PCP for UTI sx.   Return in about 6 months (around 10/10/2024) for postop follow up.       I spent over 20 minutes with the patient, with greater that 50% of that time spent on face counseling for nutrition and

## 2024-06-06 NOTE — PROGRESS NOTES
[facility]  [address]  [place]      Visit Date:  4/18/2023  Weight Management Postop Follow-up    HPI:      Ed Love is a 47 y.o. female who is here today for 2 weeks follow up since  robotic-assisted sleeve gastrectomy performed by Dr. Leonid Chandra  on 4/3/23. Weight today 221#. Down 1# since last visit. Down 15# since surgery. BMI 42 . Drinking 64 oz of fluid and 62-70 grams of protein daily. Drinking 2 protein shakes daily. Tolerating cottage cheese and mashed potatoes. No pop/carbonation. No sweets. Tolerating post-op diet. No problems with bowel movements. Continues daily BM. Not taking Bentyl. No nausea. No emesis. GERD well controlled with Protonix. Denies CP/SOB. No Dizziness. No abdominal pain. No incisional discomfort. No sx of dehydration. No fever or chills. Taking and tolerating all vitamins-Celebrate/Iron/ Calcium. Wearing CPAP Qhs. Continues Metoprolol. Monitoring BP at home and running 120/70's. Not feelign dizzy or light headed. Physical Activity:  Walking 20 minutes several times daily. Current BMI: Body mass index is 42.73 kg/m².   Current Weight:   Wt Readings from Last 3 Encounters:   04/18/23 221 lb (100.2 kg)   04/13/23 223 lb 3.2 oz (101.2 kg)   04/03/23 232 lb (105.2 kg)     Pre-op Body Weight:236      Past Medical History:  Past Medical History:   Diagnosis Date    Anxiety     Arthritis     Bell's palsy     Body mass index 40.0-44.9, adult ([address] 05/06/2021    Esophagitis     Fatty liver     GERD (gastroesophageal reflux disease)     Hypertension     IBS (irritable bowel syndrome)     Morbid obesity with BMI of 40.0-44.9, adult ([facility])     MRSA (methicillin resistant staph aureus) culture positive     JESSE (obstructive sleep apnea) 05/06/2021    UTI (urinary tract infection)     V tach ([address]     Vertigo        Past Surgical History:  Past Surgical History:   Procedure Laterality Date    BACK The patient is presenting today with unintended weight loss of 20 pounds in the past 9 months.  He says he is not trying to lose weight.  He denies nausea, vomiting and says he is eating normally.  The patient's on Noxafil 300 mg daily and one of the side effects of this is anorexia.  I am concerned this medication is not only causing the patient to lose weight but also suppressing his bone marrow.  Repeat lab work was ordered today and we will bring him back in in 3 months if he is continue to lose weight at that time we will reach out to infectious disease and see what other medication options we have.

## 2024-09-24 NOTE — PROGRESS NOTES
[address] [facility]  [address]  [place], [address] [facility]  [phone]      Visit Date:  2/8/2023  Weight Management Pre-Op Follow-up    HPI:    Medically Supervised follow-up- Month #5 of 6- Rejoining. desires sleeve     Mukesh Maldonado is here today for continued supervised weight management of morbid obesity. Weight today 236#. Weight stable since last month. Down 2# since starting with weight management. BMI 45. Reports that she did well with nutrition most of the month. Admits that she had GI bug for 4-5 days and was only able to tolerate bland foods/ mostly carbs. Was able to get right back on track with nutrition. Continues to track all food intake. Eating 3 meals daily. Occasional 1 snack daily-fruit or protein bar. Continues to work on portion control. Planning to purchase a scale to start measuring. Drinking plenty of water. No pop/juice/coffee. Occasional decaf unsweet tea. Completed PT. Continues to have back pain. Awaiting MRI. Discussed bariatric surgery with Dr. Bernie Enriquez and supportive of moving forward with bariatric surgery. Comorbid conditions include GERD, hypertension, fatty liver, chronic back pain, depression, anxiety, IBS and sleep apnea. GERD well controlled with Protonix. No breakthrough sx. Discussed possibility of worsening GERD that may require additional medication or revision surgery. EGD completed 12/13/22 with Dr. Derrick Lundy- results reviewed. H. Pylori (-). LOMN received. Mammogram completed  and reviewed- no concerning findings. Completed support groups x 2. EKG borderline. Completed heart cath July 2022. Cardiac clearance received from Dr. Haider Aguirre. Recent CPAP download showed compliance. Received sleep apnea clearance. Psychological clearance with Dr. Junior Zamudio completed and reviewed. Discussed what to expect moving forward with bariatric surgery. All questions answered. Excited to move forward with bariatric surgery.  To follow up with Dr. Suh Cease next month. Physical Activity:  minimal. Awaiting MRI to get started at the fitness center. Current BMI: Body mass index is 45.79 kg/m².   Current Weight:   Wt Readings from Last 3 Encounters:   02/08/23 236 lb 6.4 oz (107.2 kg)   02/06/23 241 lb 9.6 oz (109.6 kg)   01/10/23 236 lb (107 kg)     Initial Body [id number]    Past Medical History:  Past Medical History:   Diagnosis Date    Anxiety     Arthritis     Bell's palsy     Body mass index 40.0-44.9, adult ([facility] [address] 05/06/2021    Esophagitis     Fatty liver     GERD (gastroesophageal reflux disease)     Hypertension     IBS (irritable bowel syndrome)     Morbid obesity with BMI of 40.0-44.9, adult ([facility])     MRSA (methicillin resistant staph aureus) culture positive     JESSE (obstructive sleep apnea) 05/06/2021    UTI (urinary tract infection)     V tach     Vertigo        Past Surgical History:  Past Surgical History:   Procedure Laterality Date    BACK SURGERY      BLADDER SUSPENSION      BREAST REDUCTION SURGERY  10/2007    CARPAL TUNNEL RELEASE      CERVICAL FUSION  2017    ACDF C5-C7    CHOLECYSTECTOMY      COLONOSCOPY      DILATION AND CURETTAGE OF UTERUS      ENDOSCOPY, COLON, DIAGNOSTIC      HYSTERECTOMY (CERVIX STATUS UNKNOWN)      LUMBAR FUSION N/A 4/3/2020    L4-S1 DECOMPRESSION AND POSTERIOR FUSION; LUMBAR I & D WITH HARDWARE REMOVAL performed by Monty Steward MD at [facility]  10/17/2019    L5-S1    UPPER GASTROINTESTINAL ENDOSCOPY N/A 7/19/2019    EGD BIOPSY performed by Heather Lassiter MD at [place] [id number]  7/19/2019    EGD DILATION SAVORY performed by Heather Lassiter MD at [facility]       Past Social History:  Social History     Socioeconomic History    Marital status:      Spouse name: Not on file    Number of children: 3    Years of education: 12    Highest education level: Not on file   Occupational History    Occupation: [id number] Tobacco Use    Smoking status: Former     Packs/day: 2.00     Years: 20.00     Pack years: 40.00     Types: Cigarettes     Quit date: 2017     Years since quittin.0    Smokeless tobacco: Never   Vaping Use    Vaping Use: Never used   Substance and Sexual Activity    Alcohol use: Yes     Comment: occ    Drug use: No    Sexual activity: Not Currently   Other Topics Concern    Not on file   Social History Narrative    Not on file     Social Determinants of Health     Financial Resource Strain: Not on file   Food Insecurity: Not on file   Transportation Needs: Not on file   Physical Activity: Not on file   Stress: Not on file   Social Connections: Not on file   Intimate Partner Violence: Not on file   Housing Stability: Not on file        Medications:   Current Outpatient Medications   Medication Sig Dispense Refill    oxyCODONE-acetaminophen (PERCOCET) 5-325 MG per tablet Take 1 tablet by mouth every 4 hours as needed for Pain.      metoprolol succinate (TOPROL XL) 50 MG extended release tablet Take 1 tablet by mouth daily 30 tablet 3    pantoprazole (PROTONIX) 40 MG tablet Take 1 tablet by mouth every morning (before breakfast) 30 tablet 0    Cholecalciferol (VITAMIN D3) 25 MCG (1000 UT) TABS TAKE ONE TABLET BY MOUTH EVERY DAY  1    dicyclomine (BENTYL) 10 MG capsule  (Patient not taking: Reported on 2023)      hydroCHLOROthiazide (MICROZIDE) 12.5 MG capsule Take 12.5 mg by mouth daily as needed (Patient not taking: Reported on 2023)      cyclobenzaprine (FLEXERIL) 10 MG tablet Take 10 mg by mouth 3 times daily as needed for Muscle spasms (Patient not taking: Reported on 2023)       No current facility-administered medications for this visit. Allergies: Allergies   Allergen Reactions    Codeine Rash    Dilaudid [Hydromorphone Hcl] Rash       Subjective:    Review of Systems:  Constitutional: Denies any fever, chills, fatigue.   Wound: Denies any rash, skin color changes or wound problems. Resp: Denies any cough, shortness of breath. CV: Denies any chest pain, orthopnea or syncope. MS: Denies myalgias, (+)arthralgias-back/right hip  GI: Denies any nausea, vomiting,diarrhea, constipation, melena, hematochezia. (+) reflux  : Denies any hematuria, hesitancy or dysuria. NEURO: Denies seizures, headache. Objective:    /80 (Site: Right Upper Arm, Position: Sitting, Cuff Size: Large Adult)   Pulse 76   Temp 97.9 °F (36.6 °C) (Oral)   Ht 5' 0.25\" (1.53 m)   Wt 236 lb 6.4 oz (107.2 kg)   LMP 10/27/2015 (Exact Date)   BMI 45.79 kg/m²   Physical Examination:   Constitutional: Alert and oriented to person, place and time. Well-developed, well- nourished. Head: Normocephalic and atraumatic  Neck: Supple. Eyes: EOMI b/l. Conjunctivae normal.  No scleral icterus. Respiratory: Effort normal. No respiratory distress. Abd: Benign  Ext:  Movement x 4. No edema  Skin; Warm and dry, no visible rashes, lesions or ulcers.    Neuro: Cranial Nerves Grossly Intact; nml coordination      CBC   Lab Results   Component Value Date/Time    WBC 5.7 10/12/2022 01:11 PM    RBC 5.27 10/12/2022 01:11 PM    HGB 14.4 10/12/2022 01:11 PM    HCT 44.5 10/12/2022 01:11 PM    MCV 84.4 10/12/2022 01:11 PM    MCH 27.3 10/12/2022 01:11 PM    MCHC 32.4 10/12/2022 01:11 PM    RDW 13.8 07/07/2017 09:21 AM     10/12/2022 01:11 PM    MPV 9.8 10/12/2022 01:11 PM    RBCMORP NORMAL 07/07/2017 09:21 AM    SEGSPCT 63.1 10/12/2022 01:11 PM    LABLYMP 27.8 10/12/2022 01:11 PM    MONOPCT 5.1 10/12/2022 01:11 PM    LABEOS 1.9 10/12/2022 01:11 PM    BASO 0.7 10/12/2022 01:11 PM    NRBC 0 10/12/2022 01:11 PM    ANISOCYTOSIS 1+ 06/29/2015 03:15 AM    SEGSABS 3.6 10/12/2022 01:11 PM    LYMPHSABS 1.6 10/12/2022 01:11 PM    MONOSABS 0.3 10/12/2022 01:11 PM    EOSABS 0.1 10/12/2022 01:11 PM    BASOSABS 0.0 10/12/2022 01:11 PM        BMP/CMP   Lab Results   Component Value Date/Time    GLUCOSE 113 10/12/2022 01:11 PM CREATININE 0.7 10/12/2022 01:11 PM    BUN 18 10/12/2022 01:11 PM     10/12/2022 01:11 PM    K 4.5 10/12/2022 01:11 PM    K 4.2 04/03/2020 04:57 AM     10/12/2022 01:11 PM    CO2 30 10/12/2022 01:11 PM    CALCIUM 9.8 10/12/2022 01:11 PM    AST 26 10/12/2022 01:11 PM    ALKPHOS 97 10/12/2022 01:11 PM    PROT 7.4 10/12/2022 01:11 PM    LABALBU 4.7 10/12/2022 01:11 PM    BILITOT 0.9 10/12/2022 01:11 PM    ALT 45 10/12/2022 01:11 PM        PREALBUMIN   Lab Results   Component Value Date/Time    PREALBUMIN 23.9 07/07/2017 09:21 AM        VITAMIN B12   Lab Results   Component Value Date/Time    TILFEFXO90 793 10/12/2022 01:11 PM        VITAMIN D   Lab Results   Component Value Date/Time    VITD25 33 10/12/2022 01:11 PM        PTH  Lab Results   Component Value Date/Time    IPTH 43.0 10/12/2022 01:11 PM       VITAMIN B1/ THIAMINE   Lab Results   Component Value Date/Time    [id number] 108 10/12/2022 01:11 PM        LIPID SCREEN (FASTING)   Lab Results   Component Value Date/Time    CHOL 211 10/12/2022 01:11 PM    TRIG 130 10/12/2022 01:11 PM    HDL 55 10/12/2022 01:11 PM    LDLCALC 130 10/12/2022 01:11 PM   ,     HGA1C (T2DM ONLY)   Lab Results   Component Value Date/Time    LABA1C 5.8 10/12/2022 01:11 PM    AVGG 114 10/12/2022 01:11 PM        TSH   Lab Results   Component Value Date/Time    TSH 1.590 10/12/2022 01:11 PM        IRON   Lab Results   Component Value Date/Time    IRON 80 10/12/2022 01:11 PM        TIBC  Lab Results   Component Value Date/Time    TIBC 386 10/12/2022 01:11 PM       FERRITIN  Lab Results   Component Value Date/Time    FERRITIN 80 10/12/2022 01:11 PM       VITAMIN A  No results found for: RETINOL    NICOTINE  No results found for: NMET    UDS  Lab Results   Component Value Date/Time    UDP SEE BELOW 01/06/2021 11:56 AM       PSA  No results found for: LABPSA    GFR  Lab Results   Component Value Date/Time    LABGLOM 88 10/12/2022 01:11 PM       DEXA  No results found for this or any previous visit. Assessment:       Diagnosis Orders   1. BMI 45.0-49.9, adult ([facility] [address]        2. Gastroesophageal reflux disease, unspecified whether esophagitis present        3. Hypertension, unspecified type        4. Depression, unspecified depression type        5. Anxiety        6. Irritable bowel syndrome, unspecified type        7. Fatty liver        8. Chronic bilateral low back pain, unspecified whether sciatica present        9. JESSE on CPAP              Plan:    Behavior modification discussed in detail in regards to dietary habits. Nutritional education occurred during visit. Continue following recommendations  per dietitian. Improvement in fitness/exercise discussed with patient and the need for this  with/without surgery. EKG abnormal- Cardiac Clearance received from Dr. Violeta Dove   Sleep apnea clearance received. Compliant with CPAP. Psychology evaluation with Dr. Tsosie Kanner completed and reviewed. Continue following with Dr. Stephen Ventura as scheduled. Completed PT- awaiting MRI   EGD completed with Dr. Tiesha Wynn- results reviewed. H. Pylori (-)  Encouraged to attend support groups. Completed x 2. Goal to lose 3-5% TBW prior to surgery. Down 2#. Seca scale completed and reviewed. Initial labs completed and reviewed  Drug screen completed and reviewed  Nicotine (-). Discussed risks of nicotine a/w bariatric surgery. Must be nicotine free at least 90 days prior to surgery. Avoid nicotine life long post-op. Denies current pregnancy. Advised not to get pregnant for at least 1 year post bariatric surgery as this can increase risk of malnourishment potentially leading to low birth weight or malformation. Patient agrees to avoid pregnancy for at least 1 year post-op. Discussed importance of appropriate contraception. Will need to be off work for 3-4 weeks post-bariatric surgery. No lifting/pushing/pulling over 20# for 4 weeks post-op  No NSAIDS ( ibuprofen) 10 days prior to bariatric surgery.  Avoid NSAID use post-op  Discussed Lovenox post-op bariatric surgery  LOMN received. Will continue following with multi-disciplinary team in preparation for bariatric surgery with the expectation of lifelong follow-up post-operatively. Mammogram completed and reviewed. To follow up with Dr. Suh Cease next month. Return in about 1 month (around 3/8/2023) for Follow up. I spent over 20 minutes with the patient, with greater that 50% of that time spent on education, counseling and coordination of care.      Electronically signed by YUNIEL Vega on 2/8/2023 at 11:19 AM 97.3

## 2024-10-03 ENCOUNTER — OFFICE VISIT (OUTPATIENT)
Dept: BARIATRICS/WEIGHT MGMT | Age: 54
End: 2024-10-03
Payer: COMMERCIAL

## 2024-10-03 VITALS
HEART RATE: 63 BPM | BODY MASS INDEX: 33.57 KG/M2 | SYSTOLIC BLOOD PRESSURE: 126 MMHG | WEIGHT: 177.8 LBS | HEIGHT: 61 IN | TEMPERATURE: 97.5 F | DIASTOLIC BLOOD PRESSURE: 86 MMHG

## 2024-10-03 DIAGNOSIS — Z13.21 SCREENING FOR MALNUTRITION: ICD-10-CM

## 2024-10-03 DIAGNOSIS — K91.2 POSTSURGICAL MALABSORPTION: ICD-10-CM

## 2024-10-03 DIAGNOSIS — Z90.3 S/P GASTRIC SLEEVE PROCEDURE: Primary | ICD-10-CM

## 2024-10-03 PROCEDURE — G8427 DOCREV CUR MEDS BY ELIG CLIN: HCPCS | Performed by: PHYSICIAN ASSISTANT

## 2024-10-03 PROCEDURE — 3017F COLORECTAL CA SCREEN DOC REV: CPT | Performed by: PHYSICIAN ASSISTANT

## 2024-10-03 PROCEDURE — 3074F SYST BP LT 130 MM HG: CPT | Performed by: PHYSICIAN ASSISTANT

## 2024-10-03 PROCEDURE — 1036F TOBACCO NON-USER: CPT | Performed by: PHYSICIAN ASSISTANT

## 2024-10-03 PROCEDURE — G8484 FLU IMMUNIZE NO ADMIN: HCPCS | Performed by: PHYSICIAN ASSISTANT

## 2024-10-03 PROCEDURE — 3079F DIAST BP 80-89 MM HG: CPT | Performed by: PHYSICIAN ASSISTANT

## 2024-10-03 PROCEDURE — G8417 CALC BMI ABV UP PARAM F/U: HCPCS | Performed by: PHYSICIAN ASSISTANT

## 2024-10-03 PROCEDURE — 99214 OFFICE O/P EST MOD 30 MIN: CPT | Performed by: PHYSICIAN ASSISTANT

## 2024-10-03 NOTE — PATIENT INSTRUCTIONS
Stay well hydrated. Drink a minimum of 64 oz of non-carbonated, non-caffeinated fluids daily.  Nutritional education occurred during visit. Tolerating diet. Continue following with dietitian and follow their recommendations as directed. Continue  60-80 grams of protein each day.    Signs and symptoms reviewed with patient that would be concerning and need her to return to office for re-evaluation. Patient will call if any questions or concerns arrise.  Importance of physical activity discussed with patient.   Increase physical activity as tolerated  Increase strength training, as tolerated  Continue taking [id number] and calcium- set reminders in phone to take.   Encouraged to attend support groups  2 year labs ordered- to be drawn prior to next apt  SECA scale next visit  Measurements next visit  Advised to start tracking all food intake and make apt with the dietitian to review (50$).    Also encouraged to make apt with Lenka to adjust fitness routine. (25$ per month)

## 2024-10-03 NOTE — PROGRESS NOTES
counseling for nutrition and exercise.    Electronically signed by YUNIEL Nix on 10/3/2024 at 11:32 AM

## 2024-10-09 ENCOUNTER — APPOINTMENT (OUTPATIENT)
Dept: CT IMAGING | Age: 54
End: 2024-10-09
Payer: COMMERCIAL

## 2024-10-09 ENCOUNTER — HOSPITAL ENCOUNTER (EMERGENCY)
Age: 54
Discharge: HOME | End: 2024-10-09
Payer: COMMERCIAL

## 2024-10-09 ENCOUNTER — APPOINTMENT (OUTPATIENT)
Dept: MRI IMAGING | Age: 54
End: 2024-10-09
Payer: COMMERCIAL

## 2024-10-09 VITALS
HEART RATE: 62 BPM | HEIGHT: 61 IN | BODY MASS INDEX: 34.74 KG/M2 | RESPIRATION RATE: 14 BRPM | OXYGEN SATURATION: 100 % | SYSTOLIC BLOOD PRESSURE: 147 MMHG | DIASTOLIC BLOOD PRESSURE: 87 MMHG | TEMPERATURE: 98.4 F | WEIGHT: 184 LBS

## 2024-10-09 DIAGNOSIS — R42 DIZZINESS: ICD-10-CM

## 2024-10-09 DIAGNOSIS — R20.2 FACIAL PARESTHESIA: Primary | ICD-10-CM

## 2024-10-09 DIAGNOSIS — E16.2 HYPOGLYCEMIA: ICD-10-CM

## 2024-10-09 LAB
ALBUMIN SERPL BCG-MCNC: 4.4 G/DL (ref 3.5–5.1)
ALP SERPL-CCNC: 105 U/L (ref 38–126)
ALT SERPL W/O P-5'-P-CCNC: 37 U/L (ref 11–66)
ANION GAP SERPL CALC-SCNC: 14 MEQ/L (ref 8–16)
AST SERPL-CCNC: 22 U/L (ref 5–40)
BASOPHILS ABSOLUTE: 0 THOU/MM3 (ref 0–0.1)
BASOPHILS NFR BLD AUTO: 0.7 %
BILIRUB SERPL-MCNC: 1.3 MG/DL (ref 0.3–1.2)
BUN SERPL-MCNC: 22 MG/DL (ref 7–22)
CALCIUM SERPL-MCNC: 9.8 MG/DL (ref 8.5–10.5)
CHLORIDE SERPL-SCNC: 102 MEQ/L (ref 98–111)
CO2 SERPL-SCNC: 26 MEQ/L (ref 23–33)
CREAT SERPL-MCNC: 0.6 MG/DL (ref 0.4–1.2)
DEPRECATED RDW RBC AUTO: 42.5 FL (ref 35–45)
EKG ATRIAL RATE: 63 BPM
EKG P AXIS: 41 DEGREES
EKG P-R INTERVAL: 244 MS
EKG Q-T INTERVAL: 398 MS
EKG QRS DURATION: 92 MS
EKG QTC CALCULATION (BAZETT): 407 MS
EKG R AXIS: -6 DEGREES
EKG T AXIS: 18 DEGREES
EKG VENTRICULAR RATE: 63 BPM
EOSINOPHIL NFR BLD AUTO: 2 %
EOSINOPHILS ABSOLUTE: 0.1 THOU/MM3 (ref 0–0.4)
ERYTHROCYTE [DISTWIDTH] IN BLOOD BY AUTOMATED COUNT: 13.2 % (ref 11.5–14.5)
GFR SERPL CREATININE-BSD FRML MDRD: > 90 ML/MIN/1.73M2
GLUCOSE BLD STRIP.AUTO-MCNC: 66 MG/DL (ref 70–108)
GLUCOSE BLD STRIP.AUTO-MCNC: 80 MG/DL (ref 70–108)
GLUCOSE SERPL-MCNC: 90 MG/DL (ref 70–108)
HCT VFR BLD AUTO: 45.3 % (ref 37–47)
HETEROPH AB SERPL QL IA: NEGATIVE
HGB BLD-MCNC: 15 GM/DL (ref 12–16)
IMM GRANULOCYTES # BLD AUTO: 0.01 THOU/MM3 (ref 0–0.07)
IMM GRANULOCYTES NFR BLD AUTO: 0.2 %
INR PPP: 0.99 (ref 0.85–1.13)
LYMPHOCYTES ABSOLUTE: 1.8 THOU/MM3 (ref 1–4.8)
LYMPHOCYTES NFR BLD AUTO: 40.4 %
MCH RBC QN AUTO: 29.2 PG (ref 26–33)
MCHC RBC AUTO-ENTMCNC: 33.1 GM/DL (ref 32.2–35.5)
MCV RBC AUTO: 88.1 FL (ref 81–99)
MONOCYTES ABSOLUTE: 0.3 THOU/MM3 (ref 0.4–1.3)
MONOCYTES NFR BLD AUTO: 6.2 %
NEUTROPHILS ABSOLUTE: 2.3 THOU/MM3 (ref 1.8–7.7)
NEUTROPHILS NFR BLD AUTO: 50.5 %
NRBC BLD AUTO-RTO: 0 /100 WBC
OSMOLALITY SERPL CALC.SUM OF ELEC: 286 MOSMOL/KG (ref 275–300)
PLATELET # BLD AUTO: 227 THOU/MM3 (ref 130–400)
PMV BLD AUTO: 10.5 FL (ref 9.4–12.4)
POC CREATININE WHOLE BLOOD: 0.6 MG/DL (ref 0.5–1.2)
POTASSIUM SERPL-SCNC: 4.8 MEQ/L (ref 3.5–5.2)
PROT SERPL-MCNC: 7.4 G/DL (ref 6.1–8)
RBC # BLD AUTO: 5.14 MILL/MM3 (ref 4.2–5.4)
SODIUM SERPL-SCNC: 142 MEQ/L (ref 135–145)
TROPONIN, HIGH SENSITIVITY: 7 NG/L (ref 0–12)
TSH SERPL DL<=0.005 MIU/L-ACNC: 1.98 UIU/ML (ref 0.4–4.2)
WBC # BLD AUTO: 4.5 THOU/MM3 (ref 4.8–10.8)

## 2024-10-09 PROCEDURE — 85025 COMPLETE CBC W/AUTO DIFF WBC: CPT

## 2024-10-09 PROCEDURE — 6360000004 HC RX CONTRAST MEDICATION: Performed by: PHYSICIAN ASSISTANT

## 2024-10-09 PROCEDURE — 70498 CT ANGIOGRAPHY NECK: CPT

## 2024-10-09 PROCEDURE — 86308 HETEROPHILE ANTIBODY SCREEN: CPT

## 2024-10-09 PROCEDURE — 80053 COMPREHEN METABOLIC PANEL: CPT

## 2024-10-09 PROCEDURE — 70496 CT ANGIOGRAPHY HEAD: CPT

## 2024-10-09 PROCEDURE — 70553 MRI BRAIN STEM W/O & W/DYE: CPT

## 2024-10-09 PROCEDURE — 36415 COLL VENOUS BLD VENIPUNCTURE: CPT

## 2024-10-09 PROCEDURE — 84443 ASSAY THYROID STIM HORMONE: CPT

## 2024-10-09 PROCEDURE — 99223 1ST HOSP IP/OBS HIGH 75: CPT | Performed by: NURSE PRACTITIONER

## 2024-10-09 PROCEDURE — 96374 THER/PROPH/DIAG INJ IV PUSH: CPT

## 2024-10-09 PROCEDURE — 93005 ELECTROCARDIOGRAM TRACING: CPT | Performed by: PHYSICIAN ASSISTANT

## 2024-10-09 PROCEDURE — 84484 ASSAY OF TROPONIN QUANT: CPT

## 2024-10-09 PROCEDURE — 70450 CT HEAD/BRAIN W/O DYE: CPT

## 2024-10-09 PROCEDURE — 85610 PROTHROMBIN TIME: CPT

## 2024-10-09 PROCEDURE — A9579 GAD-BASE MR CONTRAST NOS,1ML: HCPCS | Performed by: PHYSICIAN ASSISTANT

## 2024-10-09 PROCEDURE — 6370000000 HC RX 637 (ALT 250 FOR IP): Performed by: PHYSICIAN ASSISTANT

## 2024-10-09 PROCEDURE — 82948 REAGENT STRIP/BLOOD GLUCOSE: CPT

## 2024-10-09 PROCEDURE — 82565 ASSAY OF CREATININE: CPT

## 2024-10-09 PROCEDURE — 6360000002 HC RX W HCPCS: Performed by: PHYSICIAN ASSISTANT

## 2024-10-09 PROCEDURE — 93010 ELECTROCARDIOGRAM REPORT: CPT | Performed by: INTERNAL MEDICINE

## 2024-10-09 PROCEDURE — 99285 EMERGENCY DEPT VISIT HI MDM: CPT

## 2024-10-09 RX ORDER — LORAZEPAM 2 MG/ML
1 INJECTION INTRAMUSCULAR ONCE
Status: COMPLETED | OUTPATIENT
Start: 2024-10-09 | End: 2024-10-09

## 2024-10-09 RX ORDER — IOPAMIDOL 755 MG/ML
100 INJECTION, SOLUTION INTRAVASCULAR
Status: COMPLETED | OUTPATIENT
Start: 2024-10-09 | End: 2024-10-09

## 2024-10-09 RX ORDER — ASPIRIN 325 MG
325 TABLET ORAL ONCE
Status: COMPLETED | OUTPATIENT
Start: 2024-10-09 | End: 2024-10-09

## 2024-10-09 RX ADMIN — LORAZEPAM 1 MG: 2 INJECTION INTRAMUSCULAR; INTRAVENOUS at 15:37

## 2024-10-09 RX ADMIN — ASPIRIN 325 MG: 325 TABLET ORAL at 15:16

## 2024-10-09 RX ADMIN — GADOTERIDOL 15 ML: 279.3 INJECTION, SOLUTION INTRAVENOUS at 16:40

## 2024-10-09 RX ADMIN — IOPAMIDOL 100 ML: 755 INJECTION, SOLUTION INTRAVENOUS at 13:41

## 2024-10-09 ASSESSMENT — PAIN - FUNCTIONAL ASSESSMENT: PAIN_FUNCTIONAL_ASSESSMENT: NONE - DENIES PAIN

## 2024-10-09 NOTE — DISCHARGE INSTRUCTIONS
Make sure to keep some sugar substance with you at all times in case your blood sugar drops.  Call today to make a follow-up appoint with your regular physician for reevaluation.  You may consider taking a daily over-the-counter 81 mg aspirin.  You can call the neurology office, a referral has been placed.  Take the first available appointment.    Discharge warning    Please remember that examination and testing performed in the emergency department is not a comprehensive evaluation of all medical conditions and does not replace the need to follow up with your primary care provider.  In the emergency department, we are only able to evaluate your symptoms in the current condition, but symptoms may change or worsen.  Although you are felt safe to be discharged today, if your symptoms persist or change, you need to be re-evaluated by your regular/primary care doctor as soon as possible.  If you are unable to make appointment with your regular doctor, please come back to the ER to be re-evaluated.

## 2024-10-09 NOTE — ED TRIAGE NOTES
Pt to ED via self c/o left facial and left arm numbness. Pt reports she has had these symptoms with nausea intermittently since Saturday. PMH Bell's Palsy. Pt states today at 1113 she lost her balance due to dizziness, had the facial numbness, and had left arm numbness. Denies pain. IV access established. Labs collected. YUNIEL Rivera at bedside for pt assessment. POCT 66.

## 2024-10-09 NOTE — ED PROVIDER NOTES
[facility] EMERGENCY DEPT      EMERGENCY MEDICINE     Pt Name: Eusebia Love  MRN: [id number]  Birthdate 1970  Date of evaluation: 10/9/2024  Provider: YUNIEL Plata    CHIEF COMPLAINT       Chief Complaint   Patient presents with    Left facial numbess, left arm numb      HISTORY OF PRESENT ILLNESS   Eusebia Love is a pleasant 54 y.o. female who presents to the emergency department from home with left facial numbness and left arm numbness associated with left facial drooping and imbalance.  This occurred at about 11:13 AM and went on for 20 minutes.  Patient still has a drooping feeling to her left face despite improving symptoms.    Patient's only other complaint is fatigue which has been going on for over a week.    No chest pain or shortness of breath.  No abdominal pain.  No headache.  No vision changes.    No difficulty swallowing.    PASTMEDICAL HISTORY     Past Medical History:   Diagnosis Date    Anxiety     Arthritis     Bell's palsy     Body mass index 40.0-44.9, adult 05/06/2021    Esophagitis     Fatty liver     GERD (gastroesophageal reflux disease)     Hypertension     IBS (irritable bowel syndrome)     Morbid obesity with BMI of 40.0-44.9, adult     MRSA (methicillin resistant staph aureus) culture positive     JESSE (obstructive sleep apnea) 05/06/2021    UTI (urinary tract infection)     V tach (HCC)     Vertigo        Patient Active Problem List   Diagnosis Code    GERD (gastroesophageal reflux disease) K21.9    Anxiety F41.9    Clinical depression F32.A    History of lumbar fusion Z98.1    Chronic left-sided low back pain with left-sided sciatica M54.42, G89.29    Paresthesia of buttock R20.2    Paresthesia of left lower extremity R20.2    Neurologic gait dysfunction R26.9    Fusion of lumbosacral spine M43.27    Back pain, lumbosacral M54.50    Lumbar discitis M46.46    Spondylolisthesis, lumbosacral region M43.17    Spinal stenosis, lumbar region without neurogenic

## 2024-10-10 NOTE — CONSULTS
Neurology Stroke Alert Note    Date:10/9/2024       Room:[address] [place] [address]  Patient Name:Eusebia Love     YOB: 1970     Age:54 y.o.    Requesting Physician: No att. providers found     Reason for Consult:  Evaluate for stroke alert    Subjective       HPI: Eusebia Love who is a 54 y.o. female with no significant history except recent back surgeries. She states that she had some nausea and vomiting followed by numbness of the left side of her face. It resolved after a few minutes. Today at 1113 - she had nausea again - no vomiting; followed by numbness of the lower half of her face and then numbness of her left arm. All symptoms resolved by 1138. She arrived in the ED at 1315. Stroke alert called at 1317. Neurology at the bedside at 1320. NIHSS was zero. CTH - negative for any acute findings. Dr. Cruz at the bedside. CTA - H/N done - also reviewed by Dr. Cruz - no acute findings. The patient was not a candidate for IV tNK due to NIHSS of zero. There was also no LVO.      Last known well:  1113.  Time of stroke alert:  1317.  Time of neurology arrival:  1320.  Vascular risk factors:  none.  Initial glucose: 66 .  Old deficits from prior stroke:  none.  INR in ED if anticoagulated:  not applicable.  Initial blood pressure:  145 systolic.  Initial NIHSS: zero.  Pre-morbid Modified [place] Scale:  0.   Time of initial imaging read:  1349; reviewed by Dr. Cruz initially at 1330.  Thrombolytic administered:  not indicated/contraindicated.  Thrombectomy performed:  not indicated.  Puncture time:  not applicable.       Review of Systems     Unable to complete a comprehensive ROS due to patient's condition. She denies any medical problems or recent illness. She has had several back surgeries. States she has had Bell's palsy in the past - no residual deficits.     Medications   Scheduled Meds:   Continuous Infusions:   PRN Meds:   Medications Prior to Admission:   No current facility-administered

## 2024-12-17 ENCOUNTER — OFFICE VISIT (OUTPATIENT)
Dept: NEUROLOGY | Age: 54
End: 2024-12-17
Payer: COMMERCIAL

## 2024-12-17 VITALS
BODY MASS INDEX: 36.08 KG/M2 | DIASTOLIC BLOOD PRESSURE: 76 MMHG | HEART RATE: 77 BPM | WEIGHT: 183.8 LBS | HEIGHT: 60 IN | OXYGEN SATURATION: 96 % | SYSTOLIC BLOOD PRESSURE: 138 MMHG

## 2024-12-17 DIAGNOSIS — R20.0 LEFT FACIAL NUMBNESS: Primary | ICD-10-CM

## 2024-12-17 DIAGNOSIS — H91.92 DECREASED HEARING OF LEFT EAR: ICD-10-CM

## 2024-12-17 PROCEDURE — G8427 DOCREV CUR MEDS BY ELIG CLIN: HCPCS | Performed by: PSYCHIATRY & NEUROLOGY

## 2024-12-17 PROCEDURE — 3078F DIAST BP <80 MM HG: CPT | Performed by: PSYCHIATRY & NEUROLOGY

## 2024-12-17 PROCEDURE — 1036F TOBACCO NON-USER: CPT | Performed by: PSYCHIATRY & NEUROLOGY

## 2024-12-17 PROCEDURE — G8417 CALC BMI ABV UP PARAM F/U: HCPCS | Performed by: PSYCHIATRY & NEUROLOGY

## 2024-12-17 PROCEDURE — 3017F COLORECTAL CA SCREEN DOC REV: CPT | Performed by: PSYCHIATRY & NEUROLOGY

## 2024-12-17 PROCEDURE — 3075F SYST BP GE 130 - 139MM HG: CPT | Performed by: PSYCHIATRY & NEUROLOGY

## 2024-12-17 PROCEDURE — G8484 FLU IMMUNIZE NO ADMIN: HCPCS | Performed by: PSYCHIATRY & NEUROLOGY

## 2024-12-17 PROCEDURE — 99205 OFFICE O/P NEW HI 60 MIN: CPT | Performed by: PSYCHIATRY & NEUROLOGY

## 2024-12-17 NOTE — PATIENT INSTRUCTIONS
Repeat MRI brain W/WO contrast in 4/2025, please call us to schedule  EEG  Sed rate  VZV, HSV, EBV titers  Referral to ENT re: left ear decreased hearing  Call with any new symptoms or concerns.   Follow up in 6 months.

## 2024-12-20 ENCOUNTER — HOSPITAL ENCOUNTER (OUTPATIENT)
Dept: NEUROLOGY | Age: 54
Discharge: HOME | End: 2024-12-20
Payer: COMMERCIAL

## 2024-12-20 ENCOUNTER — HOSPITAL ENCOUNTER (OUTPATIENT)
Age: 54
Discharge: HOME | End: 2024-12-20
Payer: COMMERCIAL

## 2024-12-20 DIAGNOSIS — H91.92 DECREASED HEARING OF LEFT EAR: ICD-10-CM

## 2024-12-20 DIAGNOSIS — R20.0 LEFT FACIAL NUMBNESS: ICD-10-CM

## 2024-12-20 LAB
ERYTHROCYTE [SEDIMENTATION RATE] IN BLOOD BY WESTERGREN METHOD: 3 MM/HR (ref 0–20)
SPECIMEN SOURCE: NORMAL

## 2024-12-20 PROCEDURE — 86664 EPSTEIN-BARR NUCLEAR ANTIGEN: CPT

## 2024-12-20 PROCEDURE — 86787 VARICELLA-ZOSTER ANTIBODY: CPT

## 2024-12-20 PROCEDURE — 86663 EPSTEIN-BARR ANTIBODY: CPT

## 2024-12-20 PROCEDURE — 36415 COLL VENOUS BLD VENIPUNCTURE: CPT

## 2024-12-20 PROCEDURE — 87529 HSV DNA AMP PROBE: CPT

## 2024-12-20 PROCEDURE — 86665 EPSTEIN-BARR CAPSID VCA: CPT

## 2024-12-20 PROCEDURE — 85651 RBC SED RATE NONAUTOMATED: CPT

## 2024-12-20 PROCEDURE — 95819 EEG AWAKE AND ASLEEP: CPT

## 2024-12-20 NOTE — PROGRESS NOTES
MD Osmany   diclofenac sodium (VOLTAREN) 1 % GEL  4/5/23   Provider, MD Osmany       Hand Dominance: Right   Sedation: No   H.V. Completed: Yes,with good effort   Photic: Yes   Sleep: Yes   Drowsy: Yes   Sleep Deprived: No   Seizures Observed: No   Mentality: alert     Technician: Venkatesh Dumont 12/20/2024

## 2024-12-22 LAB
EPSTEIN-BARR VIRUS ANTIBODIES: NORMAL
VZV IGM SER IA-ACNC: 0.36 ISR

## 2024-12-23 LAB — VZV IGG SER QL IA: 1.96

## 2024-12-24 LAB
HSV1 DNA SPEC QL NAA+PROBE: NOT DETECTED
HSV2 DNA SPEC QL NAA+PROBE: NOT DETECTED
SPECIMEN SOURCE: NORMAL

## 2025-03-20 ENCOUNTER — OFFICE VISIT (OUTPATIENT)
Dept: ENT CLINIC | Age: 55
End: 2025-03-20
Payer: COMMERCIAL

## 2025-03-20 VITALS
WEIGHT: 180 LBS | BODY MASS INDEX: 35.34 KG/M2 | DIASTOLIC BLOOD PRESSURE: 74 MMHG | SYSTOLIC BLOOD PRESSURE: 122 MMHG | OXYGEN SATURATION: 100 % | TEMPERATURE: 97.7 F | HEIGHT: 60 IN | RESPIRATION RATE: 18 BRPM | HEART RATE: 86 BPM

## 2025-03-20 DIAGNOSIS — H91.90 HEARING DIFFICULTY, UNSPECIFIED LATERALITY: Primary | ICD-10-CM

## 2025-03-20 PROCEDURE — 3017F COLORECTAL CA SCREEN DOC REV: CPT | Performed by: OTOLARYNGOLOGY

## 2025-03-20 PROCEDURE — 99202 OFFICE O/P NEW SF 15 MIN: CPT | Performed by: OTOLARYNGOLOGY

## 2025-03-20 PROCEDURE — 3074F SYST BP LT 130 MM HG: CPT | Performed by: OTOLARYNGOLOGY

## 2025-03-20 PROCEDURE — 1036F TOBACCO NON-USER: CPT | Performed by: OTOLARYNGOLOGY

## 2025-03-20 PROCEDURE — G8427 DOCREV CUR MEDS BY ELIG CLIN: HCPCS | Performed by: OTOLARYNGOLOGY

## 2025-03-20 PROCEDURE — 3078F DIAST BP <80 MM HG: CPT | Performed by: OTOLARYNGOLOGY

## 2025-03-20 PROCEDURE — G8417 CALC BMI ABV UP PARAM F/U: HCPCS | Performed by: OTOLARYNGOLOGY

## 2025-03-20 NOTE — PROGRESS NOTES
since quittin.1    Smokeless tobacco: Never   Substance Use Topics    Alcohol use: Yes     Comment: occ       Subjective:      Review of Systems  has been obtained. Pertinent positives are noted above.  See HPI for further details. Review of systems otherwise negative.     Objective:   /74 (BP Site: Right Upper Arm, Patient Position: Sitting)   Pulse 86   Temp 97.7 °F (36.5 °C) (Oral)   Resp 18   Ht 1.524 m (5')   Wt 81.6 kg (180 lb)   LMP 10/27/2015 (Exact Date)   SpO2 100%   BMI 35.15 kg/m²       GENERAL: alert and oriented to person, place and time, well developed and well- nourished, in no acute distress.  Skin: warm and dry, no rash or erythema.  Head: Normocephalic and atraumatic.   EYE:  Ocular motility NL. No erythema or infection.  EARS:    Right: External ear have a normal appearance with no masses, lesions, or scars. TM flat, intact without retraction. Middle ear space is clear.  Left:    External ear have a normal appearance with no masses, lesions, or scars. TM flat, intact without retraction. Middle ear space is clear.  NOSE: Normal external Anatomy.   Mouth: Lips Free from lesions, ulcers, blisters, or growths, tongue in the midline. Uvula midline. Pharyngeal walls and tonsillar fossae without abnormalities.No tonsillar exudate or tonsillar abscesses.   NECK: supple and non-tender. No lymphadenopathy on neck palpation.No masses, tenderness, or enlargement of thyroid on palpation.  NEUROLOGIC:  Facial nerve function grade I. Other cranial nerves grossly WNL. Normal Romberg test. Coordination is intact. Finger-to-nose testing is performed smoothly and accurately without dysmetria.         Data:  All of the past medical history, past surgical history, family history,social history, allergies and current medications were reviewed with the patient.  Assessment & Plan   Assessment & Plan   Diagnoses and all orders for this visit:     Diagnosis Orders   1. Hearing difficulty, unspecified

## 2025-03-27 ENCOUNTER — HOSPITAL ENCOUNTER (OUTPATIENT)
Age: 55
Discharge: HOME | End: 2025-03-27
Payer: COMMERCIAL

## 2025-03-27 DIAGNOSIS — Z90.3 S/P GASTRIC SLEEVE PROCEDURE: ICD-10-CM

## 2025-03-27 DIAGNOSIS — K91.2 POSTSURGICAL MALABSORPTION: ICD-10-CM

## 2025-03-27 DIAGNOSIS — Z13.21 SCREENING FOR MALNUTRITION: ICD-10-CM

## 2025-03-27 LAB
25(OH)D3 SERPL-MCNC: 48 NG/ML (ref 30–100)
ALBUMIN SERPL BCG-MCNC: 4.4 G/DL (ref 3.4–4.9)
ALP SERPL-CCNC: 91 U/L (ref 35–104)
ALT SERPL W/O P-5'-P-CCNC: 19 U/L (ref 10–35)
ANION GAP SERPL CALC-SCNC: 10 MEQ/L (ref 8–16)
AST SERPL-CCNC: 21 U/L (ref 10–35)
BASOPHILS ABSOLUTE: 0 THOU/MM3 (ref 0–0.1)
BASOPHILS NFR BLD AUTO: 0.6 %
BILIRUB SERPL-MCNC: 1.7 MG/DL (ref 0.3–1.2)
BUN SERPL-MCNC: 23 MG/DL (ref 8–23)
CALCIUM SERPL-MCNC: 9.9 MG/DL (ref 8.6–10)
CHLORIDE SERPL-SCNC: 104 MEQ/L (ref 98–111)
CHOLEST SERPL-MCNC: 148 MG/DL (ref 100–199)
CO2 SERPL-SCNC: 28 MEQ/L (ref 22–29)
CREAT SERPL-MCNC: 0.8 MG/DL (ref 0.5–0.9)
DEPRECATED MEAN GLUCOSE BLD GHB EST-ACNC: 105 MG/DL (ref 70–126)
DEPRECATED RDW RBC AUTO: 42.6 FL (ref 35–45)
EOSINOPHIL NFR BLD AUTO: 1.4 %
EOSINOPHILS ABSOLUTE: 0.1 THOU/MM3 (ref 0–0.4)
ERYTHROCYTE [DISTWIDTH] IN BLOOD BY AUTOMATED COUNT: 13.3 % (ref 11.5–14.5)
FERRITIN SERPL IA-MCNC: 132 NG/ML (ref 13–150)
FOLATE SERPL-MCNC: > 20 NG/ML (ref 4.6–34.8)
GFR SERPL CREATININE-BSD FRML MDRD: 87 ML/MIN/1.73M2
GLUCOSE SERPL-MCNC: 93 MG/DL (ref 74–109)
HBA1C MFR BLD HPLC: 5.5 % (ref 4–6)
HCT VFR BLD AUTO: 45 % (ref 37–47)
HDLC SERPL-MCNC: 48 MG/DL
HGB BLD-MCNC: 14.7 GM/DL (ref 12–16)
IMM GRANULOCYTES # BLD AUTO: 0.01 THOU/MM3 (ref 0–0.07)
IMM GRANULOCYTES NFR BLD AUTO: 0.2 %
IRON SATN MFR SERPL: 26 % (ref 20–50)
IRON SERPL-MCNC: 85 UG/DL (ref 37–145)
LDLC SERPL CALC-MCNC: 77 MG/DL
LYMPHOCYTES ABSOLUTE: 1.6 THOU/MM3 (ref 1–4.8)
LYMPHOCYTES NFR BLD AUTO: 32.3 %
MCH RBC QN AUTO: 28.5 PG (ref 26–33)
MCHC RBC AUTO-ENTMCNC: 32.7 GM/DL (ref 32.2–35.5)
MCV RBC AUTO: 87.4 FL (ref 81–99)
MONOCYTES ABSOLUTE: 0.3 THOU/MM3 (ref 0.4–1.3)
MONOCYTES NFR BLD AUTO: 5.5 %
NEUTROPHILS ABSOLUTE: 2.9 THOU/MM3 (ref 1.8–7.7)
NEUTROPHILS NFR BLD AUTO: 60 %
NRBC BLD AUTO-RTO: 0 /100 WBC
PLATELET # BLD AUTO: 203 THOU/MM3 (ref 130–400)
PMV BLD AUTO: 10.8 FL (ref 9.4–12.4)
POTASSIUM SERPL-SCNC: 4.8 MEQ/L (ref 3.5–5.2)
PREALB SERPL-MCNC: 23.2 MG/DL (ref 20–40)
PROT SERPL-MCNC: 6.9 G/DL (ref 6.4–8.3)
PTH-INTACT SERPL-MCNC: 40 PG/ML (ref 15–65)
RBC # BLD AUTO: 5.15 MILL/MM3 (ref 4.2–5.4)
SODIUM SERPL-SCNC: 142 MEQ/L (ref 135–145)
TIBC SERPL-MCNC: 332 UG/DL (ref 171–450)
TRIGL SERPL-MCNC: 117 MG/DL (ref 0–199)
TSH SERPL DL<=0.05 MIU/L-ACNC: 1.52 UIU/ML (ref 0.27–4.2)
VIT B12 SERPL-MCNC: 1735 PG/ML (ref 232–1245)
WBC # BLD AUTO: 4.9 THOU/MM3 (ref 4.8–10.8)

## 2025-03-27 PROCEDURE — 80061 LIPID PANEL: CPT

## 2025-03-27 PROCEDURE — 83036 HEMOGLOBIN GLYCOSYLATED A1C: CPT

## 2025-03-27 PROCEDURE — 84134 ASSAY OF PREALBUMIN: CPT

## 2025-03-27 PROCEDURE — 83550 IRON BINDING TEST: CPT

## 2025-03-27 PROCEDURE — 82607 VITAMIN B-12: CPT

## 2025-03-27 PROCEDURE — 83970 ASSAY OF PARATHORMONE: CPT

## 2025-03-27 PROCEDURE — 36415 COLL VENOUS BLD VENIPUNCTURE: CPT

## 2025-03-27 PROCEDURE — 84443 ASSAY THYROID STIM HORMONE: CPT

## 2025-03-27 PROCEDURE — 84425 ASSAY OF VITAMIN B-1: CPT

## 2025-03-27 PROCEDURE — 82746 ASSAY OF FOLIC ACID SERUM: CPT

## 2025-03-27 PROCEDURE — 85025 COMPLETE CBC W/AUTO DIFF WBC: CPT

## 2025-03-27 PROCEDURE — 80053 COMPREHEN METABOLIC PANEL: CPT

## 2025-03-27 PROCEDURE — 82728 ASSAY OF FERRITIN: CPT

## 2025-03-27 PROCEDURE — 83540 ASSAY OF IRON: CPT

## 2025-03-27 PROCEDURE — 82306 VITAMIN D 25 HYDROXY: CPT

## 2025-03-31 LAB — VIT B1 PYROPHOSHATE BLD-SCNC: 182 NMOL/L (ref 70–180)

## 2025-04-08 ENCOUNTER — OFFICE VISIT (OUTPATIENT)
Dept: BARIATRICS/WEIGHT MGMT | Age: 55
End: 2025-04-08

## 2025-04-08 VITALS
WEIGHT: 172 LBS | HEART RATE: 75 BPM | HEIGHT: 60 IN | BODY MASS INDEX: 33.77 KG/M2 | DIASTOLIC BLOOD PRESSURE: 68 MMHG | TEMPERATURE: 97.5 F | SYSTOLIC BLOOD PRESSURE: 106 MMHG

## 2025-04-08 DIAGNOSIS — R13.10 DYSPHAGIA, UNSPECIFIED TYPE: ICD-10-CM

## 2025-04-08 DIAGNOSIS — G89.29 CHRONIC BILATERAL LOW BACK PAIN, UNSPECIFIED WHETHER SCIATICA PRESENT: ICD-10-CM

## 2025-04-08 DIAGNOSIS — Z90.3 S/P GASTRIC SLEEVE PROCEDURE: Primary | ICD-10-CM

## 2025-04-08 DIAGNOSIS — M54.50 CHRONIC BILATERAL LOW BACK PAIN, UNSPECIFIED WHETHER SCIATICA PRESENT: ICD-10-CM

## 2025-04-08 NOTE — PROGRESS NOTES
Resource Strain (CARDIA)     Difficulty of Paying Living Expenses: Not hard at all   Food Insecurity: Unknown (3/9/2024)    Received from [facility], [facility] and [facility]    Food Insecurities     Worried about running out of food: Not on file     Food Bought: Not on file   Transportation Needs: Unknown (3/9/2024)    Received from [facility], [facility]    Transportation     Worried about transportation: Not on file   Physical Activity: Not on file   Stress: Not on file   Social Connections: Not on file   Intimate Partner Violence: Unknown (3/9/2024)    Received from [facility] [facility], [facility]    Interpersonal Safety     Feel physically or emotionally unsafe where currently live: Not on file     Harm by anyone: Not on file     Emotionally Harmed: Not on file   Housing Stability: Unknown (3/9/2024)    Received from [facility] [employer] [facility], [facility] and [facility]    Housing/Utilities     Worried about losing home: Not on file     Stayed outside house: Not on file     Unable to get utilities: Not on file        Medications:   Current Outpatient Medications   Medication Sig Dispense Refill    calcium carbonate (OSCAL) 500 MG TABS tablet Take 1 tablet by mouth daily      Multiple Vitamins-Minerals (CELEBRATE MULTI-COMPLETE 45) CAPS Take by mouth      cyclobenzaprine (FLEXERIL) 10 MG tablet  (Patient not taking: Reported on 4/8/2025)      diclofenac sodium (VOLTAREN) 1 % GEL  (Patient not taking: Reported on 4/8/2025)       No current facility-administered medications for this visit.       Allergies:   Allergies   Allergen Reactions    Codeine Rash and Hives    Hydromorphone Hcl Hives    Other Other (See Comments)     Unable to have NSAIDs due to gastric sleeve       Subjective:    Review of Systems:  Constitutional: Denies

## 2025-04-08 NOTE — PATIENT INSTRUCTIONS
Stay well hydrated. Drink a minimum of 64 oz of non-carbonated, non-caffeinated fluids daily.  Nutritional education occurred during visit. Tolerating diet. Continue following with dietitian and follow their recommendations as directed. Continue  60-80 grams of protein each day.    Signs and symptoms reviewed with patient that would be concerning and need her to return to office for re-evaluation. Patient will call if any questions or concerns arrise.  Importance of physical activity discussed with patient.   Increase physical activity as tolerated  Add strength training  Continue taking Multivitamin and Calcium   Encouraged to attend support groups  Annual labs reviewed with patient today  SECA scale completed and reviewed with patient today  Measurements completed and reviewed with patient today  Will send referral to Dr. Laws for EGD with dilation  Increase protein. Encouraged 70-80 grams daily. Encouraged to add 1 protein shake daily.

## 2025-04-24 ENCOUNTER — HOSPITAL ENCOUNTER (OUTPATIENT)
Dept: AUDIOLOGY | Age: 55
Discharge: HOME | End: 2025-04-24
Payer: COMMERCIAL

## 2025-04-24 PROCEDURE — 92550 TYMPANOMETRY & REFLEX THRESH: CPT | Performed by: AUDIOLOGIST

## 2025-04-24 PROCEDURE — 92557 COMPREHENSIVE HEARING TEST: CPT | Performed by: AUDIOLOGIST

## 2025-04-24 NOTE — PROGRESS NOTES
AUDIOLOGICAL EVALUATION      REASON FOR TESTING:  Audiometric evaluation per the request of Dr. Dover, due to the diagnosis of hearing difficulty, unspecified laterality. The patient reports decreased hearing, slight tinnitus and numbness for the left ear. Frequent ear infections in the left ear. She denies any vertigo.    Previous results 6/5/2019: Pure tone audiometry revealed normal hearing sensitivity for both ears. Word recognition ability is excellent at 100%, bilaterally. Tympanometry revealed normal ear canal volume, normal peak pressure and normal middle ear compliance for both ears. VNG REVEALED BPPV (STRONGER WITH THE LEFT EAR DOWN). ALL OTHER RESULTS WERE NEGATIVE. VESTIBULAR REHABILITATION WITH A REPOSITIONING MANEUVER IS RECOMMENDED.        OTOSCOPY: Clear canal with normal appearing tympanic membrane for both ears.       AUDIOGRAM                  Reliability: Good    COMMENTS:  Pure tone audiometry revealed normal hearing sensitivity for the right ear and normal hearing through 4000Hz sloping to mild high frequency sensorineural hearing loss at 6KHz-8KHz for the left ear. Word recognition ability is excellent at 96% for the left ear and excellent at 92% for the right ear. Tympanometry revealed normal ear canal volume, normal peak pressure and normal middle ear compliance for both ears. Hearing thresholds have declined for both ears, relative to previous testing on 6/5/2019. Ipsilateral and contralateral acoustic reflexes were present for the right ear. Ipsilateral reflexes were present and contralateral reflexes were absent for the left ear.      RECOMMENDATION(S):   1- Continue care with Dr. Dover, as scheduled.  2- Repeat audiogram and tympanogram in 6-12 months, per physician discretion, for monitoring. Testing can be completed sooner if any changes are noted.  3- Further evaluation to rule out a retrocochlear lesion in the unilateral sensorineural hearing loss for the left ear is

## 2025-04-29 ENCOUNTER — HOSPITAL ENCOUNTER (OUTPATIENT)
Dept: PHYSICAL THERAPY | Age: 55
Setting detail: THERAPIES SERIES
Discharge: HOME | End: 2025-04-29
Payer: COMMERCIAL

## 2025-04-29 PROCEDURE — 97161 PT EVAL LOW COMPLEX 20 MIN: CPT

## 2025-04-29 PROCEDURE — 97110 THERAPEUTIC EXERCISES: CPT

## 2025-05-01 ENCOUNTER — OFFICE VISIT (OUTPATIENT)
Dept: ENT CLINIC | Age: 55
End: 2025-05-01

## 2025-05-01 VITALS
WEIGHT: 168.9 LBS | TEMPERATURE: 97.8 F | DIASTOLIC BLOOD PRESSURE: 62 MMHG | HEART RATE: 76 BPM | BODY MASS INDEX: 33.16 KG/M2 | OXYGEN SATURATION: 97 % | HEIGHT: 60 IN | SYSTOLIC BLOOD PRESSURE: 106 MMHG | RESPIRATION RATE: 18 BRPM

## 2025-05-01 DIAGNOSIS — H90.3 ASYMMETRIC SNHL (SENSORINEURAL HEARING LOSS): Primary | ICD-10-CM

## 2025-05-01 RX ORDER — PANTOPRAZOLE SODIUM 40 MG/1
TABLET, DELAYED RELEASE ORAL
COMMUNITY
Start: 2025-04-22

## 2025-05-01 NOTE — PROGRESS NOTES
[facility] [place] [facility]  [facility] EAR, NOSE AND THROAT  [address]  [address]  [facility] [address]  Dept: [phone]  Dept Fax: [phone]  Loc: [phone]    Eusebia Love is a 55 y.o. female who was referred byNo ref. provider found for:  Chief Complaint   Patient presents with    Follow-up     Patient here for f/u to review audio/tymp. Patient states that things have been the same.    .    HPI:    The patient reports decreased hearing, slight tinnitus and numbness for the left ear. Frequent ear infections in the left ear. She denies any vertigo. Pure tone audiometry revealed normal hearing sensitivity for the right ear and normal hearing through 4000Hz sloping to mild high frequency sensorineural hearing loss at 6KHz-8KHz for the left ear. Word recognition ability is excellent at 96% for the left ear and excellent at 92% for the right ear. Tympanometry revealed normal ear canal volume, normal peak pressure and normal middle ear compliance for both ears. Hearing thresholds have declined for both ears, relative to previous testing on 6/5/2019. Ipsilateral and contralateral acoustic reflexes were present for the right ear. Ipsilateral reflexes were present and contralateral reflexes were absent for the left ear. MRI brain 10/2024: was negative.    Eusebia Love is a 55 y.o. female who presents today for hearing loss. Hx.BPPV in 2019. Hearing test was normal in 2019. She has no prior history of ear disease or ear surgeries. No hearing loss, tinnitus, otorrhea or otalgia. She does report left TMJ pain with chewing.     History:     Allergies   Allergen Reactions    Codeine Rash and Hives    Hydromorphone Hcl Hives    Other Other (See Comments)     Unable to have NSAIDs due to gastric sleeve     Current Outpatient Medications   Medication Sig Dispense Refill    pantoprazole (PROTONIX) 40 MG tablet TAKE 1 TABLET 1/2 HOUR BEFORE BREAKFAST EVERYDAY FOR 30 DAYS

## 2025-05-05 ENCOUNTER — HOSPITAL ENCOUNTER (OUTPATIENT)
Dept: PHYSICAL THERAPY | Age: 55
Setting detail: THERAPIES SERIES
Discharge: HOME | End: 2025-05-05
Payer: COMMERCIAL

## 2025-05-05 PROCEDURE — 97140 MANUAL THERAPY 1/> REGIONS: CPT

## 2025-05-05 PROCEDURE — 97110 THERAPEUTIC EXERCISES: CPT

## 2025-05-05 NOTE — PROGRESS NOTES
compliant and independent with HEP.       Patient Education:   []  HEP/Education Completed: Plan of Care, Goals, body mechanics, HEP  [employer] Access Code: [email or web address]   [x]  No new Education completed  []  Reviewed Prior HEP      [x]  Patient verbalized and/or demonstrated understanding of education provided.  []  Patient unable to verbalize and/or demonstrate understanding of education provided.  Will continue education.  []  Barriers to learning:     PLAN:  Treatment Recommendations: Strengthening, Range of Motion, Endurance Training, Neuromuscular Re-education, Manual Therapy - Soft Tissue Mobilization, Pain Management, Home Exercise Program, Patient Education, and Modalities    []  Plan of care initiated.  Plan to see patient 2 times per week for 6 weeks to address the treatment planned outlined above.  [x]  Continue with current plan of care  []  Modify plan of care as follows:    []  Hold pending physician visit  []  Discharge    Time In 1100   Time Out 1141   Timed Code Minutes: 41   Total Treatment Time: 41       Electronically Signed by: Jasmin Beck PTA

## 2025-05-07 ENCOUNTER — HOSPITAL ENCOUNTER (OUTPATIENT)
Dept: PHYSICAL THERAPY | Age: 55
Setting detail: THERAPIES SERIES
Discharge: HOME | End: 2025-05-07
Payer: COMMERCIAL

## 2025-05-07 PROCEDURE — 97140 MANUAL THERAPY 1/> REGIONS: CPT

## 2025-05-07 PROCEDURE — 97530 THERAPEUTIC ACTIVITIES: CPT

## 2025-05-07 NOTE — PROGRESS NOTES
[facility]  PHYSICAL THERAPY  [] EVALUATION  [x] DAILY NOTE (LAND) [] DAILY NOTE (AQUATIC ) [] PROGRESS NOTE [] DISCHARGE NOTE    [x] OUTPATIENT REHABILITATION CENTER [facility]   [] [place] [facility]    [] [facility]   [] [facility]    Date: 2025  Patient Name:  Eusebia Love \"Lynne\"   : 1970  MRN: [id number]  [facility]: [id number]    Referring Practitioner Josie Cardoza MD [id number]      Diagnosis  Diagnoses       Z98.1 (ICD-10-CM) - Arthrodesis status    M43.12 (ICD-10-CM) - Spondylolisthesis, cervical region           Treatment Diagnosis M54.2  Neck Pain  M54.6  Thoracic Pain  R53.1 Weakness  M25.60 Stiffness of Unspecified Joint   Date of Evaluation 25   Additional Pertinent History Eusebia Love has a past medical history of Anxiety, Arthritis, Bell's palsy, Body mass index 40.0-44.9, adult (HCC), Esophagitis, Fatty liver, GERD (gastroesophageal reflux disease), Hypertension, IBS (irritable bowel syndrome), Morbid obesity with BMI of 40.0-44.9, adult (HCC), MRSA (methicillin resistant staph aureus) culture positive, JESSE (obstructive sleep apnea), UTI (urinary tract infection), V tach (HCC), and Vertigo.  she has a past surgical history that includes Cholecystectomy; Dilation and curettage of uterus; Breast reduction surgery (10/2007); Hysterectomy; Endoscopy, colon, diagnostic; Colonoscopy; bladder suspension; Upper gastrointestinal endoscopy (N/A, 2019); Upper gastrointestinal endoscopy (2019); cervical fusion (); Lumbar spine surgery (10/17/2019); back surgery; lumbar fusion (N/A, 4/3/2020); Carpal tunnel release; and Sleeve Gastrectomy (N/A, 4/3/2023).     Allergies Allergies   Allergen Reactions    Codeine Rash and Hives    Hydromorphone Hcl Hives    Other Other (See Comments)     Unable to have NSAIDs due to gastric sleeve      Medications   Current Outpatient Medications:     pantoprazole (PROTONIX) 40 MG

## 2025-05-12 ENCOUNTER — HOSPITAL ENCOUNTER (OUTPATIENT)
Dept: PHYSICAL THERAPY | Age: 55
Setting detail: THERAPIES SERIES
Discharge: HOME | End: 2025-05-12
Payer: COMMERCIAL

## 2025-05-12 PROCEDURE — 97140 MANUAL THERAPY 1/> REGIONS: CPT

## 2025-05-12 PROCEDURE — 97110 THERAPEUTIC EXERCISES: CPT

## 2025-05-12 NOTE — PROGRESS NOTES
[facility]  PHYSICAL THERAPY  [] EVALUATION  [x] DAILY NOTE (LAND) [] DAILY NOTE (AQUATIC ) [] PROGRESS NOTE [] DISCHARGE NOTE    [x] OUTPATIENT REHABILITATION CENTER [facility]   [] [place] [facility]    [] [facility]   [] [facility]    Date: 2025  Patient Name:  Eusebia Love \"Lynne\"   : 1970  MRN: [id number]  [facility]: [id number]    Referring Practitioner Josie Cardoza MD [id number]      Diagnosis  Diagnoses       Z98.1 (ICD-10-CM) - Arthrodesis status    M43.12 (ICD-10-CM) - Spondylolisthesis, cervical region           Treatment Diagnosis M54.2  Neck Pain  M54.6  Thoracic Pain  R53.1 Weakness  M25.60 Stiffness of Unspecified Joint   Date of Evaluation 25   Additional Pertinent History Eusebia Love has a past medical history of Anxiety, Arthritis, Bell's palsy, Body mass index 40.0-44.9, adult (HCC), Esophagitis, Fatty liver, GERD (gastroesophageal reflux disease), Hypertension, IBS (irritable bowel syndrome), Morbid obesity with BMI of 40.0-44.9, adult (HCC), MRSA (methicillin resistant staph aureus) culture positive, JESSE (obstructive sleep apnea), UTI (urinary tract infection), V tach (HCC), and Vertigo.  she has a past surgical history that includes Cholecystectomy; Dilation and curettage of uterus; Breast reduction surgery (10/2007); Hysterectomy; Endoscopy, colon, diagnostic; Colonoscopy; bladder suspension; Upper gastrointestinal endoscopy (N/A, 2019); Upper gastrointestinal endoscopy (2019); cervical fusion (); Lumbar spine surgery (10/17/2019); back surgery; lumbar fusion (N/A, 4/3/2020); Carpal tunnel release; and Sleeve Gastrectomy (N/A, 4/3/2023).     Allergies Allergies   Allergen Reactions    Codeine Rash and Hives    Hydromorphone Hcl Hives    Other Other (See Comments)     Unable to have NSAIDs due to gastric sleeve      Medications   Current Outpatient Medications:     pantoprazole (PROTONIX) 40 MG

## 2025-05-14 ENCOUNTER — APPOINTMENT (OUTPATIENT)
Dept: PHYSICAL THERAPY | Age: 55
End: 2025-05-14
Payer: COMMERCIAL

## 2025-05-19 ENCOUNTER — HOSPITAL ENCOUNTER (OUTPATIENT)
Dept: PHYSICAL THERAPY | Age: 55
Setting detail: THERAPIES SERIES
Discharge: HOME | End: 2025-05-19
Payer: COMMERCIAL

## 2025-05-19 PROCEDURE — 97140 MANUAL THERAPY 1/> REGIONS: CPT

## 2025-05-19 PROCEDURE — 97110 THERAPEUTIC EXERCISES: CPT

## 2025-05-19 NOTE — PROGRESS NOTES
[facility]  PHYSICAL THERAPY  [] EVALUATION  [x] DAILY NOTE (LAND) [] DAILY NOTE (AQUATIC ) [] PROGRESS NOTE [] DISCHARGE NOTE    [x] OUTPATIENT REHABILITATION CENTER [facility]   [] [place] [facility]    [] [facility]   [] [facility]    Date: 2025  Patient Name:  Eusebia Love \"Lynne\"   : 1970  MRN: [id number]  [facility]: [id number]    Referring Practitioner Josie Cardoza MD [id number]      Diagnosis  Diagnoses       Z98.1 (ICD-10-CM) - Arthrodesis status    M43.12 (ICD-10-CM) - Spondylolisthesis, cervical region           Treatment Diagnosis M54.2  Neck Pain  M54.6  Thoracic Pain  R53.1 Weakness  M25.60 Stiffness of Unspecified Joint   Date of Evaluation 25   Additional Pertinent History Eusebia Love has a past medical history of Anxiety, Arthritis, Bell's palsy, Body mass index 40.0-44.9, adult (HCC), Esophagitis, Fatty liver, GERD (gastroesophageal reflux disease), Hypertension, IBS (irritable bowel syndrome), Morbid obesity with BMI of 40.0-44.9, adult (HCC), MRSA (methicillin resistant staph aureus) culture positive, JESSE (obstructive sleep apnea), UTI (urinary tract infection), V tach (HCC), and Vertigo.  she has a past surgical history that includes Cholecystectomy; Dilation and curettage of uterus; Breast reduction surgery (10/2007); Hysterectomy; Endoscopy, colon, diagnostic; Colonoscopy; bladder suspension; Upper gastrointestinal endoscopy (N/A, 2019); Upper gastrointestinal endoscopy (2019); cervical fusion (); Lumbar spine surgery (10/17/2019); back surgery; lumbar fusion (N/A, 4/3/2020); Carpal tunnel release; and Sleeve Gastrectomy (N/A, 4/3/2023).     Allergies Allergies   Allergen Reactions    Codeine Rash and Hives    Hydromorphone Hcl Hives    Other/Food Other (See Comments)     Unable to have NSAIDs due to gastric sleeve      Medications   Current Outpatient Medications:     pantoprazole (PROTONIX) 40 MG

## 2025-05-21 ENCOUNTER — HOSPITAL ENCOUNTER (OUTPATIENT)
Dept: PHYSICAL THERAPY | Age: 55
Setting detail: THERAPIES SERIES
Discharge: HOME | End: 2025-05-21
Payer: COMMERCIAL

## 2025-05-21 PROCEDURE — 97110 THERAPEUTIC EXERCISES: CPT

## 2025-05-21 PROCEDURE — 97140 MANUAL THERAPY 1/> REGIONS: CPT

## 2025-05-21 NOTE — PROGRESS NOTES
therapy she feels well. Pt notes compliance c HEP, going well.       TREATMENT   Precautions: ACDF C5-C7   Pain: 5/10 Cervical, Upper thoracic 7/10 Lumbar    \"X” in shaded column indicates activity completed today    “*\" next to exercise/intervention indicates progression   Modalities Parameters/  Location  Notes   MHP cervical  Concurrent with supine exercises, skin intact pre/post treatment session   Consider Dry Needling (Needs screened)             Manual Therapy Time/Technique  Notes   STM B UT, suboccipitals  10 min X                Exercise/Intervention   Notes   UBE Retro S3 *  S120 5 min  X           Supine Chin Tuck 2x8x5*s  X    Supine Cervical retraction  2x8x5*s  X           Quadruped Chin Tuck                             Pec Minor Stretch  2x30s   X    Pec Major Stretch  2x30s   X           Median Nerve Glide  10x2      Radial Nerve Glide  10x2      Resisted Row * 2x8x1s [place]  x    Resisted Shoulder Extension  *  2x8x1s [place]  x    Resisted bilateral Shoulder ER       Resisted Horizontal Abduction                 Interventions Next Treatment: Cervical/Scapular strengthening progression per tolerance. Manual/modalities prn for pain management     Activity/Treatment Tolerance:  []  Patient tolerated treatment well  []  Patient limited by fatigue  []  Patient limited by pain   []  Patient limited by medical complications  []  Other:     Assessment: Progressed shoulder girdle strengthening as indicated by * throughout flow sheet. Pt demo good understanding to cueing throughout treatment for improving body mechanics. Pt noted muscular fatigue without exacerbation of pain within treatment session.        GOALS:      Short Term Goals: Deferred to long term     Long Term Goals: 6 weeks  Pt will report decreased pain levels from 3-10/10 to <=1-7/10 for improved comfort and activity tolerance.   Pt will demo cervical spine AROM WFL pain free to aid in ease with ADLs .   Pt will improve NDI score from 18/50 to

## 2025-05-27 ENCOUNTER — HOSPITAL ENCOUNTER (OUTPATIENT)
Dept: PHYSICAL THERAPY | Age: 55
Setting detail: THERAPIES SERIES
Discharge: HOME | End: 2025-05-27
Payer: COMMERCIAL

## 2025-05-27 PROCEDURE — 97110 THERAPEUTIC EXERCISES: CPT

## 2025-05-27 PROCEDURE — 97140 MANUAL THERAPY 1/> REGIONS: CPT

## 2025-05-27 NOTE — PROGRESS NOTES
[facility]  PHYSICAL THERAPY  [] EVALUATION  [x] DAILY NOTE (LAND) [] DAILY NOTE (AQUATIC ) [] PROGRESS NOTE [] DISCHARGE NOTE    [x] OUTPATIENT REHABILITATION CENTER [facility]   [] [place] [facility]    [] [facility]   [] [facility]    Date: 2025  Patient Name:  Eusebia Love \"Lynne\"   : 1970  MRN: [id number]  CSN: [id number]    Referring Practitioner Josie Cardoza MD [id number]      Diagnosis  Diagnoses       Z98.1 (ICD-10-CM) - Arthrodesis status    M43.12 (ICD-10-CM) - Spondylolisthesis, cervical region           Treatment Diagnosis M54.2  Neck Pain  M54.6  Thoracic Pain  R53.1 Weakness  M25.60 Stiffness of Unspecified Joint   Date of Evaluation 25   Additional Pertinent History Eusebia Love has a past medical history of Anxiety, Arthritis, Bell's palsy, Body mass index 40.0-44.9, adult (HCC), Esophagitis, Fatty liver, GERD (gastroesophageal reflux disease), Hypertension, IBS (irritable bowel syndrome), Morbid obesity with BMI of 40.0-44.9, adult (HCC), MRSA (methicillin resistant staph aureus) culture positive, JESSE (obstructive sleep apnea), UTI (urinary tract infection), V tach (HCC), and Vertigo.  she has a past surgical history that includes Cholecystectomy; Dilation and curettage of uterus; Breast reduction surgery (10/2007); Hysterectomy; Endoscopy, colon, diagnostic; Colonoscopy; bladder suspension; Upper gastrointestinal endoscopy (N/A, 2019); Upper gastrointestinal endoscopy (2019); cervical fusion (); Lumbar spine surgery (10/17/2019); back surgery; lumbar fusion (N/A, 4/3/2020); Carpal tunnel release; and Sleeve Gastrectomy (N/A, 4/3/2023).     Allergies Allergies   Allergen Reactions    Codeine Rash and Hives    Hydromorphone Hcl Hives    Other/Food Other (See Comments)     Unable to have NSAIDs due to gastric sleeve      Medications   Current Outpatient Medications:     pantoprazole (PROTONIX) 40 MG

## 2025-05-28 ENCOUNTER — HOSPITAL ENCOUNTER (OUTPATIENT)
Dept: PHYSICAL THERAPY | Age: 55
Setting detail: THERAPIES SERIES
Discharge: HOME | End: 2025-05-28
Payer: COMMERCIAL

## 2025-05-28 PROCEDURE — 97140 MANUAL THERAPY 1/> REGIONS: CPT

## 2025-05-28 PROCEDURE — 97110 THERAPEUTIC EXERCISES: CPT

## 2025-05-28 NOTE — PROGRESS NOTES
[facility]  PHYSICAL THERAPY  [] EVALUATION  [x] DAILY NOTE (LAND) [] DAILY NOTE (AQUATIC ) [] PROGRESS NOTE [] DISCHARGE NOTE    [x] OUTPATIENT REHABILITATION CENTER [facility]   [] [place] [facility]    [] [facility]   [] [facility]    Date: 2025  Patient Name:  Eusebia Love \"Lynne\"   : 1970  MRN: [id number]  [facility]: [id number]    Referring Practitioner Josie Cardoza MD [id number]      Diagnosis  Diagnoses       Z98.1 (ICD-10-CM) - Arthrodesis status    M43.12 (ICD-10-CM) - Spondylolisthesis, cervical region           Treatment Diagnosis M54.2  Neck Pain  M54.6  Thoracic Pain  R53.1 Weakness  M25.60 Stiffness of Unspecified Joint   Date of Evaluation 25   Additional Pertinent History Eusebia Love has a past medical history of Anxiety, Arthritis, Bell's palsy, Body mass index 40.0-44.9, adult (HCC), Esophagitis, Fatty liver, GERD (gastroesophageal reflux disease), Hypertension, IBS (irritable bowel syndrome), Morbid obesity with BMI of 40.0-44.9, adult (HCC), MRSA (methicillin resistant staph aureus) culture positive, JESSE (obstructive sleep apnea), UTI (urinary tract infection), V tach (HCC), and Vertigo.  she has a past surgical history that includes Cholecystectomy; Dilation and curettage of uterus; Breast reduction surgery (10/2007); Hysterectomy; Endoscopy, colon, diagnostic; Colonoscopy; bladder suspension; Upper gastrointestinal endoscopy (N/A, 2019); Upper gastrointestinal endoscopy (2019); cervical fusion (); Lumbar spine surgery (10/17/2019); back surgery; lumbar fusion (N/A, 4/3/2020); Carpal tunnel release; and Sleeve Gastrectomy (N/A, 4/3/2023).     Allergies Allergies   Allergen Reactions    Codeine Rash and Hives    Hydromorphone Hcl Hives    Other/Food Other (See Comments)     Unable to have NSAIDs due to gastric sleeve      Medications   Current Outpatient Medications:     pantoprazole (PROTONIX) 40 MG

## 2025-06-03 ENCOUNTER — HOSPITAL ENCOUNTER (OUTPATIENT)
Dept: PHYSICAL THERAPY | Age: 55
Setting detail: THERAPIES SERIES
Discharge: HOME | End: 2025-06-03
Payer: COMMERCIAL

## 2025-06-03 PROCEDURE — 97110 THERAPEUTIC EXERCISES: CPT

## 2025-06-03 NOTE — DISCHARGE SUMMARY
[facility]  PHYSICAL THERAPY  [] EVALUATION  [] DAILY NOTE (LAND) [] DAILY NOTE (AQUATIC ) [] PROGRESS NOTE [x] DISCHARGE NOTE    [x] OUTPATIENT REHABILITATION CENTER [facility]   [] [place] [facility]    [] [facility]   [] [facility]    Date: 6/3/2025  Patient Name:  Eusebia Love \"Lynne\"   : 1970  MRN: [id number]  [facility]: [id number]    Referring Practitioner Josie Cardoza MD [id number]      Diagnosis  Diagnoses       Z98.1 (ICD-10-CM) - Arthrodesis status    M43.12 (ICD-10-CM) - Spondylolisthesis, cervical region           Treatment Diagnosis M54.2  Neck Pain  M54.6  Thoracic Pain  R53.1 Weakness  M25.60 Stiffness of Unspecified Joint   Date of Evaluation 25   Additional Pertinent History Eusebia Love has a past medical history of Anxiety, Arthritis, Bell's palsy, Body mass index 40.0-44.9, adult (HCC), Esophagitis, Fatty liver, GERD (gastroesophageal reflux disease), Hypertension, IBS (irritable bowel syndrome), Morbid obesity with BMI of 40.0-44.9, adult (HCC), MRSA (methicillin resistant staph aureus) culture positive, JESSE (obstructive sleep apnea), UTI (urinary tract infection), V tach (HCC), and Vertigo.  she has a past surgical history that includes Cholecystectomy; Dilation and curettage of uterus; Breast reduction surgery (10/2007); Hysterectomy; Endoscopy, colon, diagnostic; Colonoscopy; bladder suspension; Upper gastrointestinal endoscopy (N/A, 2019); Upper gastrointestinal endoscopy (2019); cervical fusion (); Lumbar spine surgery (10/17/2019); back surgery; lumbar fusion (N/A, 4/3/2020); Carpal tunnel release; and Sleeve Gastrectomy (N/A, 4/3/2023).     Allergies Allergies   Allergen Reactions    Codeine Rash and Hives    Hydromorphone Hcl Hives    Other/Food Other (See Comments)     Unable to have NSAIDs due to gastric sleeve      Medications   Current Outpatient Medications:     pantoprazole (PROTONIX) 40 MG

## 2025-09-06 ENCOUNTER — HOSPITAL ENCOUNTER (EMERGENCY)
Age: 55
Discharge: HOME | End: 2025-09-06
Payer: COMMERCIAL

## 2025-09-06 VITALS
HEART RATE: 82 BPM | TEMPERATURE: 98.3 F | RESPIRATION RATE: 20 BRPM | DIASTOLIC BLOOD PRESSURE: 86 MMHG | BODY MASS INDEX: 27.32 KG/M2 | SYSTOLIC BLOOD PRESSURE: 157 MMHG | WEIGHT: 164 LBS | HEIGHT: 65 IN | OXYGEN SATURATION: 97 %

## 2025-09-06 DIAGNOSIS — S61.251A OPEN WOUND OF LEFT INDEX FINGER DUE TO CAT BITE: Primary | ICD-10-CM
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
## 2025-09-06 PROCEDURE — 99213 OFFICE O/P EST LOW 20 MIN: CPT

## 2025-09-06 RX ORDER — MUPIROCIN 2 %
OINTMENT (GRAM) TOPICAL
Qty: 30 G | Refills: 0 | Status: SHIPPED | OUTPATIENT
Start: 2025-09-06

## 2025-09-06 ASSESSMENT — ENCOUNTER SYMPTOMS
SORE THROAT: 0
CONSTIPATION: 0
SINUS PAIN: 0
EYE PAIN: 0
CHEST TIGHTNESS: 0
RHINORRHEA: 0
VOMITING: 0
NAUSEA: 0
TROUBLE SWALLOWING: 0
APNEA: 0
ABDOMINAL PAIN: 0
DIARRHEA: 0
PHOTOPHOBIA: 0
BACK PAIN: 0
COUGH: 0
WHEEZING: 0
SHORTNESS OF BREATH: 0

## 2025-09-06 ASSESSMENT — PAIN DESCRIPTION - FREQUENCY: FREQUENCY: INTERMITTENT

## 2025-09-06 ASSESSMENT — PAIN DESCRIPTION - DESCRIPTORS: DESCRIPTORS: DISCOMFORT

## 2025-09-06 ASSESSMENT — PAIN SCALES - GENERAL: PAINLEVEL_OUTOF10: 3

## 2025-09-06 ASSESSMENT — PAIN DESCRIPTION - ORIENTATION: ORIENTATION: LEFT

## 2025-09-06 ASSESSMENT — PAIN DESCRIPTION - ONSET: ONSET: SUDDEN

## 2025-09-06 ASSESSMENT — PAIN - FUNCTIONAL ASSESSMENT
PAIN_FUNCTIONAL_ASSESSMENT: ACTIVITIES ARE NOT PREVENTED
PAIN_FUNCTIONAL_ASSESSMENT: 0-10

## 2025-09-06 ASSESSMENT — PAIN DESCRIPTION - PAIN TYPE: TYPE: ACUTE PAIN

## 2025-09-06 ASSESSMENT — PAIN DESCRIPTION - LOCATION: LOCATION: FINGER (COMMENT WHICH ONE)

## (undated) DEVICE — TUBING INSUFFLATOR HEAT HUMIDIFIED SMK EVAC SET PNEUMOCLEAR

## (undated) DEVICE — Device

## (undated) DEVICE — BINDER ABD UNISX 4 PNL PREM 6INX6INX12IN L XL 4

## (undated) DEVICE — SUTURE VCRL + SZ 0 L27IN ABSRB VLT L26MM UR-6 5/8 CIR VCP603H

## (undated) DEVICE — GLOVE ORANGE PI 8 1/2   MSG9085

## (undated) DEVICE — SYRINGE MED 30ML STD CLR PLAS LUERLOCK TIP N CTRL DISP

## (undated) DEVICE — SYRINGE MED 10ML LUERLOCK TIP W/O SFTY DISP

## (undated) DEVICE — TUBING, SUCTION, 1/4" X 20', STRAIGHT: Brand: MEDLINE INDUSTRIES, INC.

## (undated) DEVICE — GLOVE ORANGE PI 8   MSG9080

## (undated) DEVICE — GLOVE SURG SZ 85 L12IN FNGR THK13MIL BRN LTX SYN POLYMER W

## (undated) DEVICE — AEGIS 1" DISK 4MM HOLE, PEEL OPEN: Brand: MEDLINE

## (undated) DEVICE — HEAD POSITIONER, SURGICAL: Brand: DEROYAL

## (undated) DEVICE — SPONGE GZ W4XL4IN COT 12 PLY TYP VII WVN C FLD DSGN

## (undated) DEVICE — STRIP,CLOSURE,WOUND,MEDI-STRIP,1/2X4: Brand: MEDLINE

## (undated) DEVICE — CATHETER ETER IV 22GA L1IN POLYUR STR RADPQ INTROCAN SFTY

## (undated) DEVICE — PACK PROCEDURE SURG SET UP SRMC

## (undated) DEVICE — INTEGUSEAL MICROBIAL SEALANT: Brand: AVANOS

## (undated) DEVICE — BANDAGE COMPR W6INXL5YD WHT BGE POLY COT M E WRP WV HK AND

## (undated) DEVICE — GARMENT,MEDLINE,DVT,INT,CALF,MED, GEN2: Brand: MEDLINE

## (undated) DEVICE — Z CONVERTED USE 2715898 SWABSTICK MEDICATED W1.75XL6.5IN 1.6ML 3.15% CHG 70% ISO

## (undated) DEVICE — 4-PORT MANIFOLD: Brand: NEPTUNE 2

## (undated) DEVICE — PROBE STIM 3 MM FOR PEDCL SCREW DISP

## (undated) DEVICE — TAPE ADH W2INXL10YD PLAS TRNSPAR H2O RESIST HYPOALRG CURAD

## (undated) DEVICE — AGENT HEMSTAT W2XL4IN OXIDIZED REGENERATED CELOS ABSRB SFT

## (undated) DEVICE — TUBING IV STOPCOCK 48 CM 3 W

## (undated) DEVICE — 4.0MM PRECISION ROUND

## (undated) DEVICE — GOWN,SIRUS,NONRNF,SETINSLV,XL,20/CS: Brand: MEDLINE

## (undated) DEVICE — GLOVE SURG SZ 65 THK91MIL LTX FREE SYN POLYISOPRENE

## (undated) DEVICE — TUBING PRSS 36 M F

## (undated) DEVICE — 3M™ STERI-STRIP™ COMPOUND BENZOIN TINCTURE 40 BAGS/CARTON 4 CARTONS/CASE C1544: Brand: 3M™ STERI-STRIP™

## (undated) DEVICE — GOWN,SIRUS,NON REINFRCD,LARGE,SET IN SL: Brand: MEDLINE

## (undated) DEVICE — CANNULA SEAL

## (undated) DEVICE — PACK,UNIVERSAL,NO GOWNS: Brand: MEDLINE

## (undated) DEVICE — SOLUTION IV IRRIG POUR BRL 0.9% SODIUM CHL 2F7124

## (undated) DEVICE — STAPLE ENDOSCP BIOABSORB LN REINF ENDOPATH SEAMGUARD BLK

## (undated) DEVICE — DRAIN SURG 10FR PVC TB W/ TRCR MID PERF NO RESVR HUBLESS

## (undated) DEVICE — SPONGE LAP W18XL18IN WHT COT 4 PLY FLD STRUNG RADPQ DISP ST

## (undated) DEVICE — GLOVE ORANGE PI 7 1/2   MSG9075

## (undated) DEVICE — BASIC SINGLE BASIN BTC-LF: Brand: MEDLINE INDUSTRIES, INC.

## (undated) DEVICE — BIPOLAR SEALER 23-112-1 AQM 6.0: Brand: AQUAMANTYS ®

## (undated) DEVICE — BLADE CLIPPER GEN PURP NS

## (undated) DEVICE — DRESSING TRNSPAR W8XL12IN FLM SURESITE 123

## (undated) DEVICE — TROCAR: Brand: KII FIOS FIRST ENTRY

## (undated) DEVICE — REDUCER: Brand: ENDOWRIST

## (undated) DEVICE — JCKSON TBL POSTNER NO HD REST: Brand: MEDLINE INDUSTRIES, INC.

## (undated) DEVICE — INTENDED FOR TISSUE SEPARATION, AND OTHER PROCEDURES THAT REQUIRE A SHARP SURGICAL BLADE TO PUNCTURE OR CUT.: Brand: BARD-PARKER ® CARBON RIB-BACK BLADES

## (undated) DEVICE — YANKAUER,BULB TIP,W/O VENT,RIGID,STERILE: Brand: MEDLINE

## (undated) DEVICE — SPLINT ARMBRD W3XL10.5IN POLYFOAM DLX A LN

## (undated) DEVICE — SEAL

## (undated) DEVICE — SUTURE VCRL SZ 1 L18IN ABSRB VLT CTX L48MM 1/2 CIR J765D

## (undated) DEVICE — SET ADMIN 25ML L117IN PMP MOD CK VLV RLER CLMP 2 SMRTSITE

## (undated) DEVICE — IV START KIT: Brand: MEDLINE INDUSTRIES, INC.

## (undated) DEVICE — BANDAGE ADH W1XL3IN NAT FAB WVN FLX DURABLE N ADH PD SEAL

## (undated) DEVICE — STAPLE INT BIOABSORBABLE REINF LN SUREFORM 60 GRN SEAMGRD

## (undated) DEVICE — STAPLER 60 RELOAD BLACK: Brand: SUREFORM

## (undated) DEVICE — HYPODERMIC SAFETY NEEDLE: Brand: MAGELLAN

## (undated) DEVICE — ARM DRAPE

## (undated) DEVICE — SET CATH 20GA L1.5IN RAD ART POLYUR RADPQ W/ INTEGR 0.018IN

## (undated) DEVICE — SUTURE ETHBND EXCEL SZ 0 L30IN NONABSORBABLE GRN L26MM SH X834H

## (undated) DEVICE — LINER SUCT CANSTR 1500CC SEMI RIG W/ POR HYDROPHOBIC SHUT

## (undated) DEVICE — BLADELESS OBTURATOR: Brand: WECK VISTA

## (undated) DEVICE — SOLUTION IV 1000ML 0.45% SOD CHL PH 5 INJ USP VIAFLX PLAS

## (undated) DEVICE — VESSEL SEALER EXTEND: Brand: ENDOWRIST

## (undated) DEVICE — GAUZE,SPONGE,8"X4",12PLY,XRAY,STRL,LF: Brand: MEDLINE

## (undated) DEVICE — STAPLER 60 RELOAD GREEN: Brand: SUREFORM

## (undated) DEVICE — SOLUTION ANTIFOG VIS SYS CLEARIFY LAPSCP

## (undated) DEVICE — Z DUP USE 2257490 ADHESIVE SKIN CLSRE 036ML TPCL 2CTL CNCRLTE HIGH VSCSTY DRMB

## (undated) DEVICE — SOLUTION IV 1000ML 0.9% SOD CHL PH 5 INJ USP VIAFLX PLAS

## (undated) DEVICE — VISIGI 3D®  CALIBRATION SYSTEM  SIZE 40FR STD W/ BULB: Brand: BOEHRINGER® VISIGI 3D™ SLEEVE GASTRECTOMY CALIBRATION SYSTEM, SIZE 40FR W/BULB

## (undated) DEVICE — GLOVE ORANGE PI 7   MSG9070

## (undated) DEVICE — TOTAL TRAY, DB, 100% SILI FOLEY, 16FR 10: Brand: MEDLINE

## (undated) DEVICE — SUTURE VCRL + SZ 4-0 L27IN ABSRB WHT FS-2 3/8 CIR REV CUT VCP422H

## (undated) DEVICE — THE MILL DISPOSABLE - MEDIUM

## (undated) DEVICE — SOLUTION IV 500ML 0.9% SOD CHL PH 5 INJ USP VIAFLX PLAS

## (undated) DEVICE — PAD,NON-ADHERENT,3X8,STERILE,LF,1/PK: Brand: MEDLINE

## (undated) DEVICE — COLUMN DRAPE

## (undated) DEVICE — PUMP SUC IRR TBNG L10FT W/ HNDPC ASSEMB STRYKEFLOW 2

## (undated) DEVICE — CODMAN® SURGICAL PATTIES 1" X 1" (2.54CM X 2.54CM): Brand: CODMAN®

## (undated) DEVICE — PACK-MAJOR

## (undated) DEVICE — 500ML,PRESSURE INFUSER W/STOPCOCK: Brand: MEDLINE

## (undated) DEVICE — SUTURE PROL SZ 5-0 L36IN NONABSORBABLE BLU L17MM RB-1 1/2 8556H

## (undated) DEVICE — APPLICATOR MEDICATED 26 CC SOLUTION HI LT ORNG CHLORAPREP

## (undated) DEVICE — CONTAINER,SPECIMEN,PNEU TUBE,4OZ,OR STRL: Brand: MEDLINE

## (undated) DEVICE — ELECTRO LUBE IS A SINGLE PATIENT USE DEVICE THAT IS INTENDED TO BE USED ON ELECTROSURGICAL ELECTRODES TO REDUCE STICKING.: Brand: KEY SURGICAL ELECTRO LUBE

## (undated) DEVICE — STAPLER 60: Brand: SUREFORM

## (undated) DEVICE — KIT EVAC 400CC PVC RADPQ Y CONN

## (undated) DEVICE — PRESSURE MONITORING SET: Brand: TRUWAVE

## (undated) DEVICE — SET CATH 20GA L1.75IN RAD ART POLYUR RADPQ W/ INTEGR